# Patient Record
Sex: MALE | Race: WHITE | NOT HISPANIC OR LATINO | Employment: OTHER | URBAN - METROPOLITAN AREA
[De-identification: names, ages, dates, MRNs, and addresses within clinical notes are randomized per-mention and may not be internally consistent; named-entity substitution may affect disease eponyms.]

---

## 2017-01-10 ENCOUNTER — ANESTHESIA EVENT (OUTPATIENT)
Dept: ENDOSCOPY | Facility: HOSPITAL | Age: 82
End: 2017-01-10
Payer: MEDICARE

## 2017-01-10 ENCOUNTER — SURGERY (OUTPATIENT)
Age: 82
End: 2017-01-10

## 2017-01-10 ENCOUNTER — ANESTHESIA (OUTPATIENT)
Dept: ENDOSCOPY | Facility: HOSPITAL | Age: 82
End: 2017-01-10
Payer: MEDICARE

## 2017-01-10 VITALS — RESPIRATION RATE: 31 BRPM

## 2017-01-10 PROBLEM — K21.9 GERD (GASTROESOPHAGEAL REFLUX DISEASE): Status: ACTIVE | Noted: 2017-01-10

## 2017-01-10 PROCEDURE — D9220A PRA ANESTHESIA: Mod: CRNA,,, | Performed by: NURSE ANESTHETIST, CERTIFIED REGISTERED

## 2017-01-10 PROCEDURE — D9220A PRA ANESTHESIA: Mod: ANES,,, | Performed by: ANESTHESIOLOGY

## 2017-01-10 PROCEDURE — 63600175 PHARM REV CODE 636 W HCPCS: Mod: PO | Performed by: NURSE ANESTHETIST, CERTIFIED REGISTERED

## 2017-01-10 PROCEDURE — 25000003 PHARM REV CODE 250: Mod: PO | Performed by: NURSE ANESTHETIST, CERTIFIED REGISTERED

## 2017-01-10 RX ORDER — GLYCOPYRROLATE 0.2 MG/ML
INJECTION INTRAMUSCULAR; INTRAVENOUS
Status: DISCONTINUED | OUTPATIENT
Start: 2017-01-10 | End: 2017-01-10

## 2017-01-10 RX ORDER — LIDOCAINE HCL/PF 100 MG/5ML
SYRINGE (ML) INTRAVENOUS
Status: DISCONTINUED | OUTPATIENT
Start: 2017-01-10 | End: 2017-01-10

## 2017-01-10 RX ORDER — PROPOFOL 10 MG/ML
VIAL (ML) INTRAVENOUS
Status: DISCONTINUED | OUTPATIENT
Start: 2017-01-10 | End: 2017-01-10

## 2017-01-10 RX ADMIN — PROPOFOL 50 MG: 10 INJECTION, EMULSION INTRAVENOUS at 12:01

## 2017-01-10 RX ADMIN — LIDOCAINE HYDROCHLORIDE 100 SYRINGE: 20 INJECTION, SOLUTION INTRAVENOUS at 12:01

## 2017-01-10 RX ADMIN — GLYCOPYRROLATE 0.2 MG: 0.2 INJECTION, SOLUTION INTRAMUSCULAR; INTRAVENOUS at 12:01

## 2017-01-10 NOTE — ANESTHESIA PREPROCEDURE EVALUATION
01/10/2017  Amor Alcazar is a 81 y.o., male.    [facility] Anesthesia Evaluation      I have reviewed the Medications.     Review of Systems  Anesthesia Hx:  No problems with previous Anesthesia    Social:  Non-Smoker, No Alcohol Use    Cardiovascular:   hyperlipidemia    Pulmonary:  Pulmonary Normal    Renal/:  Renal/ Normal     Hepatic/GI:   GERD    Musculoskeletal:   Arthritis     Neurological:  Neurology Normal    Endocrine:  Endocrine Normal        Physical Exam  General:  Well nourished    Airway/Jaw/Neck:  Airway Findings: Mouth Opening: Normal General Airway Assessment: Adult  Mallampati: II  Jaw/Neck Findings:  Neck ROM: Extension Decreased, Mild      Dental:  Dental Findings: Edentulous   Chest/Lungs:  Chest/Lungs Findings: Clear to auscultation, Normal Respiratory Rate     Heart/Vascular:  Heart Findings: Rate: Normal  Rhythm: Regular Rhythm  Sounds: Normal  Heart murmur: negative Vascular Findings: Normal (No carotid bruits.)       Mental Status:  Mental Status Findings:  Cooperative, Alert and Oriented         Anesthesia Plan  Type of Anesthesia, risks & benefits discussed:  Anesthesia Type:  general  Patient's Preference:   Intra-op Monitoring Plan:   Intra-op Monitoring Plan Comments:   Post Op Pain Control Plan:   Post Op Pain Control Plan Comments:   Induction:   IV  Beta Blocker:  Patient is not currently on a Beta-Blocker (No further documentation required).       Informed Consent: Patient understands risks and agrees with Anesthesia plan.  Questions answered. Anesthesia consent signed with patient.  ASA Score: 2     Day of Surgery Review of History & Physical:        Anesthesia Plan Notes: Propofol general.        Ready For Surgery From Anesthesia Perspective.

## 2017-01-10 NOTE — TRANSFER OF CARE
"Anesthesia Transfer of Care Note    Patient: Amor Alcazar    Procedure(s) Performed: Procedure(s) (LRB):  ESOPHAGOGASTRODUODENOSCOPY (EGD) (N/A)    Patient location: PACU    Anesthesia Type: general    Transport from OR: Transported from OR on room air with adequate spontaneous ventilation    Post pain: adequate analgesia    Post assessment: no apparent anesthetic complications and tolerated procedure well    Post vital signs: stable    Level of consciousness: sedated    Nausea/Vomiting: no nausea/vomiting    Complications: none          Last vitals:   Visit Vitals    BP (!) 146/94 (BP Method: Automatic)    Pulse 69    Temp 36.6 °C (97.8 °F) (Oral)    Resp 18    Ht 5' 11" (1.803 m)    Wt 80.7 kg (178 lb)    SpO2 97%    BMI 24.83 kg/m2     "

## 2017-01-10 NOTE — ANESTHESIA POSTPROCEDURE EVALUATION
"Anesthesia Post Evaluation    Patient: Amor Alcazar    Procedure(s) Performed: Procedure(s) (LRB):  ESOPHAGOGASTRODUODENOSCOPY (EGD) (N/A)    Final Anesthesia Type: general  Patient location during evaluation: PACU  Patient participation: Yes- Able to Participate  Level of consciousness: awake and alert and oriented  Post-procedure vital signs: reviewed and stable  Pain management: adequate  Airway patency: patent  PONV status at discharge: No PONV  Anesthetic complications: no      Cardiovascular status: hemodynamically stable and blood pressure returned to baseline  Respiratory status: unassisted, spontaneous ventilation and room air  Hydration status: euvolemic  Follow-up not needed.        Visit Vitals    /74 (Patient Position: Lying, BP Method: Automatic)    Pulse 85    Temp 36.6 °C (97.9 °F) (Temporal)    Resp 16    Ht 5' 11" (1.803 m)    Wt 80.7 kg (178 lb)    SpO2 96%    BMI 24.83 kg/m2       Pain/Michelle Score: Pain Assessment Performed: Yes (1/10/2017 12:55 PM)  Presence of Pain: non-verbal indicators absent (1/10/2017 12:55 PM)  Michelle Score: 10 (1/10/2017 12:55 PM)      "

## 2017-01-16 ENCOUNTER — PATIENT MESSAGE (OUTPATIENT)
Dept: GASTROENTEROLOGY | Facility: CLINIC | Age: 82
End: 2017-01-16

## 2017-02-01 ENCOUNTER — PATIENT MESSAGE (OUTPATIENT)
Dept: GASTROENTEROLOGY | Facility: CLINIC | Age: 82
End: 2017-02-01

## 2017-04-04 ENCOUNTER — LAB VISIT (OUTPATIENT)
Dept: LAB | Facility: HOSPITAL | Age: 82
End: 2017-04-04
Attending: UROLOGY
Payer: MEDICARE

## 2017-04-04 DIAGNOSIS — C61 PROSTATE CANCER: ICD-10-CM

## 2017-04-04 LAB
PROSTATE SPECIFIC ANTIGEN, TOTAL: 0.51 NG/ML
PSA FREE MFR SERPL: 9.8 %
PSA FREE SERPL-MCNC: 0.05 NG/ML

## 2017-04-04 PROCEDURE — 84153 ASSAY OF PSA TOTAL: CPT

## 2017-04-04 PROCEDURE — 36415 COLL VENOUS BLD VENIPUNCTURE: CPT | Mod: PO

## 2017-04-05 ENCOUNTER — TELEPHONE (OUTPATIENT)
Dept: UROLOGY | Facility: CLINIC | Age: 82
End: 2017-04-05

## 2017-04-11 ENCOUNTER — OFFICE VISIT (OUTPATIENT)
Dept: UROLOGY | Facility: CLINIC | Age: 82
End: 2017-04-11
Payer: MEDICARE

## 2017-04-11 VITALS
SYSTOLIC BLOOD PRESSURE: 151 MMHG | DIASTOLIC BLOOD PRESSURE: 82 MMHG | WEIGHT: 173.31 LBS | BODY MASS INDEX: 24.26 KG/M2 | HEART RATE: 72 BPM | HEIGHT: 71 IN

## 2017-04-11 DIAGNOSIS — C61 PROSTATE CANCER: Primary | ICD-10-CM

## 2017-04-11 PROCEDURE — 96402 CHEMO HORMON ANTINEOPL SQ/IM: CPT | Mod: S$GLB,,, | Performed by: UROLOGY

## 2017-04-11 PROCEDURE — 99999 PR PBB SHADOW E&M-EST. PATIENT-LVL III: CPT | Mod: PBBFAC,,, | Performed by: UROLOGY

## 2017-04-11 PROCEDURE — 99499 UNLISTED E&M SERVICE: CPT | Mod: S$GLB,,, | Performed by: UROLOGY

## 2017-04-11 PROCEDURE — 1157F ADVNC CARE PLAN IN RCRD: CPT | Mod: S$GLB,,, | Performed by: UROLOGY

## 2017-04-11 PROCEDURE — 1160F RVW MEDS BY RX/DR IN RCRD: CPT | Mod: S$GLB,,, | Performed by: UROLOGY

## 2017-04-11 PROCEDURE — 1159F MED LIST DOCD IN RCRD: CPT | Mod: S$GLB,,, | Performed by: UROLOGY

## 2017-04-11 PROCEDURE — 99213 OFFICE O/P EST LOW 20 MIN: CPT | Mod: 25,S$GLB,, | Performed by: UROLOGY

## 2017-04-11 PROCEDURE — 1126F AMNT PAIN NOTED NONE PRSNT: CPT | Mod: S$GLB,,, | Performed by: UROLOGY

## 2017-04-11 NOTE — MR AVS SNAPSHOT
"    [facility]  [address]  [facility] [address]  Phone: [phone]                  Amor Alcazar   2017 8:00 AM   Office Visit    Description:  Male : 1935   Provider:  Tj Arellano MD   Department:  [facility]           Reason for Visit     Prostate Cancer                To Do List           Goals (5 Years of Data)     None      [facility] On Call     [facility] On Call Nurse Care Line -  Assistance  Unless otherwise directed by your provider, please contact [facility], our nurse care line that is available for  assistance.     Registered nurses in the [facility] provide: appointment scheduling, clinical advisement, health education, and other advisory services.  Call: [phone] (toll free)               Medications           Message regarding Medications     Verify the changes and/or additions to your medication regime listed below are the same as discussed with your clinician today.  If any of these changes or additions are incorrect, please notify your healthcare provider.        STOP taking these medications     ranitidine (ZANTAC) 300 MG tablet Take 1 tablet (300 mg total) by mouth every evening. , about 30-60 minutes before bedtime.           Verify that the below list of medications is an accurate representation of the medications you are currently taking.  If none reported, the list may be blank. If incorrect, please contact your healthcare provider. Carry this list with you in case of emergency.           Current Medications            Clinical Reference Information           Your Vitals Were     BP Pulse Height Weight BMI    151/82 (BP Location: Right arm) 72 5' 11" (1.803 m) 78.6 kg (173 lb 4.5 oz) 24.17 kg/m2      Blood Pressure          Most Recent Value    BP  (!)  151/82      Allergies as of 2017     No Known Allergies      Immunizations Administered on Date of Encounter - 2017     None      Language Assistance " Services     ATTENTION: Language assistance services are available, free of charge. Please call [phone].      ATENCIÓN: Si habla luis, tiene a mao disposición servicios gratuitos de asistencia lingüística. Llame al [phone].     CHÚ Ý: N?u b?n nói Ti?ng Vi?t, có các d?ch v? h? tr? ngôn ng? mi?n phí dành cho b?n. G?i s? [phone].         [facility] complies with applicable Federal civil rights laws and does not discriminate on the basis of race, color, national origin, age, disability, or sex.

## 2017-04-11 NOTE — PROGRESS NOTES
UROLOGY [place]  4 11 17    c-c follow up for prostate cancer high gabe    Age 82, accompanied by his wife.    Visits here every 6 months for LHRH agonist therapy. Receives trelstar 22.5 mg      His psa was 0.07 two years ago, 0.17 one year ago, 0.20 six months ago, and 0.51 last week.   On presentation it was 70.      His general health has been good, no special concerns. His stream is good, no pains or burning.  Nocturia x 2.      Has been on LHRH as his only treatment for his prostate malignancy.   Has not had side effects from his trelstar.   Also, his prostate medication is the only medication pt is on.      His first LHRH treatment was firmagon 240 mg in June 2014, the second was firmagon 80 mg one month later, and the third one another firmagon 80 mg a month after that. We had agreed that, after that, he would move to trelstar, as by then his tumor activity would be greatly suppressed and the six- month interval of the trelstar would be an advantage. He agreed to that and we have been keeping him on this treatment      His gabe was 9 (4+5) involving all sextants, done 6/2014      IMP prostate ca  Here for trelstar      rtc 6 mo

## 2017-05-09 ENCOUNTER — PATIENT MESSAGE (OUTPATIENT)
Dept: FAMILY MEDICINE | Facility: CLINIC | Age: 82
End: 2017-05-09

## 2017-06-01 ENCOUNTER — PATIENT MESSAGE (OUTPATIENT)
Dept: FAMILY MEDICINE | Facility: CLINIC | Age: 82
End: 2017-06-01

## 2017-06-28 ENCOUNTER — OFFICE VISIT (OUTPATIENT)
Dept: FAMILY MEDICINE | Facility: CLINIC | Age: 82
End: 2017-06-28
Payer: MEDICARE

## 2017-06-28 VITALS
HEIGHT: 71 IN | BODY MASS INDEX: 24.41 KG/M2 | SYSTOLIC BLOOD PRESSURE: 120 MMHG | HEART RATE: 65 BPM | RESPIRATION RATE: 16 BRPM | DIASTOLIC BLOOD PRESSURE: 74 MMHG | WEIGHT: 174.38 LBS

## 2017-06-28 DIAGNOSIS — M19.90 ARTHRITIS: ICD-10-CM

## 2017-06-28 DIAGNOSIS — Z85.828 HISTORY OF SKIN CANCER: ICD-10-CM

## 2017-06-28 DIAGNOSIS — E78.1 HYPERTRIGLYCERIDEMIA: ICD-10-CM

## 2017-06-28 DIAGNOSIS — E78.5 HYPERLIPIDEMIA, UNSPECIFIED HYPERLIPIDEMIA TYPE: Primary | ICD-10-CM

## 2017-06-28 DIAGNOSIS — C61 MALIGNANT NEOPLASM OF PROSTATE: ICD-10-CM

## 2017-06-28 PROBLEM — K21.9 GERD (GASTROESOPHAGEAL REFLUX DISEASE): Status: RESOLVED | Noted: 2017-01-10 | Resolved: 2017-06-28

## 2017-06-28 PROCEDURE — 1157F ADVNC CARE PLAN IN RCRD: CPT | Mod: S$GLB,,, | Performed by: FAMILY MEDICINE

## 2017-06-28 PROCEDURE — 1159F MED LIST DOCD IN RCRD: CPT | Mod: S$GLB,,, | Performed by: FAMILY MEDICINE

## 2017-06-28 PROCEDURE — 1126F AMNT PAIN NOTED NONE PRSNT: CPT | Mod: S$GLB,,, | Performed by: FAMILY MEDICINE

## 2017-06-28 PROCEDURE — 99214 OFFICE O/P EST MOD 30 MIN: CPT | Mod: S$GLB,,, | Performed by: FAMILY MEDICINE

## 2017-06-28 PROCEDURE — 99499 UNLISTED E&M SERVICE: CPT | Mod: S$GLB,,, | Performed by: FAMILY MEDICINE

## 2017-06-28 PROCEDURE — 99999 PR PBB SHADOW E&M-EST. PATIENT-LVL III: CPT | Mod: PBBFAC,,, | Performed by: FAMILY MEDICINE

## 2017-06-28 NOTE — PROGRESS NOTES
Subjective:     THIS DOCUMENT WAS MADE IN PART WITH [employer] DICTATION SOFTWARE.  OCCASIONALLY THIS SOFTWARE WILL MISINTERPRET WORDS OR PHRASES.     Patient ID: Amor Alcazar is a 82 y.o. male.    Chief Complaint: Hyperlipidemia (follow up;  lipid panel)    HPI    He returns with his wife for a routine checkup. He reports that he is doing well and really has no significant complaints.     Hyperlipidemia, chronic condition, trying to manage with diet really not trying that hard. Still has significant candy suites doughnuts etc.   Hypertriglyceridemia and low HDL is the primary concern.   History of prostate cancer, I did review his recent labs. He reports no acute urinary symptoms. He does follow with urology. There has been a mild increase in his PSA but it remains below 1.0.   History of skin cancer. He does not follow with Dr. Stovall on any regular basis any longer. He has a notice any significant concerns. He still spends quite a bit of time out in the sun and understands the need for protection.    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     No current facility-administered medications on file prior to visit.      Review of patient's allergies indicates:  No Known Allergies  Social History   Substance Use Topics    Smoking status: Never Smoker    Smokeless tobacco: Never Used    Alcohol use Yes      Comment: few beers daily         Review of Systems    Constitutional: Negative for chills, fever and unexpected weight change.   HENT: Negative for ear pain, rhinorrhea and sore throat.    Respiratory: Negative for cough, shortness of breath and wheezing.    Cardiovascular: Negative for chest pain.   Endocrine: Negative for polydipsia and polyuria.   Musculoskeletal: Positive for arthralgias and myalgias.   Skin: Negative for rash.   Allergic/Immunologic: Negative for environmental allergies.   Neurological: Negative for headaches.       Objective:      Physical Exam   Constitutional: He is oriented to person, place, and time. He appears well-developed and well-nourished. No distress.   HENT:   Head: Normocephalic and atraumatic.   Right Ear: External ear normal.   Left Ear: External ear normal.   Mouth/Throat: Oropharynx is clear and moist. No oropharyngeal exudate.   Eyes: Conjunctivae and EOM are normal. Pupils are equal, round, and reactive to light. Right eye exhibits no discharge. Left eye exhibits no discharge. No scleral icterus.   Neck: Normal range of motion. Neck supple. No JVD present.   Cardiovascular: Normal rate, regular rhythm and normal heart sounds.  Exam reveals no gallop and no friction rub.    No murmur heard.  Pulmonary/Chest: Effort normal and breath sounds normal. No respiratory distress.   Abdominal: Soft. Bowel sounds are normal. He exhibits no distension. There is no tenderness.   Lymphadenopathy:     He has no cervical adenopathy.   Neurological: He is alert and oriented to person, place, and time.   Skin: Skin is dry. No rash noted. He is not diaphoretic.   Psychiatric: He has a normal mood and affect. His behavior is normal.   Vitals reviewed.      Assessment:       1. Hyperlipidemia, unspecified hyperlipidemia type    2. Hypertriglyceridemia    3. Malignant neoplasm of prostate    4. Arthritis    5. History of skin cancer        Plan:       Amor was seen today for hyperlipidemia.    Diagnoses and all orders for this  visit:    Hyperlipidemia, unspecified hyperlipidemia type  -     Lipid panel; Future  -     Comprehensive metabolic panel; Future    Hypertriglyceridemia   Counseling on dietary improvements exercise and weight management    Malignant neoplasm of prostate   mild increase in PSA, he will follow back with urology  Arthritis   stable  History of skin cancer   stable, discussed UV protection

## 2017-07-06 ENCOUNTER — LAB VISIT (OUTPATIENT)
Dept: LAB | Facility: HOSPITAL | Age: 82
End: 2017-07-06
Attending: FAMILY MEDICINE
Payer: MEDICARE

## 2017-07-06 DIAGNOSIS — E78.5 HYPERLIPIDEMIA, UNSPECIFIED HYPERLIPIDEMIA TYPE: ICD-10-CM

## 2017-07-06 LAB
ALBUMIN SERPL BCP-MCNC: 3.6 G/DL
ALP SERPL-CCNC: 31 U/L
ALT SERPL W/O P-5'-P-CCNC: 5 U/L
ANION GAP SERPL CALC-SCNC: 6 MMOL/L
AST SERPL-CCNC: 18 U/L
BILIRUB SERPL-MCNC: 0.4 MG/DL
BUN SERPL-MCNC: 11 MG/DL
CALCIUM SERPL-MCNC: 9.2 MG/DL
CHLORIDE SERPL-SCNC: 107 MMOL/L
CHOLEST/HDLC SERPL: 4.4 {RATIO}
CO2 SERPL-SCNC: 27 MMOL/L
CREAT SERPL-MCNC: 1.2 MG/DL
EST. GFR  (AFRICAN AMERICAN): >60 ML/MIN/1.73 M^2
EST. GFR  (NON AFRICAN AMERICAN): 56.8 ML/MIN/1.73 M^2
GLUCOSE SERPL-MCNC: 99 MG/DL
HDL/CHOLESTEROL RATIO: 22.8 %
HDLC SERPL-MCNC: 184 MG/DL
HDLC SERPL-MCNC: 42 MG/DL
LDLC SERPL CALC-MCNC: 116.6 MG/DL
NONHDLC SERPL-MCNC: 142 MG/DL
POTASSIUM SERPL-SCNC: 4.6 MMOL/L
PROT SERPL-MCNC: 7.9 G/DL
SODIUM SERPL-SCNC: 140 MMOL/L
TRIGL SERPL-MCNC: 127 MG/DL

## 2017-07-06 PROCEDURE — 36415 COLL VENOUS BLD VENIPUNCTURE: CPT | Mod: PO

## 2017-07-06 PROCEDURE — 80053 COMPREHEN METABOLIC PANEL: CPT

## 2017-07-06 PROCEDURE — 80061 LIPID PANEL: CPT

## 2017-07-12 ENCOUNTER — OFFICE VISIT (OUTPATIENT)
Dept: FAMILY MEDICINE | Facility: CLINIC | Age: 82
End: 2017-07-12
Payer: MEDICARE

## 2017-07-12 VITALS
HEART RATE: 68 BPM | BODY MASS INDEX: 24.07 KG/M2 | DIASTOLIC BLOOD PRESSURE: 84 MMHG | SYSTOLIC BLOOD PRESSURE: 149 MMHG | WEIGHT: 171.94 LBS | HEIGHT: 71 IN

## 2017-07-12 DIAGNOSIS — C61 MALIGNANT NEOPLASM OF PROSTATE: ICD-10-CM

## 2017-07-12 DIAGNOSIS — Z00.00 ENCOUNTER FOR PREVENTIVE HEALTH EXAMINATION: Primary | ICD-10-CM

## 2017-07-12 DIAGNOSIS — Z85.828 HISTORY OF SKIN CANCER: ICD-10-CM

## 2017-07-12 DIAGNOSIS — E78.1 HYPERTRIGLYCERIDEMIA: ICD-10-CM

## 2017-07-12 DIAGNOSIS — M19.90 ARTHRITIS: ICD-10-CM

## 2017-07-12 DIAGNOSIS — Z96.1 PSEUDOPHAKIA OF BOTH EYES: ICD-10-CM

## 2017-07-12 PROCEDURE — 99397 PER PM REEVAL EST PAT 65+ YR: CPT | Mod: S$GLB,,, | Performed by: NURSE PRACTITIONER

## 2017-07-12 PROCEDURE — 99999 PR PBB SHADOW E&M-EST. PATIENT-LVL III: CPT | Mod: PBBFAC,,, | Performed by: NURSE PRACTITIONER

## 2017-07-12 NOTE — PATIENT INSTRUCTIONS
Counseling and Referral of Other Preventative  (Italic type indicates deductible and co-insurance are waived)    Patient Name: Amor Alcazar  Today's Date: 7/12/2017      SERVICE LIMITATIONS RECOMMENDATION    Vaccines    · Pneumococcal (once after 65)    · Influenza (annually)    · Hepatitis B (if medium/high risk)    · Prevnar 13      Hepatitis B medium/high risk factors:       - End-stage renal disease       - Hemophiliacs who received Factor VII or         IX concentrates       - Clients of institutions for the mentally             retarded       - Persons who live in the same house as          a HepB carrier       - Homosexual men       - Illicit injectable drug abusers     Pneumococcal: Done, no repeat necessary     Influenza: N/A     Hepatitis B: N/A     Prevnar 13: Done, no repeat necessary    Prostate cancer screening (annually to age 75)     Prostate specific antigen (PSA) Shared decision making with Provider. Sometimes a co-pay may be required if the patient decides to have this test. The USPSTF no longer recommends prostate cancer screening routinely in medicine: not to follow    Colorectal cancer screening (to age 75)    · Fecal occult blood test (annual)  · Flexible sigmoidoscopy (5y)  · Screening colonoscopy (10y)  · Barium enema   N/A    Diabetes self-management training (no USPSTF recommendations)  Requires referral by treating physician for patient with diabetes or renal disease. 10 hours of initial DSMT sessions of no less than 30 minutes each in a continuous 12-month period. 2 hours of follow-up DSMT in subsequent years.  N/A    Glaucoma screening (no USPSTF recommendation)  Diabetes mellitus, family history   , age 50 or over    American, age 65 or over  Last done 2016, recommend to repeat every 1  years    Medical nutrition therapy for diabetes or renal disease (no recommended schedule)  Requires referral by treating physician for patient with diabetes or renal disease  or kidney transplant within the past 3 years.  Can be provided in same year as diabetes self-management training (DSMT), and CMS recommends medical nutrition therapy take place after DSMT. Up to 3 hours for initial year and 2 hours in subsequent years.  N/A    Cardiovascular screening blood tests (every 5 years)  · Fasting lipid panel  Order as a panel if possible  Done this year, repeat every year    Diabetes screening tests (at least every 3 years, Medicare covers annually or at 6-month intervals for prediabetic patients)  · Fasting blood sugar (FBS) or glucose tolerance test (GTT)  Patient must be diagnosed with one of the following:       - Hypertension       - Dyslipidemia       - Obesity (BMI 30kg/m2)       - Previous elevated impaired FBS or GTT       ... or any two of the following:       - Overweight (BMI 25 but <30)       - Family history of diabetes       - Age 65 or older       - History of gestational diabetes or birth of baby weighing more than 9 pounds  Done this year, repeat every year    Abdominal aortic aneurysm screening (once)  · Sonogram   Limited to patients who meet one of the following criteria:       - Men who are 65-75 years old and have smoked more than 100 cigarette in their lifetime       - Anyone with a family history of abdominal aortic aneurysm       - Anyone recommended for screening by the USPSTF  N/A    HIV screening (annually for increased risk patients)  · HIV-1 and HIV-2 by EIA, or CARMEN, rapid antibody test or oral mucosa transudate  Patients must be at increased risk for HIV infection per USPSTF guidelines or pregnant. Tests covered annually for patient at increased risk or as requested by the patient. Pregnant patients may receive up to 3 tests during pregnancy.  Risks discussed, screening is not recommended    Smoking cessation counseling (up to 8 sessions per year)  Patients must be asymptomatic of tobacco-related conditions to receive as a preventative service.  Non-smoker     Subsequent annual wellness visit  At least 12 months since last AWV  Return in one year     The following information is provided to all patients.  This information is to help you find resources for any of the problems found today that may be affecting your health:                Living healthy guide: www.[facility].[place].[facility]      Understanding Diabetes: www.diabetes.org      Eating healthy: www.cdc.gov/healthyweight      CDC home safety checklist: www.cdc.gov/steadi/patient.html      Agency on Aging: www.goea.[place].[facility]      Alcoholics anonymous (AA): www.aa.org      Physical Activity: www.anastasia.nih.gov/do3lrvi      Tobacco use: www.quitwithusla.org

## 2017-07-19 NOTE — PROGRESS NOTES
"Amor Alcazar presented for a  Medicare AWV and comprehensive Health Risk Assessment today. The following components were reviewed and updated:    · Medical history  · Family History  · Social history  · Allergies and Current Medications  · Health Risk Assessment  · Health Maintenance  · Care Team     ** See Completed Assessments for Annual Wellness Visit within the encounter summary.**       The following assessments were completed:  · Living Situation  · CAGE  · Depression Screening  · Timed Get Up and Go  · Whisper Test  · Cognitive Function Screening  · Nutrition Screening  · ADL Screening  · PAQ Screening    Vitals:    07/12/17 1109   BP: (!) 149/84   BP Location: Left arm   Patient Position: Sitting   BP Method: Automatic   Pulse: 68   Weight: 78 kg (171 lb 15.3 oz)   Height: 5' 11" (1.803 m)     Body mass index is 23.98 kg/m².  Physical Exam   Constitutional: He appears well-developed. No distress.   HENT:   Head: Normocephalic.   Cardiovascular: Normal heart sounds.    Pulmonary/Chest: Breath sounds normal.   Neurological: He is alert.   Skin: Skin is warm and dry.   Psychiatric: He has a normal mood and affect. His behavior is normal. Judgment and thought content normal.         Diagnoses and health risks identified today and associated recommendations/orders:    1. Encounter for preventive health examination  Recommend yearly HRA visit    2. Malignant neoplasm of prostate  Follow urology recs  Followed by Eileen.       3. History of skin cancer  Recommend yearly skin checks  Followed by Norm Alaniz MD .       4. Pseudophakia of both eyes  Stable.     Followed by ophthalmology.       5. Hypertriglyceridemia  Stable.   Continue to monitor  Educate to limit sugar  Followed by Norm Alaniz MD .       6. Arthritis  Stable.     Followed by Norm Alaniz MD .         Provided Amor with a 5-10 year written screening schedule and personal prevention plan. Recommendations were developed " using the USPSTF age appropriate recommendations. Education, counseling, and referrals were provided as needed. After Visit Summary printed and given to patient which includes a list of additional screenings\tests needed.    Return in about 1 year (around 7/12/2018).    Kathie Morrison NP

## 2017-08-08 ENCOUNTER — PATIENT MESSAGE (OUTPATIENT)
Dept: FAMILY MEDICINE | Facility: CLINIC | Age: 82
End: 2017-08-08

## 2017-09-01 ENCOUNTER — PATIENT MESSAGE (OUTPATIENT)
Dept: FAMILY MEDICINE | Facility: CLINIC | Age: 82
End: 2017-09-01

## 2017-09-07 ENCOUNTER — TELEPHONE (OUTPATIENT)
Dept: UROLOGY | Facility: CLINIC | Age: 82
End: 2017-09-07

## 2017-09-07 DIAGNOSIS — C61 PROSTATE CANCER: Primary | ICD-10-CM

## 2017-09-08 DIAGNOSIS — C61 MALIGNANT NEOPLASM OF PROSTATE: Primary | ICD-10-CM

## 2017-10-03 ENCOUNTER — LAB VISIT (OUTPATIENT)
Dept: LAB | Facility: HOSPITAL | Age: 82
End: 2017-10-03
Attending: UROLOGY
Payer: MEDICARE

## 2017-10-03 DIAGNOSIS — C61 MALIGNANT NEOPLASM OF PROSTATE: ICD-10-CM

## 2017-10-03 LAB
PROSTATE SPECIFIC ANTIGEN, TOTAL: 1.3 NG/ML
PSA FREE MFR SERPL: 9.23 %
PSA FREE SERPL-MCNC: 0.12 NG/ML

## 2017-10-03 PROCEDURE — 84153 ASSAY OF PSA TOTAL: CPT

## 2017-10-03 PROCEDURE — 36415 COLL VENOUS BLD VENIPUNCTURE: CPT | Mod: PO

## 2017-10-10 ENCOUNTER — OFFICE VISIT (OUTPATIENT)
Dept: UROLOGY | Facility: CLINIC | Age: 82
End: 2017-10-10
Payer: MEDICARE

## 2017-10-10 VITALS
HEART RATE: 71 BPM | DIASTOLIC BLOOD PRESSURE: 60 MMHG | HEIGHT: 71 IN | BODY MASS INDEX: 24.07 KG/M2 | WEIGHT: 171.94 LBS | SYSTOLIC BLOOD PRESSURE: 140 MMHG

## 2017-10-10 DIAGNOSIS — C61 MALIGNANT NEOPLASM OF PROSTATE: Primary | ICD-10-CM

## 2017-10-10 PROCEDURE — 99999 PR PBB SHADOW E&M-EST. PATIENT-LVL II: CPT | Mod: PBBFAC,,, | Performed by: UROLOGY

## 2017-10-10 PROCEDURE — 96402 CHEMO HORMON ANTINEOPL SQ/IM: CPT | Mod: S$GLB,,, | Performed by: UROLOGY

## 2017-10-10 PROCEDURE — 99499 UNLISTED E&M SERVICE: CPT | Mod: S$GLB,,, | Performed by: UROLOGY

## 2017-10-10 PROCEDURE — 99213 OFFICE O/P EST LOW 20 MIN: CPT | Mod: 25,S$GLB,, | Performed by: UROLOGY

## 2017-10-10 NOTE — PROGRESS NOTES
UROLOGY [place]  10 10 17    c-c prostate ca    Age 82, accompanied by wife. Pt comes in for a trelstar injection.     Pt was diagnosed prostate ca in jun2014. The path report showed positive for adenocarcinoma in all sextants, ranging from 70% to 100% involvement. The gabe sum was 9 (4+5). Psa was 70.    A bone scan showed extensive degenerative change, but no evidence of metastatic disease.      Pt received systemic therapy with hormonal suppresion with firmagon 240 mg and later firmagon 80 im once a month times two months, and then shift to trelstar 22.5 mg im q 6 mo.  I also started him on casodex 50 mg po qd.      Visits here every 6 months for LHRH agonist therapy. Receives trelstar 22.5 mg      His psa was 1.3 last week, 0.51 on 4 4 17, 0.20 on 9 27 16, and 0.07 on 9 23 15      His general health has been good, no special concerns. His stream is good, no pains or burning.  Nocturia x 2.        been on LHRH as his only treatment for his prostate malignancy.   Has not had side effects from his trelstar.   Also, his prostate medication is the only medication pt is on.          IMP prostate ca  Here for trelstar, given by me, L glutteal region      rtc 6 mo

## 2017-12-22 ENCOUNTER — OFFICE VISIT (OUTPATIENT)
Dept: FAMILY MEDICINE | Facility: CLINIC | Age: 82
End: 2017-12-22
Payer: MEDICARE

## 2017-12-22 VITALS
SYSTOLIC BLOOD PRESSURE: 122 MMHG | DIASTOLIC BLOOD PRESSURE: 78 MMHG | HEIGHT: 71 IN | HEART RATE: 72 BPM | RESPIRATION RATE: 16 BRPM | BODY MASS INDEX: 24.26 KG/M2 | WEIGHT: 173.31 LBS

## 2017-12-22 DIAGNOSIS — C61 MALIGNANT NEOPLASM OF PROSTATE: ICD-10-CM

## 2017-12-22 DIAGNOSIS — E78.1 HYPERTRIGLYCERIDEMIA: Primary | ICD-10-CM

## 2017-12-22 PROCEDURE — 99999 PR PBB SHADOW E&M-EST. PATIENT-LVL III: CPT | Mod: PBBFAC,,, | Performed by: FAMILY MEDICINE

## 2017-12-22 PROCEDURE — 99499 UNLISTED E&M SERVICE: CPT | Mod: S$GLB,,, | Performed by: FAMILY MEDICINE

## 2017-12-22 PROCEDURE — 99213 OFFICE O/P EST LOW 20 MIN: CPT | Mod: S$GLB,,, | Performed by: FAMILY MEDICINE

## 2017-12-22 NOTE — PROGRESS NOTES
Subjective:       Patient ID: Amor Alcazar is a 82 y.o. male.    Chief Complaint: Establish Care (Patient here to establish care)    Here as new patient; previously saw Dr. Alaniz. States doing well overall and follows with urology here.        Review of Systems   Constitutional: Negative for chills, fatigue and fever.   Respiratory: Negative for cough, chest tightness and shortness of breath.    Cardiovascular: Negative for chest pain, palpitations and leg swelling.   Gastrointestinal: Negative for abdominal distention and abdominal pain.   Endocrine: Negative for cold intolerance and heat intolerance.   Skin: Negative for rash.   Psychiatric/Behavioral: Negative for dysphoric mood. The patient is not nervous/anxious.        Objective:      Physical Exam   Constitutional: He appears well-developed and well-nourished.   HENT:   Head: Normocephalic and atraumatic.   Cardiovascular: Normal rate, regular rhythm and normal heart sounds.    Pulmonary/Chest: Effort normal and breath sounds normal.   Psychiatric: He has a normal mood and affect.   Nursing note and vitals reviewed.      Assessment:       1. Hypertriglyceridemia    2. Malignant neoplasm of prostate        Plan:       Hypertriglyceridemia    Malignant neoplasm of prostate      Will monitor chronic medical issues and continue current plan of care.      Return in about 6 months (around 6/22/2018), or if symptoms worsen or fail to improve.

## 2018-02-12 ENCOUNTER — TELEPHONE (OUTPATIENT)
Dept: UROLOGY | Facility: CLINIC | Age: 83
End: 2018-02-12

## 2018-02-12 DIAGNOSIS — C61 MALIGNANT NEOPLASM OF PROSTATE: Primary | ICD-10-CM

## 2018-02-12 NOTE — TELEPHONE ENCOUNTER
Patient is scheduled for a 6 month Trelstar on 4/11/18. He needs a referral for the medication and orders for a PSA.

## 2018-04-05 ENCOUNTER — LAB VISIT (OUTPATIENT)
Dept: LAB | Facility: HOSPITAL | Age: 83
End: 2018-04-05
Attending: UROLOGY
Payer: MEDICARE

## 2018-04-05 DIAGNOSIS — C61 MALIGNANT NEOPLASM OF PROSTATE: ICD-10-CM

## 2018-04-05 LAB — COMPLEXED PSA SERPL-MCNC: 1.6 NG/ML

## 2018-04-05 PROCEDURE — 36415 COLL VENOUS BLD VENIPUNCTURE: CPT | Mod: PO

## 2018-04-05 PROCEDURE — 84153 ASSAY OF PSA TOTAL: CPT

## 2018-04-11 ENCOUNTER — OFFICE VISIT (OUTPATIENT)
Dept: UROLOGY | Facility: CLINIC | Age: 83
End: 2018-04-11
Payer: MEDICARE

## 2018-04-11 VITALS
DIASTOLIC BLOOD PRESSURE: 80 MMHG | SYSTOLIC BLOOD PRESSURE: 184 MMHG | HEART RATE: 58 BPM | HEIGHT: 70 IN | WEIGHT: 175.5 LBS | BODY MASS INDEX: 25.13 KG/M2 | RESPIRATION RATE: 18 BRPM

## 2018-04-11 DIAGNOSIS — C61 PROSTATE CA: Primary | ICD-10-CM

## 2018-04-11 PROCEDURE — 99999 PR PBB SHADOW E&M-EST. PATIENT-LVL III: CPT | Mod: PBBFAC,,, | Performed by: UROLOGY

## 2018-04-11 PROCEDURE — 99213 OFFICE O/P EST LOW 20 MIN: CPT | Mod: 25,S$GLB,, | Performed by: UROLOGY

## 2018-04-11 PROCEDURE — 96402 CHEMO HORMON ANTINEOPL SQ/IM: CPT | Mod: S$GLB,,, | Performed by: UROLOGY

## 2018-04-11 PROCEDURE — 99499 UNLISTED E&M SERVICE: CPT | Mod: S$PBB,,, | Performed by: UROLOGY

## 2018-04-11 RX ORDER — ERYTHROMYCIN 5 MG/G
OINTMENT OPHTHALMIC
COMMUNITY
Start: 2018-01-25 | End: 2018-04-11 | Stop reason: ALTCHOICE

## 2018-04-11 NOTE — PROGRESS NOTES
UROLOGY [place]  4 11 18     c-c prostate ca     Age 82, accompanied by wife. Pt comes in for a trelstar injection.      Pt was diagnosed prostate ca in jun2014. The path report showed positive for adenocarcinoma in all sextants, ranging from 70% to 100% involvement. The gabe sum was 9 (4+5). Psa was 70.    A bone scan showed extensive degenerative change, but no evidence of metastatic disease.      Pt received systemic therapy with hormonal suppresion with firmagon 240 mg and later firmagon 80 im once a month times two months, and then shift to trelstar 22.5 mg im q 6 mo.  I also started him on casodex 50 mg po qd.      Visits here every 6 months for LHRH agonist therapy. Receives trelstar 22.5 mg      His psa was 1.6 last week, it was 1.3 six months ago. It was 28 in sept 2015      His general health has been good, no special concerns. His stream is good, no pains or burning.  Nocturia x 2.          been on LHRH as his only treatment for his prostate malignancy.   Has not had side effects from his trelstar.   Also, his prostate medication is the only medication pt is on.          IMP prostate ca  Trelstar, 22.5 mg given by me, Right  glutteal region      rtc 6 mo

## 2018-06-11 ENCOUNTER — OFFICE VISIT (OUTPATIENT)
Dept: FAMILY MEDICINE | Facility: CLINIC | Age: 83
End: 2018-06-11
Payer: MEDICARE

## 2018-06-11 VITALS
RESPIRATION RATE: 16 BRPM | DIASTOLIC BLOOD PRESSURE: 78 MMHG | BODY MASS INDEX: 24.59 KG/M2 | HEIGHT: 70 IN | WEIGHT: 171.75 LBS | HEART RATE: 72 BPM | SYSTOLIC BLOOD PRESSURE: 132 MMHG

## 2018-06-11 DIAGNOSIS — Z00.00 WELLNESS EXAMINATION: Primary | ICD-10-CM

## 2018-06-11 DIAGNOSIS — E78.1 HYPERTRIGLYCERIDEMIA: ICD-10-CM

## 2018-06-11 PROCEDURE — 99397 PER PM REEVAL EST PAT 65+ YR: CPT | Mod: S$GLB,,, | Performed by: FAMILY MEDICINE

## 2018-06-11 PROCEDURE — 99999 PR PBB SHADOW E&M-EST. PATIENT-LVL III: CPT | Mod: PBBFAC,,, | Performed by: FAMILY MEDICINE

## 2018-06-11 PROCEDURE — 99499 UNLISTED E&M SERVICE: CPT | Mod: S$PBB,,, | Performed by: FAMILY MEDICINE

## 2018-06-11 NOTE — PROGRESS NOTES
Subjective:       Patient ID: Amor Alcazar is a 83 y.o. male.    Chief Complaint: Hyperlipidemia (Patient here for 6 month check up)    Here for wellness. Due for labs. Doing well overall.       Hyperlipidemia   This is a chronic problem. The current episode started more than 1 year ago. The problem is controlled. Pertinent negatives include no chest pain or shortness of breath.     Review of Systems   Constitutional: Negative for chills, fatigue and fever.   Respiratory: Negative for cough, chest tightness and shortness of breath.    Cardiovascular: Negative for chest pain, palpitations and leg swelling.   Gastrointestinal: Negative for abdominal distention and abdominal pain.   Endocrine: Negative for cold intolerance and heat intolerance.   Skin: Negative for rash.       Objective:      Physical Exam   Constitutional: He appears well-developed and well-nourished.   HENT:   Head: Normocephalic and atraumatic.   Cardiovascular: Normal rate, regular rhythm and normal heart sounds.    Pulmonary/Chest: Effort normal and breath sounds normal.   Psychiatric: He has a normal mood and affect.   Nursing note and vitals reviewed.      Assessment:       1. Wellness examination    2. Hypertriglyceridemia        Plan:       Wellness examination  -     CBC auto differential; Future; Expected date: 06/11/2018  -     Comprehensive metabolic panel; Future; Expected date: 06/11/2018  -     Lipid panel; Future; Expected date: 06/11/2018    Hypertriglyceridemia  -     CBC auto differential; Future; Expected date: 06/11/2018  -     Comprehensive metabolic panel; Future; Expected date: 06/11/2018  -     Lipid panel; Future; Expected date: 06/11/2018      Update labs and health maintenance.  Will monitor chronic medical issues and continue current plan of care.      Follow-up in about 6 months (around 12/11/2018), or if symptoms worsen or fail to improve.

## 2018-06-14 ENCOUNTER — LAB VISIT (OUTPATIENT)
Dept: LAB | Facility: HOSPITAL | Age: 83
End: 2018-06-14
Attending: FAMILY MEDICINE
Payer: MEDICARE

## 2018-06-14 DIAGNOSIS — Z00.00 WELLNESS EXAMINATION: ICD-10-CM

## 2018-06-14 DIAGNOSIS — E78.1 HYPERTRIGLYCERIDEMIA: ICD-10-CM

## 2018-06-14 LAB
ALBUMIN SERPL BCP-MCNC: 3.9 G/DL
ALP SERPL-CCNC: 27 U/L
ALT SERPL W/O P-5'-P-CCNC: <5 U/L
ANION GAP SERPL CALC-SCNC: 9 MMOL/L
AST SERPL-CCNC: 19 U/L
BASOPHILS # BLD AUTO: 0.06 K/UL
BASOPHILS NFR BLD: 0.9 %
BILIRUB SERPL-MCNC: 0.6 MG/DL
BUN SERPL-MCNC: 10 MG/DL
CALCIUM SERPL-MCNC: 9.6 MG/DL
CHLORIDE SERPL-SCNC: 108 MMOL/L
CHOLEST SERPL-MCNC: 191 MG/DL
CHOLEST/HDLC SERPL: 3.8 {RATIO}
CO2 SERPL-SCNC: 25 MMOL/L
CREAT SERPL-MCNC: 1.1 MG/DL
DIFFERENTIAL METHOD: ABNORMAL
EOSINOPHIL # BLD AUTO: 0.3 K/UL
EOSINOPHIL NFR BLD: 4.1 %
ERYTHROCYTE [DISTWIDTH] IN BLOOD BY AUTOMATED COUNT: 14.9 %
EST. GFR  (AFRICAN AMERICAN): >60 ML/MIN/1.73 M^2
EST. GFR  (NON AFRICAN AMERICAN): >60 ML/MIN/1.73 M^2
GLUCOSE SERPL-MCNC: 95 MG/DL
HCT VFR BLD AUTO: 45.1 %
HDLC SERPL-MCNC: 50 MG/DL
HDLC SERPL: 26.2 %
HGB BLD-MCNC: 14.9 G/DL
IMM GRANULOCYTES # BLD AUTO: 0.02 K/UL
IMM GRANULOCYTES NFR BLD AUTO: 0.3 %
LDLC SERPL CALC-MCNC: 120.6 MG/DL
LYMPHOCYTES # BLD AUTO: 2.8 K/UL
LYMPHOCYTES NFR BLD: 41.8 %
MCH RBC QN AUTO: 32.9 PG
MCHC RBC AUTO-ENTMCNC: 33 G/DL
MCV RBC AUTO: 100 FL
MONOCYTES # BLD AUTO: 0.7 K/UL
MONOCYTES NFR BLD: 10.1 %
NEUTROPHILS # BLD AUTO: 2.8 K/UL
NEUTROPHILS NFR BLD: 42.8 %
NONHDLC SERPL-MCNC: 141 MG/DL
NRBC BLD-RTO: 0 /100 WBC
PLATELET # BLD AUTO: 150 K/UL
PMV BLD AUTO: 10.3 FL
POTASSIUM SERPL-SCNC: 4.7 MMOL/L
PROT SERPL-MCNC: 8.2 G/DL
RBC # BLD AUTO: 4.53 M/UL
SODIUM SERPL-SCNC: 142 MMOL/L
TRIGL SERPL-MCNC: 102 MG/DL
WBC # BLD AUTO: 6.61 K/UL

## 2018-06-14 PROCEDURE — 80053 COMPREHEN METABOLIC PANEL: CPT

## 2018-06-14 PROCEDURE — 85025 COMPLETE CBC W/AUTO DIFF WBC: CPT

## 2018-06-14 PROCEDURE — 80061 LIPID PANEL: CPT

## 2018-06-14 PROCEDURE — 36415 COLL VENOUS BLD VENIPUNCTURE: CPT | Mod: PO

## 2018-07-13 ENCOUNTER — OFFICE VISIT (OUTPATIENT)
Dept: FAMILY MEDICINE | Facility: CLINIC | Age: 83
End: 2018-07-13
Payer: MEDICARE

## 2018-07-13 DIAGNOSIS — C61 MALIGNANT NEOPLASM OF PROSTATE: ICD-10-CM

## 2018-07-13 DIAGNOSIS — E78.1 HYPERTRIGLYCERIDEMIA: ICD-10-CM

## 2018-07-13 DIAGNOSIS — Z00.00 ENCOUNTER FOR PREVENTIVE HEALTH EXAMINATION: Primary | ICD-10-CM

## 2018-07-13 DIAGNOSIS — M19.90 ARTHRITIS: ICD-10-CM

## 2018-07-13 PROCEDURE — 99999 PR PBB SHADOW E&M-EST. PATIENT-LVL III: CPT | Mod: PBBFAC,,, | Performed by: NURSE PRACTITIONER

## 2018-07-13 PROCEDURE — G0439 PPPS, SUBSEQ VISIT: HCPCS | Mod: S$GLB,,, | Performed by: NURSE PRACTITIONER

## 2018-07-13 NOTE — PATIENT INSTRUCTIONS
Counseling and Referral of Other Preventative  (Italic type indicates deductible and co-insurance are waived)    Patient Name: Amor Alcazar  Today's Date: 7/13/2018    Health Maintenance       Date Due Completion Date    Influenza Vaccine 08/01/2018 9/13/2017 (Done)    Override on 9/13/2017: Done    Override on 10/21/2013: Done    Lipid Panel 06/14/2019 6/14/2018    Override on 3/21/2013: Done    TETANUS VACCINE 06/20/2026 6/20/2016    Override on 6/20/2016: Done        No orders of the defined types were placed in this encounter.    The following information is provided to all patients.  This information is to help you find resources for any of the problems found today that may be affecting your health:                Living healthy guide: www.Count includes the [facility].[place].gov      Understanding Diabetes: www.diabetes.org      Eating healthy: www.cdc.gov/healthyweight      [facility] home safety checklist: www.cdc.gov/steadi/patient.html      Agency on Aging: www.goea.[place].gov      Alcoholics anonymous (AA): www.aa.org      Physical Activity: www.anastasia.nih.gov/me8ulkc      Tobacco use: www.quitwithusla.org

## 2018-07-17 VITALS
SYSTOLIC BLOOD PRESSURE: 138 MMHG | HEIGHT: 70 IN | OXYGEN SATURATION: 98 % | HEART RATE: 66 BPM | BODY MASS INDEX: 24.27 KG/M2 | WEIGHT: 169.56 LBS | DIASTOLIC BLOOD PRESSURE: 82 MMHG

## 2018-07-17 NOTE — PROGRESS NOTES
"Amor Alcazar presented for a  Medicare AWV and comprehensive Health Risk Assessment today. The following components were reviewed and updated:    · Medical history  · Family History  · Social history  · Allergies and Current Medications  · Health Risk Assessment  · Health Maintenance  · Care Team     Review of Systems   Constitutional: Negative for chills, fever, malaise/fatigue and weight loss.   Respiratory: Negative for cough, shortness of breath and wheezing.    Cardiovascular: Negative for chest pain.   Gastrointestinal: Negative for abdominal pain, blood in stool, constipation, diarrhea, nausea and vomiting.   Skin: Negative for rash.   Neurological: Negative for dizziness, weakness and headaches.     ** See Completed Assessments for Annual Wellness Visit within the encounter summary.**     The following assessments were completed:  · Living Situation  · CAGE  · Depression Screening  · Timed Get Up and Go  · Whisper Test  · Cognitive Function Screening      · Nutrition Screening  · ADL Screening  · PAQ Screening    Vitals:    07/13/18 0937   BP: 138/82   BP Location: Left arm   Patient Position: Sitting   BP Method: Medium (Manual)   Pulse: 66   SpO2: 98%   Weight: 76.9 kg (169 lb 8.5 oz)   Height: 5' 10" (1.778 m)     Body mass index is 24.33 kg/m².  Physical Exam   Constitutional: He is oriented to person, place, and time. He appears well-nourished.   Cardiovascular: Normal rate, regular rhythm, normal heart sounds and intact distal pulses.    Pulmonary/Chest: Effort normal and breath sounds normal.   Neurological: He is alert and oriented to person, place, and time.   Skin: Skin is warm and dry.   Vitals reviewed.        Diagnoses and health risks identified today and associated recommendations/orders:    1. Encounter for preventive health examination  Reviewed and discussed health maintenance.      2. Hypertriglyceridemia  Stable- continue current treatment and follow up routinely with PCP     3. " Malignant neoplasm of prostate  Stable- continue current treatment and follow up routinely with PCP and urology ()    4. Arthritis  Stable- continue current treatment and follow up routinely with PCP       Provided Amor with a 5-10 year written screening schedule and personal prevention plan. Recommendations were developed using the USPSTF age appropriate recommendations. Education, counseling, and referrals were provided as needed. After Visit Summary printed and given to patient which includes a list of additional screenings\tests needed.    Anusha Yates, NP

## 2018-08-15 ENCOUNTER — PATIENT MESSAGE (OUTPATIENT)
Dept: UROLOGY | Facility: CLINIC | Age: 83
End: 2018-08-15

## 2018-08-15 DIAGNOSIS — C61 MALIGNANT NEOPLASM OF PROSTATE: Primary | ICD-10-CM

## 2018-10-03 ENCOUNTER — PATIENT MESSAGE (OUTPATIENT)
Dept: UROLOGY | Facility: CLINIC | Age: 83
End: 2018-10-03

## 2018-10-05 ENCOUNTER — LAB VISIT (OUTPATIENT)
Dept: LAB | Facility: HOSPITAL | Age: 83
End: 2018-10-05
Attending: UROLOGY
Payer: MEDICARE

## 2018-10-05 DIAGNOSIS — C61 PROSTATE CA: ICD-10-CM

## 2018-10-05 LAB — COMPLEXED PSA SERPL-MCNC: 2.7 NG/ML

## 2018-10-05 PROCEDURE — 36415 COLL VENOUS BLD VENIPUNCTURE: CPT | Mod: PO

## 2018-10-05 PROCEDURE — 84153 ASSAY OF PSA TOTAL: CPT

## 2018-10-11 ENCOUNTER — OFFICE VISIT (OUTPATIENT)
Dept: UROLOGY | Facility: CLINIC | Age: 83
End: 2018-10-11
Payer: MEDICARE

## 2018-10-11 VITALS
HEIGHT: 70 IN | HEART RATE: 75 BPM | DIASTOLIC BLOOD PRESSURE: 81 MMHG | SYSTOLIC BLOOD PRESSURE: 129 MMHG | BODY MASS INDEX: 24.57 KG/M2 | WEIGHT: 171.63 LBS

## 2018-10-11 DIAGNOSIS — C61 MALIGNANT NEOPLASM OF PROSTATE: Primary | ICD-10-CM

## 2018-10-11 PROCEDURE — 99999 PR PBB SHADOW E&M-EST. PATIENT-LVL III: CPT | Mod: PBBFAC,,, | Performed by: UROLOGY

## 2018-10-11 PROCEDURE — 99213 OFFICE O/P EST LOW 20 MIN: CPT | Mod: PBBFAC,PO | Performed by: UROLOGY

## 2018-10-11 PROCEDURE — 96402 CHEMO HORMON ANTINEOPL SQ/IM: CPT | Mod: S$PBB,,, | Performed by: UROLOGY

## 2018-10-11 PROCEDURE — 1101F PT FALLS ASSESS-DOCD LE1/YR: CPT | Mod: CPTII,,, | Performed by: UROLOGY

## 2018-10-11 PROCEDURE — 99212 OFFICE O/P EST SF 10 MIN: CPT | Mod: S$PBB,25,, | Performed by: UROLOGY

## 2018-10-11 RX ADMIN — TRIPTORELIN PAMOATE 22.5 MG: KIT at 01:10

## 2018-10-11 NOTE — PROGRESS NOTES
UROLOGY [place]  10 11 18    4 11 18     c-c prostate ca     Age 82, accompanied by wife. Pt comes in for a trelstar injection every six months.      Pt was diagnosed prostate ca in jun2014. The path report showed positive for adenocarcinoma in all sextants, ranging from 70% to 100% involvement. The gabe sum was 9 (4+5). Psa was 70.    A bone scan showed extensive degenerative change, but no evidence of metastatic disease.      Pt received systemic therapy with hormonal suppresion with firmagon 240 mg and later firmagon 80 im once a month times two months, and then shifted to trelstar 22.5 mg im q 6 mo.  I also started him on casodex 50 mg po qd.      Visits here every 6 months for LHRH agonist therapy. Receives trelstar 22.5 mg and has had no side effects.      His psa was 1.6 six months ago, and 2.7 last week.      His general health has been good, no special concerns. His stream is good, no pains or burning.  Nocturia x 2.        LHRH has been his only treatment for his prostate malignancy.   Also, his prostate medication is the only medication pt is on.    PAST MEDICAL HISTORY:    SURGICAL:  Cataract surgery.     MEDICAL:  Osteoarthritis. Prostate ca, high gabe, on hormonal therapy     FAMILIAL:  No family history of prostate cancer.     SOCIAL:  The patient is , lives in [place].     ALLERGIES:  NKDA.     PHYSICAL EXAMINATION:  ABDOMEN:  Flat.  No masses.  CVA is negative.  No organomegaly or tenderness.  GENITOURINARY:  Penis noncircumcised.  Meatus normal.  Testes normal.  ANA:  requests we do it on next visit in 4/19         IMP prostate ca  Trelstar, 22.5 mg given by me, Right  glutteal region, no problems      rtc 6 mo

## 2019-01-08 ENCOUNTER — TELEPHONE (OUTPATIENT)
Dept: UROLOGY | Facility: CLINIC | Age: 84
End: 2019-01-08

## 2019-01-08 DIAGNOSIS — C61 PROSTATE CA: Primary | ICD-10-CM

## 2019-01-08 NOTE — TELEPHONE ENCOUNTER
----- Message from Summer Hoffmann sent at 1/8/2019 11:35 AM CST -----  Contact: wife Polina Alcazar  Patient wife need to speak to nurse regarding approval from insurance for pt to get shot for cancer per wife       Please call to advice [phone] (home)

## 2019-03-06 ENCOUNTER — PATIENT MESSAGE (OUTPATIENT)
Dept: FAMILY MEDICINE | Facility: CLINIC | Age: 84
End: 2019-03-06

## 2019-03-06 DIAGNOSIS — M19.90 ARTHRITIS: ICD-10-CM

## 2019-03-06 DIAGNOSIS — E78.1 HYPERTRIGLYCERIDEMIA: Primary | ICD-10-CM

## 2019-03-07 ENCOUNTER — PATIENT MESSAGE (OUTPATIENT)
Dept: FAMILY MEDICINE | Facility: CLINIC | Age: 84
End: 2019-03-07

## 2019-03-08 ENCOUNTER — PATIENT MESSAGE (OUTPATIENT)
Dept: FAMILY MEDICINE | Facility: CLINIC | Age: 84
End: 2019-03-08

## 2019-03-11 ENCOUNTER — TELEPHONE (OUTPATIENT)
Dept: FAMILY MEDICINE | Facility: CLINIC | Age: 84
End: 2019-03-11

## 2019-03-12 ENCOUNTER — LAB VISIT (OUTPATIENT)
Dept: LAB | Facility: HOSPITAL | Age: 84
End: 2019-03-12
Attending: FAMILY MEDICINE
Payer: MEDICARE

## 2019-03-12 DIAGNOSIS — M19.90 ARTHRITIS: ICD-10-CM

## 2019-03-12 DIAGNOSIS — E78.1 HYPERTRIGLYCERIDEMIA: ICD-10-CM

## 2019-03-12 LAB
ALBUMIN SERPL BCP-MCNC: 3.8 G/DL
ALP SERPL-CCNC: 27 U/L
ALT SERPL W/O P-5'-P-CCNC: 5 U/L
ANION GAP SERPL CALC-SCNC: 8 MMOL/L
AST SERPL-CCNC: 24 U/L
BASOPHILS # BLD AUTO: 0.06 K/UL
BASOPHILS NFR BLD: 0.8 %
BILIRUB SERPL-MCNC: 0.5 MG/DL
BUN SERPL-MCNC: 15 MG/DL
CALCIUM SERPL-MCNC: 9.8 MG/DL
CHLORIDE SERPL-SCNC: 107 MMOL/L
CHOLEST SERPL-MCNC: 202 MG/DL
CHOLEST/HDLC SERPL: 4 {RATIO}
CO2 SERPL-SCNC: 25 MMOL/L
CREAT SERPL-MCNC: 1.1 MG/DL
DIFFERENTIAL METHOD: ABNORMAL
EOSINOPHIL # BLD AUTO: 0.3 K/UL
EOSINOPHIL NFR BLD: 4 %
ERYTHROCYTE [DISTWIDTH] IN BLOOD BY AUTOMATED COUNT: 14.3 %
EST. GFR  (AFRICAN AMERICAN): >60 ML/MIN/1.73 M^2
EST. GFR  (NON AFRICAN AMERICAN): >60 ML/MIN/1.73 M^2
GLUCOSE SERPL-MCNC: 94 MG/DL
HCT VFR BLD AUTO: 40.8 %
HDLC SERPL-MCNC: 51 MG/DL
HDLC SERPL: 25.2 %
HGB BLD-MCNC: 13.3 G/DL
IMM GRANULOCYTES # BLD AUTO: 0.04 K/UL
IMM GRANULOCYTES NFR BLD AUTO: 0.6 %
LDLC SERPL CALC-MCNC: 124.6 MG/DL
LYMPHOCYTES # BLD AUTO: 2.3 K/UL
LYMPHOCYTES NFR BLD: 32.2 %
MCH RBC QN AUTO: 31.7 PG
MCHC RBC AUTO-ENTMCNC: 32.6 G/DL
MCV RBC AUTO: 97 FL
MONOCYTES # BLD AUTO: 0.8 K/UL
MONOCYTES NFR BLD: 11.5 %
NEUTROPHILS # BLD AUTO: 3.6 K/UL
NEUTROPHILS NFR BLD: 50.9 %
NONHDLC SERPL-MCNC: 151 MG/DL
NRBC BLD-RTO: 0 /100 WBC
PLATELET # BLD AUTO: 189 K/UL
PMV BLD AUTO: 10.1 FL
POTASSIUM SERPL-SCNC: 4.6 MMOL/L
PROT SERPL-MCNC: 7.9 G/DL
RBC # BLD AUTO: 4.19 M/UL
SODIUM SERPL-SCNC: 140 MMOL/L
TRIGL SERPL-MCNC: 132 MG/DL
TSH SERPL DL<=0.005 MIU/L-ACNC: 1.49 UIU/ML
WBC # BLD AUTO: 7.07 K/UL

## 2019-03-12 PROCEDURE — 80061 LIPID PANEL: CPT | Mod: HCNC

## 2019-03-12 PROCEDURE — 80053 COMPREHEN METABOLIC PANEL: CPT | Mod: HCNC

## 2019-03-12 PROCEDURE — 85025 COMPLETE CBC W/AUTO DIFF WBC: CPT | Mod: HCNC

## 2019-03-12 PROCEDURE — 84443 ASSAY THYROID STIM HORMONE: CPT | Mod: HCNC

## 2019-03-12 PROCEDURE — 36415 COLL VENOUS BLD VENIPUNCTURE: CPT | Mod: HCNC,PO

## 2019-03-15 ENCOUNTER — OFFICE VISIT (OUTPATIENT)
Dept: FAMILY MEDICINE | Facility: CLINIC | Age: 84
End: 2019-03-15
Payer: MEDICARE

## 2019-03-15 VITALS
OXYGEN SATURATION: 98 % | SYSTOLIC BLOOD PRESSURE: 144 MMHG | DIASTOLIC BLOOD PRESSURE: 88 MMHG | BODY MASS INDEX: 24.37 KG/M2 | HEART RATE: 75 BPM | WEIGHT: 170.19 LBS | HEIGHT: 70 IN

## 2019-03-15 DIAGNOSIS — C61 MALIGNANT NEOPLASM OF PROSTATE: ICD-10-CM

## 2019-03-15 DIAGNOSIS — Z00.00 WELLNESS EXAMINATION: Primary | ICD-10-CM

## 2019-03-15 DIAGNOSIS — E78.1 HYPERTRIGLYCERIDEMIA: ICD-10-CM

## 2019-03-15 PROCEDURE — 99999 PR PBB SHADOW E&M-EST. PATIENT-LVL III: ICD-10-PCS | Mod: PBBFAC,HCNC,, | Performed by: FAMILY MEDICINE

## 2019-03-15 PROCEDURE — 99397 PR PREVENTIVE VISIT,EST,65 & OVER: ICD-10-PCS | Mod: HCNC,S$GLB,, | Performed by: FAMILY MEDICINE

## 2019-03-15 PROCEDURE — 99999 PR PBB SHADOW E&M-EST. PATIENT-LVL III: CPT | Mod: PBBFAC,HCNC,, | Performed by: FAMILY MEDICINE

## 2019-03-15 PROCEDURE — 99397 PER PM REEVAL EST PAT 65+ YR: CPT | Mod: HCNC,S$GLB,, | Performed by: FAMILY MEDICINE

## 2019-03-15 NOTE — PROGRESS NOTES
Subjective:       Patient ID: Amor Alcazar is a 83 y.o. male.    Chief Complaint: Annual Exam    Here for wellness and f/u lipids. Doing well overall. Following with urology for prostate cancer and stable.      Hyperlipidemia   This is a chronic problem. The current episode started more than 1 year ago. The problem is controlled. Pertinent negatives include no chest pain or shortness of breath.     Review of Systems   Constitutional: Negative for chills, fatigue and fever.   Respiratory: Negative for cough, chest tightness and shortness of breath.    Cardiovascular: Negative for chest pain, palpitations and leg swelling.   Endocrine: Negative for cold intolerance and heat intolerance.   Skin: Negative for rash.   Psychiatric/Behavioral: Negative for dysphoric mood. The patient is not nervous/anxious.        Objective:      Physical Exam   Constitutional: He appears well-developed and well-nourished.   HENT:   Head: Normocephalic and atraumatic.   Cardiovascular: Normal rate, regular rhythm and normal heart sounds.   Pulmonary/Chest: Effort normal and breath sounds normal.   Psychiatric: He has a normal mood and affect.   Nursing note and vitals reviewed.      Assessment:       1. Wellness examination    2. Malignant neoplasm of prostate    3. Hypertriglyceridemia        Plan:       Wellness examination  -     CBC auto differential; Future; Expected date: 03/15/2019  -     Comprehensive metabolic panel; Future; Expected date: 03/15/2019  -     Lipid panel; Future; Expected date: 03/15/2019  -     TSH; Future; Expected date: 03/15/2019    Malignant neoplasm of prostate    Hypertriglyceridemia  -     CBC auto differential; Future; Expected date: 03/15/2019  -     Comprehensive metabolic panel; Future; Expected date: 03/15/2019  -     Lipid panel; Future; Expected date: 03/15/2019  -     TSH; Future; Expected date: 03/15/2019      Reviewed recent labs and at goal overall.  Will monitor chronic medical issues and  continue current plan of care.  F/u with urology as planned.    Follow-up in about 6 months (around 9/15/2019), or if symptoms worsen or fail to improve.

## 2019-04-17 ENCOUNTER — LAB VISIT (OUTPATIENT)
Dept: LAB | Facility: HOSPITAL | Age: 84
End: 2019-04-17
Attending: UROLOGY
Payer: MEDICARE

## 2019-04-17 DIAGNOSIS — C61 MALIGNANT NEOPLASM OF PROSTATE: ICD-10-CM

## 2019-04-17 LAB — COMPLEXED PSA SERPL-MCNC: 3.9 NG/ML (ref 0–4)

## 2019-04-17 PROCEDURE — 36415 COLL VENOUS BLD VENIPUNCTURE: CPT | Mod: HCNC,PO

## 2019-04-17 PROCEDURE — 84153 ASSAY OF PSA TOTAL: CPT | Mod: HCNC

## 2019-04-24 ENCOUNTER — OFFICE VISIT (OUTPATIENT)
Dept: UROLOGY | Facility: CLINIC | Age: 84
End: 2019-04-24
Payer: MEDICARE

## 2019-04-24 VITALS
DIASTOLIC BLOOD PRESSURE: 74 MMHG | BODY MASS INDEX: 24.65 KG/M2 | HEIGHT: 70 IN | SYSTOLIC BLOOD PRESSURE: 132 MMHG | WEIGHT: 172.19 LBS | HEART RATE: 76 BPM

## 2019-04-24 DIAGNOSIS — C61 PROSTATE CA: Primary | ICD-10-CM

## 2019-04-24 PROCEDURE — 99999 PR PBB SHADOW E&M-EST. PATIENT-LVL III: CPT | Mod: PBBFAC,HCNC,, | Performed by: UROLOGY

## 2019-04-24 PROCEDURE — 96402 PR CHEMOTHER HORMON ANTINEOPL SUB-Q/IM: ICD-10-PCS | Mod: HCNC,S$GLB,, | Performed by: UROLOGY

## 2019-04-24 PROCEDURE — 96402 CHEMO HORMON ANTINEOPL SQ/IM: CPT | Mod: HCNC,S$GLB,, | Performed by: UROLOGY

## 2019-04-24 PROCEDURE — 99212 OFFICE O/P EST SF 10 MIN: CPT | Mod: HCNC,25,S$GLB, | Performed by: UROLOGY

## 2019-04-24 PROCEDURE — 99499 UNLISTED E&M SERVICE: CPT | Mod: HCNC,S$GLB,, | Performed by: UROLOGY

## 2019-04-24 PROCEDURE — 99999 PR PBB SHADOW E&M-EST. PATIENT-LVL III: ICD-10-PCS | Mod: PBBFAC,HCNC,, | Performed by: UROLOGY

## 2019-04-24 PROCEDURE — 1101F PR PT FALLS ASSESS DOC 0-1 FALLS W/OUT INJ PAST YR: ICD-10-PCS | Mod: HCNC,CPTII,S$GLB, | Performed by: UROLOGY

## 2019-04-24 PROCEDURE — 1101F PT FALLS ASSESS-DOCD LE1/YR: CPT | Mod: HCNC,CPTII,S$GLB, | Performed by: UROLOGY

## 2019-04-24 PROCEDURE — 99499 RISK ADDL DX/OHS AUDIT: ICD-10-PCS | Mod: HCNC,S$GLB,, | Performed by: UROLOGY

## 2019-04-24 PROCEDURE — 99212 PR OFFICE/OUTPT VISIT, EST, LEVL II, 10-19 MIN: ICD-10-PCS | Mod: HCNC,25,S$GLB, | Performed by: UROLOGY

## 2019-04-24 NOTE — PROGRESS NOTES
UROLOGY [place]  4 24 19     c-c prostate ca     Age 84, here with wife. Pt comes in for a trelstar injection every six months.      Pt was diagnosed prostate ca in jun2014. The path report showed positive for adenocarcinoma in all sextants, ranging from 70% to 100% involvement. The gabe sum was 9 (4+5). Psa was 70.    A bone scan showed extensive degenerative change, but no evidence of metastatic disease.      Pt received systemic therapy with hormonal suppresion with firmagon 240 mg and later firmagon 80 im once a month times two months, and then shifted to trelstar 22.5 mg im q 6 mo.  I also started him on casodex 50 mg po qd.      Visits here every 6 months for LHRH agonist therapy. Receives trelstar 22.5 mg and has had no side effects.      His psa was 3.9 last week, 2.7 six months ago, and 1.6 one year ago. It was 50 five years ago.       His general health has been good, no special concerns. His stream is good, no pains or burning.  Nocturia x 2.     LHRH has been his only treatment for his prostate malignancy.   Also, his prostate medication is the only medication pt is on.     PAST MEDICAL HISTORY:     SURGICAL:  Cataract surgery.     MEDICAL:  Osteoarthritis. Prostate ca, high gabe, on hormonal therapy     FAMILIAL:  No family history of prostate cancer.lot [id number]     SOCIAL:  The patient is , lives in [place].     ALLERGIES:  NKDA.     PHYSICAL EXAMINATION:  ABDOMEN:  Flat.  No masses.  CVA is negative.  No organomegaly or tenderness.  GENITOURINARY:  Penis noncircumcised.  Meatus normal.  Testes normal.     IMP prostate ca    Lupron depot 45 mg (6 months), given in L gluteal area personally by me.  [id number] exp 39 may 2021    May benefit from Ermaricarmenada, will discuss.      rtc 6 mo

## 2019-04-26 ENCOUNTER — TELEPHONE (OUTPATIENT)
Dept: PHARMACY | Facility: CLINIC | Age: 84
End: 2019-04-26

## 2019-04-26 ENCOUNTER — PATIENT MESSAGE (OUTPATIENT)
Dept: FAMILY MEDICINE | Facility: CLINIC | Age: 84
End: 2019-04-26

## 2019-04-26 DIAGNOSIS — C61 MALIGNANT NEOPLASM OF PROSTATE: Primary | ICD-10-CM

## 2019-04-26 NOTE — TELEPHONE ENCOUNTER
LVM for callback to inform patient that [facility] received prescription for Erleada and prior authorization is required.  [facility] will be back in touch once insurance determination is received.

## 2019-04-29 NOTE — TELEPHONE ENCOUNTER
DOCUMENTATION ONLY:  Prior authorization for Erleada 60mg Tablet approved from 04/27/2019 to 10/27/2019    Co-pay: $2,486.33    Patient Assistance IS required. Sending to the financial assistance team to investigate assistance options. Laura ARTEAGA

## 2019-04-30 NOTE — TELEPHONE ENCOUNTER
Marcos is approved by the patient's insurance with a high copay. We will be assisting the patient in applying to the  patient assistance program. Sending a staff message to Dr Tj Arellano  regarding assistance application and faxing application prescription for his review and signature.

## 2019-05-06 ENCOUNTER — PATIENT MESSAGE (OUTPATIENT)
Dept: UROLOGY | Facility: CLINIC | Age: 84
End: 2019-05-06

## 2019-05-20 ENCOUNTER — PATIENT MESSAGE (OUTPATIENT)
Dept: UROLOGY | Facility: CLINIC | Age: 84
End: 2019-05-20

## 2019-05-20 ENCOUNTER — TELEPHONE (OUTPATIENT)
Dept: UROLOGY | Facility: CLINIC | Age: 84
End: 2019-05-20

## 2019-05-20 NOTE — TELEPHONE ENCOUNTER
Advised did not get any paperwork from [employer] about the erleada. Gave correct fax numbers to send to

## 2019-05-20 NOTE — TELEPHONE ENCOUNTER
FOR DOCUMENTATION ONLY:  Financial Assistance for Erleada is approved from 11/21/18 to 5/20/20  Source: NATALYA  BIN: [id number]  PCN: [id number]  ID: [id number]  GRP: [id number]  Assistance Amount: $3500

## 2019-05-20 NOTE — TELEPHONE ENCOUNTER
Funding became available and we were able to secure a lottie for patient's Erleada prescription. Patient is approved with $0.00 copay. We will reach out to the patient to notify of the approval, offer consultation, and schedule a delivery of the medication. Sending a staff message to Dr Tj Arellano regarding approval.

## 2019-05-20 NOTE — TELEPHONE ENCOUNTER
----- Message from Tyree Bran sent at 5/20/2019  9:35 AM CDT -----  Contact: Polina Alcazar - Spouse  Type: Needs Medical Advice    Who Called:  Polina  Smooth Call Back Number: [phone]  Additional Information: Caller would like to discuss paperwork for medication. Please call to advise. Thanks!

## 2019-05-21 ENCOUNTER — TELEPHONE (OUTPATIENT)
Dept: PHARMACY | Facility: CLINIC | Age: 84
End: 2019-05-21

## 2019-05-21 ENCOUNTER — PATIENT MESSAGE (OUTPATIENT)
Dept: UROLOGY | Facility: CLINIC | Age: 84
End: 2019-05-21

## 2019-05-21 NOTE — TELEPHONE ENCOUNTER
Initial Erleada consult completed on  . Erleada (apalutamide) will be shipped on  to arrive at patient's home on  via [employer]. $ 0.00 copay. Patient will start Erleada on . Address confirmed, CC on file. Confirmed 2 patient identifiers - name and . Therapy Appropriate.     Patient was counseled on the administration directions for Erleada:  -Take 4 capsules (240 mg) by mouth once daily, with or without food and a full glass of water.  -Do not chew, crush, or break the capsules.  If possible, patient was instructed tip the tablets from the RX bottle to the cap, and take directly from the cap to the mouth.  Patient may handle the medication with their hands if they wear with a latex or nitrile glove and wash their hands before and after handling the tablets.  -If dose is missed, administer as soon as possible and then resume normal dosing schedule.  Do not administer extra tablets to make up for a missed dose.    Patient was counseled on the following possible side effects, which include, but are not limited to:  Peripheral edema, hypertension - patient will use blood pressure to monitor for trends, fatigue, fall-risks, rash, increased cholesterol levels, increased blood glucose levels, increased potassium levels, hypothyroidism, hot flashes, diarrhea, nausea, decreased appetite, arthralgia. Contact provider if signs of allergic reaction, SOB, wheezing, chest tightness, fever, bad cough, swelling of face, lips, tongue or throat.    DDIs:  Medication list reviewed.     Patient was given 2 patient education handouts on how to handle oral chemotherapy and specific recommendations- do's and don'ts. Instructed the patient that if they have any remaining oral chemotherapy, not to flush down the toilet or throw away in the trash; The patient or caregiver should return the unused oral chemotherapy to either the clinic or to myself in the Pharmacy where the oral chemotherapy can be disposed of properly.      Patient confirmed understanding. Patient did not have additional questions. Consultation included: indication; goals of treatment; administration; storage and handling; side effects; how to handle side effects; the importance of compliance; how to handle missed doses; the importance of laboratory monitoring; the importance of keeping all follow up appointments.  Patient understands to report any medication changes to [facility] and provider. All questions answered and addressed to patients satisfaction. I will f/u with her in 1 week from start, [facility] to contact patient in 3 weeks for refills.

## 2019-05-27 ENCOUNTER — PATIENT MESSAGE (OUTPATIENT)
Dept: UROLOGY | Facility: CLINIC | Age: 84
End: 2019-05-27

## 2019-05-27 NOTE — TELEPHONE ENCOUNTER
Spoke to pt's wife and clarified some questions she had on her message. Se verbalized understanding.

## 2019-06-05 NOTE — TELEPHONE ENCOUNTER
"The patient's wife returned our call in regards to first clinical follow up for Erleada. She confirmed the patient initiated upon receiving on 5/24.    Administration: She confirms the patient is taking medication as prescribed: 4 capsules once daily at 8am every morning. Medication is stored at room temp.  Adherence: Patient denies missed doses of his Erleada since starting.  Side effects/disease state management: She stated the patient is overall doing great and has not noticed any side effects - no fatigue, rash, diarrhea or nausea noted. He continues to "graze" throughout the day and has a decent appetite. She believes his energy levels have improved since initiating and went for a walk together this morning.    She expressed gratitude for [facility]'s assistance with the patient's care. We reviewed that a technician should be calling in about one week for a refill and a pharmacist will continue to f/u every couple of months. Informed her that they can call with questions at any time or ask to speak with a pharmacist - she verbalized understanding.  "

## 2019-06-17 NOTE — TELEPHONE ENCOUNTER
Erleada is not covered by patient's insurance at this time. Patient's enrollment in [employer] Patient Assistance Program has been reactivated. Patient is approved to receive his Erleada from [employer] Patient Assistance Program through 12/31/19 with $0.00 copay. Patient has been informed.

## 2019-06-17 NOTE — TELEPHONE ENCOUNTER
FOR DOCUMENTATION ONLY:  Financial Assistance for Gleevec is approved from 6/17/19 to 12/31/19 with $0.00 copay  Source: [employer] Patient Assistance Program  Assistance Amount: $[id number]

## 2019-07-02 ENCOUNTER — PATIENT MESSAGE (OUTPATIENT)
Dept: UROLOGY | Facility: CLINIC | Age: 84
End: 2019-07-02

## 2019-08-14 ENCOUNTER — PATIENT MESSAGE (OUTPATIENT)
Dept: UROLOGY | Facility: CLINIC | Age: 84
End: 2019-08-14

## 2019-08-14 DIAGNOSIS — C61 MALIGNANT NEOPLASM OF PROSTATE: Primary | ICD-10-CM

## 2019-08-20 ENCOUNTER — PATIENT OUTREACH (OUTPATIENT)
Dept: ADMINISTRATIVE | Facility: HOSPITAL | Age: 84
End: 2019-08-20

## 2019-08-21 ENCOUNTER — LAB VISIT (OUTPATIENT)
Dept: LAB | Facility: HOSPITAL | Age: 84
End: 2019-08-21
Attending: UROLOGY
Payer: MEDICARE

## 2019-08-21 DIAGNOSIS — C61 MALIGNANT NEOPLASM OF PROSTATE: ICD-10-CM

## 2019-08-21 LAB — COMPLEXED PSA SERPL-MCNC: 0.28 NG/ML (ref 0–4)

## 2019-08-21 PROCEDURE — 36415 COLL VENOUS BLD VENIPUNCTURE: CPT | Mod: HCNC,PO

## 2019-08-21 PROCEDURE — 84153 ASSAY OF PSA TOTAL: CPT | Mod: HCNC

## 2019-08-23 ENCOUNTER — PATIENT MESSAGE (OUTPATIENT)
Dept: UROLOGY | Facility: CLINIC | Age: 84
End: 2019-08-23

## 2019-08-28 ENCOUNTER — PATIENT MESSAGE (OUTPATIENT)
Dept: UROLOGY | Facility: CLINIC | Age: 84
End: 2019-08-28

## 2019-09-04 ENCOUNTER — OFFICE VISIT (OUTPATIENT)
Dept: FAMILY MEDICINE | Facility: CLINIC | Age: 84
End: 2019-09-04
Payer: MEDICARE

## 2019-09-04 VITALS
BODY MASS INDEX: 22.9 KG/M2 | SYSTOLIC BLOOD PRESSURE: 128 MMHG | TEMPERATURE: 98 F | RESPIRATION RATE: 18 BRPM | OXYGEN SATURATION: 98 % | DIASTOLIC BLOOD PRESSURE: 70 MMHG | HEART RATE: 76 BPM | WEIGHT: 160 LBS | HEIGHT: 70 IN

## 2019-09-04 DIAGNOSIS — C61 PROSTATE CANCER: Primary | ICD-10-CM

## 2019-09-04 DIAGNOSIS — R01.1 HEART MURMUR: ICD-10-CM

## 2019-09-04 PROCEDURE — G0008 FLU VACCINE - HIGH DOSE (65+) PRESERVATIVE FREE IM: ICD-10-PCS | Mod: S$GLB,,, | Performed by: FAMILY MEDICINE

## 2019-09-04 PROCEDURE — G0008 ADMIN INFLUENZA VIRUS VAC: HCPCS | Mod: S$GLB,,, | Performed by: FAMILY MEDICINE

## 2019-09-04 PROCEDURE — 1101F PR PT FALLS ASSESS DOC 0-1 FALLS W/OUT INJ PAST YR: ICD-10-PCS | Mod: CPTII,S$GLB,, | Performed by: FAMILY MEDICINE

## 2019-09-04 PROCEDURE — 99214 PR OFFICE/OUTPT VISIT, EST, LEVL IV, 30-39 MIN: ICD-10-PCS | Mod: 25,S$GLB,, | Performed by: FAMILY MEDICINE

## 2019-09-04 PROCEDURE — 90662 FLU VACCINE - HIGH DOSE (65+) PRESERVATIVE FREE IM: ICD-10-PCS | Mod: S$GLB,,, | Performed by: FAMILY MEDICINE

## 2019-09-04 PROCEDURE — 99499 RISK ADDL DX/OHS AUDIT: ICD-10-PCS | Mod: S$GLB,,, | Performed by: FAMILY MEDICINE

## 2019-09-04 PROCEDURE — 99214 OFFICE O/P EST MOD 30 MIN: CPT | Mod: 25,S$GLB,, | Performed by: FAMILY MEDICINE

## 2019-09-04 PROCEDURE — 90662 IIV NO PRSV INCREASED AG IM: CPT | Mod: S$GLB,,, | Performed by: FAMILY MEDICINE

## 2019-09-04 PROCEDURE — 1101F PT FALLS ASSESS-DOCD LE1/YR: CPT | Mod: CPTII,S$GLB,, | Performed by: FAMILY MEDICINE

## 2019-09-04 PROCEDURE — 99499 UNLISTED E&M SERVICE: CPT | Mod: S$GLB,,, | Performed by: FAMILY MEDICINE

## 2019-09-05 NOTE — PROGRESS NOTES
Subjective:       Patient ID: Amor Alcazar is a 84 y.o. male.    Chief Complaint: Establish Care    HPI   The patient is an 84-year-old is here today to establish care with me.  He is healthy aside from his prostate cancer.  He had prostate cancer for years.  He has gone through several medication treatments with his prostate cancer and he is currently on Lupron and erleada.  He has had some side effects with the erleada including headaches, dizziness, decreased appetite and weight loss but the side effects seem to be improving and were more significant when he 1st started with this medicine.  He does have an upcoming appointment with his urologist on October 25th and does follow with his urologist regularly    His history is also remarkable for history of skin cancer.  He usually sees his dermatologist yearly    He is otherwise doing well and has no acute questions or concerns.  We did discuss his labs from earlier this year    Review of Systems   Constitutional: Positive for activity change, appetite change and unexpected weight change. Negative for chills, diaphoresis, fatigue and fever.   HENT: Negative for congestion, ear pain, hearing loss, postnasal drip, rhinorrhea, sinus pressure, sneezing, sore throat and trouble swallowing.    Eyes: Negative for pain, discharge and visual disturbance.   Respiratory: Negative for cough, chest tightness, shortness of breath and wheezing.    Cardiovascular: Negative for chest pain, palpitations and leg swelling.   Gastrointestinal: Positive for constipation. Negative for abdominal distention, abdominal pain, blood in stool, diarrhea, nausea and vomiting.   Endocrine: Negative for polydipsia and polyuria.   Genitourinary: Negative for difficulty urinating, hematuria and urgency.   Musculoskeletal: Negative for arthralgias, joint swelling and neck pain.   Skin: Negative for rash.   Neurological: Positive for dizziness and headaches. Negative for weakness.    Psychiatric/Behavioral: Negative for confusion and dysphoric mood.       Objective:      Physical Exam   Constitutional: He is oriented to person, place, and time. He appears well-developed and well-nourished. No distress.   HENT:   Head: Normocephalic and atraumatic.   Right Ear: Hearing, tympanic membrane, external ear and ear canal normal.   Left Ear: Hearing, tympanic membrane, external ear and ear canal normal.   Nose: Nose normal.   Mouth/Throat: Oropharynx is clear and moist and mucous membranes are normal. No oral lesions. No oropharyngeal exudate, posterior oropharyngeal edema or posterior oropharyngeal erythema.   Eyes: Pupils are equal, round, and reactive to light. Conjunctivae, EOM and lids are normal. No scleral icterus.   Neck: Normal range of motion. Neck supple. Carotid bruit is not present. No thyroid mass and no thyromegaly present.   Cardiovascular: Normal rate and regular rhythm.  No extrasystoles are present. PMI is not displaced. Exam reveals no gallop.   Murmur heard.   Systolic murmur is present with a grade of 2/6.  Pulmonary/Chest: Effort normal and breath sounds normal. No accessory muscle usage. No respiratory distress.   Clear to auscultation bilaterally.   Abdominal: Soft. Normal appearance and bowel sounds are normal. He exhibits no abdominal bruit. There is no hepatosplenomegaly. There is no tenderness. There is no rebound.   Lymphadenopathy:        Head (right side): No submental and no submandibular adenopathy present.        Head (left side): No submental and no submandibular adenopathy present.        Right cervical: No superficial cervical, no deep cervical and no posterior cervical adenopathy present.       Left cervical: No superficial cervical, no deep cervical and no posterior cervical adenopathy present.        Right: No supraclavicular adenopathy present.        Left: No supraclavicular adenopathy present.   Neurological: He is alert and oriented to person, place, and  "time.   Skin: Skin is warm, dry and intact.   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     Blood pressure 128/70, pulse 76, temperature 98 °F (36.7 °C), temperature source Oral, resp. rate 18, height 5' 10" (1.778 m), weight 72.6 kg (160 lb), SpO2 98 %.Body mass index is 22.96 kg/m².        A/P:  1) prostate cancer.  New to me.  He will follow up with his urologist and continue with medication under his direction.  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  New to me.  They will schedule an appointment with his dermatologist Dr. Stovall within the next month.    3)   heart murmur.  New.  We will check an echo  4)  Health maintenance issues.  We will monitor the flu shot today.  He will get his shingles shot at the pharmacy.  His labs from earlier this year look good we will check these yearly    "

## 2019-09-06 ENCOUNTER — CLINICAL SUPPORT (OUTPATIENT)
Dept: CARDIOLOGY | Facility: CLINIC | Age: 84
End: 2019-09-06
Attending: FAMILY MEDICINE
Payer: MEDICARE

## 2019-09-06 VITALS
SYSTOLIC BLOOD PRESSURE: 158 MMHG | BODY MASS INDEX: 22.9 KG/M2 | HEART RATE: 65 BPM | WEIGHT: 160 LBS | DIASTOLIC BLOOD PRESSURE: 76 MMHG | HEIGHT: 70 IN

## 2019-09-06 DIAGNOSIS — R01.1 HEART MURMUR: ICD-10-CM

## 2019-09-06 LAB
ASCENDING AORTA: 3.35 CM
AV INDEX (PROSTH): 0.52
AV MEAN GRADIENT: 8 MMHG
AV PEAK GRADIENT: 13 MMHG
AV VALVE AREA: 1.81 CM2
AV VELOCITY RATIO: 0.5
BSA FOR ECHO PROCEDURE: 1.89 M2
CV ECHO LV RWT: 0.72 CM
DOP CALC AO PEAK VEL: 1.82 M/S
DOP CALC AO VTI: 41.52 CM
DOP CALC LVOT AREA: 3.5 CM2
DOP CALC LVOT DIAMETER: 2.1 CM
DOP CALC LVOT PEAK VEL: 0.91 M/S
DOP CALC LVOT STROKE VOLUME: 75.26 CM3
DOP CALCLVOT PEAK VEL VTI: 21.74 CM
E WAVE DECELERATION TIME: 291.7 MSEC
E/A RATIO: 1.15
E/E' RATIO: 11.5 M/S
ECHO LV POSTERIOR WALL: 1.11 CM (ref 0.6–1.1)
FRACTIONAL SHORTENING: 47 % (ref 28–44)
INTERVENTRICULAR SEPTUM: 1.05 CM (ref 0.6–1.1)
IVRT: 0.11 MSEC
LA MAJOR: 5.26 CM
LA MINOR: 5.4 CM
LA WIDTH: 4.05 CM
LEFT ATRIUM SIZE: 2.89 CM
LEFT ATRIUM VOLUME INDEX: 27.9 ML/M2
LEFT ATRIUM VOLUME: 53.02 CM3
LEFT INTERNAL DIMENSION IN SYSTOLE: 1.63 CM (ref 2.1–4)
LEFT VENTRICLE DIASTOLIC VOLUME INDEX: 19.65 ML/M2
LEFT VENTRICLE DIASTOLIC VOLUME: 37.31 ML
LEFT VENTRICLE MASS INDEX: 51 G/M2
LEFT VENTRICLE SYSTOLIC VOLUME INDEX: 4 ML/M2
LEFT VENTRICLE SYSTOLIC VOLUME: 7.57 ML
LEFT VENTRICULAR INTERNAL DIMENSION IN DIASTOLE: 3.08 CM (ref 3.5–6)
LEFT VENTRICULAR MASS: 96 G
LV LATERAL E/E' RATIO: 11.5 M/S
LV SEPTAL E/E' RATIO: 11.5 M/S
MV PEAK A VEL: 0.8 M/S
MV PEAK E VEL: 0.92 M/S
PISA TR MAX VEL: 2.57 M/S
PULM VEIN S/D RATIO: 1.6
PV PEAK D VEL: 0.47 M/S
PV PEAK S VEL: 0.75 M/S
RA MAJOR: 4.44 CM
RA PRESSURE: 3 MMHG
RA WIDTH: 2.81 CM
RIGHT VENTRICULAR END-DIASTOLIC DIMENSION: 3.63 CM
RV TISSUE DOPPLER FREE WALL SYSTOLIC VELOCITY 1 (APICAL 4 CHAMBER VIEW): 10.23 CM/S
SINUS: 3.89 CM
STJ: 2.48 CM
TDI LATERAL: 0.08 M/S
TDI SEPTAL: 0.08 M/S
TDI: 0.08 M/S
TR MAX PG: 26 MMHG
TRICUSPID ANNULAR PLANE SYSTOLIC EXCURSION: 2.29 CM
TV REST PULMONARY ARTERY PRESSURE: 29 MMHG

## 2019-09-06 PROCEDURE — 93306 TTE W/DOPPLER COMPLETE: CPT | Mod: HCNC,S$GLB,, | Performed by: INTERNAL MEDICINE

## 2019-09-06 PROCEDURE — 99999 PR PBB SHADOW E&M-EST. PATIENT-LVL II: ICD-10-PCS | Mod: PBBFAC,HCNC,,

## 2019-09-06 PROCEDURE — 99999 PR PBB SHADOW E&M-EST. PATIENT-LVL II: CPT | Mod: PBBFAC,HCNC,,

## 2019-09-06 PROCEDURE — 93306 ECHO (CUPID ONLY): ICD-10-PCS | Mod: HCNC,S$GLB,, | Performed by: INTERNAL MEDICINE

## 2019-09-07 ENCOUNTER — PATIENT MESSAGE (OUTPATIENT)
Dept: FAMILY MEDICINE | Facility: CLINIC | Age: 84
End: 2019-09-07

## 2019-09-11 ENCOUNTER — PATIENT MESSAGE (OUTPATIENT)
Dept: FAMILY MEDICINE | Facility: CLINIC | Age: 84
End: 2019-09-11

## 2019-09-11 DIAGNOSIS — Z79.899 HIGH RISK MEDICATION USE: Primary | ICD-10-CM

## 2019-09-11 DIAGNOSIS — M89.9 DISORDER OF BONE: ICD-10-CM

## 2019-09-17 ENCOUNTER — HOSPITAL ENCOUNTER (OUTPATIENT)
Dept: RADIOLOGY | Facility: HOSPITAL | Age: 84
Discharge: HOME | End: 2019-09-17
Attending: FAMILY MEDICINE
Payer: MEDICARE

## 2019-09-17 DIAGNOSIS — M89.9 DISORDER OF BONE: ICD-10-CM

## 2019-09-17 DIAGNOSIS — Z79.899 HIGH RISK MEDICATION USE: ICD-10-CM

## 2019-09-17 PROCEDURE — 77080 DXA BONE DENSITY AXIAL: CPT | Mod: 26,HCNC,, | Performed by: RADIOLOGY

## 2019-09-17 PROCEDURE — 77080 DEXA BONE DENSITY SPINE HIP: ICD-10-PCS | Mod: 26,HCNC,, | Performed by: RADIOLOGY

## 2019-09-17 PROCEDURE — 77080 DXA BONE DENSITY AXIAL: CPT | Mod: TC,HCNC,PO

## 2019-09-18 ENCOUNTER — PATIENT MESSAGE (OUTPATIENT)
Dept: FAMILY MEDICINE | Facility: CLINIC | Age: 84
End: 2019-09-18

## 2019-09-23 ENCOUNTER — TELEPHONE (OUTPATIENT)
Dept: UROLOGY | Facility: CLINIC | Age: 84
End: 2019-09-23

## 2019-09-23 NOTE — TELEPHONE ENCOUNTER
----- Message from Maty Gatica sent at 9/23/2019  9:38 AM CDT -----  Contact: PT  Type:  Patient Returning Call    Who Called:  PT  Who Left Message for Patient:  Sue  Does the patient know what this is regarding?:  Appointment  Best Call Back Number:  [phone]  Additional Information:  Please Advise ---Thank you

## 2019-09-24 NOTE — TELEPHONE ENCOUNTER
----- Message from Qasim Robertson sent at 9/24/2019  8:20 AM CDT -----  Contact: Polina  Type:  Patient Returning Call    Who Called:  Patient's wife   Who Left Message for Patient:  Kendy  Does the patient know what this is regarding?:  Not sure  Best Call Back Number:  [phone]  Additional Information:  Requesting a call back

## 2019-09-25 ENCOUNTER — PATIENT MESSAGE (OUTPATIENT)
Dept: UROLOGY | Facility: CLINIC | Age: 84
End: 2019-09-25

## 2019-09-25 DIAGNOSIS — C61 MALIGNANT NEOPLASM OF PROSTATE: Primary | ICD-10-CM

## 2019-10-01 ENCOUNTER — LAB VISIT (OUTPATIENT)
Dept: LAB | Facility: HOSPITAL | Age: 84
End: 2019-10-01
Attending: UROLOGY
Payer: MEDICARE

## 2019-10-01 DIAGNOSIS — C61 MALIGNANT NEOPLASM OF PROSTATE: ICD-10-CM

## 2019-10-01 LAB — COMPLEXED PSA SERPL-MCNC: 0.28 NG/ML (ref 0–4)

## 2019-10-01 PROCEDURE — 84153 ASSAY OF PSA TOTAL: CPT | Mod: HCNC

## 2019-10-01 PROCEDURE — 36415 COLL VENOUS BLD VENIPUNCTURE: CPT | Mod: HCNC,PO

## 2019-10-04 ENCOUNTER — PES CALL (OUTPATIENT)
Dept: ADMINISTRATIVE | Facility: CLINIC | Age: 84
End: 2019-10-04

## 2019-10-08 ENCOUNTER — OFFICE VISIT (OUTPATIENT)
Dept: UROLOGY | Facility: CLINIC | Age: 84
End: 2019-10-08
Payer: MEDICARE

## 2019-10-08 VITALS
WEIGHT: 165.56 LBS | DIASTOLIC BLOOD PRESSURE: 74 MMHG | HEIGHT: 70 IN | BODY MASS INDEX: 23.7 KG/M2 | HEART RATE: 71 BPM | SYSTOLIC BLOOD PRESSURE: 129 MMHG

## 2019-10-08 DIAGNOSIS — C61 MALIGNANT NEOPLASM OF PROSTATE: Primary | ICD-10-CM

## 2019-10-08 PROCEDURE — 99999 PR PBB SHADOW E&M-EST. PATIENT-LVL III: ICD-10-PCS | Mod: PBBFAC,HCNC,, | Performed by: UROLOGY

## 2019-10-08 PROCEDURE — 99499 UNLISTED E&M SERVICE: CPT | Mod: HCNC,S$GLB,, | Performed by: UROLOGY

## 2019-10-08 PROCEDURE — 99499 RISK ADDL DX/OHS AUDIT: ICD-10-PCS | Mod: HCNC,S$GLB,, | Performed by: UROLOGY

## 2019-10-08 PROCEDURE — 99214 PR OFFICE/OUTPT VISIT, EST, LEVL IV, 30-39 MIN: ICD-10-PCS | Mod: HCNC,S$GLB,, | Performed by: UROLOGY

## 2019-10-08 PROCEDURE — 1101F PR PT FALLS ASSESS DOC 0-1 FALLS W/OUT INJ PAST YR: ICD-10-PCS | Mod: HCNC,CPTII,S$GLB, | Performed by: UROLOGY

## 2019-10-08 PROCEDURE — 99214 OFFICE O/P EST MOD 30 MIN: CPT | Mod: HCNC,S$GLB,, | Performed by: UROLOGY

## 2019-10-08 PROCEDURE — 99999 PR PBB SHADOW E&M-EST. PATIENT-LVL III: CPT | Mod: PBBFAC,HCNC,, | Performed by: UROLOGY

## 2019-10-08 PROCEDURE — 1101F PT FALLS ASSESS-DOCD LE1/YR: CPT | Mod: HCNC,CPTII,S$GLB, | Performed by: UROLOGY

## 2019-10-08 NOTE — PROGRESS NOTES
UROLOGY [place]  4 24 19     c-c prostate ca     Age 84, here with wife. Pt comes in for a trelstar injection every six months.      Pt was diagnosed prostate ca in jun2014. The path report showed positive for adenocarcinoma in all sextants, ranging from 70% to 100% involvement. The gabe sum was 9 (4+5). Psa was 70.    A bone scan showed extensive degenerative change, but no evidence of metastatic disease.      He received systemic therapy with hormonal suppresion with firmagon 240 mg and later firmagon 80 im once a month times two months, and then shifted to trelstar 22.5 mg im q 6 mo.  I also started him on casodex 50 mg po qd.      Six months ago he received LHRH agonist therapy, lupron 45 mg (6 mo), when his psa was 3.9.     Since then the psa went down to 0.28 (this month).   Pt was also started on erleada, and receives it as 240 mg orally in four tablets that he takes together.   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     Pt says he likes the erleada, as he is tolerating it well and is 'doing much better than with the injections'.     IMP  Prostate ca, advanced, diagnosed jun2014. path report showed adenocarcinoma in all sextants, ranging from 70% to 100% involvement. gabe sum was 9 (4+5). Psa was 70.  Pt declines receiving the lupron injection today. Prefers to stay on the erleada only. Might have to check with dr onofre to see if this regimen can be maintained.       Counseled 35 min  Over 50% of time in counseling

## 2019-10-28 ENCOUNTER — TELEPHONE (OUTPATIENT)
Dept: UROLOGY | Facility: CLINIC | Age: 84
End: 2019-10-28

## 2019-10-28 NOTE — TELEPHONE ENCOUNTER
Patient's wife states there was some confusion on whether or not there was to be an injection received and she states she was billed for an injection for the 10/8 visits that her  did not receive. I explained that the patient used to get trelstar injections until [facility] switched to lupron, not it's the lupron injections every 6 months. Explained to patient that the erleada medication is usually given in conjunction with either the trelstar or the lupron. Patient wife expressed understanding. I explain I would talk to Dr. Arellano and figure out if the patient should come in for an injection, or continue on without.

## 2019-10-28 NOTE — TELEPHONE ENCOUNTER
----- Message from Kath Patel sent at 10/28/2019 12:18 PM CDT -----  Type: Needs Medical Advice    Who Called:  Pt  Wife colton  Smooth Call Back Number: [phone]     Please call  For  Details   Additional Information:   Calling about an  Raquel  On 01/08 // pt  Wife  Needs  To  Discuss   With  Nurse   Some  issues

## 2019-10-31 ENCOUNTER — TELEPHONE (OUTPATIENT)
Dept: UROLOGY | Facility: CLINIC | Age: 84
End: 2019-10-31

## 2019-10-31 ENCOUNTER — PATIENT MESSAGE (OUTPATIENT)
Dept: UROLOGY | Facility: CLINIC | Age: 84
End: 2019-10-31

## 2019-10-31 DIAGNOSIS — C61 PROSTATE CA: Primary | ICD-10-CM

## 2019-10-31 NOTE — TELEPHONE ENCOUNTER
I called and had a conversation with pt and wife to discuss treatment. I spoke to dr kendal onofre, as I had planned at the time of my last note on the pt. Dr onofre said that if pt refuses to take the lupron there is not much we can do, and he can be on the erleada only. However, that the action of the lupron (reducing the production of testosterone) added to the action of erleada (androgen receptor inhibitor), is superior to the use of any of them separately.     In my conversation with pt and wife they were open minded and said that in view of that information they would have no problems resuming the shots of lupron.     //IMP prostate ca, advanced  Plan: will continue with both the lupron q 6 months and the erleada, can come to office in week or two to get the injection.

## 2019-11-01 ENCOUNTER — TELEPHONE (OUTPATIENT)
Dept: FAMILY MEDICINE | Facility: CLINIC | Age: 84
End: 2019-11-01

## 2019-11-02 ENCOUNTER — TELEPHONE (OUTPATIENT)
Dept: UROLOGY | Facility: CLINIC | Age: 84
End: 2019-11-02

## 2019-11-02 NOTE — TELEPHONE ENCOUNTER
CORRECTION:  The previous telephone note, dated 10 31 19, signed by me, was typed there in error, because it belongs to another patient.   (I could not find a command in Epic to discard it).

## 2019-11-15 ENCOUNTER — OFFICE VISIT (OUTPATIENT)
Dept: UROLOGY | Facility: CLINIC | Age: 84
End: 2019-11-15
Payer: MEDICARE

## 2019-11-15 VITALS
BODY MASS INDEX: 24.3 KG/M2 | HEIGHT: 70 IN | DIASTOLIC BLOOD PRESSURE: 82 MMHG | WEIGHT: 169.75 LBS | HEART RATE: 115 BPM | SYSTOLIC BLOOD PRESSURE: 131 MMHG

## 2019-11-15 DIAGNOSIS — C61 MALIGNANT NEOPLASM OF PROSTATE: Primary | ICD-10-CM

## 2019-11-15 PROCEDURE — 1101F PR PT FALLS ASSESS DOC 0-1 FALLS W/OUT INJ PAST YR: ICD-10-PCS | Mod: CPTII,S$GLB,, | Performed by: UROLOGY

## 2019-11-15 PROCEDURE — 96402 CHEMO HORMON ANTINEOPL SQ/IM: CPT | Mod: S$GLB,,, | Performed by: UROLOGY

## 2019-11-15 PROCEDURE — 1101F PT FALLS ASSESS-DOCD LE1/YR: CPT | Mod: CPTII,S$GLB,, | Performed by: UROLOGY

## 2019-11-15 PROCEDURE — 99213 PR OFFICE/OUTPT VISIT, EST, LEVL III, 20-29 MIN: ICD-10-PCS | Mod: S$GLB,,, | Performed by: UROLOGY

## 2019-11-15 PROCEDURE — 99999 PR PBB SHADOW E&M-EST. PATIENT-LVL III: ICD-10-PCS | Mod: PBBFAC,,, | Performed by: UROLOGY

## 2019-11-15 PROCEDURE — 99499 RISK ADDL DX/OHS AUDIT: ICD-10-PCS | Mod: HCNC,S$GLB,, | Performed by: UROLOGY

## 2019-11-15 PROCEDURE — 99999 PR PBB SHADOW E&M-EST. PATIENT-LVL III: CPT | Mod: PBBFAC,,, | Performed by: UROLOGY

## 2019-11-15 PROCEDURE — 96402 PR CHEMOTHER HORMON ANTINEOPL SUB-Q/IM: ICD-10-PCS | Mod: S$GLB,,, | Performed by: UROLOGY

## 2019-11-15 PROCEDURE — 99213 OFFICE O/P EST LOW 20 MIN: CPT | Mod: S$GLB,,, | Performed by: UROLOGY

## 2019-11-15 PROCEDURE — 99499 UNLISTED E&M SERVICE: CPT | Mod: HCNC,S$GLB,, | Performed by: UROLOGY

## 2019-11-17 NOTE — PROGRESS NOTES
UROLOGY [place]  11 15 19    Cc prostate ca    Age 84, comes in accompanied by wife; is here for an injection of LHRH. Pt has been on the androgen deprivation treatment with im injections q 6 mo; the last time he was here, however, pt pointed out that his psa was very low and he had been started on erleada, so 'he was not taking the lupron injections any more'. In view of this, I did not give him the lupron injection then, but later contacted dr kendal onofre to run this concept by him and see if he thought that it was in the best interest of the pt to stop the lupron. His answer was that in pt;'s setting it would be better to keep him on the lupron shots and also on the erleada. We then called pt and let him know that he needed to come back to get the shot. He is here for such.     IMP  Prostate adenocarcinoma , gave him lupron 45 mg (6-month) intramuscular injection, L gluteal area, tolerated well.     Summary of case   Pt was diagnosed prostate ca in jun2014. The path report showed positive for adenocarcinoma in all sextants, ranging from 70% to 100% involvement. The gabe sum was 9 (4+5). Psa was 70.    A bone scan showed extensive degenerative change, but no evidence of metastatic disease.    He received systemic therapy with hormonal suppresion with firmagon 240 mg and later firmagon 80 im once a month times two months, and then shifted to trelstar 22.5 mg im q 6 mo. I also started him on casodex 50 mg po qd. Six months ago 4 24 19,  he received lupron 45 mg (6 mo), when his psa was 3.9.    Since then the psa went down to 0.28 on oct 1, 2019 Pt was also started on erleada, and receives it as 240 mg orally in four tablets daily that he takes together.   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      RTC 6 mo for another injection of lupron 45 mg

## 2019-11-22 ENCOUNTER — TELEPHONE (OUTPATIENT)
Dept: PHARMACY | Facility: CLINIC | Age: 84
End: 2019-11-22

## 2019-11-22 NOTE — TELEPHONE ENCOUNTER
FOR DOCUMENTATION ONLY:  Financial Assistance for Erleada is approved from 1-1-2020 to 12-  Source [employer] Patient Assistance

## 2019-12-31 ENCOUNTER — PES CALL (OUTPATIENT)
Dept: ADMINISTRATIVE | Facility: CLINIC | Age: 84
End: 2019-12-31

## 2020-01-08 ENCOUNTER — PES CALL (OUTPATIENT)
Dept: ADMINISTRATIVE | Facility: CLINIC | Age: 85
End: 2020-01-08

## 2020-02-03 ENCOUNTER — PATIENT MESSAGE (OUTPATIENT)
Dept: FAMILY MEDICINE | Facility: CLINIC | Age: 85
End: 2020-02-03

## 2020-02-03 DIAGNOSIS — C61 PROSTATE CANCER: Primary | ICD-10-CM

## 2020-02-03 DIAGNOSIS — R79.9 ABNORMAL FINDING OF BLOOD CHEMISTRY, UNSPECIFIED: ICD-10-CM

## 2020-02-05 NOTE — TELEPHONE ENCOUNTER
They can both have their labs in April and they're yearly visits in Jan 2021 if that works for them

## 2020-02-21 ENCOUNTER — PES CALL (OUTPATIENT)
Dept: ADMINISTRATIVE | Facility: CLINIC | Age: 85
End: 2020-02-21

## 2020-03-07 ENCOUNTER — PATIENT MESSAGE (OUTPATIENT)
Dept: UROLOGY | Facility: CLINIC | Age: 85
End: 2020-03-07

## 2020-03-09 ENCOUNTER — PATIENT MESSAGE (OUTPATIENT)
Dept: UROLOGY | Facility: CLINIC | Age: 85
End: 2020-03-09

## 2020-03-10 ENCOUNTER — PATIENT MESSAGE (OUTPATIENT)
Dept: UROLOGY | Facility: CLINIC | Age: 85
End: 2020-03-10

## 2020-03-10 DIAGNOSIS — C61 MALIGNANT NEOPLASM OF PROSTATE: Primary | ICD-10-CM

## 2020-03-12 ENCOUNTER — PATIENT MESSAGE (OUTPATIENT)
Dept: UROLOGY | Facility: CLINIC | Age: 85
End: 2020-03-12

## 2020-04-17 ENCOUNTER — PATIENT MESSAGE (OUTPATIENT)
Dept: UROLOGY | Facility: CLINIC | Age: 85
End: 2020-04-17

## 2020-04-18 ENCOUNTER — PATIENT MESSAGE (OUTPATIENT)
Dept: UROLOGY | Facility: CLINIC | Age: 85
End: 2020-04-18

## 2020-05-05 ENCOUNTER — TELEPHONE (OUTPATIENT)
Dept: PHARMACY | Facility: CLINIC | Age: 85
End: 2020-05-05

## 2020-05-05 DIAGNOSIS — C61 MALIGNANT NEOPLASM OF PROSTATE: ICD-10-CM

## 2020-05-05 NOTE — TELEPHONE ENCOUNTER
----- Message from Maty Gatica sent at 5/5/2020 11:56 AM CDT -----  Contact: Jocelyn LIU [facility]  Type:  RX Refill Request    Who Called:  Jocelyn    Refill or New Rx:  refill  RX Name and Strength:  apalutamide (ERLEADA) 60 mg Tab  How is the patient currently taking it? (ex. 1XDay):  As Directed  Is this a 30 day or 90 day RX:  as Directed  [facility]erred Pharm[facility] with phone number[facility]  [facility] [phone] ext [phone] fax [phone]  Best Call Back Number:  [phone]  Additional Information:  Please Advise ---Thank you

## 2020-05-05 NOTE — TELEPHONE ENCOUNTER
No answer/VM to inform patient that [facility] received prescription for Erleada and prior authorization is required.  [facility] will be back in touch once insurance determination is received.

## 2020-05-06 NOTE — TELEPHONE ENCOUNTER
DOCUMENTATION ONLY:  Prior authorization for Erleada 60 mg Tablet #120/30 approved from 05/05/2020 to 11/01/2020  Case ID: [id number]    Co-pay: $1587.22    Patient Assistance IS required. Sending to the financial assistance team to investigate assistance options. Laura ARTEAGA

## 2020-05-06 NOTE — TELEPHONE ENCOUNTER
Patient is enrolled in [employer] Patient Assistance Program for Erleada. Please fax Erleada prescription to [employer] Patient [employer] Program at [phone] so patient can continue to order his refills. Sending a staff message to Dr. Arellano.

## 2020-05-07 ENCOUNTER — PATIENT MESSAGE (OUTPATIENT)
Dept: UROLOGY | Facility: CLINIC | Age: 85
End: 2020-05-07

## 2020-05-11 ENCOUNTER — LAB VISIT (OUTPATIENT)
Dept: LAB | Facility: HOSPITAL | Age: 85
End: 2020-05-11
Attending: UROLOGY
Payer: MEDICARE

## 2020-05-11 DIAGNOSIS — C61 MALIGNANT NEOPLASM OF PROSTATE: ICD-10-CM

## 2020-05-11 PROCEDURE — 84153 ASSAY OF PSA TOTAL: CPT | Mod: HCNC

## 2020-05-11 PROCEDURE — 36415 COLL VENOUS BLD VENIPUNCTURE: CPT | Mod: HCNC,PO

## 2020-05-12 LAB — COMPLEXED PSA SERPL-MCNC: 0.23 NG/ML (ref 0–4)

## 2020-05-19 ENCOUNTER — OFFICE VISIT (OUTPATIENT)
Dept: UROLOGY | Facility: CLINIC | Age: 85
End: 2020-05-19
Payer: MEDICARE

## 2020-05-19 VITALS
BODY MASS INDEX: 24.3 KG/M2 | HEART RATE: 77 BPM | DIASTOLIC BLOOD PRESSURE: 74 MMHG | SYSTOLIC BLOOD PRESSURE: 118 MMHG | HEIGHT: 70 IN | WEIGHT: 169.75 LBS

## 2020-05-19 DIAGNOSIS — C61 MALIGNANT NEOPLASM OF PROSTATE: Primary | ICD-10-CM

## 2020-05-19 PROCEDURE — 99499 UNLISTED E&M SERVICE: CPT | Mod: HCNC,S$GLB,, | Performed by: UROLOGY

## 2020-05-19 PROCEDURE — 1126F AMNT PAIN NOTED NONE PRSNT: CPT | Mod: HCNC,S$GLB,, | Performed by: UROLOGY

## 2020-05-19 PROCEDURE — 1159F PR MEDICATION LIST DOCUMENTED IN MEDICAL RECORD: ICD-10-PCS | Mod: HCNC,S$GLB,, | Performed by: UROLOGY

## 2020-05-19 PROCEDURE — 99213 OFFICE O/P EST LOW 20 MIN: CPT | Mod: 25,HCNC,S$GLB, | Performed by: UROLOGY

## 2020-05-19 PROCEDURE — 1126F PR PAIN SEVERITY QUANTIFIED, NO PAIN PRESENT: ICD-10-PCS | Mod: HCNC,S$GLB,, | Performed by: UROLOGY

## 2020-05-19 PROCEDURE — 1101F PR PT FALLS ASSESS DOC 0-1 FALLS W/OUT INJ PAST YR: ICD-10-PCS | Mod: HCNC,CPTII,S$GLB, | Performed by: UROLOGY

## 2020-05-19 PROCEDURE — 99999 PR PBB SHADOW E&M-EST. PATIENT-LVL III: CPT | Mod: PBBFAC,HCNC,, | Performed by: UROLOGY

## 2020-05-19 PROCEDURE — 99499 RISK ADDL DX/OHS AUDIT: ICD-10-PCS | Mod: HCNC,S$GLB,, | Performed by: UROLOGY

## 2020-05-19 PROCEDURE — 1159F MED LIST DOCD IN RCRD: CPT | Mod: HCNC,S$GLB,, | Performed by: UROLOGY

## 2020-05-19 PROCEDURE — 99213 PR OFFICE/OUTPT VISIT, EST, LEVL III, 20-29 MIN: ICD-10-PCS | Mod: 25,HCNC,S$GLB, | Performed by: UROLOGY

## 2020-05-19 PROCEDURE — 96402 PR CHEMOTHER HORMON ANTINEOPL SUB-Q/IM: ICD-10-PCS | Mod: HCNC,S$GLB,, | Performed by: UROLOGY

## 2020-05-19 PROCEDURE — 96402 CHEMO HORMON ANTINEOPL SQ/IM: CPT | Mod: HCNC,S$GLB,, | Performed by: UROLOGY

## 2020-05-19 PROCEDURE — 1101F PT FALLS ASSESS-DOCD LE1/YR: CPT | Mod: HCNC,CPTII,S$GLB, | Performed by: UROLOGY

## 2020-05-19 PROCEDURE — 99999 PR PBB SHADOW E&M-EST. PATIENT-LVL III: ICD-10-PCS | Mod: PBBFAC,HCNC,, | Performed by: UROLOGY

## 2020-05-19 NOTE — PROGRESS NOTES
UROLOGY [place]  5 19 20     c-c prostate ca     Age 85, here with wife. Pt comes in for a trelstar injection every six months.     psa was 0.23 last week. 5 11 20     History: Pt was diagnosed prostate ca in jun2014. The path report showed positive for adenocarcinoma in all sextants, ranging from 70% to 100% involvement. The gabe sum was 9 (4+5). Psa was 70.    A bone scan showed extensive degenerative change, but no evidence of metastatic disease.      He received systemic therapy with hormonal suppresion with firmagon 240 mg and later firmagon 80 im once a month times two months, and then shifted to trelstar 22.5 mg im q 6 mo.  I also started him on casodex 50 mg po qd. On 4 24 19 received LHRH agonist therapy, lupron 45 mg (6 mo), when his psa was 3.9. Pt was also started on erleada, and receives it as 240 mg orally in four tablets that he takes together. 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 He must stay on the lupron even when on erleada.        IMP  Prostate ca, advanced, diagnosed jun2014. path report showed adenocarcinoma in all sextants, ranging from 70% to 100% involvement. gabe sum was 9 (4+5). Psa was 70.  I personally gave him his lupron 45 mg in R gluteal region. Lot [id number], exp 2 jun 22    RTc 6 mo for another lupron injection

## 2020-06-12 ENCOUNTER — LAB VISIT (OUTPATIENT)
Dept: LAB | Facility: HOSPITAL | Age: 85
End: 2020-06-12
Attending: FAMILY MEDICINE
Payer: MEDICARE

## 2020-06-12 DIAGNOSIS — C61 PROSTATE CANCER: ICD-10-CM

## 2020-06-12 DIAGNOSIS — R79.9 ABNORMAL FINDING OF BLOOD CHEMISTRY, UNSPECIFIED: ICD-10-CM

## 2020-06-12 LAB
ALBUMIN SERPL BCP-MCNC: 4 G/DL (ref 3.5–5.2)
ALP SERPL-CCNC: 31 U/L (ref 55–135)
ALT SERPL W/O P-5'-P-CCNC: 5 U/L (ref 10–44)
ANION GAP SERPL CALC-SCNC: 9 MMOL/L (ref 8–16)
AST SERPL-CCNC: 18 U/L (ref 10–40)
BASOPHILS # BLD AUTO: 0.05 K/UL (ref 0–0.2)
BASOPHILS NFR BLD: 0.7 % (ref 0–1.9)
BILIRUB SERPL-MCNC: 0.4 MG/DL (ref 0.1–1)
BUN SERPL-MCNC: 14 MG/DL (ref 8–23)
CALCIUM SERPL-MCNC: 9.9 MG/DL (ref 8.7–10.5)
CHLORIDE SERPL-SCNC: 106 MMOL/L (ref 95–110)
CHOLEST SERPL-MCNC: 277 MG/DL (ref 120–199)
CHOLEST/HDLC SERPL: 5.3 {RATIO} (ref 2–5)
CO2 SERPL-SCNC: 24 MMOL/L (ref 23–29)
CREAT SERPL-MCNC: 1.2 MG/DL (ref 0.5–1.4)
DIFFERENTIAL METHOD: ABNORMAL
EOSINOPHIL # BLD AUTO: 0.3 K/UL (ref 0–0.5)
EOSINOPHIL NFR BLD: 3.5 % (ref 0–8)
ERYTHROCYTE [DISTWIDTH] IN BLOOD BY AUTOMATED COUNT: 13.9 % (ref 11.5–14.5)
EST. GFR  (AFRICAN AMERICAN): >60 ML/MIN/1.73 M^2
EST. GFR  (NON AFRICAN AMERICAN): 55.6 ML/MIN/1.73 M^2
GLUCOSE SERPL-MCNC: 105 MG/DL (ref 70–110)
HCT VFR BLD AUTO: 39.8 % (ref 40–54)
HDLC SERPL-MCNC: 52 MG/DL (ref 40–75)
HDLC SERPL: 18.8 % (ref 20–50)
HGB BLD-MCNC: 12.6 G/DL (ref 14–18)
IMM GRANULOCYTES # BLD AUTO: 0.05 K/UL (ref 0–0.04)
IMM GRANULOCYTES NFR BLD AUTO: 0.7 % (ref 0–0.5)
LDLC SERPL CALC-MCNC: 188.2 MG/DL (ref 63–159)
LYMPHOCYTES # BLD AUTO: 2.5 K/UL (ref 1–4.8)
LYMPHOCYTES NFR BLD: 32.7 % (ref 18–48)
MCH RBC QN AUTO: 31.7 PG (ref 27–31)
MCHC RBC AUTO-ENTMCNC: 31.7 G/DL (ref 32–36)
MCV RBC AUTO: 100 FL (ref 82–98)
MONOCYTES # BLD AUTO: 0.7 K/UL (ref 0.3–1)
MONOCYTES NFR BLD: 9.5 % (ref 4–15)
NEUTROPHILS # BLD AUTO: 4.1 K/UL (ref 1.8–7.7)
NEUTROPHILS NFR BLD: 52.9 % (ref 38–73)
NONHDLC SERPL-MCNC: 225 MG/DL
NRBC BLD-RTO: 0 /100 WBC
PLATELET # BLD AUTO: 209 K/UL (ref 150–350)
PMV BLD AUTO: 10.1 FL (ref 9.2–12.9)
POTASSIUM SERPL-SCNC: 4.6 MMOL/L (ref 3.5–5.1)
PROT SERPL-MCNC: 8.1 G/DL (ref 6–8.4)
RBC # BLD AUTO: 3.98 M/UL (ref 4.6–6.2)
SODIUM SERPL-SCNC: 139 MMOL/L (ref 136–145)
TRIGL SERPL-MCNC: 184 MG/DL (ref 30–150)
WBC # BLD AUTO: 7.65 K/UL (ref 3.9–12.7)

## 2020-06-12 PROCEDURE — 80061 LIPID PANEL: CPT | Mod: HCNC

## 2020-06-12 PROCEDURE — 36415 COLL VENOUS BLD VENIPUNCTURE: CPT | Mod: HCNC,PO

## 2020-06-12 PROCEDURE — 80053 COMPREHEN METABOLIC PANEL: CPT | Mod: HCNC

## 2020-06-12 PROCEDURE — 85025 COMPLETE CBC W/AUTO DIFF WBC: CPT | Mod: HCNC

## 2020-06-15 ENCOUNTER — PATIENT MESSAGE (OUTPATIENT)
Dept: FAMILY MEDICINE | Facility: CLINIC | Age: 85
End: 2020-06-15
Payer: MEDICARE

## 2020-06-15 DIAGNOSIS — D64.9 ANEMIA, UNSPECIFIED TYPE: Primary | ICD-10-CM

## 2020-06-15 DIAGNOSIS — D53.9 NUTRITIONAL ANEMIA, UNSPECIFIED: ICD-10-CM

## 2020-06-15 PROCEDURE — 82270 POCT HEMOCULT STOOL X3: ICD-10-PCS | Mod: ,,, | Performed by: FAMILY MEDICINE

## 2020-06-15 PROCEDURE — 82270 OCCULT BLOOD FECES: CPT | Mod: ,,, | Performed by: FAMILY MEDICINE

## 2020-06-19 ENCOUNTER — PATIENT MESSAGE (OUTPATIENT)
Dept: FAMILY MEDICINE | Facility: CLINIC | Age: 85
End: 2020-06-19

## 2020-06-22 NOTE — TELEPHONE ENCOUNTER
Please review previous messages from Jeannie Richardson regarding someone from your clinic bringing out stool cards.  Patient is 85.  I dont think this is the [facility] de[facility]. For a FITKIT , colon cancer screening.

## 2020-06-23 ENCOUNTER — PATIENT MESSAGE (OUTPATIENT)
Dept: FAMILY MEDICINE | Facility: CLINIC | Age: 85
End: 2020-06-23

## 2020-06-24 ENCOUNTER — PATIENT MESSAGE (OUTPATIENT)
Dept: FAMILY MEDICINE | Facility: CLINIC | Age: 85
End: 2020-06-24

## 2020-06-24 NOTE — TELEPHONE ENCOUNTER
Spoke to patient's wife.  Instructions were discussed at length regarding the stool collection cards.  Verbalized understanding.  Patient to call when he arrives and requested that we bring it out to their vehicle.

## 2020-06-30 ENCOUNTER — LAB VISIT (OUTPATIENT)
Dept: LAB | Facility: HOSPITAL | Age: 85
End: 2020-06-30
Attending: FAMILY MEDICINE
Payer: MEDICARE

## 2020-06-30 DIAGNOSIS — D64.9 ANEMIA, UNSPECIFIED TYPE: ICD-10-CM

## 2020-06-30 DIAGNOSIS — D53.9 NUTRITIONAL ANEMIA, UNSPECIFIED: ICD-10-CM

## 2020-06-30 LAB
BASOPHILS # BLD AUTO: 0.05 K/UL (ref 0–0.2)
BASOPHILS NFR BLD: 0.8 % (ref 0–1.9)
DIFFERENTIAL METHOD: ABNORMAL
EOSINOPHIL # BLD AUTO: 0.3 K/UL (ref 0–0.5)
EOSINOPHIL NFR BLD: 4.7 % (ref 0–8)
ERYTHROCYTE [DISTWIDTH] IN BLOOD BY AUTOMATED COUNT: 14.3 % (ref 11.5–14.5)
FERRITIN SERPL-MCNC: 442 NG/ML (ref 20–300)
FOLATE SERPL-MCNC: 7.5 NG/ML (ref 4–24)
HCT VFR BLD AUTO: 37.4 % (ref 40–54)
HGB BLD-MCNC: 11.6 G/DL (ref 14–18)
IMM GRANULOCYTES # BLD AUTO: 0.04 K/UL (ref 0–0.04)
IMM GRANULOCYTES NFR BLD AUTO: 0.6 % (ref 0–0.5)
IRON SERPL-MCNC: 79 UG/DL (ref 45–160)
LYMPHOCYTES # BLD AUTO: 2 K/UL (ref 1–4.8)
LYMPHOCYTES NFR BLD: 30.9 % (ref 18–48)
MCH RBC QN AUTO: 31.7 PG (ref 27–31)
MCHC RBC AUTO-ENTMCNC: 31 G/DL (ref 32–36)
MCV RBC AUTO: 102 FL (ref 82–98)
MONOCYTES # BLD AUTO: 0.7 K/UL (ref 0.3–1)
MONOCYTES NFR BLD: 11.1 % (ref 4–15)
NEUTROPHILS # BLD AUTO: 3.3 K/UL (ref 1.8–7.7)
NEUTROPHILS NFR BLD: 51.9 % (ref 38–73)
NRBC BLD-RTO: 0 /100 WBC
PLATELET # BLD AUTO: 211 K/UL (ref 150–350)
PMV BLD AUTO: 10.8 FL (ref 9.2–12.9)
RBC # BLD AUTO: 3.66 M/UL (ref 4.6–6.2)
SATURATED IRON: 36 % (ref 20–50)
TOTAL IRON BINDING CAPACITY: 221 UG/DL (ref 250–450)
TRANSFERRIN SERPL-MCNC: 149 MG/DL (ref 200–375)
VIT B12 SERPL-MCNC: 239 PG/ML (ref 210–950)
WBC # BLD AUTO: 6.41 K/UL (ref 3.9–12.7)

## 2020-06-30 PROCEDURE — 82746 ASSAY OF FOLIC ACID SERUM: CPT | Mod: HCNC

## 2020-06-30 PROCEDURE — 82728 ASSAY OF FERRITIN: CPT | Mod: HCNC

## 2020-06-30 PROCEDURE — 36415 COLL VENOUS BLD VENIPUNCTURE: CPT | Mod: HCNC,PO

## 2020-06-30 PROCEDURE — 83540 ASSAY OF IRON: CPT | Mod: HCNC

## 2020-06-30 PROCEDURE — 85025 COMPLETE CBC W/AUTO DIFF WBC: CPT | Mod: HCNC

## 2020-06-30 PROCEDURE — 82607 VITAMIN B-12: CPT | Mod: HCNC

## 2020-07-02 LAB
CTP QC/QA: YES
FECAL OCCULT BLOOD, POC: NEGATIVE

## 2020-07-05 ENCOUNTER — PATIENT MESSAGE (OUTPATIENT)
Dept: FAMILY MEDICINE | Facility: CLINIC | Age: 85
End: 2020-07-05

## 2020-07-05 DIAGNOSIS — D64.9 ANEMIA, UNSPECIFIED TYPE: Primary | ICD-10-CM

## 2020-07-09 ENCOUNTER — PATIENT MESSAGE (OUTPATIENT)
Dept: FAMILY MEDICINE | Facility: CLINIC | Age: 85
End: 2020-07-09

## 2020-07-10 ENCOUNTER — TELEPHONE (OUTPATIENT)
Dept: HEMATOLOGY/ONCOLOGY | Facility: CLINIC | Age: 85
End: 2020-07-10

## 2020-07-10 NOTE — TELEPHONE ENCOUNTER
Spoke with wife regarding appointment.  She stated that it is very distressing for her  to come to the cancer center.  Offered her the option of a VV, she agreed.  Explained that she would need to use her IPad for the appointment, she verbalized understanding.  Request that staff call her the morning of appt to confirm her connection.

## 2020-07-15 ENCOUNTER — OFFICE VISIT (OUTPATIENT)
Dept: HEMATOLOGY/ONCOLOGY | Facility: CLINIC | Age: 85
End: 2020-07-15
Payer: MEDICARE

## 2020-07-15 ENCOUNTER — TELEPHONE (OUTPATIENT)
Dept: FAMILY MEDICINE | Facility: CLINIC | Age: 85
End: 2020-07-15

## 2020-07-15 DIAGNOSIS — D64.9 ANEMIA, UNSPECIFIED TYPE: ICD-10-CM

## 2020-07-15 PROCEDURE — 1159F PR MEDICATION LIST DOCUMENTED IN MEDICAL RECORD: ICD-10-PCS | Mod: HCNC,95,, | Performed by: INTERNAL MEDICINE

## 2020-07-15 PROCEDURE — 99203 OFFICE O/P NEW LOW 30 MIN: CPT | Mod: HCNC,95,, | Performed by: INTERNAL MEDICINE

## 2020-07-15 PROCEDURE — 1101F PT FALLS ASSESS-DOCD LE1/YR: CPT | Mod: HCNC,CPTII,95, | Performed by: INTERNAL MEDICINE

## 2020-07-15 PROCEDURE — 99203 PR OFFICE/OUTPT VISIT, NEW, LEVL III, 30-44 MIN: ICD-10-PCS | Mod: HCNC,95,, | Performed by: INTERNAL MEDICINE

## 2020-07-15 PROCEDURE — 1101F PR PT FALLS ASSESS DOC 0-1 FALLS W/OUT INJ PAST YR: ICD-10-PCS | Mod: HCNC,CPTII,95, | Performed by: INTERNAL MEDICINE

## 2020-07-15 PROCEDURE — 1159F MED LIST DOCD IN RCRD: CPT | Mod: HCNC,95,, | Performed by: INTERNAL MEDICINE

## 2020-07-15 NOTE — TELEPHONE ENCOUNTER
Dear Dr. Dubose,  before I subject my  to the Trauma (and it would be Traumatic for him and myself) of seeing another Doctor, I would like to know what, if anything, he can/will do for him.  He/We have been struggling with his condition for 6 years now.  It is hard and getting harder with all of the additional Trauma these days.  We just need more info from you.  Thank you.    The above message was sent 5 days ago and he has had his VV with Dr Hogan. Wife would like to discuss with you.

## 2020-07-15 NOTE — PROGRESS NOTES
HPI    85 years old male was referred to Hematology Clinic for anemia.  Patient is currently being followed by urology treating for prostate cancer.    PSA is well controlled.    On recent blood work patient noticed the hemoglobin trending down.        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/19 ECHO     Social History     Socioeconomic History    Marital status:      Spouse name: Not on file    Number of children: Not on file    Years of education: Not on file    Highest education level: Not on file   Occupational History    Occupation: retired     Comment: [employer], [employer]   Social Needs    Financial resource strain: Not hard at all    Food insecurity     Worry: Never true     Inability: Never true    Transportation needs     Medical: No     Non-medical: No   Tobacco Use    Smoking status: Never Smoker    Smokeless tobacco: Never Used   Substance and Sexual Activity    Alcohol use: Yes     Frequency: 2-3 times a week     Drinks per session: 1 or 2     Binge frequency: Never     Comment: few beers daily    Drug use: No    Sexual activity: Not Currently   Lifestyle    Physical activity     Days per week: Not on file     Minutes per session: Not on file    Stress: To some extent   Relationships    Social connections     Talks on phone: Once a week     Gets together: Once a week     Attends Judaism service: Not on file     Active member of club or organization: No     Attends meetings of clubs or organizations: Never     Relationship status:    Other Topics Concern    Not on file   Social History Narrative    Not on file         Subjective      Review of Systems   Constitutional: Negative for appetite change, fatigue and unexpected weight change.   HENT: Negative for mouth sores.   Eyes: Negative for visual disturbance.   Respiratory:  Negative for cough and shortness of breath.   Cardiovascular: Negative for chest pain.   Gastrointestinal: Negative for diarrhea.   Genitourinary: Negative for frequency.   Musculoskeletal: Negative for back pain.   Skin: Negative for rash.   Neurological: Negative for headaches.   Hematological: Negative for adenopathy.   Psychiatric/Behavioral: The patient is not nervous/anxious.   All other systems reviewed and are negative.     Objective    Physical Exam     Virtual visit    CMP  Sodium   Date Value Ref Range Status   06/12/2020 139 136 - 145 mmol/L Final     Potassium   Date Value Ref Range Status   06/12/2020 4.6 3.5 - 5.1 mmol/L Final     Chloride   Date Value Ref Range Status   06/12/2020 106 95 - 110 mmol/L Final     CO2   Date Value Ref Range Status   06/12/2020 24 23 - 29 mmol/L Final     Glucose   Date Value Ref Range Status   06/12/2020 105 70 - 110 mg/dL Final     BUN, Bld   Date Value Ref Range Status   06/12/2020 14 8 - 23 mg/dL Final     Creatinine   Date Value Ref Range Status   06/12/2020 1.2 0.5 - 1.4 mg/dL Final     Calcium   Date Value Ref Range Status   06/12/2020 9.9 8.7 - 10.5 mg/dL Final     Total Protein   Date Value Ref Range Status   06/12/2020 8.1 6.0 - 8.4 g/dL Final     Albumin   Date Value Ref Range Status   06/12/2020 4.0 3.5 - 5.2 g/dL Final     Total Bilirubin   Date Value Ref Range Status   06/12/2020 0.4 0.1 - 1.0 mg/dL Final     Comment:     For infants and newborns, interpretation of results should be based  on gestational age, weight and in agreement with clinical  observations.  Premature Infant recommended reference ranges:  Up to 24 hours.............<8.0 mg/dL  Up to 48 hours............<12.0 mg/dL  3-5 days..................<15.0 mg/dL  6-29 days.................<15.0 mg/dL       Alkaline Phosphatase   Date Value Ref Range Status   06/12/2020 31 (L) 55 - 135 U/L Final     AST   Date Value Ref Range Status   06/12/2020 18 10 - 40 U/L Final     ALT   Date Value Ref Range  Status   06/12/2020 5 (L) 10 - 44 U/L Final     Anion Gap   Date Value Ref Range Status   06/12/2020 9 8 - 16 mmol/L Final     eGFR if    Date Value Ref Range Status   06/12/2020 >60.0 >60 mL/min/1.73 m^2 Final     eGFR if non    Date Value Ref Range Status   06/12/2020 55.6 (A) >60 mL/min/1.73 m^2 Final     Comment:     Calculation used to obtain the estimated glomerular filtration  rate (eGFR) is the CKD-EPI equation.        Lab Results   Component Value Date    WBC 6.41 06/30/2020    HGB 11.6 (L) 06/30/2020    HCT 37.4 (L) 06/30/2020     (H) 06/30/2020     06/30/2020         Assessment Plan    Anemia Macrocytosis  > recheck CBC   > peripheral blood smear  > LDH            Anemia, unspecified type  -     Ambulatory referral/consult to Hematology / Oncology

## 2020-07-15 NOTE — LETTER
July 15, 2020      Dipti Dubose MD  [id number] [address] [address]           [facility] [address] [address]  Phone: [phone]  Fax: [phone]          Patient: Amor Alcazar   MR Number: [id number]   YOB: 1935   Date of Visit: 7/15/2020       Dear Dr. Dipti Dubose:    Thank you for referring Amor Alcazar to me for evaluation. Attached you will find relevant portions of my assessment and plan of care.    If you have questions, please do not hesitate to call me. I look forward to following Amor Alcazar along with you.    Sincerely,    Juan Daniel Hogan MD    Enclosure  CC:  No Recipients    If you would like to receive this communication electronically, please contact [email or web address] or [phone] to request more information on [employer] Link access.    For providers and/or their staff who would like to refer a patient to [facility], please contact us through our one-stop-shop provider referral line, [facility], at [phone].    If you feel you have received this communication in error or would no longer like to receive these types of communications, please e-mail [email or web address]

## 2020-07-16 ENCOUNTER — PATIENT MESSAGE (OUTPATIENT)
Dept: FAMILY MEDICINE | Facility: CLINIC | Age: 85
End: 2020-07-16

## 2020-07-17 ENCOUNTER — PES CALL (OUTPATIENT)
Dept: ADMINISTRATIVE | Facility: CLINIC | Age: 85
End: 2020-07-17

## 2020-08-21 ENCOUNTER — PATIENT MESSAGE (OUTPATIENT)
Dept: UROLOGY | Facility: CLINIC | Age: 85
End: 2020-08-21

## 2020-08-21 ENCOUNTER — PATIENT MESSAGE (OUTPATIENT)
Dept: HEMATOLOGY/ONCOLOGY | Facility: CLINIC | Age: 85
End: 2020-08-21

## 2020-08-21 DIAGNOSIS — C61 PROSTATE CA: Primary | ICD-10-CM

## 2020-08-21 DIAGNOSIS — C61 MALIGNANT NEOPLASM OF PROSTATE: Primary | ICD-10-CM

## 2020-09-08 NOTE — PROGRESS NOTES
HPI    85 years old male was referred to Hematology Clinic for anemia.  Patient is currently being followed by urology treating for prostate cancer.    PSA is well controlled.  Last check in May 2020.  Patient follows Dr. Arellano    Follow-up visit no acute events.      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/19 ECHO     Social History     Socioeconomic History    Marital status:      Spouse name: Not on file    Number of children: Not on file    Years of education: Not on file    Highest education level: Not on file   Occupational History    Occupation: retired     Comment: [employer], [employer]   Social Needs    Financial resource strain: Not hard at all    Food insecurity     Worry: Never true     Inability: Never true    Transportation needs     Medical: No     Non-medical: No   Tobacco Use    Smoking status: Never Smoker    Smokeless tobacco: Never Used   Substance and Sexual Activity    Alcohol use: Yes     Frequency: 2-3 times a week     Drinks per session: 1 or 2     Binge frequency: Never     Comment: few beers daily    Drug use: No    Sexual activity: Not Currently   Lifestyle    Physical activity     Days per week: Not on file     Minutes per session: Not on file    Stress: To some extent   Relationships    Social connections     Talks on phone: Once a week     Gets together: Once a week     Attends Nondenominational service: Not on file     Active member of club or organization: No     Attends meetings of clubs or organizations: Never     Relationship status:    Other Topics Concern    Not on file   Social History Narrative    Not on file         Subjective      Review of Systems   Constitutional: Negative for appetite change, fatigue and unexpected weight change.   HENT: Negative for mouth sores.   Eyes: Negative for visual  disturbance.   Respiratory: Negative for cough and shortness of breath.   Cardiovascular: Negative for chest pain.   Gastrointestinal: Negative for diarrhea.   Genitourinary: Negative for frequency.   Musculoskeletal: Negative for back pain.   Skin: Negative for rash.   Neurological: Negative for headaches.   Hematological: Negative for adenopathy.   Psychiatric/Behavioral: The patient is not nervous/anxious.   All other systems reviewed and are negative.     Objective    Physical Exam     Virtual visit    CMP  Sodium   Date Value Ref Range Status   06/12/2020 139 136 - 145 mmol/L Final     Potassium   Date Value Ref Range Status   06/12/2020 4.6 3.5 - 5.1 mmol/L Final     Chloride   Date Value Ref Range Status   06/12/2020 106 95 - 110 mmol/L Final     CO2   Date Value Ref Range Status   06/12/2020 24 23 - 29 mmol/L Final     Glucose   Date Value Ref Range Status   06/12/2020 105 70 - 110 mg/dL Final     BUN, Bld   Date Value Ref Range Status   06/12/2020 14 8 - 23 mg/dL Final     Creatinine   Date Value Ref Range Status   06/12/2020 1.2 0.5 - 1.4 mg/dL Final     Calcium   Date Value Ref Range Status   06/12/2020 9.9 8.7 - 10.5 mg/dL Final     Total Protein   Date Value Ref Range Status   06/12/2020 8.1 6.0 - 8.4 g/dL Final     Albumin   Date Value Ref Range Status   06/12/2020 4.0 3.5 - 5.2 g/dL Final     Total Bilirubin   Date Value Ref Range Status   06/12/2020 0.4 0.1 - 1.0 mg/dL Final     Comment:     For infants and newborns, interpretation of results should be based  on gestational age, weight and in agreement with clinical  observations.  Premature Infant recommended reference ranges:  Up to 24 hours.............<8.0 mg/dL  Up to 48 hours............<12.0 mg/dL  3-5 days..................<15.0 mg/dL  6-29 days.................<15.0 mg/dL       Alkaline Phosphatase   Date Value Ref Range Status   06/12/2020 31 (L) 55 - 135 U/L Final     AST   Date Value Ref Range Status   06/12/2020 18 10 - 40 U/L Final      ALT   Date Value Ref Range Status   06/12/2020 5 (L) 10 - 44 U/L Final     Anion Gap   Date Value Ref Range Status   06/12/2020 9 8 - 16 mmol/L Final     eGFR if    Date Value Ref Range Status   06/12/2020 >60.0 >60 mL/min/1.73 m^2 Final     eGFR if non    Date Value Ref Range Status   06/12/2020 55.6 (A) >60 mL/min/1.73 m^2 Final     Comment:     Calculation used to obtain the estimated glomerular filtration  rate (eGFR) is the CKD-EPI equation.        Lab Results   Component Value Date    WBC 8.02 09/09/2020    HGB 11.9 (L) 09/09/2020    HCT 36.0 (L) 09/09/2020    MCV 95 09/09/2020     09/09/2020       Ferritin 442  Iron 79, transferrin 149, TIBC 221, iron saturation 36%  Folate 7.5      Assessment Plan    Anemia Macrocytosis resolved  > hemoglobin stable  > RTC 6 months with repeat lab CBC LDH.    Prostate cancer  > follow-up with Dr. Arellano             There are no diagnoses linked to this encounter.

## 2020-09-09 ENCOUNTER — LAB VISIT (OUTPATIENT)
Dept: LAB | Facility: HOSPITAL | Age: 85
End: 2020-09-09
Attending: INTERNAL MEDICINE
Payer: MEDICARE

## 2020-09-09 ENCOUNTER — OFFICE VISIT (OUTPATIENT)
Dept: HEMATOLOGY/ONCOLOGY | Facility: CLINIC | Age: 85
End: 2020-09-09
Payer: MEDICARE

## 2020-09-09 VITALS
HEIGHT: 70 IN | SYSTOLIC BLOOD PRESSURE: 129 MMHG | BODY MASS INDEX: 23.99 KG/M2 | DIASTOLIC BLOOD PRESSURE: 73 MMHG | OXYGEN SATURATION: 98 % | RESPIRATION RATE: 20 BRPM | TEMPERATURE: 98 F | WEIGHT: 167.56 LBS | HEART RATE: 72 BPM

## 2020-09-09 DIAGNOSIS — D64.9 ANEMIA, UNSPECIFIED TYPE: Primary | ICD-10-CM

## 2020-09-09 DIAGNOSIS — D64.9 ANEMIA, UNSPECIFIED TYPE: ICD-10-CM

## 2020-09-09 DIAGNOSIS — C61 MALIGNANT NEOPLASM OF PROSTATE: ICD-10-CM

## 2020-09-09 LAB
BASOPHILS # BLD AUTO: 0.07 K/UL (ref 0–0.2)
BASOPHILS NFR BLD: 0.9 % (ref 0–1.9)
DIFFERENTIAL METHOD: ABNORMAL
EOSINOPHIL # BLD AUTO: 0.4 K/UL (ref 0–0.5)
EOSINOPHIL NFR BLD: 4.7 % (ref 0–8)
ERYTHROCYTE [DISTWIDTH] IN BLOOD BY AUTOMATED COUNT: 12.8 % (ref 11.5–14.5)
HCT VFR BLD AUTO: 36 % (ref 40–54)
HGB BLD-MCNC: 11.9 G/DL (ref 14–18)
IMM GRANULOCYTES # BLD AUTO: 0.05 K/UL (ref 0–0.04)
IMM GRANULOCYTES NFR BLD AUTO: 0.6 % (ref 0–0.5)
LDH SERPL L TO P-CCNC: 524 U/L (ref 313–618)
LYMPHOCYTES # BLD AUTO: 2.8 K/UL (ref 1–4.8)
LYMPHOCYTES NFR BLD: 34.9 % (ref 18–48)
MCH RBC QN AUTO: 31.4 PG (ref 27–31)
MCHC RBC AUTO-ENTMCNC: 33.1 G/DL (ref 32–36)
MCV RBC AUTO: 95 FL (ref 82–98)
MONOCYTES # BLD AUTO: 0.8 K/UL (ref 0.3–1)
MONOCYTES NFR BLD: 10 % (ref 4–15)
NEUTROPHILS # BLD AUTO: 3.9 K/UL (ref 1.8–7.7)
NEUTROPHILS NFR BLD: 48.9 % (ref 38–73)
NRBC BLD-RTO: 0 /100 WBC
PATH REV BLD -IMP: NORMAL
PLATELET # BLD AUTO: 213 K/UL (ref 150–350)
PMV BLD AUTO: 9.8 FL (ref 9.2–12.9)
RBC # BLD AUTO: 3.79 M/UL (ref 4.6–6.2)
WBC # BLD AUTO: 8.02 K/UL (ref 3.9–12.7)

## 2020-09-09 PROCEDURE — 85025 COMPLETE CBC W/AUTO DIFF WBC: CPT | Mod: PN

## 2020-09-09 PROCEDURE — 1101F PT FALLS ASSESS-DOCD LE1/YR: CPT | Mod: HCNC,CPTII,S$GLB, | Performed by: INTERNAL MEDICINE

## 2020-09-09 PROCEDURE — 1101F PR PT FALLS ASSESS DOC 0-1 FALLS W/OUT INJ PAST YR: ICD-10-PCS | Mod: HCNC,CPTII,S$GLB, | Performed by: INTERNAL MEDICINE

## 2020-09-09 PROCEDURE — 85025 COMPLETE CBC W/AUTO DIFF WBC: CPT

## 2020-09-09 PROCEDURE — 83615 LACTATE (LD) (LDH) ENZYME: CPT | Mod: PN

## 2020-09-09 PROCEDURE — 99999 PR PBB SHADOW E&M-EST. PATIENT-LVL III: ICD-10-PCS | Mod: PBBFAC,HCNC,, | Performed by: INTERNAL MEDICINE

## 2020-09-09 PROCEDURE — 99214 OFFICE O/P EST MOD 30 MIN: CPT | Mod: HCNC,S$GLB,, | Performed by: INTERNAL MEDICINE

## 2020-09-09 PROCEDURE — 83615 LACTATE (LD) (LDH) ENZYME: CPT

## 2020-09-09 PROCEDURE — 36415 COLL VENOUS BLD VENIPUNCTURE: CPT | Mod: HCNC,PN

## 2020-09-09 PROCEDURE — 1159F MED LIST DOCD IN RCRD: CPT | Mod: HCNC,S$GLB,, | Performed by: INTERNAL MEDICINE

## 2020-09-09 PROCEDURE — 99214 PR OFFICE/OUTPT VISIT, EST, LEVL IV, 30-39 MIN: ICD-10-PCS | Mod: HCNC,S$GLB,, | Performed by: INTERNAL MEDICINE

## 2020-09-09 PROCEDURE — 1126F PR PAIN SEVERITY QUANTIFIED, NO PAIN PRESENT: ICD-10-PCS | Mod: HCNC,S$GLB,, | Performed by: INTERNAL MEDICINE

## 2020-09-09 PROCEDURE — 1126F AMNT PAIN NOTED NONE PRSNT: CPT | Mod: HCNC,S$GLB,, | Performed by: INTERNAL MEDICINE

## 2020-09-09 PROCEDURE — 1159F PR MEDICATION LIST DOCUMENTED IN MEDICAL RECORD: ICD-10-PCS | Mod: HCNC,S$GLB,, | Performed by: INTERNAL MEDICINE

## 2020-09-09 PROCEDURE — 99999 PR PBB SHADOW E&M-EST. PATIENT-LVL III: CPT | Mod: PBBFAC,HCNC,, | Performed by: INTERNAL MEDICINE

## 2020-09-09 RX ORDER — FOLIC ACID 0.4 MG
400 TABLET ORAL DAILY
COMMUNITY

## 2020-09-12 LAB — PATH REV BLD -IMP: NORMAL

## 2020-10-26 ENCOUNTER — LAB VISIT (OUTPATIENT)
Dept: LAB | Facility: HOSPITAL | Age: 85
End: 2020-10-26
Attending: UROLOGY
Payer: MEDICARE

## 2020-10-26 DIAGNOSIS — C61 PROSTATE CA: ICD-10-CM

## 2020-10-26 PROCEDURE — 84153 ASSAY OF PSA TOTAL: CPT | Mod: HCNC

## 2020-10-26 PROCEDURE — 36415 COLL VENOUS BLD VENIPUNCTURE: CPT | Mod: HCNC,PO

## 2020-10-27 LAB — COMPLEXED PSA SERPL-MCNC: 0.39 NG/ML (ref 0–4)

## 2020-10-31 ENCOUNTER — PATIENT OUTREACH (OUTPATIENT)
Dept: ADMINISTRATIVE | Facility: OTHER | Age: 85
End: 2020-10-31

## 2020-10-31 NOTE — PROGRESS NOTES
Health Maintenance Due   Topic Date Due    Influenza Vaccine (1) 08/01/2020     Updates were requested from care everywhere.  Chart was reviewed for overdue Proactive [facility] Encounters (SHANT) topics (CRS, Breast Cancer Screening, Eye exam)  Health Maintenance has been updated.  LINKS immunization registry triggered.  Immunizations were reconciled.

## 2020-11-03 ENCOUNTER — CLINICAL SUPPORT (OUTPATIENT)
Dept: UROLOGY | Facility: CLINIC | Age: 85
End: 2020-11-03
Payer: MEDICARE

## 2020-11-03 VITALS — BODY MASS INDEX: 23.68 KG/M2 | HEIGHT: 70 IN | WEIGHT: 165.38 LBS

## 2020-11-03 DIAGNOSIS — C61 PROSTATE CA: Primary | ICD-10-CM

## 2020-11-03 PROCEDURE — 99999 PR PBB SHADOW E&M-EST. PATIENT-LVL III: CPT | Mod: PBBFAC,HCNC,, | Performed by: UROLOGY

## 2020-11-03 PROCEDURE — 99999 PR PBB SHADOW E&M-EST. PATIENT-LVL III: ICD-10-PCS | Mod: PBBFAC,HCNC,, | Performed by: UROLOGY

## 2020-11-03 PROCEDURE — 99499 NO LOS: ICD-10-PCS | Mod: HCNC,S$GLB,, | Performed by: UROLOGY

## 2020-11-03 PROCEDURE — 96402 CHEMO HORMON ANTINEOPL SQ/IM: CPT | Mod: HCNC,S$GLB,, | Performed by: UROLOGY

## 2020-11-03 PROCEDURE — 96402 PR CHEMOTHER HORMON ANTINEOPL SUB-Q/IM: ICD-10-PCS | Mod: HCNC,S$GLB,, | Performed by: UROLOGY

## 2020-11-03 PROCEDURE — 99499 UNLISTED E&M SERVICE: CPT | Mod: HCNC,S$GLB,, | Performed by: UROLOGY

## 2020-11-03 RX ORDER — INFLUENZA A VIRUS A/MICHIGAN/45/2015 X-275 (H1N1) ANTIGEN (FORMALDEHYDE INACTIVATED), INFLUENZA A VIRUS A/SINGAPORE/INFIMH-16-0019/2016 IVR-186 (H3N2) ANTIGEN (FORMALDEHYDE INACTIVATED), INFLUENZA B VIRUS B/PHUKET/3073/2013 ANTIGEN (FORMALDEHYDE INACTIVATED), AND INFLUENZA B VIRUS B/MARYLAND/15/2016 BX-69A ANTIGEN (FORMALDEHYDE INACTIVATED) 60; 60; 60; 60 UG/.7ML; UG/.7ML; UG/.7ML; UG/.7ML
INJECTION, SUSPENSION INTRAMUSCULAR
COMMUNITY
Start: 2020-10-13 | End: 2021-01-11

## 2020-11-03 NOTE — PROGRESS NOTES
UROLOGY [place]  11 3 20     c-c prostate ca     Age 85, here with wife. Pt comes in for a trelstar injection every six months. pt says he is having no side effects from it.     psa was 0.39 last week     History: Pt was diagnosed prostate ca in jun2014. The path report showed positive for adenocarcinoma in all sextants, ranging from 70% to 100% involvement. The gabe sum was 9 (4+5). Psa was 70.    A bone scan showed extensive degenerative change, but no evidence of metastatic disease.      He received systemic therapy with hormonal suppresion with firmagon 240 mg and later firmagon 80 im once a month times two months, and then shifted to trelstar 22.5 mg im q 6 mo.  I also started him on casodex 50 mg po qd. On 4 24 19 received LHRH agonist therapy, lupron 45 mg (6 mo), when his psa was 3.9. Pt was also started on erleada, and receives it as 240 mg orally in four tablets that he takes together. 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 He must stay on the lupron even when on erleada.         IMP  Prostate ca, advanced, diagnosed jun2014. path report showed adenocarcinoma in all sextants, ranging from 70% to 100% involvement. gabe sum was 9 (4+5). Psa was 70.  I personally gave him his Eligard 45 mg (6-month) today (11 3 20) in the L anterior abdominal area, Lot [id number], exp 07/2022     RTc 6 mo for another Eligard injection

## 2020-12-10 ENCOUNTER — TELEPHONE (OUTPATIENT)
Dept: FAMILY MEDICINE | Facility: CLINIC | Age: 85
End: 2020-12-10

## 2021-01-11 ENCOUNTER — OFFICE VISIT (OUTPATIENT)
Dept: FAMILY MEDICINE | Facility: CLINIC | Age: 86
End: 2021-01-11
Payer: MEDICARE

## 2021-01-11 VITALS
DIASTOLIC BLOOD PRESSURE: 70 MMHG | BODY MASS INDEX: 24.14 KG/M2 | RESPIRATION RATE: 18 BRPM | HEIGHT: 70 IN | SYSTOLIC BLOOD PRESSURE: 140 MMHG | HEART RATE: 114 BPM | WEIGHT: 168.63 LBS | TEMPERATURE: 98 F

## 2021-01-11 DIAGNOSIS — E78.5 HYPERLIPIDEMIA, UNSPECIFIED HYPERLIPIDEMIA TYPE: ICD-10-CM

## 2021-01-11 DIAGNOSIS — Z00.00 ANNUAL PHYSICAL EXAM: Primary | ICD-10-CM

## 2021-01-11 DIAGNOSIS — C61 MALIGNANT NEOPLASM OF PROSTATE: ICD-10-CM

## 2021-01-11 PROCEDURE — 1157F ADVNC CARE PLAN IN RCRD: CPT | Mod: S$GLB,,, | Performed by: FAMILY MEDICINE

## 2021-01-11 PROCEDURE — 1126F AMNT PAIN NOTED NONE PRSNT: CPT | Mod: S$GLB,,, | Performed by: FAMILY MEDICINE

## 2021-01-11 PROCEDURE — 1126F PR PAIN SEVERITY QUANTIFIED, NO PAIN PRESENT: ICD-10-PCS | Mod: S$GLB,,, | Performed by: FAMILY MEDICINE

## 2021-01-11 PROCEDURE — 99499 RISK ADDL DX/OHS AUDIT: ICD-10-PCS | Mod: S$GLB,,, | Performed by: FAMILY MEDICINE

## 2021-01-11 PROCEDURE — 1101F PR PT FALLS ASSESS DOC 0-1 FALLS W/OUT INJ PAST YR: ICD-10-PCS | Mod: CPTII,S$GLB,, | Performed by: FAMILY MEDICINE

## 2021-01-11 PROCEDURE — 3288F PR FALLS RISK ASSESSMENT DOCUMENTED: ICD-10-PCS | Mod: CPTII,S$GLB,, | Performed by: FAMILY MEDICINE

## 2021-01-11 PROCEDURE — 99397 PR PREVENTIVE VISIT,EST,65 & OVER: ICD-10-PCS | Mod: S$GLB,,, | Performed by: FAMILY MEDICINE

## 2021-01-11 PROCEDURE — 1157F PR ADVANCE CARE PLAN OR EQUIV PRESENT IN MEDICAL RECORD: ICD-10-PCS | Mod: S$GLB,,, | Performed by: FAMILY MEDICINE

## 2021-01-11 PROCEDURE — 99397 PER PM REEVAL EST PAT 65+ YR: CPT | Mod: S$GLB,,, | Performed by: FAMILY MEDICINE

## 2021-01-11 PROCEDURE — 1101F PT FALLS ASSESS-DOCD LE1/YR: CPT | Mod: CPTII,S$GLB,, | Performed by: FAMILY MEDICINE

## 2021-01-11 PROCEDURE — 3288F FALL RISK ASSESSMENT DOCD: CPT | Mod: CPTII,S$GLB,, | Performed by: FAMILY MEDICINE

## 2021-01-11 PROCEDURE — 99499 UNLISTED E&M SERVICE: CPT | Mod: S$GLB,,, | Performed by: FAMILY MEDICINE

## 2021-02-05 ENCOUNTER — PATIENT MESSAGE (OUTPATIENT)
Dept: FAMILY MEDICINE | Facility: CLINIC | Age: 86
End: 2021-02-05

## 2021-02-15 ENCOUNTER — PATIENT MESSAGE (OUTPATIENT)
Dept: FAMILY MEDICINE | Facility: CLINIC | Age: 86
End: 2021-02-15

## 2021-02-22 ENCOUNTER — TELEPHONE (OUTPATIENT)
Dept: FAMILY MEDICINE | Facility: CLINIC | Age: 86
End: 2021-02-22

## 2021-02-22 ENCOUNTER — PATIENT MESSAGE (OUTPATIENT)
Dept: FAMILY MEDICINE | Facility: CLINIC | Age: 86
End: 2021-02-22

## 2021-02-24 ENCOUNTER — IMMUNIZATION (OUTPATIENT)
Dept: FAMILY MEDICINE | Facility: CLINIC | Age: 86
End: 2021-02-24
Payer: MEDICARE

## 2021-02-24 DIAGNOSIS — Z23 NEED FOR VACCINATION: Primary | ICD-10-CM

## 2021-02-24 PROCEDURE — 91300 COVID-19, MRNA, LNP-S, PF, 30 MCG/0.3 ML DOSE VACCINE: CPT | Mod: PBBFAC | Performed by: INTERNAL MEDICINE

## 2021-03-09 ENCOUNTER — TELEPHONE (OUTPATIENT)
Dept: FAMILY MEDICINE | Facility: CLINIC | Age: 86
End: 2021-03-09

## 2021-03-10 ENCOUNTER — LAB VISIT (OUTPATIENT)
Dept: LAB | Facility: HOSPITAL | Age: 86
End: 2021-03-10
Attending: FAMILY MEDICINE
Payer: MEDICARE

## 2021-03-10 ENCOUNTER — PATIENT MESSAGE (OUTPATIENT)
Dept: FAMILY MEDICINE | Facility: CLINIC | Age: 86
End: 2021-03-10

## 2021-03-10 DIAGNOSIS — E78.5 HYPERLIPIDEMIA, UNSPECIFIED HYPERLIPIDEMIA TYPE: ICD-10-CM

## 2021-03-10 LAB
ALBUMIN SERPL BCP-MCNC: 3.7 G/DL (ref 3.5–5.2)
ALP SERPL-CCNC: 35 U/L (ref 55–135)
ALT SERPL W/O P-5'-P-CCNC: 12 U/L (ref 10–44)
ANION GAP SERPL CALC-SCNC: 10 MMOL/L (ref 8–16)
AST SERPL-CCNC: 23 U/L (ref 10–40)
BILIRUB SERPL-MCNC: 0.4 MG/DL (ref 0.1–1)
BUN SERPL-MCNC: 15 MG/DL (ref 8–23)
CALCIUM SERPL-MCNC: 9.7 MG/DL (ref 8.7–10.5)
CHLORIDE SERPL-SCNC: 108 MMOL/L (ref 95–110)
CHOLEST SERPL-MCNC: 264 MG/DL (ref 120–199)
CHOLEST/HDLC SERPL: 6.8 {RATIO} (ref 2–5)
CO2 SERPL-SCNC: 23 MMOL/L (ref 23–29)
CREAT SERPL-MCNC: 1.2 MG/DL (ref 0.5–1.4)
EST. GFR  (AFRICAN AMERICAN): >60 ML/MIN/1.73 M^2
EST. GFR  (NON AFRICAN AMERICAN): 55.6 ML/MIN/1.73 M^2
GLUCOSE SERPL-MCNC: 112 MG/DL (ref 70–110)
HDLC SERPL-MCNC: 39 MG/DL (ref 40–75)
HDLC SERPL: 14.8 % (ref 20–50)
LDLC SERPL CALC-MCNC: 182.4 MG/DL (ref 63–159)
NONHDLC SERPL-MCNC: 225 MG/DL
POTASSIUM SERPL-SCNC: 4.5 MMOL/L (ref 3.5–5.1)
PROT SERPL-MCNC: 7.8 G/DL (ref 6–8.4)
SODIUM SERPL-SCNC: 141 MMOL/L (ref 136–145)
TRIGL SERPL-MCNC: 213 MG/DL (ref 30–150)

## 2021-03-10 PROCEDURE — 80053 COMPREHEN METABOLIC PANEL: CPT | Performed by: FAMILY MEDICINE

## 2021-03-10 PROCEDURE — 80061 LIPID PANEL: CPT | Performed by: FAMILY MEDICINE

## 2021-03-10 PROCEDURE — 36415 COLL VENOUS BLD VENIPUNCTURE: CPT | Mod: PO | Performed by: FAMILY MEDICINE

## 2021-03-17 ENCOUNTER — IMMUNIZATION (OUTPATIENT)
Dept: FAMILY MEDICINE | Facility: CLINIC | Age: 86
End: 2021-03-17
Payer: MEDICARE

## 2021-03-17 DIAGNOSIS — Z23 NEED FOR VACCINATION: Primary | ICD-10-CM

## 2021-03-17 PROCEDURE — 91300 COVID-19, MRNA, LNP-S, PF, 30 MCG/0.3 ML DOSE VACCINE: CPT | Mod: ,,, | Performed by: INTERNAL MEDICINE

## 2021-03-17 PROCEDURE — 0002A COVID-19, MRNA, LNP-S, PF, 30 MCG/0.3 ML DOSE VACCINE: ICD-10-PCS | Mod: CV19,,, | Performed by: INTERNAL MEDICINE

## 2021-03-17 PROCEDURE — 0002A COVID-19, MRNA, LNP-S, PF, 30 MCG/0.3 ML DOSE VACCINE: CPT | Mod: CV19,,, | Performed by: INTERNAL MEDICINE

## 2021-03-17 PROCEDURE — 91300 COVID-19, MRNA, LNP-S, PF, 30 MCG/0.3 ML DOSE VACCINE: ICD-10-PCS | Mod: ,,, | Performed by: INTERNAL MEDICINE

## 2021-04-15 DIAGNOSIS — C61 MALIGNANT NEOPLASM OF PROSTATE: ICD-10-CM

## 2021-04-16 ENCOUNTER — SPECIALTY PHARMACY (OUTPATIENT)
Dept: PHARMACY | Facility: CLINIC | Age: 86
End: 2021-04-16

## 2021-04-16 DIAGNOSIS — C61 MALIGNANT NEOPLASM OF PROSTATE: Primary | ICD-10-CM

## 2021-05-02 ENCOUNTER — PATIENT MESSAGE (OUTPATIENT)
Dept: UROLOGY | Facility: CLINIC | Age: 86
End: 2021-05-02

## 2021-05-02 DIAGNOSIS — C61 PROSTATE CA: Primary | ICD-10-CM

## 2021-05-05 ENCOUNTER — LAB VISIT (OUTPATIENT)
Dept: LAB | Facility: HOSPITAL | Age: 86
End: 2021-05-05
Attending: UROLOGY
Payer: MEDICARE

## 2021-05-05 ENCOUNTER — OFFICE VISIT (OUTPATIENT)
Dept: UROLOGY | Facility: CLINIC | Age: 86
End: 2021-05-05
Payer: MEDICARE

## 2021-05-05 VITALS
DIASTOLIC BLOOD PRESSURE: 80 MMHG | SYSTOLIC BLOOD PRESSURE: 119 MMHG | HEIGHT: 70 IN | WEIGHT: 153.25 LBS | HEART RATE: 109 BPM | BODY MASS INDEX: 21.94 KG/M2

## 2021-05-05 DIAGNOSIS — C61 PROSTATE CA: ICD-10-CM

## 2021-05-05 DIAGNOSIS — C61 MALIGNANT NEOPLASM OF PROSTATE: Primary | ICD-10-CM

## 2021-05-05 LAB — COMPLEXED PSA SERPL-MCNC: 0.89 NG/ML (ref 0–4)

## 2021-05-05 PROCEDURE — 99499 NO LOS: ICD-10-PCS | Mod: S$GLB,,, | Performed by: UROLOGY

## 2021-05-05 PROCEDURE — 3288F PR FALLS RISK ASSESSMENT DOCUMENTED: ICD-10-PCS | Mod: CPTII,S$GLB,, | Performed by: UROLOGY

## 2021-05-05 PROCEDURE — 96402 PR CHEMOTHER HORMON ANTINEOPL SUB-Q/IM: ICD-10-PCS | Mod: S$GLB,,, | Performed by: UROLOGY

## 2021-05-05 PROCEDURE — 1157F ADVNC CARE PLAN IN RCRD: CPT | Mod: S$GLB,,, | Performed by: UROLOGY

## 2021-05-05 PROCEDURE — 99499 UNLISTED E&M SERVICE: CPT | Mod: S$GLB,,, | Performed by: UROLOGY

## 2021-05-05 PROCEDURE — 1159F MED LIST DOCD IN RCRD: CPT | Mod: S$GLB,,, | Performed by: UROLOGY

## 2021-05-05 PROCEDURE — 84153 ASSAY OF PSA TOTAL: CPT | Performed by: UROLOGY

## 2021-05-05 PROCEDURE — 36415 COLL VENOUS BLD VENIPUNCTURE: CPT | Mod: PO | Performed by: UROLOGY

## 2021-05-05 PROCEDURE — 96402 CHEMO HORMON ANTINEOPL SQ/IM: CPT | Mod: S$GLB,,, | Performed by: UROLOGY

## 2021-05-05 PROCEDURE — 99999 PR PBB SHADOW E&M-EST. PATIENT-LVL III: ICD-10-PCS | Mod: PBBFAC,,, | Performed by: UROLOGY

## 2021-05-05 PROCEDURE — 1157F PR ADVANCE CARE PLAN OR EQUIV PRESENT IN MEDICAL RECORD: ICD-10-PCS | Mod: S$GLB,,, | Performed by: UROLOGY

## 2021-05-05 PROCEDURE — 1101F PR PT FALLS ASSESS DOC 0-1 FALLS W/OUT INJ PAST YR: ICD-10-PCS | Mod: CPTII,S$GLB,, | Performed by: UROLOGY

## 2021-05-05 PROCEDURE — 1101F PT FALLS ASSESS-DOCD LE1/YR: CPT | Mod: CPTII,S$GLB,, | Performed by: UROLOGY

## 2021-05-05 PROCEDURE — 99999 PR PBB SHADOW E&M-EST. PATIENT-LVL III: CPT | Mod: PBBFAC,,, | Performed by: UROLOGY

## 2021-05-05 PROCEDURE — 3288F FALL RISK ASSESSMENT DOCD: CPT | Mod: CPTII,S$GLB,, | Performed by: UROLOGY

## 2021-05-05 PROCEDURE — 1159F PR MEDICATION LIST DOCUMENTED IN MEDICAL RECORD: ICD-10-PCS | Mod: S$GLB,,, | Performed by: UROLOGY

## 2021-05-15 ENCOUNTER — PATIENT MESSAGE (OUTPATIENT)
Dept: UROLOGY | Facility: CLINIC | Age: 86
End: 2021-05-15

## 2021-05-17 ENCOUNTER — PATIENT MESSAGE (OUTPATIENT)
Dept: UROLOGY | Facility: CLINIC | Age: 86
End: 2021-05-17

## 2021-05-24 ENCOUNTER — TELEPHONE (OUTPATIENT)
Dept: UROLOGY | Facility: CLINIC | Age: 86
End: 2021-05-24

## 2021-07-05 ENCOUNTER — PATIENT MESSAGE (OUTPATIENT)
Dept: UROLOGY | Facility: CLINIC | Age: 86
End: 2021-07-05

## 2021-08-19 ENCOUNTER — OFFICE VISIT (OUTPATIENT)
Dept: FAMILY MEDICINE | Facility: CLINIC | Age: 86
End: 2021-08-19
Payer: MEDICARE

## 2021-08-19 VITALS
SYSTOLIC BLOOD PRESSURE: 148 MMHG | RESPIRATION RATE: 16 BRPM | WEIGHT: 149.25 LBS | HEIGHT: 70 IN | OXYGEN SATURATION: 98 % | HEART RATE: 103 BPM | DIASTOLIC BLOOD PRESSURE: 88 MMHG | BODY MASS INDEX: 21.37 KG/M2 | TEMPERATURE: 98 F

## 2021-08-19 DIAGNOSIS — R63.0 ANOREXIA: ICD-10-CM

## 2021-08-19 DIAGNOSIS — R79.9 ABNORMAL FINDING OF BLOOD CHEMISTRY, UNSPECIFIED: ICD-10-CM

## 2021-08-19 DIAGNOSIS — R63.4 WEIGHT LOSS: Primary | ICD-10-CM

## 2021-08-19 DIAGNOSIS — C61 PROSTATE CANCER: ICD-10-CM

## 2021-08-19 PROCEDURE — 99214 PR OFFICE/OUTPT VISIT, EST, LEVL IV, 30-39 MIN: ICD-10-PCS | Mod: S$GLB,,, | Performed by: FAMILY MEDICINE

## 2021-08-19 PROCEDURE — 93010 EKG 12-LEAD: ICD-10-PCS | Mod: S$GLB,,, | Performed by: INTERNAL MEDICINE

## 2021-08-19 PROCEDURE — 1159F PR MEDICATION LIST DOCUMENTED IN MEDICAL RECORD: ICD-10-PCS | Mod: CPTII,S$GLB,, | Performed by: FAMILY MEDICINE

## 2021-08-19 PROCEDURE — 3077F SYST BP >= 140 MM HG: CPT | Mod: CPTII,S$GLB,, | Performed by: FAMILY MEDICINE

## 2021-08-19 PROCEDURE — 99499 RISK ADDL DX/OHS AUDIT: ICD-10-PCS | Mod: S$GLB,,, | Performed by: FAMILY MEDICINE

## 2021-08-19 PROCEDURE — 3077F PR MOST RECENT SYSTOLIC BLOOD PRESSURE >= 140 MM HG: ICD-10-PCS | Mod: CPTII,S$GLB,, | Performed by: FAMILY MEDICINE

## 2021-08-19 PROCEDURE — 1160F RVW MEDS BY RX/DR IN RCRD: CPT | Mod: CPTII,S$GLB,, | Performed by: FAMILY MEDICINE

## 2021-08-19 PROCEDURE — 3079F DIAST BP 80-89 MM HG: CPT | Mod: CPTII,S$GLB,, | Performed by: FAMILY MEDICINE

## 2021-08-19 PROCEDURE — 1160F PR REVIEW ALL MEDS BY PRESCRIBER/CLIN PHARMACIST DOCUMENTED: ICD-10-PCS | Mod: CPTII,S$GLB,, | Performed by: FAMILY MEDICINE

## 2021-08-19 PROCEDURE — 99214 OFFICE O/P EST MOD 30 MIN: CPT | Mod: S$GLB,,, | Performed by: FAMILY MEDICINE

## 2021-08-19 PROCEDURE — 1159F MED LIST DOCD IN RCRD: CPT | Mod: CPTII,S$GLB,, | Performed by: FAMILY MEDICINE

## 2021-08-19 PROCEDURE — 1126F AMNT PAIN NOTED NONE PRSNT: CPT | Mod: CPTII,S$GLB,, | Performed by: FAMILY MEDICINE

## 2021-08-19 PROCEDURE — 1126F PR PAIN SEVERITY QUANTIFIED, NO PAIN PRESENT: ICD-10-PCS | Mod: CPTII,S$GLB,, | Performed by: FAMILY MEDICINE

## 2021-08-19 PROCEDURE — 99499 UNLISTED E&M SERVICE: CPT | Mod: S$GLB,,, | Performed by: FAMILY MEDICINE

## 2021-08-19 PROCEDURE — 1157F ADVNC CARE PLAN IN RCRD: CPT | Mod: CPTII,S$GLB,, | Performed by: FAMILY MEDICINE

## 2021-08-19 PROCEDURE — 93010 ELECTROCARDIOGRAM REPORT: CPT | Mod: S$GLB,,, | Performed by: INTERNAL MEDICINE

## 2021-08-19 PROCEDURE — 3079F PR MOST RECENT DIASTOLIC BLOOD PRESSURE 80-89 MM HG: ICD-10-PCS | Mod: CPTII,S$GLB,, | Performed by: FAMILY MEDICINE

## 2021-08-19 PROCEDURE — 93005 ELECTROCARDIOGRAM TRACING: CPT | Mod: S$GLB,,, | Performed by: FAMILY MEDICINE

## 2021-08-19 PROCEDURE — 1157F PR ADVANCE CARE PLAN OR EQUIV PRESENT IN MEDICAL RECORD: ICD-10-PCS | Mod: CPTII,S$GLB,, | Performed by: FAMILY MEDICINE

## 2021-08-19 PROCEDURE — 93005 EKG 12-LEAD: ICD-10-PCS | Mod: S$GLB,,, | Performed by: FAMILY MEDICINE

## 2021-08-19 RX ORDER — LEUPROLIDE ACETATE 45 MG
KIT SUBCUTANEOUS
COMMUNITY
Start: 2021-05-05 | End: 2022-04-20 | Stop reason: ALTCHOICE

## 2021-08-20 ENCOUNTER — TELEPHONE (OUTPATIENT)
Dept: FAMILY MEDICINE | Facility: CLINIC | Age: 86
End: 2021-08-20

## 2021-08-23 ENCOUNTER — PATIENT MESSAGE (OUTPATIENT)
Dept: UROLOGY | Facility: CLINIC | Age: 86
End: 2021-08-23

## 2021-08-26 ENCOUNTER — LAB VISIT (OUTPATIENT)
Dept: LAB | Facility: HOSPITAL | Age: 86
End: 2021-08-26
Attending: FAMILY MEDICINE
Payer: MEDICARE

## 2021-08-26 DIAGNOSIS — R79.9 ABNORMAL FINDING OF BLOOD CHEMISTRY, UNSPECIFIED: ICD-10-CM

## 2021-08-26 DIAGNOSIS — R63.4 WEIGHT LOSS: ICD-10-CM

## 2021-08-26 LAB
ALBUMIN SERPL BCP-MCNC: 3.6 G/DL (ref 3.5–5.2)
ALP SERPL-CCNC: 30 U/L (ref 55–135)
ALT SERPL W/O P-5'-P-CCNC: 9 U/L (ref 10–44)
ANION GAP SERPL CALC-SCNC: 9 MMOL/L (ref 8–16)
AST SERPL-CCNC: 20 U/L (ref 10–40)
BASOPHILS # BLD AUTO: 0.06 K/UL (ref 0–0.2)
BASOPHILS NFR BLD: 0.9 % (ref 0–1.9)
BILIRUB SERPL-MCNC: 0.3 MG/DL (ref 0.1–1)
BUN SERPL-MCNC: 15 MG/DL (ref 8–23)
CALCIUM SERPL-MCNC: 9.5 MG/DL (ref 8.7–10.5)
CHLORIDE SERPL-SCNC: 107 MMOL/L (ref 95–110)
CHOLEST SERPL-MCNC: 242 MG/DL (ref 120–199)
CHOLEST/HDLC SERPL: 6.5 {RATIO} (ref 2–5)
CO2 SERPL-SCNC: 24 MMOL/L (ref 23–29)
CREAT SERPL-MCNC: 1.2 MG/DL (ref 0.5–1.4)
DIFFERENTIAL METHOD: ABNORMAL
EOSINOPHIL # BLD AUTO: 0.3 K/UL (ref 0–0.5)
EOSINOPHIL NFR BLD: 3.8 % (ref 0–8)
ERYTHROCYTE [DISTWIDTH] IN BLOOD BY AUTOMATED COUNT: 13.9 % (ref 11.5–14.5)
EST. GFR  (AFRICAN AMERICAN): >60 ML/MIN/1.73 M^2
EST. GFR  (NON AFRICAN AMERICAN): 55.2 ML/MIN/1.73 M^2
ESTIMATED AVG GLUCOSE: 108 MG/DL (ref 68–131)
GLUCOSE SERPL-MCNC: 111 MG/DL (ref 70–110)
HBA1C MFR BLD: 5.4 % (ref 4–5.6)
HCT VFR BLD AUTO: 35 % (ref 40–54)
HDLC SERPL-MCNC: 37 MG/DL (ref 40–75)
HDLC SERPL: 15.3 % (ref 20–50)
HGB BLD-MCNC: 11.3 G/DL (ref 14–18)
IMM GRANULOCYTES # BLD AUTO: 0.04 K/UL (ref 0–0.04)
IMM GRANULOCYTES NFR BLD AUTO: 0.6 % (ref 0–0.5)
LDLC SERPL CALC-MCNC: 166.4 MG/DL (ref 63–159)
LYMPHOCYTES # BLD AUTO: 1.9 K/UL (ref 1–4.8)
LYMPHOCYTES NFR BLD: 27.5 % (ref 18–48)
MCH RBC QN AUTO: 30.8 PG (ref 27–31)
MCHC RBC AUTO-ENTMCNC: 32.3 G/DL (ref 32–36)
MCV RBC AUTO: 95 FL (ref 82–98)
MONOCYTES # BLD AUTO: 0.6 K/UL (ref 0.3–1)
MONOCYTES NFR BLD: 9.3 % (ref 4–15)
NEUTROPHILS # BLD AUTO: 3.9 K/UL (ref 1.8–7.7)
NEUTROPHILS NFR BLD: 57.9 % (ref 38–73)
NONHDLC SERPL-MCNC: 205 MG/DL
NRBC BLD-RTO: 0 /100 WBC
PLATELET # BLD AUTO: 239 K/UL (ref 150–450)
PMV BLD AUTO: 10.6 FL (ref 9.2–12.9)
POTASSIUM SERPL-SCNC: 4.2 MMOL/L (ref 3.5–5.1)
PREALB SERPL-MCNC: 20 MG/DL (ref 20–43)
PROT SERPL-MCNC: 7.4 G/DL (ref 6–8.4)
RBC # BLD AUTO: 3.67 M/UL (ref 4.6–6.2)
SODIUM SERPL-SCNC: 140 MMOL/L (ref 136–145)
T4 FREE SERPL-MCNC: 0.66 NG/DL (ref 0.71–1.51)
TRIGL SERPL-MCNC: 193 MG/DL (ref 30–150)
TSH SERPL DL<=0.005 MIU/L-ACNC: 5.38 UIU/ML (ref 0.4–4)
WBC # BLD AUTO: 6.79 K/UL (ref 3.9–12.7)

## 2021-08-26 PROCEDURE — 83036 HEMOGLOBIN GLYCOSYLATED A1C: CPT | Performed by: FAMILY MEDICINE

## 2021-08-26 PROCEDURE — 84443 ASSAY THYROID STIM HORMONE: CPT | Performed by: FAMILY MEDICINE

## 2021-08-26 PROCEDURE — 85025 COMPLETE CBC W/AUTO DIFF WBC: CPT | Performed by: FAMILY MEDICINE

## 2021-08-26 PROCEDURE — 84134 ASSAY OF PREALBUMIN: CPT | Performed by: FAMILY MEDICINE

## 2021-08-26 PROCEDURE — 84439 ASSAY OF FREE THYROXINE: CPT | Performed by: FAMILY MEDICINE

## 2021-08-26 PROCEDURE — 80053 COMPREHEN METABOLIC PANEL: CPT | Performed by: FAMILY MEDICINE

## 2021-08-26 PROCEDURE — 36415 COLL VENOUS BLD VENIPUNCTURE: CPT | Mod: PO | Performed by: FAMILY MEDICINE

## 2021-08-26 PROCEDURE — 80061 LIPID PANEL: CPT | Performed by: FAMILY MEDICINE

## 2021-09-11 ENCOUNTER — PATIENT MESSAGE (OUTPATIENT)
Dept: FAMILY MEDICINE | Facility: CLINIC | Age: 86
End: 2021-09-11

## 2021-09-14 ENCOUNTER — TELEPHONE (OUTPATIENT)
Dept: UROLOGY | Facility: CLINIC | Age: 86
End: 2021-09-14

## 2021-10-07 ENCOUNTER — TELEPHONE (OUTPATIENT)
Dept: UROLOGY | Facility: CLINIC | Age: 86
End: 2021-10-07

## 2021-10-13 ENCOUNTER — TELEPHONE (OUTPATIENT)
Dept: UROLOGY | Facility: CLINIC | Age: 86
End: 2021-10-13

## 2021-10-14 ENCOUNTER — TELEPHONE (OUTPATIENT)
Dept: UROLOGY | Facility: CLINIC | Age: 86
End: 2021-10-14

## 2021-10-19 ENCOUNTER — LAB VISIT (OUTPATIENT)
Dept: LAB | Facility: HOSPITAL | Age: 86
End: 2021-10-19
Attending: UROLOGY
Payer: MEDICARE

## 2021-10-19 DIAGNOSIS — C61 MALIGNANT NEOPLASM OF PROSTATE: ICD-10-CM

## 2021-10-19 LAB — COMPLEXED PSA SERPL-MCNC: 1.8 NG/ML (ref 0–4)

## 2021-10-19 PROCEDURE — 36415 COLL VENOUS BLD VENIPUNCTURE: CPT | Mod: HCNC,PO | Performed by: UROLOGY

## 2021-10-19 PROCEDURE — 84153 ASSAY OF PSA TOTAL: CPT | Mod: HCNC | Performed by: UROLOGY

## 2021-10-26 ENCOUNTER — OFFICE VISIT (OUTPATIENT)
Dept: UROLOGY | Facility: CLINIC | Age: 86
End: 2021-10-26
Payer: MEDICARE

## 2021-10-26 VITALS — BODY MASS INDEX: 21.14 KG/M2 | WEIGHT: 147.69 LBS | HEIGHT: 70 IN

## 2021-10-26 DIAGNOSIS — C61 MALIGNANT NEOPLASM OF PROSTATE: Primary | ICD-10-CM

## 2021-10-26 PROCEDURE — 1126F PR PAIN SEVERITY QUANTIFIED, NO PAIN PRESENT: ICD-10-PCS | Mod: HCNC,CPTII,S$GLB, | Performed by: UROLOGY

## 2021-10-26 PROCEDURE — 99499 UNLISTED E&M SERVICE: CPT | Mod: HCNC,S$GLB,, | Performed by: UROLOGY

## 2021-10-26 PROCEDURE — 99213 OFFICE O/P EST LOW 20 MIN: CPT | Mod: HCNC,25,S$GLB, | Performed by: UROLOGY

## 2021-10-26 PROCEDURE — 1157F ADVNC CARE PLAN IN RCRD: CPT | Mod: HCNC,CPTII,S$GLB, | Performed by: UROLOGY

## 2021-10-26 PROCEDURE — 99499 RISK ADDL DX/OHS AUDIT: ICD-10-PCS | Mod: HCNC,S$GLB,, | Performed by: UROLOGY

## 2021-10-26 PROCEDURE — 96402 CHEMO HORMON ANTINEOPL SQ/IM: CPT | Mod: HCNC,S$GLB,, | Performed by: UROLOGY

## 2021-10-26 PROCEDURE — 96402 PR CHEMOTHER HORMON ANTINEOPL SUB-Q/IM: ICD-10-PCS | Mod: HCNC,S$GLB,, | Performed by: UROLOGY

## 2021-10-26 PROCEDURE — 1159F MED LIST DOCD IN RCRD: CPT | Mod: HCNC,CPTII,S$GLB, | Performed by: UROLOGY

## 2021-10-26 PROCEDURE — 1101F PT FALLS ASSESS-DOCD LE1/YR: CPT | Mod: HCNC,CPTII,S$GLB, | Performed by: UROLOGY

## 2021-10-26 PROCEDURE — 1159F PR MEDICATION LIST DOCUMENTED IN MEDICAL RECORD: ICD-10-PCS | Mod: HCNC,CPTII,S$GLB, | Performed by: UROLOGY

## 2021-10-26 PROCEDURE — 1160F PR REVIEW ALL MEDS BY PRESCRIBER/CLIN PHARMACIST DOCUMENTED: ICD-10-PCS | Mod: HCNC,CPTII,S$GLB, | Performed by: UROLOGY

## 2021-10-26 PROCEDURE — 99999 PR PBB SHADOW E&M-EST. PATIENT-LVL II: ICD-10-PCS | Mod: PBBFAC,HCNC,, | Performed by: UROLOGY

## 2021-10-26 PROCEDURE — 99213 PR OFFICE/OUTPT VISIT, EST, LEVL III, 20-29 MIN: ICD-10-PCS | Mod: HCNC,25,S$GLB, | Performed by: UROLOGY

## 2021-10-26 PROCEDURE — 1101F PR PT FALLS ASSESS DOC 0-1 FALLS W/OUT INJ PAST YR: ICD-10-PCS | Mod: HCNC,CPTII,S$GLB, | Performed by: UROLOGY

## 2021-10-26 PROCEDURE — 3288F PR FALLS RISK ASSESSMENT DOCUMENTED: ICD-10-PCS | Mod: HCNC,CPTII,S$GLB, | Performed by: UROLOGY

## 2021-10-26 PROCEDURE — 3288F FALL RISK ASSESSMENT DOCD: CPT | Mod: HCNC,CPTII,S$GLB, | Performed by: UROLOGY

## 2021-10-26 PROCEDURE — 1126F AMNT PAIN NOTED NONE PRSNT: CPT | Mod: HCNC,CPTII,S$GLB, | Performed by: UROLOGY

## 2021-10-26 PROCEDURE — 1160F RVW MEDS BY RX/DR IN RCRD: CPT | Mod: HCNC,CPTII,S$GLB, | Performed by: UROLOGY

## 2021-10-26 PROCEDURE — 1157F PR ADVANCE CARE PLAN OR EQUIV PRESENT IN MEDICAL RECORD: ICD-10-PCS | Mod: HCNC,CPTII,S$GLB, | Performed by: UROLOGY

## 2021-10-26 PROCEDURE — 99999 PR PBB SHADOW E&M-EST. PATIENT-LVL II: CPT | Mod: PBBFAC,HCNC,, | Performed by: UROLOGY

## 2021-10-26 RX ORDER — MV-MIN/FOLIC/K1/LYCOPEN/LUTEIN 300-60 MCG
TABLET ORAL DAILY
COMMUNITY

## 2021-11-01 ENCOUNTER — IMMUNIZATION (OUTPATIENT)
Dept: FAMILY MEDICINE | Facility: CLINIC | Age: 86
End: 2021-11-01
Payer: MEDICARE

## 2021-11-01 DIAGNOSIS — Z23 NEED FOR VACCINATION: Primary | ICD-10-CM

## 2021-11-01 PROCEDURE — 91300 COVID-19, MRNA, LNP-S, PF, 30 MCG/0.3 ML DOSE VACCINE: CPT | Mod: HCNC,PBBFAC | Performed by: FAMILY MEDICINE

## 2021-11-09 ENCOUNTER — PATIENT MESSAGE (OUTPATIENT)
Dept: FAMILY MEDICINE | Facility: CLINIC | Age: 86
End: 2021-11-09
Payer: MEDICARE

## 2021-11-15 ENCOUNTER — OFFICE VISIT (OUTPATIENT)
Dept: FAMILY MEDICINE | Facility: CLINIC | Age: 86
End: 2021-11-15
Payer: MEDICARE

## 2021-11-15 VITALS
BODY MASS INDEX: 21.21 KG/M2 | DIASTOLIC BLOOD PRESSURE: 68 MMHG | SYSTOLIC BLOOD PRESSURE: 122 MMHG | OXYGEN SATURATION: 100 % | RESPIRATION RATE: 16 BRPM | TEMPERATURE: 99 F | HEART RATE: 62 BPM | WEIGHT: 147.81 LBS

## 2021-11-15 DIAGNOSIS — R94.6 ABNORMAL THYROID FUNCTION TEST: Primary | ICD-10-CM

## 2021-11-15 DIAGNOSIS — C61 PROSTATE CANCER: ICD-10-CM

## 2021-11-15 DIAGNOSIS — N18.31 CHRONIC KIDNEY DISEASE, STAGE 3A: ICD-10-CM

## 2021-11-15 DIAGNOSIS — E78.5 HYPERLIPIDEMIA, UNSPECIFIED HYPERLIPIDEMIA TYPE: ICD-10-CM

## 2021-11-15 PROCEDURE — 3288F FALL RISK ASSESSMENT DOCD: CPT | Mod: CPTII,S$GLB,, | Performed by: FAMILY MEDICINE

## 2021-11-15 PROCEDURE — 1160F PR REVIEW ALL MEDS BY PRESCRIBER/CLIN PHARMACIST DOCUMENTED: ICD-10-PCS | Mod: CPTII,S$GLB,, | Performed by: FAMILY MEDICINE

## 2021-11-15 PROCEDURE — 99214 PR OFFICE/OUTPT VISIT, EST, LEVL IV, 30-39 MIN: ICD-10-PCS | Mod: S$GLB,,, | Performed by: FAMILY MEDICINE

## 2021-11-15 PROCEDURE — 36415 PR COLLECTION VENOUS BLOOD,VENIPUNCTURE: ICD-10-PCS | Mod: S$GLB,,, | Performed by: FAMILY MEDICINE

## 2021-11-15 PROCEDURE — 1101F PT FALLS ASSESS-DOCD LE1/YR: CPT | Mod: CPTII,S$GLB,, | Performed by: FAMILY MEDICINE

## 2021-11-15 PROCEDURE — 1159F MED LIST DOCD IN RCRD: CPT | Mod: CPTII,S$GLB,, | Performed by: FAMILY MEDICINE

## 2021-11-15 PROCEDURE — 1101F PR PT FALLS ASSESS DOC 0-1 FALLS W/OUT INJ PAST YR: ICD-10-PCS | Mod: CPTII,S$GLB,, | Performed by: FAMILY MEDICINE

## 2021-11-15 PROCEDURE — 3288F PR FALLS RISK ASSESSMENT DOCUMENTED: ICD-10-PCS | Mod: CPTII,S$GLB,, | Performed by: FAMILY MEDICINE

## 2021-11-15 PROCEDURE — 99499 UNLISTED E&M SERVICE: CPT | Mod: HCNC,S$GLB,, | Performed by: FAMILY MEDICINE

## 2021-11-15 PROCEDURE — 1126F PR PAIN SEVERITY QUANTIFIED, NO PAIN PRESENT: ICD-10-PCS | Mod: CPTII,S$GLB,, | Performed by: FAMILY MEDICINE

## 2021-11-15 PROCEDURE — 36415 COLL VENOUS BLD VENIPUNCTURE: CPT | Mod: S$GLB,,, | Performed by: FAMILY MEDICINE

## 2021-11-15 PROCEDURE — 1157F PR ADVANCE CARE PLAN OR EQUIV PRESENT IN MEDICAL RECORD: ICD-10-PCS | Mod: CPTII,S$GLB,, | Performed by: FAMILY MEDICINE

## 2021-11-15 PROCEDURE — 84443 ASSAY THYROID STIM HORMONE: CPT | Mod: HCNC | Performed by: FAMILY MEDICINE

## 2021-11-15 PROCEDURE — 1159F PR MEDICATION LIST DOCUMENTED IN MEDICAL RECORD: ICD-10-PCS | Mod: CPTII,S$GLB,, | Performed by: FAMILY MEDICINE

## 2021-11-15 PROCEDURE — 1126F AMNT PAIN NOTED NONE PRSNT: CPT | Mod: CPTII,S$GLB,, | Performed by: FAMILY MEDICINE

## 2021-11-15 PROCEDURE — 3074F PR MOST RECENT SYSTOLIC BLOOD PRESSURE < 130 MM HG: ICD-10-PCS | Mod: CPTII,S$GLB,, | Performed by: FAMILY MEDICINE

## 2021-11-15 PROCEDURE — 84439 ASSAY OF FREE THYROXINE: CPT | Mod: HCNC | Performed by: FAMILY MEDICINE

## 2021-11-15 PROCEDURE — 3078F PR MOST RECENT DIASTOLIC BLOOD PRESSURE < 80 MM HG: ICD-10-PCS | Mod: CPTII,S$GLB,, | Performed by: FAMILY MEDICINE

## 2021-11-15 PROCEDURE — 3074F SYST BP LT 130 MM HG: CPT | Mod: CPTII,S$GLB,, | Performed by: FAMILY MEDICINE

## 2021-11-15 PROCEDURE — 99499 RISK ADDL DX/OHS AUDIT: ICD-10-PCS | Mod: HCNC,S$GLB,, | Performed by: FAMILY MEDICINE

## 2021-11-15 PROCEDURE — 3078F DIAST BP <80 MM HG: CPT | Mod: CPTII,S$GLB,, | Performed by: FAMILY MEDICINE

## 2021-11-15 PROCEDURE — 1160F RVW MEDS BY RX/DR IN RCRD: CPT | Mod: CPTII,S$GLB,, | Performed by: FAMILY MEDICINE

## 2021-11-15 PROCEDURE — 99214 OFFICE O/P EST MOD 30 MIN: CPT | Mod: S$GLB,,, | Performed by: FAMILY MEDICINE

## 2021-11-15 PROCEDURE — 1157F ADVNC CARE PLAN IN RCRD: CPT | Mod: CPTII,S$GLB,, | Performed by: FAMILY MEDICINE

## 2021-11-16 LAB
T4 FREE SERPL-MCNC: 0.68 NG/DL (ref 0.71–1.51)
TSH SERPL DL<=0.005 MIU/L-ACNC: 6.26 UIU/ML (ref 0.4–4)

## 2021-11-18 ENCOUNTER — PATIENT MESSAGE (OUTPATIENT)
Dept: FAMILY MEDICINE | Facility: CLINIC | Age: 86
End: 2021-11-18
Payer: MEDICARE

## 2021-11-18 DIAGNOSIS — E03.4 HYPOTHYROIDISM DUE TO ACQUIRED ATROPHY OF THYROID: Primary | ICD-10-CM

## 2021-11-18 RX ORDER — LEVOTHYROXINE SODIUM 50 UG/1
50 TABLET ORAL
Qty: 30 TABLET | Refills: 1 | Status: SHIPPED | OUTPATIENT
Start: 2021-11-18 | End: 2022-01-04 | Stop reason: SDUPTHER

## 2021-11-22 ENCOUNTER — PATIENT MESSAGE (OUTPATIENT)
Dept: FAMILY MEDICINE | Facility: CLINIC | Age: 86
End: 2021-11-22
Payer: MEDICARE

## 2022-01-04 RX ORDER — LEVOTHYROXINE SODIUM 50 UG/1
50 TABLET ORAL
Qty: 90 TABLET | Refills: 3 | Status: SHIPPED | OUTPATIENT
Start: 2022-01-04 | End: 2022-02-26

## 2022-01-04 NOTE — TELEPHONE ENCOUNTER
No new care gaps identified.  Powered by [employer]. Reference number: [id number].   1/04/2022 9:58:39 AM CST

## 2022-01-04 NOTE — TELEPHONE ENCOUNTER
----- Message from Annemarie Dutta sent at 1/4/2022  9:31 AM CST -----  Contact: wife  Type: Needs Medical Advice  Who Called:  constance Fish  Best Call Back Number: [phone]  Additional Information: Her and Amor are both doing well with the levothyroxine (SYNTHROID) 75 MCG tablet. The prescription expires on Jan 20. Please send 3 month supply for each to     [facility] - [facility] [address]  [address] [facility] [address]  Phone: [phone] Fax: [phone]

## 2022-01-04 NOTE — TELEPHONE ENCOUNTER
Refill Authorization Note   Amor Alcazar  is requesting a refill authorization.  Brief Assessment and Rationale for Refill:  Approve     Medication Therapy Plan:  approve     Medication Reconciliation Completed: No   Comments:   --->Care Gap information included below if applicable.       Requested Prescriptions   Pending Prescriptions Disp Refills    levothyroxine (SYNTHROID) 50 MCG tablet 90 tablet 3     Sig: Take 1 tablet (50 mcg total) by mouth before breakfast.       Endocrinology:  Hypothyroid Agents Failed - 1/4/2022 12:27 PM        Failed - TSH in normal range and within 360 days     TSH   Date Value Ref Range Status   11/15/2021 6.257 (H) 0.400 - 4.000 uIU/mL Final   08/26/2021 5.376 (H) 0.400 - 4.000 uIU/mL Final   03/12/2019 1.494 0.400 - 4.000 uIU/mL Final              Passed - Patient is at least 18 years old        Passed - Valid encounter within last 15 months     Recent Visits  Date Type Provider Dept   11/15/21 Office Visit Dipti Dubose MD [facility]   08/19/21 Office Visit Dipti Dubose MD [facility]   01/11/21 Office Visit Dipti Dubose MD [facility]   Showing recent visits within past 720 days and meeting all other requirements  Future Appointments  No visits were found meeting these conditions.  Showing future appointments within next 150 days and meeting all other requirements      Future Appointments              In 1 month Dipti Dubose MD [facility]                Passed - Manual Review: FT4 is not required if last TSH is WNL.        Passed - T4 free within 1080 days     Free T4   Date Value Ref Range Status   11/15/2021 0.68 (L) 0.71 - 1.51 ng/dL Final   08/26/2021 0.66 (L) 0.71 - 1.51 ng/dL Final                  Appointments  past 12m or future 3m with PCP    Date Provider   Last Visit   11/15/2021 Dipti Dubose MD   Next Visit   2/18/2022 Dipti Dubose MD   ED visits in past 90 days: 0     Note composed:12:28  PM 01/04/2022

## 2022-02-09 ENCOUNTER — TELEPHONE (OUTPATIENT)
Dept: UROLOGY | Facility: CLINIC | Age: 87
End: 2022-02-09
Payer: MEDICARE

## 2022-02-09 DIAGNOSIS — C61 PROSTATE CA: Primary | ICD-10-CM

## 2022-02-09 NOTE — TELEPHONE ENCOUNTER
----- Message from Margarita Kay sent at 2/9/2022  2:10 PM CST -----  Contact: Wife  Type:  Needs Medical Advice    Who Called:  patient  Wife       Would the patient rather a call back or a response via [facility]?  Call    Best Call Back Number:  [phone] (home) [phone] (work)    Additional Information:   patient wife needs to speak to Mary about the appt on 04/13    Please call to advise

## 2022-02-09 NOTE — TELEPHONE ENCOUNTER
----- Message from Kiki Parkinson sent at 2/9/2022 12:25 PM CST -----  Contact: pt  Type: Needs Medical Advice    Who: Called: pt wife     Best Call Back Number: [phone]    Inquiry/Question: pt wife needs a call back regarding his next appts and lab work please advise  Thank you~

## 2022-02-09 NOTE — TELEPHONE ENCOUNTER
Spoke with patient wife, appointment rescheduled for 4/20/2022 @1030 am. Patient wife expressed understanding.

## 2022-02-18 ENCOUNTER — OFFICE VISIT (OUTPATIENT)
Dept: FAMILY MEDICINE | Facility: CLINIC | Age: 87
End: 2022-02-18
Payer: MEDICARE

## 2022-02-18 VITALS
OXYGEN SATURATION: 94 % | WEIGHT: 154.88 LBS | SYSTOLIC BLOOD PRESSURE: 132 MMHG | HEART RATE: 59 BPM | BODY MASS INDEX: 22.22 KG/M2 | TEMPERATURE: 97 F | DIASTOLIC BLOOD PRESSURE: 68 MMHG | RESPIRATION RATE: 14 BRPM

## 2022-02-18 DIAGNOSIS — E78.5 HYPERLIPIDEMIA, UNSPECIFIED HYPERLIPIDEMIA TYPE: ICD-10-CM

## 2022-02-18 DIAGNOSIS — C61 PROSTATE CANCER: ICD-10-CM

## 2022-02-18 DIAGNOSIS — E03.4 HYPOTHYROIDISM DUE TO ACQUIRED ATROPHY OF THYROID: Primary | ICD-10-CM

## 2022-02-18 DIAGNOSIS — N18.31 CHRONIC KIDNEY DISEASE, STAGE 3A: ICD-10-CM

## 2022-02-18 LAB
ALBUMIN SERPL BCP-MCNC: 3.6 G/DL (ref 3.5–5.2)
ALP SERPL-CCNC: 39 U/L (ref 55–135)
ALT SERPL W/O P-5'-P-CCNC: 11 U/L (ref 10–44)
ANION GAP SERPL CALC-SCNC: 11 MMOL/L (ref 8–16)
AST SERPL-CCNC: 19 U/L (ref 10–40)
BASOPHILS # BLD AUTO: 0.05 K/UL (ref 0–0.2)
BASOPHILS NFR BLD: 0.8 % (ref 0–1.9)
BILIRUB SERPL-MCNC: 0.2 MG/DL (ref 0.1–1)
BUN SERPL-MCNC: 16 MG/DL (ref 8–23)
CALCIUM SERPL-MCNC: 9.3 MG/DL (ref 8.7–10.5)
CHLORIDE SERPL-SCNC: 107 MMOL/L (ref 95–110)
CHOLEST SERPL-MCNC: 238 MG/DL (ref 120–199)
CHOLEST/HDLC SERPL: 6 {RATIO} (ref 2–5)
CO2 SERPL-SCNC: 24 MMOL/L (ref 23–29)
CREAT SERPL-MCNC: 1.2 MG/DL (ref 0.5–1.4)
DIFFERENTIAL METHOD: ABNORMAL
EOSINOPHIL # BLD AUTO: 0.4 K/UL (ref 0–0.5)
EOSINOPHIL NFR BLD: 6.1 % (ref 0–8)
ERYTHROCYTE [DISTWIDTH] IN BLOOD BY AUTOMATED COUNT: 13.4 % (ref 11.5–14.5)
EST. GFR  (AFRICAN AMERICAN): >60 ML/MIN/1.73 M^2
EST. GFR  (NON AFRICAN AMERICAN): 55.2 ML/MIN/1.73 M^2
GLUCOSE SERPL-MCNC: 106 MG/DL (ref 70–110)
HCT VFR BLD AUTO: 33.5 % (ref 40–54)
HDLC SERPL-MCNC: 40 MG/DL (ref 40–75)
HDLC SERPL: 16.8 % (ref 20–50)
HGB BLD-MCNC: 10.8 G/DL (ref 14–18)
IMM GRANULOCYTES # BLD AUTO: 0.03 K/UL (ref 0–0.04)
IMM GRANULOCYTES NFR BLD AUTO: 0.5 % (ref 0–0.5)
LDLC SERPL CALC-MCNC: 159.6 MG/DL (ref 63–159)
LYMPHOCYTES # BLD AUTO: 2.1 K/UL (ref 1–4.8)
LYMPHOCYTES NFR BLD: 32.5 % (ref 18–48)
MCH RBC QN AUTO: 30.6 PG (ref 27–31)
MCHC RBC AUTO-ENTMCNC: 32.2 G/DL (ref 32–36)
MCV RBC AUTO: 95 FL (ref 82–98)
MONOCYTES # BLD AUTO: 0.8 K/UL (ref 0.3–1)
MONOCYTES NFR BLD: 12.1 % (ref 4–15)
NEUTROPHILS # BLD AUTO: 3.1 K/UL (ref 1.8–7.7)
NEUTROPHILS NFR BLD: 48 % (ref 38–73)
NONHDLC SERPL-MCNC: 198 MG/DL
NRBC BLD-RTO: 0 /100 WBC
PLATELET # BLD AUTO: 221 K/UL (ref 150–450)
PMV BLD AUTO: 10.9 FL (ref 9.2–12.9)
POTASSIUM SERPL-SCNC: 4.3 MMOL/L (ref 3.5–5.1)
PROT SERPL-MCNC: 7 G/DL (ref 6–8.4)
RBC # BLD AUTO: 3.53 M/UL (ref 4.6–6.2)
SODIUM SERPL-SCNC: 142 MMOL/L (ref 136–145)
TRIGL SERPL-MCNC: 192 MG/DL (ref 30–150)
TSH SERPL DL<=0.005 MIU/L-ACNC: 4.46 UIU/ML (ref 0.4–4)
WBC # BLD AUTO: 6.52 K/UL (ref 3.9–12.7)

## 2022-02-18 PROCEDURE — 3078F DIAST BP <80 MM HG: CPT | Mod: HCNC,CPTII,S$GLB, | Performed by: FAMILY MEDICINE

## 2022-02-18 PROCEDURE — 1157F PR ADVANCE CARE PLAN OR EQUIV PRESENT IN MEDICAL RECORD: ICD-10-PCS | Mod: HCNC,CPTII,S$GLB, | Performed by: FAMILY MEDICINE

## 2022-02-18 PROCEDURE — 80053 COMPREHEN METABOLIC PANEL: CPT | Mod: HCNC | Performed by: FAMILY MEDICINE

## 2022-02-18 PROCEDURE — 99214 PR OFFICE/OUTPT VISIT, EST, LEVL IV, 30-39 MIN: ICD-10-PCS | Mod: HCNC,S$GLB,, | Performed by: FAMILY MEDICINE

## 2022-02-18 PROCEDURE — 84439 ASSAY OF FREE THYROXINE: CPT | Mod: HCNC | Performed by: FAMILY MEDICINE

## 2022-02-18 PROCEDURE — 3288F FALL RISK ASSESSMENT DOCD: CPT | Mod: HCNC,CPTII,S$GLB, | Performed by: FAMILY MEDICINE

## 2022-02-18 PROCEDURE — 85025 COMPLETE CBC W/AUTO DIFF WBC: CPT | Mod: HCNC | Performed by: FAMILY MEDICINE

## 2022-02-18 PROCEDURE — 1101F PR PT FALLS ASSESS DOC 0-1 FALLS W/OUT INJ PAST YR: ICD-10-PCS | Mod: HCNC,CPTII,S$GLB, | Performed by: FAMILY MEDICINE

## 2022-02-18 PROCEDURE — 3078F PR MOST RECENT DIASTOLIC BLOOD PRESSURE < 80 MM HG: ICD-10-PCS | Mod: HCNC,CPTII,S$GLB, | Performed by: FAMILY MEDICINE

## 2022-02-18 PROCEDURE — 1126F AMNT PAIN NOTED NONE PRSNT: CPT | Mod: HCNC,CPTII,S$GLB, | Performed by: FAMILY MEDICINE

## 2022-02-18 PROCEDURE — 3075F PR MOST RECENT SYSTOLIC BLOOD PRESS GE 130-139MM HG: ICD-10-PCS | Mod: HCNC,CPTII,S$GLB, | Performed by: FAMILY MEDICINE

## 2022-02-18 PROCEDURE — 1160F RVW MEDS BY RX/DR IN RCRD: CPT | Mod: HCNC,CPTII,S$GLB, | Performed by: FAMILY MEDICINE

## 2022-02-18 PROCEDURE — 1126F PR PAIN SEVERITY QUANTIFIED, NO PAIN PRESENT: ICD-10-PCS | Mod: HCNC,CPTII,S$GLB, | Performed by: FAMILY MEDICINE

## 2022-02-18 PROCEDURE — 99214 OFFICE O/P EST MOD 30 MIN: CPT | Mod: HCNC,S$GLB,, | Performed by: FAMILY MEDICINE

## 2022-02-18 PROCEDURE — 80061 LIPID PANEL: CPT | Mod: HCNC | Performed by: FAMILY MEDICINE

## 2022-02-18 PROCEDURE — 1101F PT FALLS ASSESS-DOCD LE1/YR: CPT | Mod: HCNC,CPTII,S$GLB, | Performed by: FAMILY MEDICINE

## 2022-02-18 PROCEDURE — 3288F PR FALLS RISK ASSESSMENT DOCUMENTED: ICD-10-PCS | Mod: HCNC,CPTII,S$GLB, | Performed by: FAMILY MEDICINE

## 2022-02-18 PROCEDURE — 1160F PR REVIEW ALL MEDS BY PRESCRIBER/CLIN PHARMACIST DOCUMENTED: ICD-10-PCS | Mod: HCNC,CPTII,S$GLB, | Performed by: FAMILY MEDICINE

## 2022-02-18 PROCEDURE — 1159F PR MEDICATION LIST DOCUMENTED IN MEDICAL RECORD: ICD-10-PCS | Mod: HCNC,CPTII,S$GLB, | Performed by: FAMILY MEDICINE

## 2022-02-18 PROCEDURE — 1159F MED LIST DOCD IN RCRD: CPT | Mod: HCNC,CPTII,S$GLB, | Performed by: FAMILY MEDICINE

## 2022-02-18 PROCEDURE — 1157F ADVNC CARE PLAN IN RCRD: CPT | Mod: HCNC,CPTII,S$GLB, | Performed by: FAMILY MEDICINE

## 2022-02-18 PROCEDURE — 84443 ASSAY THYROID STIM HORMONE: CPT | Mod: HCNC | Performed by: FAMILY MEDICINE

## 2022-02-18 PROCEDURE — 3075F SYST BP GE 130 - 139MM HG: CPT | Mod: HCNC,CPTII,S$GLB, | Performed by: FAMILY MEDICINE

## 2022-02-19 LAB — T4 FREE SERPL-MCNC: 0.68 NG/DL (ref 0.71–1.51)

## 2022-02-26 ENCOUNTER — PATIENT MESSAGE (OUTPATIENT)
Dept: FAMILY MEDICINE | Facility: CLINIC | Age: 87
End: 2022-02-26
Payer: MEDICARE

## 2022-02-26 DIAGNOSIS — E03.4 HYPOTHYROIDISM DUE TO ACQUIRED ATROPHY OF THYROID: ICD-10-CM

## 2022-02-26 DIAGNOSIS — D64.9 ANEMIA, UNSPECIFIED TYPE: Primary | ICD-10-CM

## 2022-02-26 PROCEDURE — 82270 POCT HEMOCULT STOOL X3: ICD-10-PCS | Mod: ,,, | Performed by: FAMILY MEDICINE

## 2022-02-26 PROCEDURE — 82270 OCCULT BLOOD FECES: CPT | Mod: ,,, | Performed by: FAMILY MEDICINE

## 2022-02-26 RX ORDER — LEVOTHYROXINE SODIUM 75 UG/1
75 TABLET ORAL
Qty: 30 TABLET | Refills: 1 | Status: SHIPPED | OUTPATIENT
Start: 2022-02-26 | End: 2022-04-26 | Stop reason: SDUPTHER

## 2022-02-27 NOTE — PROGRESS NOTES
Subjective:       Patient ID: Amor Alcazar is a 86 y.o. male.    Chief Complaint: Follow-up (3mo)    HPI   The patient is an 86-year-old who is here today for follow-up.  Overall, he is doing well.  Today we discussed the followin) hypothyroidism.  With his Synthroid, he has been a new man.  His family could tell a difference on day 1 of his medications.  His sluggishness and his appetite quickly improved  and has had sustained improvement.   2) prostate cancer.  He does continue follow-up with the urologist for treatment.  He is taking erleada daily and eligard injections with Urology    Of note, with his improved appetite, he has gained 7 lb since her last visit    Review of Systems   Constitutional: Negative for appetite change, chills, diaphoresis, fatigue, fever and unexpected weight change.   HENT: Negative for congestion, ear pain, postnasal drip, rhinorrhea, sinus pressure, sneezing, sore throat and trouble swallowing.    Eyes: Negative for pain, discharge and visual disturbance.   Respiratory: Negative for cough, chest tightness, shortness of breath and wheezing.    Cardiovascular: Negative for chest pain, palpitations and leg swelling.   Gastrointestinal: Negative for abdominal distention, abdominal pain, blood in stool, constipation, diarrhea, nausea and vomiting.   Skin: Negative for rash.       Objective:      Physical Exam  Constitutional:       General: He is not in acute distress.     Appearance: Normal appearance. He is well-developed.   HENT:      Head: Normocephalic and atraumatic.      Right Ear: Hearing, tympanic membrane, ear canal and external ear normal.      Left Ear: Hearing, tympanic membrane, ear canal and external ear normal.      Nose: Nose normal.      Mouth/Throat:      Mouth: No oral lesions.      Pharynx: No oropharyngeal exudate or posterior oropharyngeal erythema.   Eyes:      General: Lids are normal. No scleral icterus.     Extraocular Movements: Extraocular movements  intact.      Conjunctiva/sclera: Conjunctivae normal.      Pupils: Pupils are equal, round, and reactive to light.   Neck:      Thyroid: No thyroid mass or thyromegaly.      Vascular: No carotid bruit.   Cardiovascular:      Rate and Rhythm: Normal rate and regular rhythm.  No extrasystoles are present.     Chest Wall: PMI is not displaced.      Heart sounds: Normal heart sounds. No murmur heard.    No gallop.   Pulmonary:      Effort: Pulmonary effort is normal. No accessory muscle usage or respiratory distress.      Breath sounds: Normal breath sounds.   Chest:   Breasts:      Right: No supraclavicular adenopathy.      Left: No supraclavicular adenopathy.       Abdominal:      General: Bowel sounds are normal. There is no abdominal bruit.      Palpations: Abdomen is soft.      Tenderness: There is no abdominal tenderness. There is no rebound.   Musculoskeletal:      Cervical back: Normal range of motion and neck supple.   Lymphadenopathy:      Head:      Right side of head: No submental or submandibular adenopathy.      Left side of head: No submental or submandibular adenopathy.      Cervical:      Right cervical: No superficial, deep or posterior cervical adenopathy.     Left cervical: No superficial, deep or posterior cervical adenopathy.      Upper Body:      Right upper body: No supraclavicular adenopathy.      Left upper body: No supraclavicular adenopathy.   Skin:     General: Skin is warm and dry.   Neurological:      Mental Status: He is alert and oriented to person, place, and time.       Blood pressure 132/68, pulse (!) 59, temperature 97.3 °F (36.3 °C), temperature source Temporal, resp. rate 14, weight 70.2 kg (154 lb 14 oz), SpO2 (!) 94 %.Body mass index is 22.22 kg/m².          A/P:  1) hypothyroidism.  Improved.  We will check a TSH today.  He will continue with his Synthroid  2)  Prostate care.  Stable.  Continue current medication under the direction of Urology    3) anemia.  Status unknown.  We  will check a CBC today  4) chronic kidney disease.  We will check a BMP today  5)  health maintenance issues.  We will check labs today        As long as he does well, I will see him back in 6 months or sooner if needed

## 2022-02-28 ENCOUNTER — TELEPHONE (OUTPATIENT)
Dept: HEMATOLOGY/ONCOLOGY | Facility: CLINIC | Age: 87
End: 2022-02-28
Payer: MEDICARE

## 2022-02-28 NOTE — TELEPHONE ENCOUNTER
Received hemon referral for dx of anemia.  Not a new pt, pt is ex pt of Dr Hogan, last seen on 9/9/20.  Called and spoke with pt's wife, she sttated she schedules all the pt's appt's.    She said she did not remember pt seeing hemonc, Dr Hogan in the past for the anemia.   Scheduled pt as extended est pt, for anemia; confirmed date,time and location.

## 2022-03-07 ENCOUNTER — OFFICE VISIT (OUTPATIENT)
Dept: HEMATOLOGY/ONCOLOGY | Facility: CLINIC | Age: 87
End: 2022-03-07
Payer: MEDICARE

## 2022-03-07 ENCOUNTER — LAB VISIT (OUTPATIENT)
Dept: LAB | Facility: HOSPITAL | Age: 87
End: 2022-03-07
Payer: MEDICARE

## 2022-03-07 VITALS
OXYGEN SATURATION: 99 % | RESPIRATION RATE: 16 BRPM | SYSTOLIC BLOOD PRESSURE: 123 MMHG | WEIGHT: 152.31 LBS | BODY MASS INDEX: 21.81 KG/M2 | DIASTOLIC BLOOD PRESSURE: 69 MMHG | HEIGHT: 70 IN | HEART RATE: 86 BPM

## 2022-03-07 DIAGNOSIS — C61 MALIGNANT NEOPLASM OF PROSTATE: ICD-10-CM

## 2022-03-07 DIAGNOSIS — E03.9 HYPOTHYROIDISM, UNSPECIFIED TYPE: ICD-10-CM

## 2022-03-07 DIAGNOSIS — D64.9 ANEMIA, UNSPECIFIED TYPE: Primary | ICD-10-CM

## 2022-03-07 DIAGNOSIS — N18.31 CHRONIC KIDNEY DISEASE, STAGE 3A: ICD-10-CM

## 2022-03-07 DIAGNOSIS — D64.9 ANEMIA, UNSPECIFIED TYPE: ICD-10-CM

## 2022-03-07 LAB
BASOPHILS # BLD AUTO: 0.08 K/UL (ref 0–0.2)
BASOPHILS NFR BLD: 1.1 % (ref 0–1.9)
DIFFERENTIAL METHOD: ABNORMAL
EOSINOPHIL # BLD AUTO: 0.3 K/UL (ref 0–0.5)
EOSINOPHIL NFR BLD: 4.4 % (ref 0–8)
ERYTHROCYTE [DISTWIDTH] IN BLOOD BY AUTOMATED COUNT: 13.5 % (ref 11.5–14.5)
FOLATE SERPL-MCNC: 39.6 NG/ML (ref 4–24)
HCT VFR BLD AUTO: 32.7 % (ref 40–54)
HGB BLD-MCNC: 10.8 G/DL (ref 14–18)
IMM GRANULOCYTES # BLD AUTO: 0.04 K/UL (ref 0–0.04)
IMM GRANULOCYTES NFR BLD AUTO: 0.6 % (ref 0–0.5)
IRON SERPL-MCNC: 43 UG/DL (ref 45–160)
LYMPHOCYTES # BLD AUTO: 2.1 K/UL (ref 1–4.8)
LYMPHOCYTES NFR BLD: 29.8 % (ref 18–48)
MCH RBC QN AUTO: 30.5 PG (ref 27–31)
MCHC RBC AUTO-ENTMCNC: 33 G/DL (ref 32–36)
MCV RBC AUTO: 92 FL (ref 82–98)
MONOCYTES # BLD AUTO: 0.8 K/UL (ref 0.3–1)
MONOCYTES NFR BLD: 11.7 % (ref 4–15)
NEUTROPHILS # BLD AUTO: 3.7 K/UL (ref 1.8–7.7)
NEUTROPHILS NFR BLD: 52.4 % (ref 38–73)
NRBC BLD-RTO: 0 /100 WBC
PLATELET # BLD AUTO: 200 K/UL (ref 150–450)
PMV BLD AUTO: 9.7 FL (ref 9.2–12.9)
RBC # BLD AUTO: 3.54 M/UL (ref 4.6–6.2)
RETICS/RBC NFR AUTO: 1 % (ref 0.4–2)
SATURATED IRON: 19 % (ref 20–50)
TOTAL IRON BINDING CAPACITY: 225 UG/DL (ref 250–450)
TRANSFERRIN SERPL-MCNC: 152 MG/DL (ref 200–375)
VIT B12 SERPL-MCNC: 298 PG/ML (ref 210–950)
WBC # BLD AUTO: 7.08 K/UL (ref 3.9–12.7)

## 2022-03-07 PROCEDURE — 1157F ADVNC CARE PLAN IN RCRD: CPT | Mod: HCNC,CPTII,S$GLB,

## 2022-03-07 PROCEDURE — 1159F MED LIST DOCD IN RCRD: CPT | Mod: HCNC,CPTII,S$GLB,

## 2022-03-07 PROCEDURE — 1126F AMNT PAIN NOTED NONE PRSNT: CPT | Mod: HCNC,CPTII,S$GLB,

## 2022-03-07 PROCEDURE — 85025 COMPLETE CBC W/AUTO DIFF WBC: CPT | Mod: HCNC,PN

## 2022-03-07 PROCEDURE — 1101F PT FALLS ASSESS-DOCD LE1/YR: CPT | Mod: HCNC,CPTII,S$GLB,

## 2022-03-07 PROCEDURE — 84238 ASSAY NONENDOCRINE RECEPTOR: CPT | Mod: HCNC

## 2022-03-07 PROCEDURE — 99999 PR PBB SHADOW E&M-EST. PATIENT-LVL III: CPT | Mod: PBBFAC,HCNC,,

## 2022-03-07 PROCEDURE — 3078F PR MOST RECENT DIASTOLIC BLOOD PRESSURE < 80 MM HG: ICD-10-PCS | Mod: HCNC,CPTII,S$GLB,

## 2022-03-07 PROCEDURE — 99999 PR PBB SHADOW E&M-EST. PATIENT-LVL III: ICD-10-PCS | Mod: PBBFAC,HCNC,,

## 2022-03-07 PROCEDURE — 1126F PR PAIN SEVERITY QUANTIFIED, NO PAIN PRESENT: ICD-10-PCS | Mod: HCNC,CPTII,S$GLB,

## 2022-03-07 PROCEDURE — 3288F PR FALLS RISK ASSESSMENT DOCUMENTED: ICD-10-PCS | Mod: HCNC,CPTII,S$GLB,

## 2022-03-07 PROCEDURE — 3078F DIAST BP <80 MM HG: CPT | Mod: HCNC,CPTII,S$GLB,

## 2022-03-07 PROCEDURE — 82746 ASSAY OF FOLIC ACID SERUM: CPT | Mod: HCNC

## 2022-03-07 PROCEDURE — 99214 OFFICE O/P EST MOD 30 MIN: CPT | Mod: HCNC,S$GLB,,

## 2022-03-07 PROCEDURE — 85045 AUTOMATED RETICULOCYTE COUNT: CPT | Mod: HCNC,PN

## 2022-03-07 PROCEDURE — 1159F PR MEDICATION LIST DOCUMENTED IN MEDICAL RECORD: ICD-10-PCS | Mod: HCNC,CPTII,S$GLB,

## 2022-03-07 PROCEDURE — 3288F FALL RISK ASSESSMENT DOCD: CPT | Mod: HCNC,CPTII,S$GLB,

## 2022-03-07 PROCEDURE — 36415 COLL VENOUS BLD VENIPUNCTURE: CPT | Mod: HCNC,PN

## 2022-03-07 PROCEDURE — 84466 ASSAY OF TRANSFERRIN: CPT | Mod: HCNC

## 2022-03-07 PROCEDURE — 1101F PR PT FALLS ASSESS DOC 0-1 FALLS W/OUT INJ PAST YR: ICD-10-PCS | Mod: HCNC,CPTII,S$GLB,

## 2022-03-07 PROCEDURE — 3074F SYST BP LT 130 MM HG: CPT | Mod: HCNC,CPTII,S$GLB,

## 2022-03-07 PROCEDURE — 1157F PR ADVANCE CARE PLAN OR EQUIV PRESENT IN MEDICAL RECORD: ICD-10-PCS | Mod: HCNC,CPTII,S$GLB,

## 2022-03-07 PROCEDURE — 99214 PR OFFICE/OUTPT VISIT, EST, LEVL IV, 30-39 MIN: ICD-10-PCS | Mod: HCNC,S$GLB,,

## 2022-03-07 PROCEDURE — 3074F PR MOST RECENT SYSTOLIC BLOOD PRESSURE < 130 MM HG: ICD-10-PCS | Mod: HCNC,CPTII,S$GLB,

## 2022-03-07 PROCEDURE — 82607 VITAMIN B-12: CPT | Mod: HCNC

## 2022-03-07 NOTE — Clinical Note
Labs today: CBC, B12, folate, iron/TIBC, ferritin, STFR, reticulocyte Labs in 6 weeks CBC, CMP Follow-up with me in 6 weeks, labs prior to visit

## 2022-03-07 NOTE — PROGRESS NOTES
Subjective:     Name: Amor Alcazar  : 1935  MRN: [id number]  CC: Anemia     HPI:   Amor Alcazar is a 86 y.o. male with hypothyroid, arthritis, prostate cancer seen today at the request of Dr. Dubose for re-evaluation of his chronic anemia. He is accompanied by his wife.  Mr. Alcazar was previously seen by Dr. Hogan for macrocytic anemia with the last clinic visit in 2020. Folic acid supplement was added at that time. Macrocytosis had resolved and Hgb remained stable in the 11-12 g/dL range. Mr. Alcazar has been following with his PCP since that time. Now, patient's Hgb is 10.8 g/dL, normocytic. Is compliant with folic acid supplement and daily MVI. Has stool cards at home and is planning to turn those in this week. Denies abd pain, hematochezia, melena, dysuria, hematuria.   Prostate cancer is followed by urology, Dr. Arellano. Mr. Alcazar is compliant with po Erleada and Q 6 month Eligard injections. Last PSA 10/19/2021 was 1.8 ng/mL  Recently diagnosed with hypothyroid and started on Levothyroxine 75 mcg daily.  He is active, continues to get outside to care for his lawn. Denies any SOB, cough, CP, HA, swelling, numbness, tingling, LAD, fevers, chills.     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 TR  ECHO       Past Surgical History:   Procedure Laterality Date    CATARACT EXTRACTION W/  INTRAOCULAR LENS IMPLANT Bilateral     ESOPHAGOGASTRODUODENOSCOPY         Family History   Problem Relation Age of Onset    Lung cancer Sister          of       Social History     Socioeconomic History    Marital status:    Occupational History    Occupation: retired     Comment: army, steel, automobile industry   Tobacco Use    Smoking status: Never Smoker    Smokeless tobacco: Never Used   Substance and  "Sexual Activity    Alcohol use: Yes     Comment: few beers daily    Drug use: No    Sexual activity: Not Currently       Review of patient's allergies indicates:  No Known Allergies    Review of Systems   Constitutional: Negative for chills, decreased appetite, fever, malaise/fatigue and night sweats.   Eyes: Negative for visual disturbance.   Cardiovascular: Negative for chest pain, leg swelling and palpitations.   Respiratory: Negative for cough and shortness of breath.    Hematologic/Lymphatic: Negative for adenopathy. Does not bruise/bleed easily.   Skin: Negative for rash.   Musculoskeletal: Positive for arthritis. Negative for falls.   Gastrointestinal: Negative for abdominal pain, constipation, diarrhea, hematochezia, melena, nausea and vomiting.   Genitourinary: Negative for dysuria, flank pain and frequency.   Neurological: Negative for headaches.            Objective:     Vitals:    03/07/22 0959   BP: 123/69   BP Location: Left arm   Patient Position: Sitting   Pulse: 86   Resp: 16   SpO2: 99%   Weight: 69.1 kg (152 lb 5.4 oz)   Height: 5' 10" (1.778 m)        Physical Exam  Vitals reviewed.   Constitutional:       General: He is not in acute distress.     Appearance: He is not diaphoretic.   HENT:      Head: Normocephalic and atraumatic.   Eyes:      General: No scleral icterus.  Cardiovascular:      Rate and Rhythm: Normal rate and regular rhythm.      Heart sounds: Normal heart sounds. No murmur heard.    No gallop.   Pulmonary:      Effort: Pulmonary effort is normal. No respiratory distress.      Breath sounds: Normal breath sounds. No wheezing or rhonchi.   Abdominal:      General: Bowel sounds are normal. There is no distension.      Palpations: Abdomen is soft. There is no mass.      Tenderness: There is no abdominal tenderness. There is no guarding.   Musculoskeletal:      Cervical back: No tenderness.      Right lower leg: No edema.      Left lower leg: No edema.   Lymphadenopathy:      " Cervical: No cervical adenopathy.   Skin:     General: Skin is warm and dry.      Coloration: Skin is not pale.      Findings: No bruising or rash.   Neurological:      Mental Status: He is alert and oriented to person, place, and time.      Gait: Gait normal.   Psychiatric:         Mood and Affect: Mood normal.         Behavior: Behavior normal.         Judgment: Judgment normal.             Current Outpatient Medications on File Prior to Visit   Medication Sig    apalutamide (ERLEADA) 60 mg Tab Take 4 tablets (240 mg total) by mouth once daily.    ELIGARD, 6 MONTH, 45 mg injection     folic acid (FOLVITE) 400 MCG tablet Take 400 mcg by mouth once daily.    levothyroxine (SYNTHROID) 75 MCG tablet Take 1 tablet (75 mcg total) by mouth before breakfast.    multivit-min-FA-lycopen-lutein (CENTRUM SILVER MEN) 300-600-300 mcg Tab Take by mouth once daily.     No current facility-administered medications on file prior to visit.       CBC:  Lab Results   Component Value Date    WBC 7.08 03/07/2022    HGB 10.8 (L) 03/07/2022    HCT 32.7 (L) 03/07/2022    MCV 92 03/07/2022     03/07/2022         CMP:  Sodium   Date Value Ref Range Status   02/18/2022 142 136 - 145 mmol/L Final     Potassium   Date Value Ref Range Status   02/18/2022 4.3 3.5 - 5.1 mmol/L Final     Chloride   Date Value Ref Range Status   02/18/2022 107 95 - 110 mmol/L Final     CO2   Date Value Ref Range Status   02/18/2022 24 23 - 29 mmol/L Final     Glucose   Date Value Ref Range Status   02/18/2022 106 70 - 110 mg/dL Final     BUN   Date Value Ref Range Status   02/18/2022 16 8 - 23 mg/dL Final     Creatinine   Date Value Ref Range Status   02/18/2022 1.2 0.5 - 1.4 mg/dL Final     Calcium   Date Value Ref Range Status   02/18/2022 9.3 8.7 - 10.5 mg/dL Final     Total Protein   Date Value Ref Range Status   02/18/2022 7.0 6.0 - 8.4 g/dL Final     Albumin   Date Value Ref Range Status   02/18/2022 3.6 3.5 - 5.2 g/dL Final     Total Bilirubin    Date Value Ref Range Status   02/18/2022 0.2 0.1 - 1.0 mg/dL Final     Comment:     For infants and newborns, interpretation of results should be based  on gestational age, weight and in agreement with clinical  observations.    Premature Infant recommended reference ranges:  Up to 24 hours.............<8.0 mg/dL  Up to 48 hours............<12.0 mg/dL  3-5 days..................<15.0 mg/dL  6-29 days.................<15.0 mg/dL       Alkaline Phosphatase   Date Value Ref Range Status   02/18/2022 39 (L) 55 - 135 U/L Final     AST   Date Value Ref Range Status   02/18/2022 19 10 - 40 U/L Final     ALT   Date Value Ref Range Status   02/18/2022 11 10 - 44 U/L Final     Anion Gap   Date Value Ref Range Status   02/18/2022 11 8 - 16 mmol/L Final     eGFR if    Date Value Ref Range Status   02/18/2022 >60.0 >60 mL/min/1.73 m^2 Final     eGFR if non    Date Value Ref Range Status   02/18/2022 55.2 (A) >60 mL/min/1.73 m^2 Final     Comment:     Calculation used to obtain the estimated glomerular filtration  rate (eGFR) is the CKD-EPI equation.          DXA Bone Density Spine And Hip  Narrative: EXAMINATION:  DEXA BONE DENSITY SPINE HIP    CLINICAL HISTORY:  Disorder of bone, unspecified    TECHNIQUE:  DXA scanning was performed over the left hip and lumbar spine.  Review of the images confirms satisfactory positioning and technique.    COMPARISON:  None    FINDINGS:  The L1 to L4 vertebral bone mineral density is equal to 1.129 g/cm squared with a T score of 0.3.    The left femoral neck bone mineral density is equal to 0.861 g/cm squared with a T score of -0.5.    There is a 5% risk of a major osteoporotic fracture and a 1.5% risk of hip fracture in the next 10 years (FRAX).  Impression: No evidence of osteoporosis or osteopenia numerically    Consider FDA approved medical therapies in postmenopausal women and men aged 50 years and older, based on the following:    *A hip or vertebral  (clinical or morphometric) fracture  *T score less than or equal to -2.5 at the femoral neck or spine after appropriate evaluation to exclude secondary causes.  *Low bone mass -- also known as osteopenia (T score between -1.0 and -2.5 at the femoral neck or spine) and a 10 year probability of hip fracture greater than or equal to 3% or a 10 year probability of major osteoporosis-related fracture greater than or equal to 20% based on the [place]-adapted WHO algorithm.  *Clinicians judgment and/or patient preference may indicate treatment for people with 10 year fracture probabilities is above or below these levels.    Electronically signed by: Fred Arriaza MD  Date:    09/17/2019  Time:    15:00       All pertinent labs and imaging reviewed.    Assessment:       1. Anemia, unspecified type    2. Chronic kidney disease, stage 3a    3. Malignant neoplasm of prostate    4. Hypothyroidism, unspecified type    Anemia  -Discussed potential causes for anemia; will check B12, folic acid, iron/TIBC, ferritin, STFR, reticulocyte count, CBC today  -Will replete Iron, B12, folate if needed   -Patient to turn in stool cards as discussed with PCP prior  -Likely anemia of chronic disease; CKD. Would not initiate TULIO until Hgb were <10 g/dL  -Will monitor     CKD  -Stage 3 chronic kidney disease  -Followed by PCP   -Will monitor     Prostate Cancer   -Taking Erleada   -Getting Eligard injections Q 6 months  -Last PSA 1.8 ng/mL (10/2021)  -Followed by urology    Hypothyroidism  -Began Levothyroxine 2/26/22  -Managed per PCP     Labs today: B12, folic acid, iron/TIBC, ferritin, STFR, reticulocyte count, CBC     Follow-up in 6 weeks with CBC and CMP prior to visit.   Patient queried and all questions were answered.

## 2022-03-09 ENCOUNTER — PATIENT MESSAGE (OUTPATIENT)
Dept: FAMILY MEDICINE | Facility: CLINIC | Age: 87
End: 2022-03-09
Payer: MEDICARE

## 2022-03-09 LAB
CTP QC/QA: YES
FECAL OCCULT BLOOD, POC: NEGATIVE
STFR SERPL-MCNC: 2.7 MG/L (ref 1.8–4.6)

## 2022-03-10 DIAGNOSIS — D50.8 OTHER IRON DEFICIENCY ANEMIA: ICD-10-CM

## 2022-03-10 PROBLEM — D50.9 IRON DEFICIENCY ANEMIA: Status: ACTIVE | Noted: 2022-03-10

## 2022-03-11 ENCOUNTER — TELEPHONE (OUTPATIENT)
Dept: HEMATOLOGY/ONCOLOGY | Facility: CLINIC | Age: 87
End: 2022-03-11
Payer: MEDICARE

## 2022-03-11 NOTE — TELEPHONE ENCOUNTER
----- Message from Mylene Vazquez, Patient Care Assistant sent at 3/11/2022  2:18 PM CST -----  Contact: colton  Type:  Test Results    Who Called:  colton   Name of Test (Lab/Mammo/Etc):  blood  Date of Test:  3/7  Ordering Provider:  same  Where the test was performed:  office  Best Call Back Number:  [phone]    Additional Information: colton  is wanting to see if blood results  are back ,yet , please call for further discussion, thanks

## 2022-03-11 NOTE — TELEPHONE ENCOUNTER
Juju notified via staff message from preservice. Will change orders.----- Message from Kath Lorenzo sent at 3/11/2022  2:04 PM CST -----  Just letting you know patient insurance denied Feraheme

## 2022-03-14 RX ORDER — DIPHENHYDRAMINE HCL 25 MG
25 CAPSULE ORAL
Status: CANCELLED
Start: 2022-03-15

## 2022-03-14 RX ORDER — SODIUM CHLORIDE 0.9 % (FLUSH) 0.9 %
10 SYRINGE (ML) INJECTION
Status: CANCELLED | OUTPATIENT
Start: 2022-03-15

## 2022-03-14 RX ORDER — EPINEPHRINE 0.3 MG/.3ML
0.3 INJECTION SUBCUTANEOUS ONCE AS NEEDED
Status: CANCELLED | OUTPATIENT
Start: 2022-03-15

## 2022-03-14 RX ORDER — DEXAMETHASONE SODIUM PHOSPHATE 4 MG/ML
4 INJECTION, SOLUTION INTRA-ARTICULAR; INTRALESIONAL; INTRAMUSCULAR; INTRAVENOUS; SOFT TISSUE
Status: CANCELLED
Start: 2022-03-15

## 2022-03-14 RX ORDER — HEPARIN 100 UNIT/ML
500 SYRINGE INTRAVENOUS
Status: CANCELLED | OUTPATIENT
Start: 2022-03-15

## 2022-03-14 RX ORDER — FAMOTIDINE 10 MG/ML
20 INJECTION INTRAVENOUS
Status: CANCELLED
Start: 2022-03-15 | End: 2022-03-15

## 2022-03-14 RX ORDER — DIPHENHYDRAMINE HYDROCHLORIDE 50 MG/ML
50 INJECTION INTRAMUSCULAR; INTRAVENOUS ONCE AS NEEDED
Status: CANCELLED | OUTPATIENT
Start: 2022-03-15

## 2022-03-14 RX ORDER — METHYLPREDNISOLONE SOD SUCC 125 MG
125 VIAL (EA) INJECTION ONCE AS NEEDED
Status: CANCELLED | OUTPATIENT
Start: 2022-03-15

## 2022-03-14 NOTE — PROGRESS NOTES
Spoke with patient's wife regarding lab results, will move forward with Venofer 200 mg IV weekly x 4 doses.

## 2022-03-17 ENCOUNTER — INFUSION (OUTPATIENT)
Dept: INFUSION THERAPY | Facility: HOSPITAL | Age: 87
End: 2022-03-17
Payer: MEDICARE

## 2022-03-17 VITALS
SYSTOLIC BLOOD PRESSURE: 142 MMHG | OXYGEN SATURATION: 99 % | RESPIRATION RATE: 16 BRPM | DIASTOLIC BLOOD PRESSURE: 66 MMHG | HEIGHT: 70 IN | WEIGHT: 150.69 LBS | HEART RATE: 70 BPM | BODY MASS INDEX: 21.57 KG/M2 | TEMPERATURE: 98 F

## 2022-03-17 DIAGNOSIS — D50.8 OTHER IRON DEFICIENCY ANEMIA: Primary | ICD-10-CM

## 2022-03-17 PROCEDURE — 25000003 PHARM REV CODE 250: Mod: PN

## 2022-03-17 PROCEDURE — A4216 STERILE WATER/SALINE, 10 ML: HCPCS | Mod: PN

## 2022-03-17 PROCEDURE — 96365 THER/PROPH/DIAG IV INF INIT: CPT | Mod: PN

## 2022-03-17 PROCEDURE — 96375 TX/PRO/DX INJ NEW DRUG ADDON: CPT | Mod: PN

## 2022-03-17 PROCEDURE — 63600175 PHARM REV CODE 636 W HCPCS: Mod: PN

## 2022-03-17 RX ORDER — DIPHENHYDRAMINE HYDROCHLORIDE 50 MG/ML
50 INJECTION INTRAMUSCULAR; INTRAVENOUS ONCE AS NEEDED
Status: CANCELLED | OUTPATIENT
Start: 2022-03-24

## 2022-03-17 RX ORDER — HEPARIN 100 UNIT/ML
500 SYRINGE INTRAVENOUS
Status: CANCELLED | OUTPATIENT
Start: 2022-03-24

## 2022-03-17 RX ORDER — METHYLPREDNISOLONE SOD SUCC 125 MG
125 VIAL (EA) INJECTION ONCE AS NEEDED
Status: CANCELLED | OUTPATIENT
Start: 2022-03-24

## 2022-03-17 RX ORDER — DIPHENHYDRAMINE HCL 25 MG
25 CAPSULE ORAL
Status: CANCELLED
Start: 2022-03-24

## 2022-03-17 RX ORDER — EPINEPHRINE 0.3 MG/.3ML
0.3 INJECTION SUBCUTANEOUS ONCE AS NEEDED
Status: CANCELLED | OUTPATIENT
Start: 2022-03-24

## 2022-03-17 RX ORDER — DEXAMETHASONE SODIUM PHOSPHATE 4 MG/ML
4 INJECTION, SOLUTION INTRA-ARTICULAR; INTRALESIONAL; INTRAMUSCULAR; INTRAVENOUS; SOFT TISSUE
Status: CANCELLED
Start: 2022-03-24

## 2022-03-17 RX ORDER — SODIUM CHLORIDE 0.9 % (FLUSH) 0.9 %
10 SYRINGE (ML) INJECTION
Status: DISCONTINUED | OUTPATIENT
Start: 2022-03-17 | End: 2022-03-17 | Stop reason: HOSPADM

## 2022-03-17 RX ORDER — DIPHENHYDRAMINE HCL 25 MG
25 CAPSULE ORAL
Status: COMPLETED | OUTPATIENT
Start: 2022-03-17 | End: 2022-03-17

## 2022-03-17 RX ORDER — SODIUM CHLORIDE 0.9 % (FLUSH) 0.9 %
10 SYRINGE (ML) INJECTION
Status: CANCELLED | OUTPATIENT
Start: 2022-03-24

## 2022-03-17 RX ORDER — DEXAMETHASONE SODIUM PHOSPHATE 4 MG/ML
4 INJECTION, SOLUTION INTRA-ARTICULAR; INTRALESIONAL; INTRAMUSCULAR; INTRAVENOUS; SOFT TISSUE
Status: COMPLETED | OUTPATIENT
Start: 2022-03-17 | End: 2022-03-17

## 2022-03-17 RX ORDER — FAMOTIDINE 10 MG/ML
20 INJECTION INTRAVENOUS
Status: COMPLETED | OUTPATIENT
Start: 2022-03-17 | End: 2022-03-17

## 2022-03-17 RX ORDER — FAMOTIDINE 10 MG/ML
20 INJECTION INTRAVENOUS
Status: CANCELLED
Start: 2022-03-24 | End: 2022-03-24

## 2022-03-17 RX ADMIN — DEXAMETHASONE SODIUM PHOSPHATE 4 MG: 4 INJECTION INTRA-ARTICULAR; INTRALESIONAL; INTRAMUSCULAR; INTRAVENOUS; SOFT TISSUE at 12:03

## 2022-03-17 RX ADMIN — FAMOTIDINE 20 MG: 10 INJECTION INTRAVENOUS at 12:03

## 2022-03-17 RX ADMIN — Medication 10 ML: at 02:03

## 2022-03-17 RX ADMIN — DIPHENHYDRAMINE HYDROCHLORIDE 25 MG: 25 CAPSULE ORAL at 12:03

## 2022-03-17 RX ADMIN — IRON SUCROSE 200 MG: 20 INJECTION, SOLUTION INTRAVENOUS at 01:03

## 2022-03-17 RX ADMIN — SODIUM CHLORIDE: 9 INJECTION, SOLUTION INTRAVENOUS at 12:03

## 2022-03-17 NOTE — PLAN OF CARE
Problem: Adult Inpatient Plan of Care  Goal: Plan of Care Review  Outcome: Ongoing, Progressing  Flowsheets (Taken 3/17/2022 1435)  Plan of Care Reviewed With:   patient   spouse  Goal: Patient-Specific Goal (Individualized)  Outcome: Ongoing, Progressing  Flowsheets (Taken 3/17/2022 1435)  Anxieties, Fears or Concerns: none voiced  Individualized Care Needs: recliner, wife at bedside, education  Patient-Specific Goals (Include Timeframe): no signs of reaction during treatment and 30 min post watch     Problem: Fatigue  Goal: Improved Activity Tolerance  Outcome: Ongoing, Progressing  Intervention: Promote Improved Energy  Flowsheets (Taken 3/17/2022 1435)  Fatigue Management:   paced activity encouraged   frequent rest breaks encouraged   fatigue-related activity identified  Sleep/Rest Enhancement:   relaxation techniques promoted   consistent schedule promoted   noise level reduced   awakenings minimized   therapeutic touch utilized   family presence promoted  Activity Management: Ambulated -L4  Pt arrived to clinic for Venofer infusion and tolerated well with no changes throughout therapy, with being monitored 30 min post infusion. Pt aware of side effects and f/u appts and discharged to home in NAD with wife.

## 2022-03-25 ENCOUNTER — INFUSION (OUTPATIENT)
Dept: INFUSION THERAPY | Facility: HOSPITAL | Age: 87
End: 2022-03-25
Payer: MEDICARE

## 2022-03-25 VITALS
DIASTOLIC BLOOD PRESSURE: 67 MMHG | SYSTOLIC BLOOD PRESSURE: 150 MMHG | RESPIRATION RATE: 17 BRPM | TEMPERATURE: 98 F | HEART RATE: 76 BPM

## 2022-03-25 DIAGNOSIS — D50.8 OTHER IRON DEFICIENCY ANEMIA: Primary | ICD-10-CM

## 2022-03-25 PROCEDURE — 96375 TX/PRO/DX INJ NEW DRUG ADDON: CPT | Mod: PN

## 2022-03-25 PROCEDURE — 25000003 PHARM REV CODE 250: Mod: PN

## 2022-03-25 PROCEDURE — 96365 THER/PROPH/DIAG IV INF INIT: CPT | Mod: PN

## 2022-03-25 PROCEDURE — 63600175 PHARM REV CODE 636 W HCPCS: Mod: PN

## 2022-03-25 RX ORDER — HEPARIN 100 UNIT/ML
500 SYRINGE INTRAVENOUS
Status: CANCELLED | OUTPATIENT
Start: 2022-03-31

## 2022-03-25 RX ORDER — DIPHENHYDRAMINE HYDROCHLORIDE 50 MG/ML
50 INJECTION INTRAMUSCULAR; INTRAVENOUS ONCE AS NEEDED
Status: CANCELLED | OUTPATIENT
Start: 2022-03-31

## 2022-03-25 RX ORDER — METHYLPREDNISOLONE SOD SUCC 125 MG
125 VIAL (EA) INJECTION ONCE AS NEEDED
Status: CANCELLED | OUTPATIENT
Start: 2022-03-31

## 2022-03-25 RX ORDER — DEXAMETHASONE SODIUM PHOSPHATE 4 MG/ML
4 INJECTION, SOLUTION INTRA-ARTICULAR; INTRALESIONAL; INTRAMUSCULAR; INTRAVENOUS; SOFT TISSUE
Status: CANCELLED
Start: 2022-03-31

## 2022-03-25 RX ORDER — HEPARIN 100 UNIT/ML
500 SYRINGE INTRAVENOUS
Status: DISCONTINUED | OUTPATIENT
Start: 2022-03-25 | End: 2022-03-25 | Stop reason: HOSPADM

## 2022-03-25 RX ORDER — FAMOTIDINE 10 MG/ML
20 INJECTION INTRAVENOUS
Status: COMPLETED | OUTPATIENT
Start: 2022-03-25 | End: 2022-03-25

## 2022-03-25 RX ORDER — DIPHENHYDRAMINE HCL 25 MG
25 CAPSULE ORAL
Status: COMPLETED | OUTPATIENT
Start: 2022-03-25 | End: 2022-03-25

## 2022-03-25 RX ORDER — EPINEPHRINE 0.3 MG/.3ML
0.3 INJECTION SUBCUTANEOUS ONCE AS NEEDED
Status: CANCELLED | OUTPATIENT
Start: 2022-03-31

## 2022-03-25 RX ORDER — DEXAMETHASONE SODIUM PHOSPHATE 4 MG/ML
4 INJECTION, SOLUTION INTRA-ARTICULAR; INTRALESIONAL; INTRAMUSCULAR; INTRAVENOUS; SOFT TISSUE
Status: COMPLETED | OUTPATIENT
Start: 2022-03-25 | End: 2022-03-25

## 2022-03-25 RX ORDER — SODIUM CHLORIDE 0.9 % (FLUSH) 0.9 %
10 SYRINGE (ML) INJECTION
Status: CANCELLED | OUTPATIENT
Start: 2022-03-31

## 2022-03-25 RX ORDER — DIPHENHYDRAMINE HCL 25 MG
25 CAPSULE ORAL
Status: CANCELLED
Start: 2022-03-31

## 2022-03-25 RX ORDER — FAMOTIDINE 10 MG/ML
20 INJECTION INTRAVENOUS
Status: CANCELLED
Start: 2022-03-31 | End: 2022-03-31

## 2022-03-25 RX ORDER — SODIUM CHLORIDE 0.9 % (FLUSH) 0.9 %
10 SYRINGE (ML) INJECTION
Status: DISCONTINUED | OUTPATIENT
Start: 2022-03-25 | End: 2022-03-25 | Stop reason: HOSPADM

## 2022-03-25 RX ADMIN — FAMOTIDINE 20 MG: 10 INJECTION INTRAVENOUS at 10:03

## 2022-03-25 RX ADMIN — DIPHENHYDRAMINE HYDROCHLORIDE 25 MG: 25 CAPSULE ORAL at 10:03

## 2022-03-25 RX ADMIN — DEXAMETHASONE SODIUM PHOSPHATE 4 MG: 4 INJECTION INTRA-ARTICULAR; INTRALESIONAL; INTRAMUSCULAR; INTRAVENOUS; SOFT TISSUE at 10:03

## 2022-03-25 RX ADMIN — IRON SUCROSE 200 MG: 20 INJECTION, SOLUTION INTRAVENOUS at 10:03

## 2022-03-25 RX ADMIN — SODIUM CHLORIDE: 9 INJECTION, SOLUTION INTRAVENOUS at 10:03

## 2022-03-25 NOTE — PLAN OF CARE
Problem: Adult Inpatient Plan of Care  Goal: Plan of Care Review  Outcome: Ongoing, Progressing  Flowsheets (Taken 3/25/2022 1216)  Plan of Care Reviewed With:   patient   spouse   Pt tolerated venofer infusion well.  No adverse reaction noted.   IV flushed with NS and D/C per protocol.  Patient left clinic in no acute distress.

## 2022-03-25 NOTE — PLAN OF CARE
Problem: Adult Inpatient Plan of Care  Goal: Patient-Specific Goal (Individualized)  Outcome: Ongoing, Progressing  Flowsheets (Taken 3/25/2022 1021)  Anxieties, Fears or Concerns: NONE  Individualized Care Needs: RECLINER, WIFE AT BEDSIDE  Patient-Specific Goals (Include Timeframe): no s/s of rx during tx     Problem: Fatigue  Goal: Improved Activity Tolerance  Outcome: Ongoing, Progressing  Intervention: Promote Improved Energy  Flowsheets (Taken 3/25/2022 1021)  Fatigue Management: frequent rest breaks encouraged  Sleep/Rest Enhancement: relaxation techniques promoted  Activity Management: Ambulated -L4

## 2022-03-30 ENCOUNTER — TELEPHONE (OUTPATIENT)
Dept: UROLOGY | Facility: CLINIC | Age: 87
End: 2022-03-30
Payer: MEDICARE

## 2022-03-30 NOTE — TELEPHONE ENCOUNTER
----- Message from Josefina Estrada sent at 3/30/2022 12:16 PM CDT -----  Contact: Polina at [phone]  Type: Needs Medical Advice  Who Called:  Pts wife Polina  Best Call Back Number: [phone]    Additional Information: Pts wife is calling because Karinea faxed over a [employer] form for pts medication. The only thing filled out was the provider info and not the Pt portion. He will be out of his medication if this does not get done asap. Pls call Pts wife back.     She will be at the office tomorrow to talk to Lisa. Pls call back and  advise.

## 2022-03-31 ENCOUNTER — CLINICAL SUPPORT (OUTPATIENT)
Dept: UROLOGY | Facility: CLINIC | Age: 87
End: 2022-03-31
Payer: MEDICARE

## 2022-03-31 ENCOUNTER — INFUSION (OUTPATIENT)
Dept: INFUSION THERAPY | Facility: HOSPITAL | Age: 87
End: 2022-03-31
Payer: MEDICARE

## 2022-03-31 VITALS
HEIGHT: 70 IN | WEIGHT: 149.94 LBS | HEART RATE: 67 BPM | RESPIRATION RATE: 17 BRPM | BODY MASS INDEX: 21.47 KG/M2 | DIASTOLIC BLOOD PRESSURE: 70 MMHG | SYSTOLIC BLOOD PRESSURE: 154 MMHG | TEMPERATURE: 98 F

## 2022-03-31 DIAGNOSIS — D50.8 OTHER IRON DEFICIENCY ANEMIA: Primary | ICD-10-CM

## 2022-03-31 DIAGNOSIS — C61 PROSTATE CA: Primary | ICD-10-CM

## 2022-03-31 PROCEDURE — 96375 TX/PRO/DX INJ NEW DRUG ADDON: CPT | Mod: PN

## 2022-03-31 PROCEDURE — 63600175 PHARM REV CODE 636 W HCPCS: Mod: PN

## 2022-03-31 PROCEDURE — 25000003 PHARM REV CODE 250: Mod: PN

## 2022-03-31 PROCEDURE — 99999 PR PBB SHADOW E&M-EST. PATIENT-LVL I: CPT | Mod: PBBFAC,,,

## 2022-03-31 PROCEDURE — 99999 PR PBB SHADOW E&M-EST. PATIENT-LVL I: ICD-10-PCS | Mod: PBBFAC,,,

## 2022-03-31 PROCEDURE — 96365 THER/PROPH/DIAG IV INF INIT: CPT | Mod: PN

## 2022-03-31 RX ORDER — EPINEPHRINE 0.3 MG/.3ML
0.3 INJECTION SUBCUTANEOUS ONCE AS NEEDED
Status: CANCELLED | OUTPATIENT
Start: 2022-04-07

## 2022-03-31 RX ORDER — METHYLPREDNISOLONE SOD SUCC 125 MG
125 VIAL (EA) INJECTION ONCE AS NEEDED
Status: CANCELLED | OUTPATIENT
Start: 2022-04-07

## 2022-03-31 RX ORDER — DEXAMETHASONE SODIUM PHOSPHATE 4 MG/ML
4 INJECTION, SOLUTION INTRA-ARTICULAR; INTRALESIONAL; INTRAMUSCULAR; INTRAVENOUS; SOFT TISSUE
Status: COMPLETED | OUTPATIENT
Start: 2022-03-31 | End: 2022-03-31

## 2022-03-31 RX ORDER — DIPHENHYDRAMINE HCL 25 MG
25 CAPSULE ORAL
Status: CANCELLED
Start: 2022-04-07

## 2022-03-31 RX ORDER — FAMOTIDINE 10 MG/ML
20 INJECTION INTRAVENOUS
Status: COMPLETED | OUTPATIENT
Start: 2022-03-31 | End: 2022-03-31

## 2022-03-31 RX ORDER — DEXAMETHASONE SODIUM PHOSPHATE 4 MG/ML
4 INJECTION, SOLUTION INTRA-ARTICULAR; INTRALESIONAL; INTRAMUSCULAR; INTRAVENOUS; SOFT TISSUE
Status: CANCELLED
Start: 2022-04-07

## 2022-03-31 RX ORDER — SODIUM CHLORIDE 0.9 % (FLUSH) 0.9 %
10 SYRINGE (ML) INJECTION
Status: CANCELLED | OUTPATIENT
Start: 2022-04-07

## 2022-03-31 RX ORDER — DIPHENHYDRAMINE HCL 25 MG
25 CAPSULE ORAL
Status: COMPLETED | OUTPATIENT
Start: 2022-03-31 | End: 2022-03-31

## 2022-03-31 RX ORDER — FAMOTIDINE 10 MG/ML
20 INJECTION INTRAVENOUS
Status: CANCELLED
Start: 2022-04-07 | End: 2022-04-07

## 2022-03-31 RX ORDER — SODIUM CHLORIDE 0.9 % (FLUSH) 0.9 %
10 SYRINGE (ML) INJECTION
Status: DISCONTINUED | OUTPATIENT
Start: 2022-03-31 | End: 2022-03-31 | Stop reason: HOSPADM

## 2022-03-31 RX ORDER — DIPHENHYDRAMINE HYDROCHLORIDE 50 MG/ML
50 INJECTION INTRAMUSCULAR; INTRAVENOUS ONCE AS NEEDED
Status: CANCELLED | OUTPATIENT
Start: 2022-04-07

## 2022-03-31 RX ORDER — HEPARIN 100 UNIT/ML
500 SYRINGE INTRAVENOUS
Status: CANCELLED | OUTPATIENT
Start: 2022-04-07

## 2022-03-31 RX ADMIN — DIPHENHYDRAMINE HYDROCHLORIDE 25 MG: 25 CAPSULE ORAL at 10:03

## 2022-03-31 RX ADMIN — SODIUM CHLORIDE: 0.9 INJECTION, SOLUTION INTRAVENOUS at 10:03

## 2022-03-31 RX ADMIN — FAMOTIDINE 20 MG: 10 INJECTION INTRAVENOUS at 10:03

## 2022-03-31 RX ADMIN — IRON SUCROSE 200 MG: 20 INJECTION, SOLUTION INTRAVENOUS at 10:03

## 2022-03-31 RX ADMIN — DEXAMETHASONE SODIUM PHOSPHATE 4 MG: 4 INJECTION INTRA-ARTICULAR; INTRALESIONAL; INTRAMUSCULAR; INTRAVENOUS; SOFT TISSUE at 10:03

## 2022-03-31 NOTE — PROGRESS NOTES
Patient coming to fill out paperwork for medication assistance. Faxed to [employer] and [employer] erleada assistance when finished

## 2022-03-31 NOTE — PLAN OF CARE
Problem: Adult Inpatient Plan of Care  Goal: Plan of Care Review  Outcome: Ongoing, Progressing  Goal: Patient-Specific Goal (Individualized)  Outcome: Ongoing, Progressing     Problem: Fatigue  Goal: Improved Activity Tolerance  Outcome: Ongoing, Progressing       Pt tolerated venofer well this shift. VSS. Pt denied the need for a printed schedule. PIV removed; cath tip intact. Pt is safe for discharge.

## 2022-04-01 ENCOUNTER — TELEPHONE (OUTPATIENT)
Dept: INFUSION THERAPY | Facility: HOSPITAL | Age: 87
End: 2022-04-01
Payer: MEDICARE

## 2022-04-04 NOTE — TELEPHONE ENCOUNTER
No new care gaps identified.  Powered by [employer]. Reference number: [id number].   4/04/2022 11:36:26 AM CDT

## 2022-04-06 NOTE — TELEPHONE ENCOUNTER
Refill Routing Note   Medication(s) are not appropriate for processing by [facility] for the following reason(s):      - Medication requested has undergone a recent dosage adjustment (<3 months)    [facility] action(s):  Defer Medication-related problems identified: Dose adjustment        --->Care Gap information included in message below if applicable.   Medication reconciliation completed: No   Automatic Epic Generated Protocol Data:        Requested Prescriptions   Pending Prescriptions Disp Refills    levothyroxine (SYNTHROID) 75 MCG tablet [Pharmacy Med Name: LEVOTHYROXINE  75 MCG TAB] 90 tablet 3     Sig: Take 1 tablet (75 mcg total) by mouth before breakfast.       Endocrinology:  Hypothyroid Agents Failed - 4/4/2022 11:36 AM        Failed - Matches previous order       Previous Authorizing Provider: Dipti Dubose MD (levothyroxine (SYNTHROID) 75 MCG tablet)  Previous Pharmacy: [facility] [address] [address]            Failed - TSH in normal range and within 360 days     TSH   Date Value Ref Range Status   02/18/2022 4.458 (H) 0.400 - 4.000 uIU/mL Final   11/15/2021 6.257 (H) 0.400 - 4.000 uIU/mL Final   08/26/2021 5.376 (H) 0.400 - 4.000 uIU/mL Final              Passed - Patient is at least 18 years old        Passed - Valid encounter within last 15 months     Recent Visits  Date Type Provider Dept   02/18/22 Office Visit Dipti Dubose MD [facility]   11/15/21 Office Visit Dipti Dubose MD [facility]   08/19/21 Office Visit Dipti Dubose MD [facility]   01/11/21 Office Visit Dipti Dubose MD [facility]   Showing recent visits within past 720 days and meeting all other requirements  Future Appointments  No visits were found meeting these conditions.  Showing future appointments within next 150 days and meeting all other requirements      Future Appointments              Tomorrow CHAIR 39, [facility]  [facility] Infusion, [facility]    In 1 week CHAIR 29, [facility]  [facility] Infusion, [facility]    In 1 week LAB, [facility] DRAW STATION [facility] - Lab, [facility]    In 2 weeks GABRIELLE Bellamy MD [place] [facility]    In 2 weeks Juju Gore NP [facility] - Hematology Oncology, [facility]    In 2 weeks CHAIR 20, [facility] Infusion[facility]    In 3 weeks CHAIR 29, [facility]  [facility]fusion, [facility]    In 4 weeks CHAIR 29, [facility]  [facility] Infusion, [facility]    In 1 month CHAIR 29, [facility] - Infusion, [facility]    In 1 month CHAIR 29, [facility], [facility]    In 4 months Dipti Dubose MD [facility]                Passed - No ED/Hospital visits since last PCP visit     Last PCP Visit: 2/18/2022 Last Admission: 1/10/2017 Last ED Visit: 11/18/2016          Passed - Manual Review: FT4 is not required if last TSH is WNL.        Passed - T4 free within 1080 days     Free T4   Date Value Ref Range Status   02/18/2022 0.68 (L) 0.71 - 1.51 ng/dL Final   11/15/2021 0.68 (L) 0.71 - 1.51 ng/dL Final   08/26/2021 0.66 (L) 0.71 - 1.51 ng/dL Final                    Appointments  past 12m or future 3m with PCP    Date Provider   Last Visit   2/18/2022 Dipti Dubose MD   Next Visit   8/18/2022 Dipti Dubose MD   ED visits in past 90 days: 0        Note composed:5:34 AM 04/06/2022

## 2022-04-07 ENCOUNTER — INFUSION (OUTPATIENT)
Dept: INFUSION THERAPY | Facility: HOSPITAL | Age: 87
End: 2022-04-07
Payer: MEDICARE

## 2022-04-07 VITALS
TEMPERATURE: 98 F | SYSTOLIC BLOOD PRESSURE: 149 MMHG | HEART RATE: 68 BPM | RESPIRATION RATE: 18 BRPM | DIASTOLIC BLOOD PRESSURE: 79 MMHG

## 2022-04-07 DIAGNOSIS — D50.8 OTHER IRON DEFICIENCY ANEMIA: Primary | ICD-10-CM

## 2022-04-07 PROCEDURE — 96375 TX/PRO/DX INJ NEW DRUG ADDON: CPT | Mod: PN

## 2022-04-07 PROCEDURE — 96365 THER/PROPH/DIAG IV INF INIT: CPT | Mod: PN

## 2022-04-07 PROCEDURE — 25000003 PHARM REV CODE 250: Mod: PN

## 2022-04-07 PROCEDURE — 63600175 PHARM REV CODE 636 W HCPCS: Mod: PN

## 2022-04-07 RX ORDER — EPINEPHRINE 0.3 MG/.3ML
0.3 INJECTION SUBCUTANEOUS ONCE AS NEEDED
Status: CANCELLED | OUTPATIENT
Start: 2022-04-14

## 2022-04-07 RX ORDER — DEXAMETHASONE SODIUM PHOSPHATE 4 MG/ML
4 INJECTION, SOLUTION INTRA-ARTICULAR; INTRALESIONAL; INTRAMUSCULAR; INTRAVENOUS; SOFT TISSUE
Status: COMPLETED | OUTPATIENT
Start: 2022-04-07 | End: 2022-04-07

## 2022-04-07 RX ORDER — DIPHENHYDRAMINE HYDROCHLORIDE 50 MG/ML
50 INJECTION INTRAMUSCULAR; INTRAVENOUS ONCE AS NEEDED
Status: CANCELLED | OUTPATIENT
Start: 2022-04-14

## 2022-04-07 RX ORDER — SODIUM CHLORIDE 0.9 % (FLUSH) 0.9 %
10 SYRINGE (ML) INJECTION
Status: CANCELLED | OUTPATIENT
Start: 2022-04-14

## 2022-04-07 RX ORDER — SODIUM CHLORIDE 0.9 % (FLUSH) 0.9 %
10 SYRINGE (ML) INJECTION
Status: DISCONTINUED | OUTPATIENT
Start: 2022-04-07 | End: 2022-04-07 | Stop reason: HOSPADM

## 2022-04-07 RX ORDER — DIPHENHYDRAMINE HCL 25 MG
25 CAPSULE ORAL
Status: CANCELLED
Start: 2022-04-14

## 2022-04-07 RX ORDER — HEPARIN 100 UNIT/ML
500 SYRINGE INTRAVENOUS
Status: DISCONTINUED | OUTPATIENT
Start: 2022-04-07 | End: 2022-04-07 | Stop reason: HOSPADM

## 2022-04-07 RX ORDER — DEXAMETHASONE SODIUM PHOSPHATE 4 MG/ML
4 INJECTION, SOLUTION INTRA-ARTICULAR; INTRALESIONAL; INTRAMUSCULAR; INTRAVENOUS; SOFT TISSUE
Status: CANCELLED
Start: 2022-04-14

## 2022-04-07 RX ORDER — DIPHENHYDRAMINE HCL 25 MG
25 CAPSULE ORAL
Status: COMPLETED | OUTPATIENT
Start: 2022-04-07 | End: 2022-04-07

## 2022-04-07 RX ORDER — FAMOTIDINE 10 MG/ML
20 INJECTION INTRAVENOUS
Status: CANCELLED
Start: 2022-04-14 | End: 2022-04-14

## 2022-04-07 RX ORDER — LEVOTHYROXINE SODIUM 75 UG/1
75 TABLET ORAL
Qty: 90 TABLET | Refills: 3 | OUTPATIENT
Start: 2022-04-07 | End: 2023-04-07

## 2022-04-07 RX ORDER — FAMOTIDINE 10 MG/ML
20 INJECTION INTRAVENOUS
Status: COMPLETED | OUTPATIENT
Start: 2022-04-07 | End: 2022-04-07

## 2022-04-07 RX ORDER — METHYLPREDNISOLONE SOD SUCC 125 MG
125 VIAL (EA) INJECTION ONCE AS NEEDED
Status: CANCELLED | OUTPATIENT
Start: 2022-04-14

## 2022-04-07 RX ORDER — HEPARIN 100 UNIT/ML
500 SYRINGE INTRAVENOUS
Status: CANCELLED | OUTPATIENT
Start: 2022-04-14

## 2022-04-07 RX ADMIN — SODIUM CHLORIDE: 0.9 INJECTION, SOLUTION INTRAVENOUS at 10:04

## 2022-04-07 RX ADMIN — DIPHENHYDRAMINE HYDROCHLORIDE 25 MG: 25 CAPSULE ORAL at 10:04

## 2022-04-07 RX ADMIN — IRON SUCROSE 200 MG: 20 INJECTION, SOLUTION INTRAVENOUS at 10:04

## 2022-04-07 RX ADMIN — FAMOTIDINE 20 MG: 10 INJECTION INTRAVENOUS at 10:04

## 2022-04-07 RX ADMIN — DEXAMETHASONE SODIUM PHOSPHATE 4 MG: 4 INJECTION INTRA-ARTICULAR; INTRALESIONAL; INTRAMUSCULAR; INTRAVENOUS; SOFT TISSUE at 10:04

## 2022-04-08 NOTE — TELEPHONE ENCOUNTER
----- Message from Colin ZUÑIGA Frirudi sent at 4/8/2022  9:13 AM CDT -----  Contact: wife/Polina  Type:  RX Refill Request    Who Called:  wife/Polina  Refill or New Rx:  new    levothyroxine (SYNTHROID) 75 MCG tablet 30 tablet 1 2/26/2022 2/26/2023 --  Sig - Route: Take 1 tablet (75 mcg total) by mouth before breakfast. - Oral  Sent to pharmacy as: levothyroxine (SYNTHROID) 75 MCG tablet  Class: Normal    Preferred Pharmacy with phone number:      [facility] - [place], [place] - [address]  [address] [address]  Phone: [phone] Fax: [phone]      Ordering Provider:  Gracy Rebollar Call Back Number:  [phone]  Additional Information:  n/a

## 2022-04-08 NOTE — TELEPHONE ENCOUNTER
No new care gaps identified.  Powered by [employer]. Reference number: [id number].   4/08/2022 9:28:20 AM CDT

## 2022-04-10 ENCOUNTER — TELEPHONE (OUTPATIENT)
Dept: FAMILY MEDICINE | Facility: CLINIC | Age: 87
End: 2022-04-10
Payer: MEDICARE

## 2022-04-10 NOTE — TELEPHONE ENCOUNTER
----- Message from Peter Gaitan MA sent at 4/9/2022  8:34 AM CDT -----  Contact: PA PARK [[id number]]  Type: Needs Medical Advice    Who Called: PA PARK [[id number]]  Best Call Back Number: [phone]  Inquiry/Question: Would you kindly call PA PARK [[id number]] regarding a second attempt regarding medication refill  Thank you~

## 2022-04-11 RX ORDER — LEVOTHYROXINE SODIUM 75 UG/1
75 TABLET ORAL
Qty: 30 TABLET | Refills: 0 | OUTPATIENT
Start: 2022-04-11 | End: 2023-04-11

## 2022-04-12 ENCOUNTER — TELEPHONE (OUTPATIENT)
Dept: FAMILY MEDICINE | Facility: CLINIC | Age: 87
End: 2022-04-12
Payer: MEDICARE

## 2022-04-14 ENCOUNTER — INFUSION (OUTPATIENT)
Dept: INFUSION THERAPY | Facility: HOSPITAL | Age: 87
End: 2022-04-14
Payer: MEDICARE

## 2022-04-14 VITALS
SYSTOLIC BLOOD PRESSURE: 140 MMHG | HEART RATE: 101 BPM | TEMPERATURE: 98 F | WEIGHT: 150.81 LBS | BODY MASS INDEX: 21.59 KG/M2 | HEIGHT: 70 IN | RESPIRATION RATE: 18 BRPM | DIASTOLIC BLOOD PRESSURE: 86 MMHG

## 2022-04-14 DIAGNOSIS — D50.8 OTHER IRON DEFICIENCY ANEMIA: Primary | ICD-10-CM

## 2022-04-14 PROCEDURE — 63600175 PHARM REV CODE 636 W HCPCS: Mod: PN

## 2022-04-14 PROCEDURE — 96365 THER/PROPH/DIAG IV INF INIT: CPT | Mod: PN

## 2022-04-14 PROCEDURE — 25000003 PHARM REV CODE 250: Mod: PN

## 2022-04-14 PROCEDURE — 96375 TX/PRO/DX INJ NEW DRUG ADDON: CPT | Mod: PN

## 2022-04-14 PROCEDURE — A4216 STERILE WATER/SALINE, 10 ML: HCPCS | Mod: PN

## 2022-04-14 RX ORDER — METHYLPREDNISOLONE SOD SUCC 125 MG
125 VIAL (EA) INJECTION ONCE AS NEEDED
Status: CANCELLED | OUTPATIENT
Start: 2022-04-21

## 2022-04-14 RX ORDER — DIPHENHYDRAMINE HYDROCHLORIDE 50 MG/ML
50 INJECTION INTRAMUSCULAR; INTRAVENOUS ONCE AS NEEDED
Status: CANCELLED | OUTPATIENT
Start: 2022-04-21

## 2022-04-14 RX ORDER — SODIUM CHLORIDE 0.9 % (FLUSH) 0.9 %
10 SYRINGE (ML) INJECTION
Status: DISCONTINUED | OUTPATIENT
Start: 2022-04-14 | End: 2022-04-14 | Stop reason: HOSPADM

## 2022-04-14 RX ORDER — HEPARIN 100 UNIT/ML
500 SYRINGE INTRAVENOUS
Status: CANCELLED | OUTPATIENT
Start: 2022-04-21

## 2022-04-14 RX ORDER — DIPHENHYDRAMINE HCL 25 MG
25 CAPSULE ORAL
Status: CANCELLED
Start: 2022-04-21

## 2022-04-14 RX ORDER — DEXAMETHASONE SODIUM PHOSPHATE 4 MG/ML
4 INJECTION, SOLUTION INTRA-ARTICULAR; INTRALESIONAL; INTRAMUSCULAR; INTRAVENOUS; SOFT TISSUE
Status: CANCELLED
Start: 2022-04-21

## 2022-04-14 RX ORDER — FAMOTIDINE 10 MG/ML
20 INJECTION INTRAVENOUS
Status: CANCELLED
Start: 2022-04-21 | End: 2022-04-21

## 2022-04-14 RX ORDER — EPINEPHRINE 0.3 MG/.3ML
0.3 INJECTION SUBCUTANEOUS ONCE AS NEEDED
Status: CANCELLED | OUTPATIENT
Start: 2022-04-21

## 2022-04-14 RX ORDER — DIPHENHYDRAMINE HCL 25 MG
25 CAPSULE ORAL
Status: COMPLETED | OUTPATIENT
Start: 2022-04-14 | End: 2022-04-14

## 2022-04-14 RX ORDER — FAMOTIDINE 10 MG/ML
20 INJECTION INTRAVENOUS
Status: COMPLETED | OUTPATIENT
Start: 2022-04-14 | End: 2022-04-14

## 2022-04-14 RX ORDER — DEXAMETHASONE SODIUM PHOSPHATE 4 MG/ML
4 INJECTION, SOLUTION INTRA-ARTICULAR; INTRALESIONAL; INTRAMUSCULAR; INTRAVENOUS; SOFT TISSUE
Status: COMPLETED | OUTPATIENT
Start: 2022-04-14 | End: 2022-04-14

## 2022-04-14 RX ORDER — SODIUM CHLORIDE 0.9 % (FLUSH) 0.9 %
10 SYRINGE (ML) INJECTION
Status: CANCELLED | OUTPATIENT
Start: 2022-04-21

## 2022-04-14 RX ADMIN — Medication 10 ML: at 10:04

## 2022-04-14 RX ADMIN — FAMOTIDINE 20 MG: 10 INJECTION INTRAVENOUS at 10:04

## 2022-04-14 RX ADMIN — DIPHENHYDRAMINE HYDROCHLORIDE 25 MG: 25 CAPSULE ORAL at 10:04

## 2022-04-14 RX ADMIN — IRON SUCROSE 200 MG: 20 INJECTION, SOLUTION INTRAVENOUS at 11:04

## 2022-04-14 RX ADMIN — DEXAMETHASONE SODIUM PHOSPHATE 4 MG: 4 INJECTION INTRA-ARTICULAR; INTRALESIONAL; INTRAMUSCULAR; INTRAVENOUS; SOFT TISSUE at 10:04

## 2022-04-14 RX ADMIN — SODIUM CHLORIDE: 0.9 INJECTION, SOLUTION INTRAVENOUS at 10:04

## 2022-04-14 NOTE — PLAN OF CARE
Problem: Adult Inpatient Plan of Care  Goal: Plan of Care Review  4/14/2022 1304 by Letty Jones, RN  Outcome: Ongoing, Progressing  Flowsheets (Taken 4/14/2022 1255)  Plan of Care Reviewed With:   patient   spouse   Pt tolerated his Venofer infusion well, NAD. Pt observed for 30 minutes post infusion. Pt given a schedule and reviewed, pt verbalized understanding. Pt ambulated out of the clinic without difficulty accompanied by his wife.

## 2022-04-14 NOTE — PLAN OF CARE
Problem: Fatigue  Goal: Improved Activity Tolerance  Outcome: Ongoing, Progressing     Problem: Fatigue  Goal: Improved Activity Tolerance  Intervention: Promote Improved Energy  Flowsheets (Taken 4/14/2022 1132)  Fatigue Management:   activity schedule adjusted   fatigue-related activity identified   paced activity encouraged   frequent rest breaks encouraged  Sleep/Rest Enhancement:   regular sleep/rest pattern promoted   relaxation techniques promoted   natural light exposure provided  Activity Management:   Ambulated -L4   Ambulated in andersen - L4   Ambulated to bathroom - L4   Up in chair - L3     Problem: Adult Inpatient Plan of Care  Goal: Patient-Specific Goal (Individualized)  Outcome: Ongoing, Progressing  Flowsheets (Taken 4/14/2022 1132)  Anxieties, Fears or Concerns: none  Individualized Care Needs: recliner, snacks, wife at chairside  Patient-Specific Goals (Include Timeframe): no s/s of reaction during treatment

## 2022-04-18 RX ORDER — FLUOROURACIL 50 MG/G
CREAM TOPICAL
COMMUNITY
Start: 2021-12-22 | End: 2022-06-10 | Stop reason: ALTCHOICE

## 2022-04-18 RX ORDER — TRETINOIN 0.5 MG/G
CREAM TOPICAL
COMMUNITY
Start: 2021-12-02 | End: 2023-05-23 | Stop reason: ALTCHOICE

## 2022-04-19 ENCOUNTER — LAB VISIT (OUTPATIENT)
Dept: LAB | Facility: HOSPITAL | Age: 87
End: 2022-04-19
Attending: STUDENT IN AN ORGANIZED HEALTH CARE EDUCATION/TRAINING PROGRAM
Payer: MEDICARE

## 2022-04-19 DIAGNOSIS — E03.4 HYPOTHYROIDISM DUE TO ACQUIRED ATROPHY OF THYROID: ICD-10-CM

## 2022-04-19 DIAGNOSIS — N18.31 CHRONIC KIDNEY DISEASE, STAGE 3A: ICD-10-CM

## 2022-04-19 DIAGNOSIS — D64.9 ANEMIA, UNSPECIFIED TYPE: ICD-10-CM

## 2022-04-19 DIAGNOSIS — C61 PROSTATE CA: ICD-10-CM

## 2022-04-19 LAB
ALBUMIN SERPL BCP-MCNC: 3.4 G/DL (ref 3.5–5.2)
ALP SERPL-CCNC: 31 U/L (ref 55–135)
ALT SERPL W/O P-5'-P-CCNC: 5 U/L (ref 10–44)
ANION GAP SERPL CALC-SCNC: 11 MMOL/L (ref 8–16)
AST SERPL-CCNC: 19 U/L (ref 10–40)
BASOPHILS # BLD AUTO: 0.06 K/UL (ref 0–0.2)
BASOPHILS NFR BLD: 0.8 % (ref 0–1.9)
BILIRUB SERPL-MCNC: 0.2 MG/DL (ref 0.1–1)
BUN SERPL-MCNC: 17 MG/DL (ref 8–23)
CALCIUM SERPL-MCNC: 9.6 MG/DL (ref 8.7–10.5)
CHLORIDE SERPL-SCNC: 104 MMOL/L (ref 95–110)
CO2 SERPL-SCNC: 25 MMOL/L (ref 23–29)
COMPLEXED PSA SERPL-MCNC: 3.6 NG/ML (ref 0–4)
CREAT SERPL-MCNC: 1.1 MG/DL (ref 0.5–1.4)
DIFFERENTIAL METHOD: ABNORMAL
EOSINOPHIL # BLD AUTO: 0.3 K/UL (ref 0–0.5)
EOSINOPHIL NFR BLD: 3.2 % (ref 0–8)
ERYTHROCYTE [DISTWIDTH] IN BLOOD BY AUTOMATED COUNT: 14.4 % (ref 11.5–14.5)
EST. GFR  (AFRICAN AMERICAN): >60 ML/MIN/1.73 M^2
EST. GFR  (NON AFRICAN AMERICAN): >60 ML/MIN/1.73 M^2
FERRITIN SERPL-MCNC: 1782 NG/ML (ref 20–300)
GLUCOSE SERPL-MCNC: 106 MG/DL (ref 70–110)
HCT VFR BLD AUTO: 34.5 % (ref 40–54)
HGB BLD-MCNC: 11.2 G/DL (ref 14–18)
IMM GRANULOCYTES # BLD AUTO: 0.09 K/UL (ref 0–0.04)
IMM GRANULOCYTES NFR BLD AUTO: 1.2 % (ref 0–0.5)
LYMPHOCYTES # BLD AUTO: 2.3 K/UL (ref 1–4.8)
LYMPHOCYTES NFR BLD: 29.5 % (ref 18–48)
MCH RBC QN AUTO: 29.9 PG (ref 27–31)
MCHC RBC AUTO-ENTMCNC: 32.5 G/DL (ref 32–36)
MCV RBC AUTO: 92 FL (ref 82–98)
MONOCYTES # BLD AUTO: 0.8 K/UL (ref 0.3–1)
MONOCYTES NFR BLD: 10.3 % (ref 4–15)
NEUTROPHILS # BLD AUTO: 4.3 K/UL (ref 1.8–7.7)
NEUTROPHILS NFR BLD: 55 % (ref 38–73)
NRBC BLD-RTO: 0 /100 WBC
PLATELET # BLD AUTO: 222 K/UL (ref 150–450)
PMV BLD AUTO: 9.5 FL (ref 9.2–12.9)
POTASSIUM SERPL-SCNC: 4.5 MMOL/L (ref 3.5–5.1)
PROT SERPL-MCNC: 7.2 G/DL (ref 6–8.4)
RBC # BLD AUTO: 3.74 M/UL (ref 4.6–6.2)
SODIUM SERPL-SCNC: 140 MMOL/L (ref 136–145)
TSH SERPL DL<=0.005 MIU/L-ACNC: 3.47 UIU/ML (ref 0.4–4)
WBC # BLD AUTO: 7.73 K/UL (ref 3.9–12.7)

## 2022-04-19 PROCEDURE — 36415 COLL VENOUS BLD VENIPUNCTURE: CPT | Mod: PN | Performed by: UROLOGY

## 2022-04-19 PROCEDURE — 84443 ASSAY THYROID STIM HORMONE: CPT | Performed by: FAMILY MEDICINE

## 2022-04-19 PROCEDURE — 82728 ASSAY OF FERRITIN: CPT

## 2022-04-19 PROCEDURE — 80053 COMPREHEN METABOLIC PANEL: CPT | Mod: PN

## 2022-04-19 PROCEDURE — 84153 ASSAY OF PSA TOTAL: CPT | Performed by: UROLOGY

## 2022-04-19 PROCEDURE — 85025 COMPLETE CBC W/AUTO DIFF WBC: CPT | Mod: PN

## 2022-04-20 ENCOUNTER — DOCUMENTATION ONLY (OUTPATIENT)
Dept: INFUSION THERAPY | Facility: HOSPITAL | Age: 87
End: 2022-04-20
Payer: MEDICARE

## 2022-04-20 ENCOUNTER — OFFICE VISIT (OUTPATIENT)
Dept: UROLOGY | Facility: CLINIC | Age: 87
End: 2022-04-20
Payer: MEDICARE

## 2022-04-20 ENCOUNTER — PATIENT MESSAGE (OUTPATIENT)
Dept: FAMILY MEDICINE | Facility: CLINIC | Age: 87
End: 2022-04-20
Payer: MEDICARE

## 2022-04-20 VITALS — HEIGHT: 70 IN | WEIGHT: 150.81 LBS | BODY MASS INDEX: 21.59 KG/M2

## 2022-04-20 DIAGNOSIS — C61 PROSTATE CANCER: Primary | ICD-10-CM

## 2022-04-20 PROCEDURE — 1157F PR ADVANCE CARE PLAN OR EQUIV PRESENT IN MEDICAL RECORD: ICD-10-PCS | Mod: CPTII,S$GLB,, | Performed by: UROLOGY

## 2022-04-20 PROCEDURE — 1160F RVW MEDS BY RX/DR IN RCRD: CPT | Mod: CPTII,S$GLB,, | Performed by: UROLOGY

## 2022-04-20 PROCEDURE — 1159F PR MEDICATION LIST DOCUMENTED IN MEDICAL RECORD: ICD-10-PCS | Mod: CPTII,S$GLB,, | Performed by: UROLOGY

## 2022-04-20 PROCEDURE — 3288F PR FALLS RISK ASSESSMENT DOCUMENTED: ICD-10-PCS | Mod: CPTII,S$GLB,, | Performed by: UROLOGY

## 2022-04-20 PROCEDURE — 99999 PR PBB SHADOW E&M-EST. PATIENT-LVL III: ICD-10-PCS | Mod: PBBFAC,,, | Performed by: UROLOGY

## 2022-04-20 PROCEDURE — 99213 PR OFFICE/OUTPT VISIT, EST, LEVL III, 20-29 MIN: ICD-10-PCS | Mod: 25,S$GLB,, | Performed by: UROLOGY

## 2022-04-20 PROCEDURE — 99213 OFFICE O/P EST LOW 20 MIN: CPT | Mod: 25,S$GLB,, | Performed by: UROLOGY

## 2022-04-20 PROCEDURE — 1160F PR REVIEW ALL MEDS BY PRESCRIBER/CLIN PHARMACIST DOCUMENTED: ICD-10-PCS | Mod: CPTII,S$GLB,, | Performed by: UROLOGY

## 2022-04-20 PROCEDURE — 3288F FALL RISK ASSESSMENT DOCD: CPT | Mod: CPTII,S$GLB,, | Performed by: UROLOGY

## 2022-04-20 PROCEDURE — 1157F ADVNC CARE PLAN IN RCRD: CPT | Mod: CPTII,S$GLB,, | Performed by: UROLOGY

## 2022-04-20 PROCEDURE — 1101F PR PT FALLS ASSESS DOC 0-1 FALLS W/OUT INJ PAST YR: ICD-10-PCS | Mod: CPTII,S$GLB,, | Performed by: UROLOGY

## 2022-04-20 PROCEDURE — 1101F PT FALLS ASSESS-DOCD LE1/YR: CPT | Mod: CPTII,S$GLB,, | Performed by: UROLOGY

## 2022-04-20 PROCEDURE — 1126F PR PAIN SEVERITY QUANTIFIED, NO PAIN PRESENT: ICD-10-PCS | Mod: CPTII,S$GLB,, | Performed by: UROLOGY

## 2022-04-20 PROCEDURE — 1126F AMNT PAIN NOTED NONE PRSNT: CPT | Mod: CPTII,S$GLB,, | Performed by: UROLOGY

## 2022-04-20 PROCEDURE — 96402 PR CHEMOTHER HORMON ANTINEOPL SUB-Q/IM: ICD-10-PCS | Mod: S$GLB,,, | Performed by: UROLOGY

## 2022-04-20 PROCEDURE — 1159F MED LIST DOCD IN RCRD: CPT | Mod: CPTII,S$GLB,, | Performed by: UROLOGY

## 2022-04-20 PROCEDURE — 96402 CHEMO HORMON ANTINEOPL SQ/IM: CPT | Mod: S$GLB,,, | Performed by: UROLOGY

## 2022-04-20 PROCEDURE — 99999 PR PBB SHADOW E&M-EST. PATIENT-LVL III: CPT | Mod: PBBFAC,,, | Performed by: UROLOGY

## 2022-04-20 NOTE — PROGRESS NOTES
Subjective:       Patient ID: Amor Alcazar is a 87 y.o. male.    Chief Complaint: Injections (lupron)    HPI     87-year-old with a history of prostate cancer.  He was diagnosed in 2014. PSA was 70.  All sextants were positive.  Tio score 4+5.  He has been on androgen deprivation.  He is currently receiving Lupron and Erleada since 2019. His last PSA is 3.6 which is doubled in the last 6 months.  He has no complaints today.  He denies hematuria and dysuria.      Component PSA Diagnostic   Latest Ref Rng & Units 0.00 - 4.00 ng/mL   4/19/2022 3.6   10/19/2021 1.8   5/5/2021 0.89   10/26/2020 0.39   5/11/2020 0.23   10/1/2019 0.28   8/21/2019 0.28   4/17/2019 3.9   10/5/2018 2.7   4/5/2018 1.6       Review of Systems   Constitutional: Negative for fever.   Genitourinary: Negative for dysuria and hematuria.       Objective:      Physical Exam  Vitals reviewed.   Constitutional:       Appearance: He is well-developed.   Pulmonary:      Effort: Pulmonary effort is normal.   Skin:     Findings: No rash.   Neurological:      Mental Status: He is alert and oriented to person, place, and time.         I administered Lupron 45 mg to right glut.  No complication.       Assessment:       1. Prostate cancer        Plan:       Prostate cancer  -     NM PET CT Fluciclovine F18(Prostate Cancer Recurrence); Future; Expected date: 04/20/2022    Other orders  -     leuprolide (6 month) injection 45 mg      PSA is rising despite total androgen blockade.  I will get a baseline PET scan.  Recommend Oncology evaluation.  Follow-up 6 months for Lupron

## 2022-04-21 ENCOUNTER — OFFICE VISIT (OUTPATIENT)
Dept: HEMATOLOGY/ONCOLOGY | Facility: CLINIC | Age: 87
End: 2022-04-21
Payer: MEDICARE

## 2022-04-21 VITALS
HEIGHT: 70 IN | BODY MASS INDEX: 21.53 KG/M2 | OXYGEN SATURATION: 98 % | SYSTOLIC BLOOD PRESSURE: 110 MMHG | TEMPERATURE: 97 F | HEART RATE: 92 BPM | DIASTOLIC BLOOD PRESSURE: 70 MMHG | WEIGHT: 150.38 LBS

## 2022-04-21 DIAGNOSIS — E03.9 HYPOTHYROIDISM, UNSPECIFIED TYPE: ICD-10-CM

## 2022-04-21 DIAGNOSIS — C61 MALIGNANT NEOPLASM OF PROSTATE: ICD-10-CM

## 2022-04-21 DIAGNOSIS — D50.8 OTHER IRON DEFICIENCY ANEMIA: ICD-10-CM

## 2022-04-21 DIAGNOSIS — N18.31 CHRONIC KIDNEY DISEASE, STAGE 3A: ICD-10-CM

## 2022-04-21 DIAGNOSIS — D64.9 ANEMIA, UNSPECIFIED TYPE: Primary | ICD-10-CM

## 2022-04-21 PROCEDURE — 1101F PT FALLS ASSESS-DOCD LE1/YR: CPT | Mod: CPTII,S$GLB,,

## 2022-04-21 PROCEDURE — 99999 PR PBB SHADOW E&M-EST. PATIENT-LVL III: ICD-10-PCS | Mod: PBBFAC,,,

## 2022-04-21 PROCEDURE — 3078F PR MOST RECENT DIASTOLIC BLOOD PRESSURE < 80 MM HG: ICD-10-PCS | Mod: CPTII,S$GLB,,

## 2022-04-21 PROCEDURE — 1157F ADVNC CARE PLAN IN RCRD: CPT | Mod: CPTII,S$GLB,,

## 2022-04-21 PROCEDURE — 3074F PR MOST RECENT SYSTOLIC BLOOD PRESSURE < 130 MM HG: ICD-10-PCS | Mod: CPTII,S$GLB,,

## 2022-04-21 PROCEDURE — 99214 OFFICE O/P EST MOD 30 MIN: CPT | Mod: S$GLB,,,

## 2022-04-21 PROCEDURE — 3078F DIAST BP <80 MM HG: CPT | Mod: CPTII,S$GLB,,

## 2022-04-21 PROCEDURE — 1126F AMNT PAIN NOTED NONE PRSNT: CPT | Mod: CPTII,S$GLB,,

## 2022-04-21 PROCEDURE — 3288F FALL RISK ASSESSMENT DOCD: CPT | Mod: CPTII,S$GLB,,

## 2022-04-21 PROCEDURE — 99214 PR OFFICE/OUTPT VISIT, EST, LEVL IV, 30-39 MIN: ICD-10-PCS | Mod: S$GLB,,,

## 2022-04-21 PROCEDURE — 1159F MED LIST DOCD IN RCRD: CPT | Mod: CPTII,S$GLB,,

## 2022-04-21 PROCEDURE — 1126F PR PAIN SEVERITY QUANTIFIED, NO PAIN PRESENT: ICD-10-PCS | Mod: CPTII,S$GLB,,

## 2022-04-21 PROCEDURE — 3288F PR FALLS RISK ASSESSMENT DOCUMENTED: ICD-10-PCS | Mod: CPTII,S$GLB,,

## 2022-04-21 PROCEDURE — 1159F PR MEDICATION LIST DOCUMENTED IN MEDICAL RECORD: ICD-10-PCS | Mod: CPTII,S$GLB,,

## 2022-04-21 PROCEDURE — 99999 PR PBB SHADOW E&M-EST. PATIENT-LVL III: CPT | Mod: PBBFAC,,,

## 2022-04-21 PROCEDURE — 1157F PR ADVANCE CARE PLAN OR EQUIV PRESENT IN MEDICAL RECORD: ICD-10-PCS | Mod: CPTII,S$GLB,,

## 2022-04-21 PROCEDURE — 1101F PR PT FALLS ASSESS DOC 0-1 FALLS W/OUT INJ PAST YR: ICD-10-PCS | Mod: CPTII,S$GLB,,

## 2022-04-21 PROCEDURE — 3074F SYST BP LT 130 MM HG: CPT | Mod: CPTII,S$GLB,,

## 2022-04-21 NOTE — PROGRESS NOTES
Subjective:     Name: Amor Alcazar  : 1935  MRN: [id number]  CC: Anemia     HPI:   Amor Alcazar is a 87 y.o. male with hypothyroid, arthritis, prostate cancer seen today for follow up of iron deficiency anemia. He is accompanied by his wife.    Overall he is feeling well, no specific complaints. Endorses generalized fatigue after working in the yard.   Was seen by urology yesterday and was told he will need to follow-up with oncology due PSA doubling since October of last year. PET scan has been ordered, patient awaiting appointment. He is feeling a bit anxious about this news.   Denies any SOB, cough, CP, HA, swelling, numbness, tingling, LAD, fevers, chills. Denies abd pain, hematochezia, melena, dysuria, hematuria.    Prior History:  Mr. Alcazar was previously seen by Dr. Hogan for macrocytic anemia with the last clinic visit in 2020. Folic acid supplement was added at that time. Macrocytosis had resolved and Hgb remained stable in the 11-12 g/dL range. Mr. Alcazar has been following with his PCP since that time. Now, patient's Hgb is 10.8 g/dL, normocytic. Is compliant with folic acid supplement and daily MVI. Denies abd pain, hematochezia, melena, dysuria, hematuria.   Prostate cancer is followed by urology, Dr. Arellano. Mr. Alcazar is compliant with po Erleada and Q 6 month Eligard injections.   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 TR  ECHO       Past Surgical History:   Procedure Laterality Date    CATARACT EXTRACTION W/  INTRAOCULAR LENS IMPLANT Bilateral     ESOPHAGOGASTRODUODENOSCOPY         Family History   Problem Relation Age of Onset    Lung cancer Sister          of       Social  History     Socioeconomic History    Marital status:    Occupational History    Occupation: retired     Comment: army, steel, automobile industry   Tobacco Use    Smoking status: Never Smoker    Smokeless tobacco: Never Used   Substance and Sexual Activity    Alcohol use: Yes     Comment: few beers daily    Drug use: No    Sexual activity: Not Currently       Review of patient's allergies indicates:  No Known Allergies    Review of Systems   Constitutional: Negative for chills, decreased appetite, fever, malaise/fatigue and night sweats.   Eyes: Negative for visual disturbance.   Cardiovascular: Negative for chest pain, leg swelling and palpitations.   Respiratory: Negative for cough and shortness of breath.    Hematologic/Lymphatic: Negative for adenopathy. Does not bruise/bleed easily.   Skin: Negative for rash.   Musculoskeletal: Positive for arthritis. Negative for falls.   Gastrointestinal: Negative for abdominal pain, constipation, diarrhea, hematochezia, melena, nausea and vomiting.   Genitourinary: Negative for dysuria, flank pain, frequency and hematuria.   Neurological: Negative for headaches.          Objective:     There were no vitals filed for this visit.     Physical Exam  Vitals reviewed.   Constitutional:       General: He is not in acute distress.     Appearance: He is not diaphoretic.   HENT:      Head: Normocephalic and atraumatic.      Mouth/Throat:      Mouth: Mucous membranes are moist.      Pharynx: No oropharyngeal exudate.   Eyes:      General: No scleral icterus.  Cardiovascular:      Rate and Rhythm: Normal rate and regular rhythm.      Heart sounds: Normal heart sounds. No murmur heard.    No gallop.   Pulmonary:      Effort: Pulmonary effort is normal. No respiratory distress.      Breath sounds: Normal breath sounds. No wheezing or rhonchi.   Abdominal:      General: Bowel sounds are normal. There is no distension.      Palpations: Abdomen is soft. There is no mass.       Tenderness: There is no abdominal tenderness. There is no guarding.   Musculoskeletal:      Cervical back: No tenderness.      Right lower leg: No edema.      Left lower leg: No edema.   Lymphadenopathy:      Cervical: No cervical adenopathy.   Skin:     General: Skin is warm and dry.      Coloration: Skin is not pale.      Findings: No bruising or rash.   Neurological:      Mental Status: He is alert and oriented to person, place, and time.      Gait: Gait normal.   Psychiatric:         Mood and Affect: Mood normal.         Behavior: Behavior normal.         Judgment: Judgment normal.             Current Outpatient Medications on File Prior to Visit   Medication Sig    apalutamide (ERLEADA) 60 mg Tab Take 4 tablets (240 mg total) by mouth once daily.    fluorouraciL (EFUDEX) 5 % cream APPLY TO LESION TWICE DAILY FOR THREE WEEKS AS DIRECTED    folic acid (FOLVITE) 400 MCG tablet Take 400 mcg by mouth once daily.    levothyroxine (SYNTHROID) 75 MCG tablet Take 1 tablet (75 mcg total) by mouth before breakfast.    multivit-min-FA-lycopen-lutein (CENTRUM SILVER MEN) 300-600-300 mcg Tab Take by mouth once daily.    tretinoin (RETIN-A) 0.05 % cream apply TO right side of face AT BEDTIME     Current Facility-Administered Medications on File Prior to Visit   Medication    [COMPLETED] leuprolide (6 month) injection 45 mg       CBC:  Lab Results   Component Value Date    WBC 7.73 04/19/2022    HGB 11.2 (L) 04/19/2022    HCT 34.5 (L) 04/19/2022    MCV 92 04/19/2022     04/19/2022         CMP:  Sodium   Date Value Ref Range Status   04/19/2022 140 136 - 145 mmol/L Final     Potassium   Date Value Ref Range Status   04/19/2022 4.5 3.5 - 5.1 mmol/L Final     Chloride   Date Value Ref Range Status   04/19/2022 104 95 - 110 mmol/L Final     CO2   Date Value Ref Range Status   04/19/2022 25 23 - 29 mmol/L Final     Glucose   Date Value Ref Range Status   04/19/2022 106 70 - 110 mg/dL Final     BUN   Date Value Ref  Range Status   04/19/2022 17 8 - 23 mg/dL Final     Creatinine   Date Value Ref Range Status   04/19/2022 1.1 0.5 - 1.4 mg/dL Final     Calcium   Date Value Ref Range Status   04/19/2022 9.6 8.7 - 10.5 mg/dL Final     Total Protein   Date Value Ref Range Status   04/19/2022 7.2 6.0 - 8.4 g/dL Final     Albumin   Date Value Ref Range Status   04/19/2022 3.4 (L) 3.5 - 5.2 g/dL Final     Total Bilirubin   Date Value Ref Range Status   04/19/2022 0.2 0.1 - 1.0 mg/dL Final     Comment:     For infants and newborns, interpretation of results should be based  on gestational age, weight and in agreement with clinical  observations.    Premature Infant recommended reference ranges:  Up to 24 hours.............<8.0 mg/dL  Up to 48 hours............<12.0 mg/dL  3-5 days..................<15.0 mg/dL  6-29 days.................<15.0 mg/dL       Alkaline Phosphatase   Date Value Ref Range Status   04/19/2022 31 (L) 55 - 135 U/L Final     AST   Date Value Ref Range Status   04/19/2022 19 10 - 40 U/L Final     ALT   Date Value Ref Range Status   04/19/2022 5 (L) 10 - 44 U/L Final     Anion Gap   Date Value Ref Range Status   04/19/2022 11 8 - 16 mmol/L Final     eGFR if    Date Value Ref Range Status   04/19/2022 >60 >60 mL/min/1.73 m^2 Final     eGFR if non    Date Value Ref Range Status   04/19/2022 >60 >60 mL/min/1.73 m^2 Final     Comment:     Calculation used to obtain the estimated glomerular filtration  rate (eGFR) is the CKD-EPI equation.          All pertinent labs and imaging reviewed.    Assessment:       1. Anemia, unspecified type    2. Other iron deficiency anemia    3. Chronic kidney disease, stage 3a    4. Malignant neoplasm of prostate    5. Hypothyroidism, unspecified type         Anemia  -Found to be iron deficient per labs 3/7/22; received Venofer 200 mg weekly x 5 doses from 3/7-4/14  -Stool cards 3/9/22 negative for OB  -Hgb improved at 11.2 g/dL (from 10.8 g/dL on 3/7/22)  -CKD  in addition to Erleada for prostate cancer could be contributing factors to anemia   -Will continue to monitor. CBC in one month    CKD  -Stage 3 chronic kidney disease  -Followed by PCP   -Will monitor     Prostate Cancer   -Taking Erleada   -Getting Eligard injections Q 6 months; last given 4/20/22  -Most recent PSA 3.6 ng/mL, up from 1.8 ng/mL on 10/2021  -Followed by urology, referral has been placed for PET imaging and oncology f/u  -Will arrange for patient to see Dr. Jackson after the PET scan. Patient would like to keep his care here at this facility.     Hypothyroidism  -Began Levothyroxine 2/26/22  -Dose adjustments have been made in recent weeks  -Managed per PCP     Patient queried and all questions were answered.    Route Chart for Scheduling    Med Onc Chart Routing      Follow up with physician    Follow up with [facility] 1 month.   Labs CBC   Lab interval:     Imaging    Pharmacy appointment    Other referrals            Therapy Plan Information  4. Pre-Medications  famotidine (PF) injection 20 mg  20 mg, Intravenous, Every visit  dexamethasone injection 4 mg  4 mg, Intravenous, Every visit  diphenhydrAMINE capsule 25 mg  25 mg, Oral, Every visit  Medications  iron sucrose (VENOFER) 200 mg in sodium chloride 0.9% 100 mL IVPB  200 mg, Intravenous, 1 time a week  Flushes  sodium chloride 0.9% 250 mL flush bag  Intravenous, 1 time a week  sodium chloride 0.9% flush 10 mL  10 mL, Intravenous, 1 time a week  heparin, porcine (PF) 100 unit/mL injection flush 500 Units  500 Units, Intravenous, 1 time a week  alteplase injection 2 mg  2 mg, Intra-Catheter, 1 time a week  PRN Medications  EPINEPHrine (EPIPEN) 0.3 mg/0.3 mL pen injection 0.3 mg  0.3 mg, Intramuscular, PRN  diphenhydrAMINE injection 50 mg  50 mg, Intravenous, PRN  methylPREDNISolone sodium succinate injection 125 mg  125 mg, Intravenous, PRN  sodium chloride 0.9% bolus 1,000 mL  1,000 mL, Intravenous, PRN

## 2022-04-25 NOTE — TELEPHONE ENCOUNTER
----- Message from Gregory Medeiros sent at 4/25/2022  8:17 AM CDT -----  Type: Needs Medical Advice  Who Called:  Polina Ottoniel (Spouse    Best Call Back Number: [phone]  Additional Information: Caller states that she would like a callback regarding the patient's lab results.

## 2022-04-26 ENCOUNTER — TELEPHONE (OUTPATIENT)
Dept: FAMILY MEDICINE | Facility: CLINIC | Age: 87
End: 2022-04-26
Payer: MEDICARE

## 2022-04-26 RX ORDER — LEVOTHYROXINE SODIUM 75 UG/1
75 TABLET ORAL
Qty: 30 TABLET | Refills: 5 | Status: SHIPPED | OUTPATIENT
Start: 2022-04-26 | End: 2022-05-04

## 2022-04-26 NOTE — TELEPHONE ENCOUNTER
No new care gaps identified.  Powered by [employer]. Reference number: [id number].   4/26/2022 10:47:50 AM CDT

## 2022-04-26 NOTE — TELEPHONE ENCOUNTER
----- Message from Anthony Irvin sent at 4/26/2022 10:33 AM CDT -----  Contact: Polina Alcazar (Spouse)  Who Called: Polina Alcazar (Spouse)    Refill or New Rx: Refill     RX Name and Strength:levothyroxine (SYNTHROID) 75 MCG tablet    How is the patient currently taking it? (ex. 1XDay):    Is this a 30 day or 90 day RX:    Preferred Pharmacy with phone number: [facility]     Local or Mail Order: Mail Order     Ordering Provider:    Would the patient rather a call back or a response via [facility]?     Best Call Back Number: [phone]    Additional Information: the patient wife would like to know if the patient will continue the medication and results from blood work

## 2022-04-26 NOTE — TELEPHONE ENCOUNTER
"Patient's wife reports "he is doing fine on that dosage and I'd hate to up it and have him, ya know, that medication is tricky" Prefers not to make any changes to this prescription currently. She is requesting a 30 day supply to local pharmacy and will call back at a later time to request refill be sent to mail order. He is currently down to his last two tablets.  "

## 2022-04-29 DIAGNOSIS — C61 MALIGNANT NEOPLASM OF PROSTATE: ICD-10-CM

## 2022-04-29 NOTE — TELEPHONE ENCOUNTER
----- Message from Gregory Medeiros sent at 4/29/2022  8:17 AM CDT -----  Type: Needs Medical Advice  Who Called:  Polinamauro Alcazar (Spouse)    Best Call Back Number: [phone]-- Please leave message if no answer  Additional Information: Caller states that [facility] hasn't received the patient's order for   apalutamide (ERLEADA) 60 mg Tab    Please call to advise

## 2022-04-29 NOTE — TELEPHONE ENCOUNTER
----- Message from Gregory Medeiros sent at 4/29/2022  8:17 AM CDT -----  Type: Needs Medical Advice  Who Called:  Polinamauro Alcazar (Spouse)    Best Call Back Number: [phone]-- Please leave message if no answer  Additional Information: Caller states that [facility] hasn't received the patient's order for   apalutamide (ERLEADA) 60 mg Tab    Please call to advise

## 2022-05-04 RX ORDER — LEVOTHYROXINE SODIUM 75 UG/1
75 TABLET ORAL
Qty: 90 TABLET | Refills: 3 | Status: SHIPPED | OUTPATIENT
Start: 2022-05-04 | End: 2023-03-05

## 2022-05-04 RX ORDER — LEVOTHYROXINE SODIUM 75 UG/1
75 TABLET ORAL
Qty: 90 TABLET | Refills: 3 | Status: SHIPPED | OUTPATIENT
Start: 2022-05-04 | End: 2022-05-04 | Stop reason: SDUPTHER

## 2022-05-04 RX ORDER — LEVOTHYROXINE SODIUM 75 UG/1
75 TABLET ORAL
Qty: 90 TABLET | Refills: 3 | Status: SHIPPED | OUTPATIENT
Start: 2022-05-04 | End: 2022-08-27

## 2022-05-04 NOTE — TELEPHONE ENCOUNTER
----- Message from Marina Diaz sent at 5/4/2022  1:00 PM CDT -----  Contact: spouse  Type: Needs Medical Advice  Who Called:  Polina - spouse  Symptoms (please be specific):    How long has patient had these symptoms:    Pharmacy name and phone #:    [facility] - [place], [place] - [address]  [address] [address]  Phone: [phone] Fax: [phone]  Best Call Back Number: [phone] (home) [phone] (work)    Additional Information: prescription for levothyroxine (SYNTHROID) 75 MCG tablet was  not received by [facility], please contact to advise. , patient states she was told it would be sent and can not keep backtracking on things she was told that would be done.

## 2022-05-04 NOTE — TELEPHONE ENCOUNTER
Wife requesting 90 day supply be sent to [facility] as previous prescription sent to local pharmacy was to be short supply only

## 2022-05-04 NOTE — TELEPHONE ENCOUNTER
No new care gaps identified.  [facility] Embedded Care Gaps. Reference number: [id number]. 5/04/2022   1:18:11 PM CDT

## 2022-05-09 ENCOUNTER — HOSPITAL ENCOUNTER (OUTPATIENT)
Dept: RADIOLOGY | Facility: HOSPITAL | Age: 87
Discharge: HOME | End: 2022-05-09
Attending: UROLOGY
Payer: MEDICARE

## 2022-05-09 ENCOUNTER — TELEPHONE (OUTPATIENT)
Dept: UROLOGY | Facility: CLINIC | Age: 87
End: 2022-05-09
Payer: MEDICARE

## 2022-05-09 DIAGNOSIS — C61 PROSTATE CANCER: Primary | ICD-10-CM

## 2022-05-09 DIAGNOSIS — C61 PROSTATE CANCER: ICD-10-CM

## 2022-05-09 PROCEDURE — 78815 NM PET CT FLUCICLOVINE F18(PROSTATE CANCER RECURRENCE): ICD-10-PCS | Mod: 26,PS,, | Performed by: RADIOLOGY

## 2022-05-09 PROCEDURE — 78815 PET IMAGE W/CT SKULL-THIGH: CPT | Mod: 26,PS,, | Performed by: RADIOLOGY

## 2022-05-09 PROCEDURE — 78815 PET IMAGE W/CT SKULL-THIGH: CPT | Mod: TC,PN,PI

## 2022-05-09 NOTE — PROGRESS NOTES
PET Imaging Questionnaire    1. Are you a Diabetic? Recent Blood Sugar level? No    2. Are you anemic? Bone Marrow Stimulation Meds? No    3. Have you had a CT Scan, if so when & where was your last one? Yes -     4. Have you had a PET Scan, if so when & where was your last one? No    5. Chemotherapy or currently on Chemotherapy? No    6. Radiation therapy? No    Surgical History:   Past Surgical History:   Procedure Laterality Date    CATARACT EXTRACTION W/  INTRAOCULAR LENS IMPLANT Bilateral     ESOPHAGOGASTRODUODENOSCOPY     7.      8. Have you been fasting for at least 6 hours? Yes    9. Is there any chance you may be pregnant or breastfeeding? No    Assay: 10.27 MCi@:12:54   Injection Site:RT Forearm    Residual: .64 mCi@: 12:57   Technologist: Kulwinder Weldon Injected:9.63mCi

## 2022-05-09 NOTE — TELEPHONE ENCOUNTER
----- Message from Josefina Estrada sent at 5/9/2022  2:24 PM CDT -----  Contact: [phone]  Type: Needs Medical Advice  Who Called: Pts wife    Best Call Back Number: [phone]    Additional Information:Pts wife is calling about results from pts PRT scan today. Pls call back and advise

## 2022-05-10 ENCOUNTER — TELEPHONE (OUTPATIENT)
Dept: UROLOGY | Facility: CLINIC | Age: 87
End: 2022-05-10
Payer: MEDICARE

## 2022-05-10 NOTE — TELEPHONE ENCOUNTER
----- Message from Kiki Parkinson sent at 5/10/2022 12:28 PM CDT -----  Contact: pt wife  Type: Return Call    Who Called: Nurse     Who Left Message: Lisa Vazquez     Does the patient know what this is regarding: Yes    Best Call Back Number: [phone]      Pt wife is wanting a call back about the results is asking for a copy to sit and explain this to their kids, she does not remember what the dr said about the results,  please call pt wife back e mail at [email or web address]   Thank you~

## 2022-05-10 NOTE — TELEPHONE ENCOUNTER
----- Message from Marina Diaz sent at 5/10/2022  7:15 AM CDT -----  Contact: spouse  Type:  Test Results    Who Called:  Polina - spouse  Name of Test (Lab/Mammo/Etc):  Pet Scann  Date of Test:  05/09/22  Ordering Provider:  Romie Bellamy  Where the test was performed:  [facility]  Best Call Back Number:  [phone]     Additional Information:

## 2022-05-10 NOTE — TELEPHONE ENCOUNTER
----- Message from Fredi May sent at 5/10/2022 10:13 AM CDT -----  Contact: pt's wife Polina at  [phone]  Type: Needs Medical Advice  Who Called:  pt's wife Polina   Best Call Back Number: [phone]  Additional Information:  pt's wife Polina is calling the office to ask the office to call her after 1 pm today with 's results for his CT Scan he had on 5/9/22. Please call back and advise.

## 2022-05-10 NOTE — TELEPHONE ENCOUNTER
----- Message from Kiki Parkinson sent at 5/10/2022 12:28 PM CDT -----  Contact: pt wife  Type: Return Call    Who Called: Nurse     Who Left Message: Lisa Vazquez     Does the patient know what this is regarding: Yes    Best Call Back Number: [phone]      Pt wife is wanting a call back about the results is asking for a copy to sit and explain this to their kids, she does not remember what the dr said about the results,  please call pt wife back e mail at [email or web address]   Thank you~

## 2022-05-11 ENCOUNTER — TELEPHONE (OUTPATIENT)
Dept: HEMATOLOGY/ONCOLOGY | Facility: CLINIC | Age: 87
End: 2022-05-11
Payer: MEDICARE

## 2022-05-11 NOTE — NURSING
Spoke with patient and spouse.  Patient will come on 5/20 for prostate appointment and follow up on Anemia.  Both were instructed to come to waiting area 30 minutes prior to appointment.

## 2022-05-20 ENCOUNTER — TELEPHONE (OUTPATIENT)
Dept: SURGERY | Facility: CLINIC | Age: 87
End: 2022-05-20
Payer: MEDICARE

## 2022-05-20 ENCOUNTER — OFFICE VISIT (OUTPATIENT)
Dept: HEMATOLOGY/ONCOLOGY | Facility: CLINIC | Age: 87
End: 2022-05-20
Payer: MEDICARE

## 2022-05-20 ENCOUNTER — LAB VISIT (OUTPATIENT)
Dept: LAB | Facility: HOSPITAL | Age: 87
End: 2022-05-20
Attending: RADIOLOGY
Payer: MEDICARE

## 2022-05-20 VITALS
DIASTOLIC BLOOD PRESSURE: 68 MMHG | HEART RATE: 70 BPM | SYSTOLIC BLOOD PRESSURE: 125 MMHG | OXYGEN SATURATION: 98 % | WEIGHT: 148.81 LBS | BODY MASS INDEX: 21.3 KG/M2 | RESPIRATION RATE: 16 BRPM | TEMPERATURE: 98 F | HEIGHT: 70 IN

## 2022-05-20 DIAGNOSIS — C77.1 METASTASIS TO MEDIASTINAL LYMPH NODE: ICD-10-CM

## 2022-05-20 DIAGNOSIS — D64.9 ANEMIA, UNSPECIFIED TYPE: ICD-10-CM

## 2022-05-20 DIAGNOSIS — C61 PROSTATE CANCER: ICD-10-CM

## 2022-05-20 DIAGNOSIS — C61 PROSTATE CANCER: Primary | ICD-10-CM

## 2022-05-20 LAB
BASOPHILS # BLD AUTO: 0.05 K/UL (ref 0–0.2)
BASOPHILS NFR BLD: 0.6 % (ref 0–1.9)
DIFFERENTIAL METHOD: ABNORMAL
EOSINOPHIL # BLD AUTO: 0.2 K/UL (ref 0–0.5)
EOSINOPHIL NFR BLD: 2.7 % (ref 0–8)
ERYTHROCYTE [DISTWIDTH] IN BLOOD BY AUTOMATED COUNT: 14.4 % (ref 11.5–14.5)
HCT VFR BLD AUTO: 32.7 % (ref 40–54)
HGB BLD-MCNC: 10.8 G/DL (ref 14–18)
IMM GRANULOCYTES # BLD AUTO: 0.05 K/UL (ref 0–0.04)
IMM GRANULOCYTES NFR BLD AUTO: 0.6 % (ref 0–0.5)
LYMPHOCYTES # BLD AUTO: 2.4 K/UL (ref 1–4.8)
LYMPHOCYTES NFR BLD: 29.8 % (ref 18–48)
MCH RBC QN AUTO: 29.8 PG (ref 27–31)
MCHC RBC AUTO-ENTMCNC: 33 G/DL (ref 32–36)
MCV RBC AUTO: 90 FL (ref 82–98)
MONOCYTES # BLD AUTO: 0.8 K/UL (ref 0.3–1)
MONOCYTES NFR BLD: 10.2 % (ref 4–15)
NEUTROPHILS # BLD AUTO: 4.6 K/UL (ref 1.8–7.7)
NEUTROPHILS NFR BLD: 56.1 % (ref 38–73)
NRBC BLD-RTO: 0 /100 WBC
PLATELET # BLD AUTO: 210 K/UL (ref 150–450)
PMV BLD AUTO: 9.7 FL (ref 9.2–12.9)
RBC # BLD AUTO: 3.62 M/UL (ref 4.6–6.2)
WBC # BLD AUTO: 8.16 K/UL (ref 3.9–12.7)

## 2022-05-20 PROCEDURE — 1157F ADVNC CARE PLAN IN RCRD: CPT | Mod: CPTII,S$GLB,, | Performed by: INTERNAL MEDICINE

## 2022-05-20 PROCEDURE — 1126F PR PAIN SEVERITY QUANTIFIED, NO PAIN PRESENT: ICD-10-PCS | Mod: CPTII,S$GLB,, | Performed by: INTERNAL MEDICINE

## 2022-05-20 PROCEDURE — 3074F PR MOST RECENT SYSTOLIC BLOOD PRESSURE < 130 MM HG: ICD-10-PCS | Mod: CPTII,S$GLB,, | Performed by: INTERNAL MEDICINE

## 2022-05-20 PROCEDURE — 1159F MED LIST DOCD IN RCRD: CPT | Mod: CPTII,S$GLB,, | Performed by: INTERNAL MEDICINE

## 2022-05-20 PROCEDURE — 1160F RVW MEDS BY RX/DR IN RCRD: CPT | Mod: CPTII,S$GLB,, | Performed by: INTERNAL MEDICINE

## 2022-05-20 PROCEDURE — 88185 FLOWCYTOMETRY/TC ADD-ON: CPT | Mod: 59 | Performed by: PATHOLOGY

## 2022-05-20 PROCEDURE — 3288F FALL RISK ASSESSMENT DOCD: CPT | Mod: CPTII,S$GLB,, | Performed by: INTERNAL MEDICINE

## 2022-05-20 PROCEDURE — 3078F PR MOST RECENT DIASTOLIC BLOOD PRESSURE < 80 MM HG: ICD-10-PCS | Mod: CPTII,S$GLB,, | Performed by: INTERNAL MEDICINE

## 2022-05-20 PROCEDURE — 1159F PR MEDICATION LIST DOCUMENTED IN MEDICAL RECORD: ICD-10-PCS | Mod: CPTII,S$GLB,, | Performed by: INTERNAL MEDICINE

## 2022-05-20 PROCEDURE — 1160F PR REVIEW ALL MEDS BY PRESCRIBER/CLIN PHARMACIST DOCUMENTED: ICD-10-PCS | Mod: CPTII,S$GLB,, | Performed by: INTERNAL MEDICINE

## 2022-05-20 PROCEDURE — 85025 COMPLETE CBC W/AUTO DIFF WBC: CPT | Mod: PN

## 2022-05-20 PROCEDURE — 1101F PR PT FALLS ASSESS DOC 0-1 FALLS W/OUT INJ PAST YR: ICD-10-PCS | Mod: CPTII,S$GLB,, | Performed by: INTERNAL MEDICINE

## 2022-05-20 PROCEDURE — 1157F PR ADVANCE CARE PLAN OR EQUIV PRESENT IN MEDICAL RECORD: ICD-10-PCS | Mod: CPTII,S$GLB,, | Performed by: INTERNAL MEDICINE

## 2022-05-20 PROCEDURE — 1126F AMNT PAIN NOTED NONE PRSNT: CPT | Mod: CPTII,S$GLB,, | Performed by: INTERNAL MEDICINE

## 2022-05-20 PROCEDURE — 36415 COLL VENOUS BLD VENIPUNCTURE: CPT | Mod: PN

## 2022-05-20 PROCEDURE — 99999 PR PBB SHADOW E&M-EST. PATIENT-LVL IV: ICD-10-PCS | Mod: PBBFAC,,, | Performed by: INTERNAL MEDICINE

## 2022-05-20 PROCEDURE — 1101F PT FALLS ASSESS-DOCD LE1/YR: CPT | Mod: CPTII,S$GLB,, | Performed by: INTERNAL MEDICINE

## 2022-05-20 PROCEDURE — 88189 FLOWCYTOMETRY/READ 16 & >: CPT | Mod: ,,, | Performed by: PATHOLOGY

## 2022-05-20 PROCEDURE — 3288F PR FALLS RISK ASSESSMENT DOCUMENTED: ICD-10-PCS | Mod: CPTII,S$GLB,, | Performed by: INTERNAL MEDICINE

## 2022-05-20 PROCEDURE — 3078F DIAST BP <80 MM HG: CPT | Mod: CPTII,S$GLB,, | Performed by: INTERNAL MEDICINE

## 2022-05-20 PROCEDURE — 3074F SYST BP LT 130 MM HG: CPT | Mod: CPTII,S$GLB,, | Performed by: INTERNAL MEDICINE

## 2022-05-20 PROCEDURE — 99999 PR PBB SHADOW E&M-EST. PATIENT-LVL IV: CPT | Mod: PBBFAC,,, | Performed by: INTERNAL MEDICINE

## 2022-05-20 PROCEDURE — 88237 TISSUE CULTURE BONE MARROW: CPT | Performed by: INTERNAL MEDICINE

## 2022-05-20 PROCEDURE — 88271 CYTOGENETICS DNA PROBE: CPT | Mod: 59 | Performed by: INTERNAL MEDICINE

## 2022-05-20 PROCEDURE — 88184 FLOWCYTOMETRY/ TC 1 MARKER: CPT | Performed by: PATHOLOGY

## 2022-05-20 PROCEDURE — 99215 OFFICE O/P EST HI 40 MIN: CPT | Mod: S$GLB,,, | Performed by: INTERNAL MEDICINE

## 2022-05-20 PROCEDURE — 88189 PR  FLOWCYTOMETRY/READ, 16 & > MARKERS: ICD-10-PCS | Mod: ,,, | Performed by: PATHOLOGY

## 2022-05-20 PROCEDURE — 99499 RISK ADDL DX/OHS AUDIT: ICD-10-PCS | Mod: S$GLB,,, | Performed by: INTERNAL MEDICINE

## 2022-05-20 PROCEDURE — 99215 PR OFFICE/OUTPT VISIT, EST, LEVL V, 40-54 MIN: ICD-10-PCS | Mod: S$GLB,,, | Performed by: INTERNAL MEDICINE

## 2022-05-20 PROCEDURE — 99499 UNLISTED E&M SERVICE: CPT | Mod: S$GLB,,, | Performed by: INTERNAL MEDICINE

## 2022-05-20 NOTE — TELEPHONE ENCOUNTER
----- Message from Keyana Lepe MA sent at 5/20/2022  3:51 PM CDT -----  We are referring patient for port placement and Dr Jackson would like to coordinate doing a bone marrow biopsy at same time of port placement to save a patient time and additional sedation. Can you have the surgery scheduler contact me to work on this please?

## 2022-05-20 NOTE — PROGRESS NOTES
History of present illness:  The patient is an 87-year-old white gentleman previously followed by Dr. Hogan for iron deficiency anemia.  Patient has previously received Venofer.  Patient has some difficulties with chronic mild anemia in part related to underlying chronic kidney disease as well as prostate carcinoma for which patient sees Dr. Bellamy and is managed with Lupron/Earleada.  Patient has had a rising PSA despite ongoing therapy.  Dr. Bellamy subsequently obtain fluciclovine PET scan and has been referred back to our clinic for additional evaluation.  He has also been seen by our nurse practitioners in regard to recurrent difficulties with iron deficiency anemia.  Patient repeated a course of Venofer x5 doses without significant increment in his counts.  Patient denies difficulties with pain, weight loss, or urination.  He remains active but needs to pace himself for his daily chores.    Physical examination:  Well-developed, well-nourished, elderly, white gentleman, in no acute distress, who has a weight of 148.5 lb.  VITAL SIGNS: Documented  and reviewed this visit.  HEENT: Normocephalic, atraumatic. Oral mucosa pink and moist. Lips without lesions. Tongue midline. Oropharynx clear. Nonicteric sclerae.   NECK: Supple, no adenopathy. No carotid bruits, thyromegaly or thyroid nodule.   HEART: Regular rate and rhythm without murmur, gallop or rub.   LUNGS: Clear to auscultation bilaterally. Normal respiratory effort.   ABDOMEN: Soft, nontender, nondistended with positive normoactive bowel sounds, no hepatosplenomegaly.   EXTREMITIES: No cyanosis, clubbing or edema. Distal pulses are intact.   AXILLAE AND GROIN: No palpable pathologic lymphadenopathy is appreciated.   SKIN: Intact/turgor normal   NEUROLOGIC: Cranial nerves II-XII grossly intact. Motor: Good muscle bulk and tone. Strength/sensory 5/5 throughout. Gait stable.     Laboratory:  Most recent studies were obtained 04/19/2022.  Ferritin obtained on  the same date is elevated at 1782.  Sodium 140, potassium 4.5, chloride 104, CO2 25, BUN 17, creatinine 1.1, glucose 106, calcium 9.6, liver function test within normal limits, GFR greater than 60.  PSA 3.6 (1.8, 0.89, 0.39).    Most recent CBC was obtained today.  White count 8.2, hemoglobin 10.8, hematocrit 32.7, platelets 210, absolute neutrophil count 4600.     Fluciclovine PET scan:  Mediastinal blood pool max SUV: 3.7  L3 vertebral body bone marrow max SUV: 2.1  Head/neck:  No significant abnormal radiotracer accumulation is identified within the head and neck region.  No lymphadenopathy is present.  Chest:  There is an irregular focal zone of fibrosis in the right pulmonary apex which does not exhibit significantly increased radiotracer accumulation.  There is lymphadenopathy in the right paratracheal region of the mediastinum and in the subcarinal region of the mediastinum with abnormally increased radiotracer accumulation highly suspicious for metastatic disease.  A right paratracheal node on image 113 measures 2.0 x 1.3 cm with a max SUV of 4.2.  One of the larger subcarinal nodes on image 126 measures 1.8 x 1.6 cm with a max SUV of 3.8.  Abdomen/pelvis:  There is markedly increased radiotracer accumulation within the prostate gland with 2 distinct nodular masses occupying the majority of the gland.  The conglomerate size of these masses on image 275 is 3.8 x 3.0 cm with a max SUV of 11.2.  The findings are consistent with prostate cancer recurrence.  There are nonenlarged bilateral inguinal lymph nodes which do not exhibit significant increased radiotracer accumulation.   Skeleton:   No significant abnormal radiotracer accumulation is identified within the skeleton and there are no findings to suggest osseous metastatic disease.    Impression:  1. 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  2. Chronic anemia with previously documented depression and iron  stores-unresponsive to repeat course of Venofer.    Plan:  1. In regard to anemia, we will pursue bilateral bone marrow aspiration and biopsy with flow, cytogenetic analysis, and FISH panel for MDS.  2. Informed consent for bone marrow aspiration and biopsy was obtained during the course of today's office evaluation.  3. In regard to prostate carcinoma, patient's  Next line of therapy would consist of cytotoxic chemotherapy.  We discussed single agent Novantrone and single agent Taxotere as options.  4. Obtain additional lab to include CBC, CMP, LDH, and magnesium.  5. Obtain guardant 360 panel.  6. Obtain baseline echocardiogram to establish left ventricular ejection fraction.  7. Consult Dr. Rivero for assistance with implantation of a MediPort catheter.  8. Arrange to have port and bone marrow aspiration and biopsy performed at the same time in the Ambulatory Surgical Care unit.  9. Return to clinic to review results at earliest convenience.        This note was created using voice recognition software and may contain grammatical errors.

## 2022-05-20 NOTE — Clinical Note
Obtain cmp ldh mg Obtain echo for lvef Consult porter for port placement Bilateral bone marrow asp and biopsy with fish for mds Need to schedule simultaneous port placement and bmbx Send guardant 360 Rtc to review results.   Give pt's daughter handouts for novantrone and taxotere to read.

## 2022-05-20 NOTE — LETTER
May 20, 2022        GABRIELLE Bellamy MD  [address] [place] [address]             [facility]  [address] [place] [address]  Phone: [phone]  Fax: [phone]   Patient: Amor Alcazar   MR Number: [id number]   YOB: 1935   Date of Visit: 5/20/2022       Dear Dr. Bellamy:    Thank you for referring Amor Alcazar to me for evaluation of refractory iron deficiency anemia and androgen independent prostate cancer. Below are the relevant portions of my assessment and plan of care.  If you have questions, please do not hesitate to call me. I look forward to following Amor along with you.    Sincerely,      MD NAVEED Winkler MD Gary Brown, MD Joshua D. Parks, MD

## 2022-05-23 ENCOUNTER — TELEPHONE (OUTPATIENT)
Dept: SURGERY | Facility: CLINIC | Age: 87
End: 2022-05-23
Payer: MEDICARE

## 2022-05-23 ENCOUNTER — TELEPHONE (OUTPATIENT)
Dept: HEMATOLOGY/ONCOLOGY | Facility: CLINIC | Age: 87
End: 2022-05-23
Payer: MEDICARE

## 2022-05-23 DIAGNOSIS — D64.9 ANEMIA, UNSPECIFIED TYPE: Primary | ICD-10-CM

## 2022-05-23 DIAGNOSIS — Z01.818 PRE-OP TESTING: ICD-10-CM

## 2022-05-23 DIAGNOSIS — Z45.2 ENCOUNTER FOR INSERTION OF VENOUS ACCESS PORT: Primary | ICD-10-CM

## 2022-05-23 LAB
FLOW CYTOMETRY ANTIBODIES ANALYZED - BLOOD: NORMAL
FLOW CYTOMETRY COMMENT - BLOOD: NORMAL
FLOW CYTOMETRY INTERPRETATION - BLOOD: NORMAL

## 2022-05-23 RX ORDER — SODIUM CHLORIDE 9 MG/ML
INJECTION, SOLUTION INTRAVENOUS CONTINUOUS
Status: CANCELLED | OUTPATIENT
Start: 2022-05-23

## 2022-05-23 NOTE — TELEPHONE ENCOUNTER
Advised with Morris in Dr Rivero about scheduling BMBX on same day as port placement. Advised jUne 1 would be good to do; this. BMBX scheduled and all consents faxed to them . Patient wife advised and verbalized understanding of date and time and to follow prep instruction that were given to them at last visit.

## 2022-05-24 ENCOUNTER — TELEPHONE (OUTPATIENT)
Dept: HEMATOLOGY/ONCOLOGY | Facility: CLINIC | Age: 87
End: 2022-05-24
Payer: MEDICARE

## 2022-05-24 NOTE — TELEPHONE ENCOUNTER
Spoke to wife and advised we are still in preliminary stages of work up in relation to recommendations of treatment . Dr Jackson will have a better idea on how to answer these questions once all results are in and can spend time giving recommendations and advised on time lines. Wife verbalized understanding.   ----- Message from Annemarie Dutta sent at 5/24/2022 10:21 AM CDT -----  Contact: wife  Type: Needs Medical Advice  Who Called:  constance Fish  Best Call Back Number: [phone]  Additional Information: requesting a call back regarding some questions they have and forgot to ask during appt.   1. If patient continues like he is doing, how much time will he have and what changes can he expect?  2. How is the chemo going to affect him physically and mentally?  3. What is the doctors recommendation?  4. Can the erleada dosage be increased instead of chemo through IV?

## 2022-05-25 ENCOUNTER — CLINICAL SUPPORT (OUTPATIENT)
Dept: CARDIOLOGY | Facility: HOSPITAL | Age: 87
End: 2022-05-25
Attending: INTERNAL MEDICINE
Payer: MEDICARE

## 2022-05-25 VITALS — BODY MASS INDEX: 21.19 KG/M2 | HEART RATE: 90 BPM | WEIGHT: 148 LBS | HEIGHT: 70 IN

## 2022-05-25 DIAGNOSIS — C61 PROSTATE CANCER: ICD-10-CM

## 2022-05-25 DIAGNOSIS — C77.1 METASTASIS TO MEDIASTINAL LYMPH NODE: ICD-10-CM

## 2022-05-25 DIAGNOSIS — D64.9 ANEMIA, UNSPECIFIED TYPE: ICD-10-CM

## 2022-05-25 LAB
ASCENDING AORTA: 2.61 CM
AV INDEX (PROSTH): 0.55
AV MEAN GRADIENT: 5 MMHG
AV PEAK GRADIENT: 9 MMHG
AV VALVE AREA: 1.82 CM2
AV VELOCITY RATIO: 0.69
BSA FOR ECHO PROCEDURE: 1.82 M2
CV ECHO LV RWT: 0.4 CM
DOP CALC AO PEAK VEL: 1.48 M/S
DOP CALC AO VTI: 29.15 CM
DOP CALC LVOT AREA: 3.3 CM2
DOP CALC LVOT DIAMETER: 2.05 CM
DOP CALC LVOT PEAK VEL: 1.02 M/S
DOP CALC LVOT STROKE VOLUME: 53.08 CM3
DOP CALCLVOT PEAK VEL VTI: 16.09 CM
E WAVE DECELERATION TIME: 109.39 MSEC
E/A RATIO: 0.76
E/E' RATIO: 9.07 M/S
ECHO LV POSTERIOR WALL: 0.91 CM (ref 0.6–1.1)
EJECTION FRACTION: 55 %
FRACTIONAL SHORTENING: 39 % (ref 28–44)
INTERVENTRICULAR SEPTUM: 0.82 CM (ref 0.6–1.1)
IVRT: 31.4 MSEC
LA MAJOR: 4.02 CM
LA MINOR: 5.08 CM
LA WIDTH: 3.74 CM
LEFT ATRIUM SIZE: 2.89 CM
LEFT ATRIUM VOLUME INDEX: 22.4 ML/M2
LEFT ATRIUM VOLUME: 41.24 CM3
LEFT INTERNAL DIMENSION IN SYSTOLE: 2.75 CM (ref 2.1–4)
LEFT VENTRICLE DIASTOLIC VOLUME INDEX: 50.66 ML/M2
LEFT VENTRICLE DIASTOLIC VOLUME: 93.21 ML
LEFT VENTRICLE MASS INDEX: 69 G/M2
LEFT VENTRICLE SYSTOLIC VOLUME INDEX: 15.4 ML/M2
LEFT VENTRICLE SYSTOLIC VOLUME: 28.32 ML
LEFT VENTRICULAR INTERNAL DIMENSION IN DIASTOLE: 4.52 CM (ref 3.5–6)
LEFT VENTRICULAR MASS: 126.89 G
LV LATERAL E/E' RATIO: 8.5 M/S
LV SEPTAL E/E' RATIO: 9.71 M/S
MV A" WAVE DURATION": 10.28 MSEC
MV PEAK A VEL: 0.9 M/S
MV PEAK E VEL: 0.68 M/S
MV STENOSIS PRESSURE HALF TIME: 31.72 MS
MV VALVE AREA P 1/2 METHOD: 6.94 CM2
PISA MRMAX VEL: 0.02 M/S
PISA TR MAX VEL: 2.54 M/S
PULM VEIN S/D RATIO: 1.48
PV PEAK D VEL: 0.31 M/S
PV PEAK S VEL: 0.46 M/S
RA MAJOR: 3.31 CM
RA PRESSURE: 3 MMHG
RA WIDTH: 3.55 CM
RIGHT VENTRICULAR END-DIASTOLIC DIMENSION: 3.4 CM
RV TISSUE DOPPLER FREE WALL SYSTOLIC VELOCITY 1 (APICAL 4 CHAMBER VIEW): 18.48 CM/S
SINUS: 3.19 CM
STJ: 2.38 CM
TDI LATERAL: 0.08 M/S
TDI SEPTAL: 0.07 M/S
TDI: 0.08 M/S
TR MAX PG: 26 MMHG
TRICUSPID ANNULAR PLANE SYSTOLIC EXCURSION: 1.76 CM
TV REST PULMONARY ARTERY PRESSURE: 29 MMHG

## 2022-05-25 PROCEDURE — 93356 PR IMG, MYOCARDIAL STRAIN, SPECKLE TRACKING: ICD-10-PCS | Mod: ,,, | Performed by: INTERNAL MEDICINE

## 2022-05-25 PROCEDURE — 93306 ECHO (CUPID ONLY): ICD-10-PCS | Mod: 26,,, | Performed by: INTERNAL MEDICINE

## 2022-05-25 PROCEDURE — 93306 TTE W/DOPPLER COMPLETE: CPT | Mod: 26,,, | Performed by: INTERNAL MEDICINE

## 2022-05-25 PROCEDURE — 93356 MYOCRD STRAIN IMG SPCKL TRCK: CPT | Mod: PO

## 2022-05-25 PROCEDURE — 93356 MYOCRD STRAIN IMG SPCKL TRCK: CPT | Mod: ,,, | Performed by: INTERNAL MEDICINE

## 2022-05-27 ENCOUNTER — TELEPHONE (OUTPATIENT)
Dept: SURGERY | Facility: HOSPITAL | Age: 87
End: 2022-05-27
Payer: MEDICARE

## 2022-05-27 NOTE — TELEPHONE ENCOUNTER
Spoke to wife, Polina, for pre-op call. Her understanding is the pt will also be having a bone marrow biopsy done simultaneously with the port placement on 6/1/22. Can you please clarify with her and the OR  if both procedures are to be done 6/1? We only have him down for the port placement. Thank you.

## 2022-05-31 ENCOUNTER — ANESTHESIA EVENT (OUTPATIENT)
Dept: SURGERY | Facility: HOSPITAL | Age: 87
End: 2022-05-31
Payer: MEDICARE

## 2022-06-01 ENCOUNTER — TELEPHONE (OUTPATIENT)
Dept: SURGERY | Facility: HOSPITAL | Age: 87
End: 2022-06-01
Payer: MEDICARE

## 2022-06-01 ENCOUNTER — ANESTHESIA (OUTPATIENT)
Dept: SURGERY | Facility: HOSPITAL | Age: 87
End: 2022-06-01
Payer: MEDICARE

## 2022-06-01 ENCOUNTER — TELEPHONE (OUTPATIENT)
Dept: HEMATOLOGY/ONCOLOGY | Facility: CLINIC | Age: 87
End: 2022-06-01
Payer: MEDICARE

## 2022-06-01 NOTE — TELEPHONE ENCOUNTER
Wild rao of new date and time of 6/15 arrive at 6:30am to [address] for bone marrow biopsy and port placement by Dr Rivero to follow.   ----- Message from Rosario Ramsey sent at 6/1/2022  9:01 AM CDT -----  Type:Needs Medical Advice    Who Called:Soumya (Wife)  Best call back number:[phone]  Additional Info: Requesting a call back regarding#Mrs Dooley said please schedule ASAP, call when ready  Please Advise- Thank you

## 2022-06-01 NOTE — TELEPHONE ENCOUNTER
Sounds like a plan.    Message text       DARNELL Collado, [place]; Josefa ADAM Staff 10 minutes ago (8:46 AM)     LAST Caceres and I just spoke and looks like better day is 6/15. Manuel can do the Bone marrow at 7am with port to follow.    Message text

## 2022-06-01 NOTE — TELEPHONE ENCOUNTER
Patient's surgery with Dr. Rivero and Dr. Jackson will be cancelled for today. Per Dr. Jackson, patient is supposed to be scheduled for a port placement and bone marrow biopsy at the same time in the OR. Patient was not scheduled for both procedures today. Please call patient to reschedule. Thank you.

## 2022-06-02 ENCOUNTER — OFFICE VISIT (OUTPATIENT)
Dept: SURGERY | Facility: CLINIC | Age: 87
End: 2022-06-02
Payer: MEDICARE

## 2022-06-02 VITALS
DIASTOLIC BLOOD PRESSURE: 75 MMHG | HEART RATE: 106 BPM | BODY MASS INDEX: 21.08 KG/M2 | HEIGHT: 70 IN | SYSTOLIC BLOOD PRESSURE: 127 MMHG | WEIGHT: 147.25 LBS | TEMPERATURE: 98 F

## 2022-06-02 DIAGNOSIS — C61 PROSTATE CANCER: Primary | ICD-10-CM

## 2022-06-02 PROCEDURE — 1101F PR PT FALLS ASSESS DOC 0-1 FALLS W/OUT INJ PAST YR: ICD-10-PCS | Mod: CPTII,S$GLB,, | Performed by: SURGERY

## 2022-06-02 PROCEDURE — 3288F FALL RISK ASSESSMENT DOCD: CPT | Mod: CPTII,S$GLB,, | Performed by: SURGERY

## 2022-06-02 PROCEDURE — 1126F AMNT PAIN NOTED NONE PRSNT: CPT | Mod: CPTII,S$GLB,, | Performed by: SURGERY

## 2022-06-02 PROCEDURE — 3074F SYST BP LT 130 MM HG: CPT | Mod: CPTII,S$GLB,, | Performed by: SURGERY

## 2022-06-02 PROCEDURE — 3078F PR MOST RECENT DIASTOLIC BLOOD PRESSURE < 80 MM HG: ICD-10-PCS | Mod: CPTII,S$GLB,, | Performed by: SURGERY

## 2022-06-02 PROCEDURE — 99203 OFFICE O/P NEW LOW 30 MIN: CPT | Mod: S$GLB,,, | Performed by: SURGERY

## 2022-06-02 PROCEDURE — 3074F PR MOST RECENT SYSTOLIC BLOOD PRESSURE < 130 MM HG: ICD-10-PCS | Mod: CPTII,S$GLB,, | Performed by: SURGERY

## 2022-06-02 PROCEDURE — 99999 PR PBB SHADOW E&M-EST. PATIENT-LVL III: CPT | Mod: PBBFAC,,, | Performed by: SURGERY

## 2022-06-02 PROCEDURE — 99999 PR PBB SHADOW E&M-EST. PATIENT-LVL III: ICD-10-PCS | Mod: PBBFAC,,, | Performed by: SURGERY

## 2022-06-02 PROCEDURE — 1159F PR MEDICATION LIST DOCUMENTED IN MEDICAL RECORD: ICD-10-PCS | Mod: CPTII,S$GLB,, | Performed by: SURGERY

## 2022-06-02 PROCEDURE — 1101F PT FALLS ASSESS-DOCD LE1/YR: CPT | Mod: CPTII,S$GLB,, | Performed by: SURGERY

## 2022-06-02 PROCEDURE — 1126F PR PAIN SEVERITY QUANTIFIED, NO PAIN PRESENT: ICD-10-PCS | Mod: CPTII,S$GLB,, | Performed by: SURGERY

## 2022-06-02 PROCEDURE — 3288F PR FALLS RISK ASSESSMENT DOCUMENTED: ICD-10-PCS | Mod: CPTII,S$GLB,, | Performed by: SURGERY

## 2022-06-02 PROCEDURE — 1157F ADVNC CARE PLAN IN RCRD: CPT | Mod: CPTII,S$GLB,, | Performed by: SURGERY

## 2022-06-02 PROCEDURE — 1159F MED LIST DOCD IN RCRD: CPT | Mod: CPTII,S$GLB,, | Performed by: SURGERY

## 2022-06-02 PROCEDURE — 3078F DIAST BP <80 MM HG: CPT | Mod: CPTII,S$GLB,, | Performed by: SURGERY

## 2022-06-02 PROCEDURE — 99203 PR OFFICE/OUTPT VISIT, NEW, LEVL III, 30-44 MIN: ICD-10-PCS | Mod: S$GLB,,, | Performed by: SURGERY

## 2022-06-02 PROCEDURE — 1157F PR ADVANCE CARE PLAN OR EQUIV PRESENT IN MEDICAL RECORD: ICD-10-PCS | Mod: CPTII,S$GLB,, | Performed by: SURGERY

## 2022-06-02 NOTE — PROGRESS NOTES
Subjective:       Patient ID: Amor Alcazar is a 87 y.o. male.    Chief Complaint: Consult (Port placement)    HPI  86 yo M who is referred to me for evaluation of port placement. Pt with known metastatic prostate cancer.  He is in need of port to allow for chemotherapy.  He has no history of dialysis or of surgery in his chest.      Review of Systems   Constitutional: Negative for activity change and appetite change.   Cardiovascular: Negative for chest pain and claudication.   Gastrointestinal: Negative for abdominal distention, abdominal pain, change in bowel habit, vomiting and change in bowel habit.   Hematological: Negative for adenopathy. Does not bruise/bleed easily.   Psychiatric/Behavioral: Negative for agitation and decreased concentration.         Objective:      Physical Exam  Vitals reviewed.   Cardiovascular:      Rate and Rhythm: Normal rate.   Pulmonary:      Effort: Pulmonary effort is normal.   Abdominal:      General: Abdomen is flat. There is no distension.      Tenderness: There is no abdominal tenderness.      Hernia: No hernia is present.   Neurological:      Mental Status: He is alert.         Assessment:     prostate cancer  Problem List Items Addressed This Visit    None         Plan:       To have port placed on 6/15.

## 2022-06-10 ENCOUNTER — TELEPHONE (OUTPATIENT)
Dept: HEMATOLOGY/ONCOLOGY | Facility: CLINIC | Age: 87
End: 2022-06-10
Payer: MEDICARE

## 2022-06-10 NOTE — TELEPHONE ENCOUNTER
Pt's wife stated that she have some questions that they forgot to ask at the visit and it has been hard to grasp all the information and what to expect going forward:    1. Prognosis?  2. How long would he have without chemo since it is slow moving cancer?  3. Is the chemo he is getting going to stop it?

## 2022-06-10 NOTE — TELEPHONE ENCOUNTER
----- Message from Irene Fowler, Patient Care Assistant sent at 6/10/2022 11:13 AM CDT -----  Regarding: advice  Contact: colton miller's wife  Type: Needs Medical Advice  Who Called:  colton miller's wife   Best Call Back Number: [phone] (home) [phone] (work)    Additional Information: colton miller's wife states she would like a callback regarding an questions. Please call pt to advise. Thanks!

## 2022-06-15 ENCOUNTER — HOSPITAL ENCOUNTER (OUTPATIENT)
Dept: RADIOLOGY | Facility: HOSPITAL | Age: 87
Discharge: HOME | End: 2022-06-15
Attending: SURGERY | Admitting: INTERNAL MEDICINE
Payer: MEDICARE

## 2022-06-15 ENCOUNTER — HOSPITAL ENCOUNTER (OUTPATIENT)
Facility: HOSPITAL | Age: 87
Discharge: HOME | End: 2022-06-15
Attending: INTERNAL MEDICINE | Admitting: INTERNAL MEDICINE
Payer: MEDICARE

## 2022-06-15 DIAGNOSIS — Z45.2 ENCOUNTER FOR INSERTION OF VENOUS ACCESS PORT: ICD-10-CM

## 2022-06-15 DIAGNOSIS — D64.9 ANEMIA, UNSPECIFIED TYPE: ICD-10-CM

## 2022-06-15 DIAGNOSIS — Z95.828 PORT-A-CATH IN PLACE: ICD-10-CM

## 2022-06-15 DIAGNOSIS — D64.89 ANEMIA DUE TO OTHER CAUSE, NOT CLASSIFIED: Primary | ICD-10-CM

## 2022-06-15 PROCEDURE — 88313 SPECIAL STAINS GROUP 2: CPT | Performed by: PATHOLOGY

## 2022-06-15 PROCEDURE — 88313 SPECIAL STAINS GROUP 2: CPT | Mod: 26,,, | Performed by: PATHOLOGY

## 2022-06-15 PROCEDURE — 88271 CYTOGENETICS DNA PROBE: CPT | Performed by: INTERNAL MEDICINE

## 2022-06-15 PROCEDURE — 88311 DECALCIFY TISSUE: CPT | Performed by: PATHOLOGY

## 2022-06-15 PROCEDURE — 63600175 PHARM REV CODE 636 W HCPCS: Mod: PO | Performed by: ANESTHESIOLOGY

## 2022-06-15 PROCEDURE — 25000003 PHARM REV CODE 250: Mod: PO | Performed by: INTERNAL MEDICINE

## 2022-06-15 PROCEDURE — 88342 IMHCHEM/IMCYTCHM 1ST ANTB: CPT | Mod: 26,59,, | Performed by: PATHOLOGY

## 2022-06-15 PROCEDURE — D9220A PRA ANESTHESIA: ICD-10-PCS | Mod: ANES,,, | Performed by: ANESTHESIOLOGY

## 2022-06-15 PROCEDURE — 63600175 PHARM REV CODE 636 W HCPCS: Mod: PO | Performed by: INTERNAL MEDICINE

## 2022-06-15 PROCEDURE — D9220A PRA ANESTHESIA: ICD-10-PCS | Mod: CRNA,,, | Performed by: NURSE ANESTHETIST, CERTIFIED REGISTERED

## 2022-06-15 PROCEDURE — 88341 IMHCHEM/IMCYTCHM EA ADD ANTB: CPT | Mod: 26,59,, | Performed by: PATHOLOGY

## 2022-06-15 PROCEDURE — 88311 PR  DECALCIFY TISSUE: ICD-10-PCS | Mod: 26,,, | Performed by: PATHOLOGY

## 2022-06-15 PROCEDURE — 88305 TISSUE EXAM BY PATHOLOGIST: CPT | Mod: 26,,, | Performed by: PATHOLOGY

## 2022-06-15 PROCEDURE — 77001 CHG FLUOROGUIDE CNTRL VEN ACCESS,PLACE,REPLACE,REMOVE: ICD-10-PCS | Mod: 26,,, | Performed by: SURGERY

## 2022-06-15 PROCEDURE — 36561 PR INSERT TUNNELED CV CATH WITH PORT: ICD-10-PCS | Mod: LT,,, | Performed by: SURGERY

## 2022-06-15 PROCEDURE — 88237 TISSUE CULTURE BONE MARROW: CPT | Performed by: INTERNAL MEDICINE

## 2022-06-15 PROCEDURE — 38222 DX BONE MARROW BX & ASPIR: CPT | Mod: 50,,, | Performed by: INTERNAL MEDICINE

## 2022-06-15 PROCEDURE — 37000008 HC ANESTHESIA 1ST 15 MINUTES: Mod: PO | Performed by: INTERNAL MEDICINE

## 2022-06-15 PROCEDURE — 88189 PR  FLOWCYTOMETRY/READ, 16 & > MARKERS: ICD-10-PCS | Mod: ,,, | Performed by: PATHOLOGY

## 2022-06-15 PROCEDURE — 36000707: Mod: PO | Performed by: INTERNAL MEDICINE

## 2022-06-15 PROCEDURE — 88184 FLOWCYTOMETRY/ TC 1 MARKER: CPT | Performed by: PATHOLOGY

## 2022-06-15 PROCEDURE — 71045 X-RAY EXAM CHEST 1 VIEW: CPT | Mod: TC,FY,PO

## 2022-06-15 PROCEDURE — 88342 CHG IMMUNOCYTOCHEMISTRY: ICD-10-PCS | Mod: 26,59,, | Performed by: PATHOLOGY

## 2022-06-15 PROCEDURE — 36000706: Mod: PO | Performed by: INTERNAL MEDICINE

## 2022-06-15 PROCEDURE — 88185 FLOWCYTOMETRY/TC ADD-ON: CPT | Mod: 59 | Performed by: PATHOLOGY

## 2022-06-15 PROCEDURE — 88311 DECALCIFY TISSUE: CPT | Mod: 26,,, | Performed by: PATHOLOGY

## 2022-06-15 PROCEDURE — 37000009 HC ANESTHESIA EA ADD 15 MINS: Mod: PO | Performed by: INTERNAL MEDICINE

## 2022-06-15 PROCEDURE — D9220A PRA ANESTHESIA: Mod: CRNA,,, | Performed by: NURSE ANESTHETIST, CERTIFIED REGISTERED

## 2022-06-15 PROCEDURE — 88313 PR  SPECIAL STAINS,GROUP II: ICD-10-PCS | Mod: 26,,, | Performed by: PATHOLOGY

## 2022-06-15 PROCEDURE — 88305 TISSUE EXAM BY PATHOLOGIST: CPT | Performed by: PATHOLOGY

## 2022-06-15 PROCEDURE — 88341 PR IHC OR ICC EACH ADD'L SINGLE ANTIBODY  STAINPR: ICD-10-PCS | Mod: 26,59,, | Performed by: PATHOLOGY

## 2022-06-15 PROCEDURE — 88264 CHROMOSOME ANALYSIS 20-25: CPT | Performed by: INTERNAL MEDICINE

## 2022-06-15 PROCEDURE — 77001 FLUOROGUIDE FOR VEIN DEVICE: CPT | Mod: 26,,, | Performed by: SURGERY

## 2022-06-15 PROCEDURE — 71045 XR CHEST 1 VIEW: ICD-10-PCS | Mod: 26,,, | Performed by: RADIOLOGY

## 2022-06-15 PROCEDURE — 88305 TISSUE EXAM BY PATHOLOGIST: ICD-10-PCS | Mod: 26,,, | Performed by: PATHOLOGY

## 2022-06-15 PROCEDURE — 71000033 HC RECOVERY, INTIAL HOUR: Mod: PO | Performed by: INTERNAL MEDICINE

## 2022-06-15 PROCEDURE — 88342 IMHCHEM/IMCYTCHM 1ST ANTB: CPT | Mod: 59 | Performed by: PATHOLOGY

## 2022-06-15 PROCEDURE — 77001 FLUOROGUIDE FOR VEIN DEVICE: CPT | Mod: TC,PO

## 2022-06-15 PROCEDURE — 63600175 PHARM REV CODE 636 W HCPCS: Mod: PO | Performed by: NURSE ANESTHETIST, CERTIFIED REGISTERED

## 2022-06-15 PROCEDURE — 88341 IMHCHEM/IMCYTCHM EA ADD ANTB: CPT | Performed by: PATHOLOGY

## 2022-06-15 PROCEDURE — D9220A PRA ANESTHESIA: Mod: ANES,,, | Performed by: ANESTHESIOLOGY

## 2022-06-15 PROCEDURE — C1788 PORT, INDWELLING, IMP: HCPCS | Mod: PO | Performed by: INTERNAL MEDICINE

## 2022-06-15 PROCEDURE — 71045 X-RAY EXAM CHEST 1 VIEW: CPT | Mod: 26,,, | Performed by: RADIOLOGY

## 2022-06-15 PROCEDURE — 85097 PR  BONE MARROW,SMEAR INTERPRETATION: ICD-10-PCS | Mod: ,,, | Performed by: PATHOLOGY

## 2022-06-15 PROCEDURE — 85097 BONE MARROW INTERPRETATION: CPT | Mod: ,,, | Performed by: PATHOLOGY

## 2022-06-15 PROCEDURE — 88189 FLOWCYTOMETRY/READ 16 & >: CPT | Mod: ,,, | Performed by: PATHOLOGY

## 2022-06-15 PROCEDURE — 25000003 PHARM REV CODE 250: Mod: PO | Performed by: NURSE ANESTHETIST, CERTIFIED REGISTERED

## 2022-06-15 PROCEDURE — 36561 INSERT TUNNELED CV CATH: CPT | Mod: LT,,, | Performed by: SURGERY

## 2022-06-15 PROCEDURE — 38222 PR BONE MARROW BIOPSY(IES) W/ASPIRATION(S); DIAGNOSTIC: ICD-10-PCS | Mod: 50,,, | Performed by: INTERNAL MEDICINE

## 2022-06-15 DEVICE — KIT POWERPORT SINGLE 8FR: Type: IMPLANTABLE DEVICE | Site: CHEST | Status: FUNCTIONAL

## 2022-06-15 RX ORDER — PROPOFOL 10 MG/ML
VIAL (ML) INTRAVENOUS
Status: DISCONTINUED | OUTPATIENT
Start: 2022-06-15 | End: 2022-06-15

## 2022-06-15 RX ORDER — BUPIVACAINE HCL/EPINEPHRINE 0.25-.0005
VIAL (ML) INJECTION
Status: DISCONTINUED | OUTPATIENT
Start: 2022-06-15 | End: 2022-06-15 | Stop reason: HOSPADM

## 2022-06-15 RX ORDER — HYDROCODONE BITARTRATE AND ACETAMINOPHEN 5; 325 MG/1; MG/1
1 TABLET ORAL EVERY 4 HOURS PRN
Status: DISCONTINUED | OUTPATIENT
Start: 2022-06-15 | End: 2022-06-15 | Stop reason: HOSPADM

## 2022-06-15 RX ORDER — EPHEDRINE SULFATE 50 MG/ML
INJECTION, SOLUTION INTRAVENOUS
Status: DISCONTINUED | OUTPATIENT
Start: 2022-06-15 | End: 2022-06-15

## 2022-06-15 RX ORDER — LIDOCAINE HYDROCHLORIDE 10 MG/ML
INJECTION, SOLUTION EPIDURAL; INFILTRATION; INTRACAUDAL; PERINEURAL
Status: DISCONTINUED | OUTPATIENT
Start: 2022-06-15 | End: 2022-06-15 | Stop reason: HOSPADM

## 2022-06-15 RX ORDER — SODIUM CHLORIDE 9 MG/ML
INJECTION, SOLUTION INTRAVENOUS CONTINUOUS
Status: DISCONTINUED | OUTPATIENT
Start: 2022-06-15 | End: 2022-06-15 | Stop reason: HOSPADM

## 2022-06-15 RX ORDER — ONDANSETRON 2 MG/ML
4 INJECTION INTRAMUSCULAR; INTRAVENOUS EVERY 12 HOURS PRN
Status: DISCONTINUED | OUTPATIENT
Start: 2022-06-15 | End: 2022-06-15 | Stop reason: HOSPADM

## 2022-06-15 RX ORDER — PROPOFOL 10 MG/ML
VIAL (ML) INTRAVENOUS CONTINUOUS PRN
Status: DISCONTINUED | OUTPATIENT
Start: 2022-06-15 | End: 2022-06-15

## 2022-06-15 RX ORDER — SODIUM CHLORIDE, SODIUM LACTATE, POTASSIUM CHLORIDE, CALCIUM CHLORIDE 600; 310; 30; 20 MG/100ML; MG/100ML; MG/100ML; MG/100ML
INJECTION, SOLUTION INTRAVENOUS CONTINUOUS
Status: DISCONTINUED | OUTPATIENT
Start: 2022-06-15 | End: 2022-06-15 | Stop reason: HOSPADM

## 2022-06-15 RX ORDER — FENTANYL CITRATE 50 UG/ML
INJECTION, SOLUTION INTRAMUSCULAR; INTRAVENOUS
Status: DISCONTINUED | OUTPATIENT
Start: 2022-06-15 | End: 2022-06-15

## 2022-06-15 RX ORDER — HYDROCODONE BITARTRATE AND ACETAMINOPHEN 5; 325 MG/1; MG/1
1 TABLET ORAL EVERY 6 HOURS PRN
Qty: 20 TABLET | Refills: 0 | Status: SHIPPED | OUTPATIENT
Start: 2022-06-15 | End: 2023-02-23

## 2022-06-15 RX ORDER — ONDANSETRON 2 MG/ML
INJECTION INTRAMUSCULAR; INTRAVENOUS
Status: DISCONTINUED | OUTPATIENT
Start: 2022-06-15 | End: 2022-06-15

## 2022-06-15 RX ORDER — HEPARIN 100 UNIT/ML
SYRINGE INTRAVENOUS
Status: DISCONTINUED | OUTPATIENT
Start: 2022-06-15 | End: 2022-06-15 | Stop reason: HOSPADM

## 2022-06-15 RX ORDER — OXYCODONE HYDROCHLORIDE 5 MG/1
5 TABLET ORAL
Status: DISCONTINUED | OUTPATIENT
Start: 2022-06-15 | End: 2022-06-15 | Stop reason: HOSPADM

## 2022-06-15 RX ORDER — LIDOCAINE HCL/PF 100 MG/5ML
SYRINGE (ML) INTRAVENOUS
Status: DISCONTINUED | OUTPATIENT
Start: 2022-06-15 | End: 2022-06-15

## 2022-06-15 RX ORDER — LIDOCAINE HYDROCHLORIDE 10 MG/ML
1 INJECTION, SOLUTION EPIDURAL; INFILTRATION; INTRACAUDAL; PERINEURAL ONCE
Status: DISCONTINUED | OUTPATIENT
Start: 2022-06-15 | End: 2022-06-15 | Stop reason: HOSPADM

## 2022-06-15 RX ORDER — HYDROMORPHONE HYDROCHLORIDE 2 MG/ML
0.2 INJECTION, SOLUTION INTRAMUSCULAR; INTRAVENOUS; SUBCUTANEOUS EVERY 5 MIN PRN
Status: DISCONTINUED | OUTPATIENT
Start: 2022-06-15 | End: 2022-06-15 | Stop reason: HOSPADM

## 2022-06-15 RX ORDER — FENTANYL CITRATE 50 UG/ML
25 INJECTION, SOLUTION INTRAMUSCULAR; INTRAVENOUS EVERY 5 MIN PRN
Status: DISCONTINUED | OUTPATIENT
Start: 2022-06-15 | End: 2022-06-15 | Stop reason: HOSPADM

## 2022-06-15 RX ADMIN — PROPOFOL 30 MG: 10 INJECTION, EMULSION INTRAVENOUS at 07:06

## 2022-06-15 RX ADMIN — FENTANYL CITRATE 25 MCG: 50 INJECTION, SOLUTION INTRAMUSCULAR; INTRAVENOUS at 07:06

## 2022-06-15 RX ADMIN — LIDOCAINE HYDROCHLORIDE 25 MG: 20 INJECTION, SOLUTION INTRAVENOUS at 07:06

## 2022-06-15 RX ADMIN — PROPOFOL 10 MG: 10 INJECTION, EMULSION INTRAVENOUS at 07:06

## 2022-06-15 RX ADMIN — SODIUM CHLORIDE 2 G: 9 INJECTION, SOLUTION INTRAVENOUS at 07:06

## 2022-06-15 RX ADMIN — EPHEDRINE SULFATE 10 MG: 50 INJECTION INTRAVENOUS at 07:06

## 2022-06-15 RX ADMIN — ONDANSETRON 4 MG: 2 INJECTION INTRAMUSCULAR; INTRAVENOUS at 07:06

## 2022-06-15 RX ADMIN — SODIUM CHLORIDE, SODIUM LACTATE, POTASSIUM CHLORIDE, AND CALCIUM CHLORIDE: .6; .31; .03; .02 INJECTION, SOLUTION INTRAVENOUS at 06:06

## 2022-06-15 RX ADMIN — PROPOFOL 50 MCG/KG/MIN: 10 INJECTION, EMULSION INTRAVENOUS at 07:06

## 2022-06-15 NOTE — ANESTHESIA POSTPROCEDURE EVALUATION
Anesthesia Post Evaluation    Patient: Amor Alcazar    Procedure(s) Performed: Procedure(s) (LRB):  Biopsy-bone marrow (Bilateral)  INSERTION, PORT-A-CATH (N/A)    Final Anesthesia Type: general      Patient location during evaluation: PACU  Patient participation: Yes- Able to Participate  Level of consciousness: awake and alert and oriented  Post-procedure vital signs: reviewed and stable  Pain management: adequate  Airway patency: patent    PONV status at discharge: No PONV  Anesthetic complications: no      Cardiovascular status: blood pressure returned to baseline  Respiratory status: unassisted, spontaneous ventilation and room air  Hydration status: euvolemic  Follow-up not needed.          Vitals Value Taken Time   /74 06/15/22 0830   Temp  06/15/22 0833   Pulse 114 06/15/22 0830   Resp 17 06/15/22 0830   SpO2 100 % 06/15/22 0830         No case tracking events are documented in the log.      Pain/Michelle Score: Michelle Score: 10 (6/15/2022  8:30 AM)

## 2022-06-15 NOTE — PROCEDURES
BILATERAL BONE MARROW ASPIRATION AND BIOPSY    PREOP DIAGNOSIS:  1. UNSPECIFIED ANEMIA      2. PROSTATE CANCER    POSTOP DIAGNOSIS:  SAME    SURGEON:  WILLIAMS    SUMMARY:  Time-out was obtained in the room.  After obtaining informed consent, the patient was taken to the Ambulatory Surgical Care unit, placed under MAC, prepped and draped in the usual sterile fashion, and anesthetized with 1% xylocaine.  Jam Shidi needles were subsequently advanced into the left and then the right posterior, superior, iliac crest.  Aspirates, biopsies, and separate aliquot for flow, cytogenetic analysis, and FISH for MDS were obtained on the 1st passes.  The needles were withdrawn and the wounds dressed with 4x4s and Elastoplast applied as a pressure dressing.  The patient tolerated the procedure well.  There were no immediate complications.  Patient was subsequently repositioned so as to allow Dr. Rivero to proceed with implantation of a MediPort catheter.  Estimated blood loss is less than 20 cc.    This note was created using voice recognition software and may contain grammatical errors.

## 2022-06-15 NOTE — DISCHARGE INSTRUCTIONS
WOUND CARE    After Surgery    DO:  May shower in 48 hours.  Minimal activity for 48 hours.  May remove outer dressing in 48 hours. If steri-strips are present, leave them in place. If they get wet, pat them dry. Steri-strips will fall off on their own. Do not pull them off.  Advance diet as tolerated.  Resume home medications as prescribed.    DO NOT:  Do not soak in a tub or pool until directed by your physician.  Do not drive for 24 hours or while taking narcotic pain medication.  Do not take additional tylenol/acetaminophen while taking narcotic pain medication that contains tylenol/acetaminophen.     CALL PHYSICIAN FOR:  Redness, swelling or bleeding.  Fever greater than 101.  Drainage from the incision site that appears infected.  Persistent pain not relieved by pain medication.    RETURN APPOINTMENT: Call to schedule appointment in 10-14 days.            BONE MARROW ASPIRATION    DO:   Minimal activity and light meals for 24 hours.   Keep operative site clean and dry for 24 hours.   May remove dressing and shower in 24 hours.   May take Tylenol for discomfort.    Resume all home medications.    DO NOT:   Do not drive for 24 hours or while taking narcotic pain medication.     CALL PHYSICIAN FOR:   Bleeding or any signs of infection (redness, draining pus or warm to touch).   Fever greater than 101.   Persistent pain not relieved by pain medications.    FOLLOW-UP APPOINTMENT AS INSTRUCTED.    CALL [phone]  TO CONTACT DOCTOR

## 2022-06-15 NOTE — H&P
History of present illness:  The patient is an 87-year-old white gentleman previously followed by Dr. Hogan for iron deficiency anemia.  Patient has previously received Venofer.  Patient has some difficulties with chronic mild anemia in part related to underlying chronic kidney disease as well as prostate carcinoma for which patient sees Dr. Bellamy and is managed with Lupron/Earleada.  Patient has had a rising PSA despite ongoing therapy.  Dr. Bellamy subsequently obtain fluciclovine PET scan and has been referred back to our clinic for additional evaluation.  He has also been seen by our nurse practitioners in regard to recurrent difficulties with iron deficiency anemia.  Patient repeated a course of Venofer x5 doses without significant increment in his counts.  Patient denies difficulties with pain, weight loss, or urination.  He remains active but needs to pace himself for his daily chores.     Physical examination:  Well-developed, well-nourished, elderly, white gentleman, in no acute distress, who has a weight of 148.5 lb.  VITAL SIGNS: Documented  and reviewed this visit.  HEENT: Normocephalic, atraumatic. Oral mucosa pink and moist. Lips without lesions. Tongue midline. Oropharynx clear. Nonicteric sclerae.   NECK: Supple, no adenopathy. No carotid bruits, thyromegaly or thyroid nodule.   HEART: Regular rate and rhythm without murmur, gallop or rub.   LUNGS: Clear to auscultation bilaterally. Normal respiratory effort.   ABDOMEN: Soft, nontender, nondistended with positive normoactive bowel sounds, no hepatosplenomegaly.   EXTREMITIES: No cyanosis, clubbing or edema. Distal pulses are intact.   AXILLAE AND GROIN: No palpable pathologic lymphadenopathy is appreciated.   SKIN: Intact/turgor normal   NEUROLOGIC: Cranial nerves II-XII grossly intact. Motor: Good muscle bulk and tone. Strength/sensory 5/5 throughout. Gait stable.      Laboratory:  Most recent studies were obtained 04/19/2022.  Ferritin obtained  on the same date is elevated at 1782.  Sodium 140, potassium 4.5, chloride 104, CO2 25, BUN 17, creatinine 1.1, glucose 106, calcium 9.6, liver function test within normal limits, GFR greater than 60.  PSA 3.6 (1.8, 0.89, 0.39).    Most recent CBC was obtained today.  White count 8.2, hemoglobin 10.8, hematocrit 32.7, platelets 210, absolute neutrophil count 4600.     Fluciclovine PET scan:  Mediastinal blood pool max SUV: 3.7  L3 vertebral body bone marrow max SUV: 2.1  Head/neck:  No significant abnormal radiotracer accumulation is identified within the head and neck region.  No lymphadenopathy is present.  Chest:  There is an irregular focal zone of fibrosis in the right pulmonary apex which does not exhibit significantly increased radiotracer accumulation.  There is lymphadenopathy in the right paratracheal region of the mediastinum and in the subcarinal region of the mediastinum with abnormally increased radiotracer accumulation highly suspicious for metastatic disease.  A right paratracheal node on image 113 measures 2.0 x 1.3 cm with a max SUV of 4.2.  One of the larger subcarinal nodes on image 126 measures 1.8 x 1.6 cm with a max SUV of 3.8.  Abdomen/pelvis:  There is markedly increased radiotracer accumulation within the prostate gland with 2 distinct nodular masses occupying the majority of the gland.  The conglomerate size of these masses on image 275 is 3.8 x 3.0 cm with a max SUV of 11.2.  The findings are consistent with prostate cancer recurrence.  There are nonenlarged bilateral inguinal lymph nodes which do not exhibit significant increased radiotracer accumulation.   Skeleton:   No significant abnormal radiotracer accumulation is identified within the skeleton and there are no findings to suggest osseous metastatic disease.     Impression:  1. 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  2. Chronic anemia with previously documented depression and  iron stores-unresponsive to repeat course of Venofer.     Plan:  1. In regard to anemia, we will pursue bilateral bone marrow aspiration and biopsy with flow, cytogenetic analysis, and FISH panel for MDS.  2. Informed consent for bone marrow aspiration and biopsy was obtained during the course of today's office evaluation.  3. In regard to prostate carcinoma, patient's  Next line of therapy would consist of cytotoxic chemotherapy.  We discussed single agent Novantrone and single agent Taxotere as options.  4. Obtain additional lab to include CBC, CMP, LDH, and magnesium.  5. Obtain guardant 360 panel.  6. Obtain baseline echocardiogram to establish left ventricular ejection fraction.  7. Consult Dr. Rivero for assistance with implantation of a MediPort catheter.  8. Arrange to have port and bone marrow aspiration and biopsy performed at the same time in the Ambulatory Surgical Care unit.  9. Return to clinic to review results at earliest convenience.          This note was created using voice recognition software and may contain grammatical errors.        There have been no significant changes to the patient's history or physical examination since my evaluation as above.

## 2022-06-15 NOTE — TRANSFER OF CARE
"Anesthesia Transfer of Care Note    Patient: Amor Alcazar    Procedure(s) Performed: Procedure(s) (LRB):  Biopsy-bone marrow (Bilateral)  INSERTION, PORT-A-CATH (N/A)    Patient location: PACU    Anesthesia Type: MAC    Transport from OR: Transported from OR on room air with adequate spontaneous ventilation    Post pain: adequate analgesia    Post assessment: no apparent anesthetic complications    Post vital signs: stable    Level of consciousness: awake    Nausea/Vomiting: no nausea/vomiting    Complications: none    Transfer of care protocol was followed      Last vitals:   Visit Vitals  /86 (BP Location: Right arm, Patient Position: Lying)   Pulse 98   Temp 36.7 °C (98.1 °F) (Skin)   Resp 16   Ht 5' 10" (1.778 m)   Wt 67.1 kg (148 lb)   SpO2 99%   BMI 21.24 kg/m²     "

## 2022-06-15 NOTE — ANESTHESIA PREPROCEDURE EVALUATION
06/15/2022  Amor Alcazar is a 87 y.o., male.      Pre-op Assessment    I have reviewed the Patient Summary Reports.     I have reviewed the Nursing Notes. I have reviewed the NPO Status.   I have reviewed the Medications.     Review of Systems  Anesthesia Hx:  No problems with previous Anesthesia Denies Hx of Anesthetic complications    Social:  Non-Smoker    Cardiovascular:   Denies Hypertension.  Denies MI.  Denies CAD.    Denies CABG/stent.   Denies Angina.    Pulmonary:   Denies COPD.  Denies Asthma.  Denies Recent URI.    Renal/:   Denies Chronic Renal Disease.     Hepatic/GI:   Denies GERD. Denies Liver Disease.    Neurological:   Denies TIA. Denies CVA. Denies Seizures.    Endocrine:   Denies Diabetes. Denies Hypothyroidism.    Psych:   Denies Psychiatric History.          Physical Exam  General: Well nourished, Cooperative, Alert and Oriented    Airway:  Mallampati: II / II  Mouth Opening: Normal  TM Distance: 4 - 6 cm  Tongue: Normal    Dental:  Intact    Chest/Lungs:  Clear to auscultation, Normal Respiratory Rate    Heart:  Rate: Normal  Rhythm: Regular Rhythm  Sounds: Normal        Anesthesia Plan  Type of Anesthesia, risks & benefits discussed:    Anesthesia Type: MAC  Intra-op Monitoring Plan: Standard ASA Monitors  Induction:  IV  Informed Consent: Informed consent signed with the Patient and all parties understand the risks and agree with anesthesia plan.  All questions answered.   ASA Score: 3    Ready For Surgery From Anesthesia Perspective.     .

## 2022-06-15 NOTE — OP NOTE
Date of Procedure: 6/15/22    Staff surgeon: Dr Marques Rivero    Preoperative Diagnosis:  Prostate cancer    Postoperative Diagnosis: Same    Procedure: Placement of Left subclavian port a cath    Anesthesia: MAC    Indication for procedure:  86 yo M in need of port for chemotherapy.     Description of procedure:    Following signing of informed consent patient was taken to the operating room and placed in supine position.  SCDs were placed. Monitored anesthesia was administered without event.  Both arms were tucked and shoulder roll was placed. The patient was prepped and draped in standard fashion. An appropriate time-out procedure was then performed. Patient is placed in Trendelenburg. I obtained access to the Left subclavian vein with one percutaneous stick.  Guidewire was placed and position confirmed with fluoroscopy.  Next I make an incision incorporating the insertion site into my incision. I next took the tunneling sheath and dilator passed over the guidewire under fluoroscopic visualization. I removed the guidewire and dilator.  I next passed the catheter through the tunneling sheath and removed the tunneling sheath.  I used fluoroscopy to position the tip of the catheter such that the proximal tip is at the confluence of the subclavian vein. I then cut the distal tip of the catheter.  I secured the catheter to the port. I secure the port down to the chest wall with 2 Vicryl suture. Skin incision closed with 3 0 Vicryl and 4 0 Monocryl.  Patient was awakened taken recovery room stable condition there were no immediate complications blood loss 10 cc.  Port was aspirated and flushed easily.  POrt is ok to use if CxR is without complication.

## 2022-06-15 NOTE — BRIEF OP NOTE
[facility] - Surgery  Brief Operative Note    SUMMARY     Surgery Date: 6/15/2022     Surgeon(s) and Role:  Panel 1:     * Hermann Jackson MD - Primary  Panel 2:     * Marques Rivero MD - Primary    Assisting Surgeon: None    Pre-op Diagnosis:  Anemia, unspecified type [D64.9]  Encounter for insertion of venous access port [Z45.2]    Post-op Diagnosis:  Post-Op Diagnosis Codes:     * Anemia, unspecified type [D64.9]     * Encounter for insertion of venous access port [Z45.2]    Procedure(s) (LRB):  Biopsy-bone marrow (Bilateral)  INSERTION, PORT-A-CATH (N/A)    Anesthesia: General/MAC    Operative Findings: Normal venous anatomy    Estimated Blood Loss: 5 cc's    Estimated Blood Loss has been documented.         Specimens:   Specimen (24h ago, onward)             Start     Ordered    06/15/22 0722  Specimen to Pathology, Bone Marrow Aspiration/Biopsy  Once        Question Answer Comment   Specimen Source: Bone Marrow Aspirate, Right Iliac Crest    Clinical Information: anemia    Release to patient Immediate        06/15/22 0724    06/15/22 0722  Specimen to Pathology, Bone Marrow Aspiration/Biopsy  Once        Question Answer Comment   Specimen Source: Bone Marrow Aspirate, Left Iliac Crest    Clinical Information: anemia    Release to patient Immediate        06/15/22 0724                [id number]

## 2022-06-16 VITALS
TEMPERATURE: 98 F | RESPIRATION RATE: 15 BRPM | BODY MASS INDEX: 21.19 KG/M2 | WEIGHT: 148 LBS | SYSTOLIC BLOOD PRESSURE: 128 MMHG | OXYGEN SATURATION: 100 % | HEIGHT: 70 IN | HEART RATE: 110 BPM | DIASTOLIC BLOOD PRESSURE: 75 MMHG

## 2022-06-20 ENCOUNTER — TELEPHONE (OUTPATIENT)
Dept: HEMATOLOGY/ONCOLOGY | Facility: CLINIC | Age: 87
End: 2022-06-20
Payer: MEDICARE

## 2022-06-20 ENCOUNTER — TELEPHONE (OUTPATIENT)
Dept: FAMILY MEDICINE | Facility: CLINIC | Age: 87
End: 2022-06-20
Payer: MEDICARE

## 2022-06-20 LAB
BODY SITE - BONE MARROW: NORMAL
CLINICAL DIAGNOSIS - BONE MARROW: NORMAL
FLOW CYTOMETRY ANTIBODIES ANALYZED - BONE MARROW: NORMAL
FLOW CYTOMETRY COMMENT - BONE MARROW: NORMAL
FLOW CYTOMETRY INTERPRETATION - BONE MARROW: NORMAL

## 2022-06-20 NOTE — TELEPHONE ENCOUNTER
Spoke to patient's wife and advised advanced directive is on file for both patient and herself. Jessy to print and mail copies to home address on file. Verbalized understanding

## 2022-06-20 NOTE — TELEPHONE ENCOUNTER
Advised patient we do have one on file from 2014, asked if she had any changes that need to be updated since then and she advised it is the same. ----- Message from Mylene Vazquez, Patient Care Assistant sent at 6/20/2022 10:32 AM CDT -----  Type: Needs Medical Advice  Who Called:  colton  Best Call Back Number: [phone]    Additional Information: colton would like to speak to nurse about concerns about the ADVANCE  DIRECT PAPERWORK ( CARE GIVER CONSENT FORM )  and why it is not on file . And who does she bring paperwork to , please call to further discuss, with colton , thanks

## 2022-06-20 NOTE — TELEPHONE ENCOUNTER
----- Message from Peter Gaitan MA sent at 6/20/2022  8:28 AM CDT -----  Contact: PA PARK [[id number]]  Type: Needs Medical Advice    Who Called: PA PARK [[id number]]  Best Call Back Number: [phone]    Inquiry/Question: Would you kindly call PA PARK [[id number]] regarding paperwork concerns     Thank you~

## 2022-06-22 LAB
CHROM BANDING METHOD: NORMAL
CHROMOSOME ANALYSIS BM ADDITIONAL INFORMATION: NORMAL
CHROMOSOME ANALYSIS BM RELEASED BY: NORMAL
CHROMOSOME ANALYSIS BM RESULT SUMMARY: NORMAL
CLINICAL CYTOGENETICIST REVIEW: NORMAL
FINAL PATHOLOGIC DIAGNOSIS: NORMAL
GROSS: NORMAL
KARYOTYP MAR: NORMAL
Lab: NORMAL
MICROSCOPIC EXAM: NORMAL
REASON FOR REFERRAL (NARRATIVE): NORMAL
REF LAB TEST METHOD: NORMAL
SPECIMEN SOURCE: NORMAL
SPECIMEN: NORMAL
SUPPLEMENTAL DIAGNOSIS: NORMAL

## 2022-06-27 ENCOUNTER — TELEPHONE (OUTPATIENT)
Dept: HEMATOLOGY/ONCOLOGY | Facility: CLINIC | Age: 87
End: 2022-06-27

## 2022-06-27 ENCOUNTER — OFFICE VISIT (OUTPATIENT)
Dept: HEMATOLOGY/ONCOLOGY | Facility: CLINIC | Age: 87
End: 2022-06-27
Payer: MEDICARE

## 2022-06-27 VITALS
DIASTOLIC BLOOD PRESSURE: 86 MMHG | WEIGHT: 147.69 LBS | HEART RATE: 77 BPM | TEMPERATURE: 97 F | RESPIRATION RATE: 16 BRPM | BODY MASS INDEX: 21.14 KG/M2 | HEIGHT: 70 IN | SYSTOLIC BLOOD PRESSURE: 127 MMHG | OXYGEN SATURATION: 98 %

## 2022-06-27 DIAGNOSIS — C61 PROSTATE CANCER: Primary | ICD-10-CM

## 2022-06-27 DIAGNOSIS — N18.31 CHRONIC KIDNEY DISEASE, STAGE 3A: ICD-10-CM

## 2022-06-27 DIAGNOSIS — D64.9 ANEMIA, UNSPECIFIED TYPE: ICD-10-CM

## 2022-06-27 DIAGNOSIS — C77.1 METASTASIS TO MEDIASTINAL LYMPH NODE: ICD-10-CM

## 2022-06-27 PROCEDURE — 3079F DIAST BP 80-89 MM HG: CPT | Mod: CPTII,S$GLB,, | Performed by: INTERNAL MEDICINE

## 2022-06-27 PROCEDURE — 1126F PR PAIN SEVERITY QUANTIFIED, NO PAIN PRESENT: ICD-10-PCS | Mod: CPTII,S$GLB,, | Performed by: INTERNAL MEDICINE

## 2022-06-27 PROCEDURE — 1159F MED LIST DOCD IN RCRD: CPT | Mod: CPTII,S$GLB,, | Performed by: INTERNAL MEDICINE

## 2022-06-27 PROCEDURE — 1101F PR PT FALLS ASSESS DOC 0-1 FALLS W/OUT INJ PAST YR: ICD-10-PCS | Mod: CPTII,S$GLB,, | Performed by: INTERNAL MEDICINE

## 2022-06-27 PROCEDURE — 1160F RVW MEDS BY RX/DR IN RCRD: CPT | Mod: CPTII,S$GLB,, | Performed by: INTERNAL MEDICINE

## 2022-06-27 PROCEDURE — 3288F FALL RISK ASSESSMENT DOCD: CPT | Mod: CPTII,S$GLB,, | Performed by: INTERNAL MEDICINE

## 2022-06-27 PROCEDURE — 3074F PR MOST RECENT SYSTOLIC BLOOD PRESSURE < 130 MM HG: ICD-10-PCS | Mod: CPTII,S$GLB,, | Performed by: INTERNAL MEDICINE

## 2022-06-27 PROCEDURE — 1157F ADVNC CARE PLAN IN RCRD: CPT | Mod: CPTII,S$GLB,, | Performed by: INTERNAL MEDICINE

## 2022-06-27 PROCEDURE — 3074F SYST BP LT 130 MM HG: CPT | Mod: CPTII,S$GLB,, | Performed by: INTERNAL MEDICINE

## 2022-06-27 PROCEDURE — 1157F PR ADVANCE CARE PLAN OR EQUIV PRESENT IN MEDICAL RECORD: ICD-10-PCS | Mod: CPTII,S$GLB,, | Performed by: INTERNAL MEDICINE

## 2022-06-27 PROCEDURE — 1126F AMNT PAIN NOTED NONE PRSNT: CPT | Mod: CPTII,S$GLB,, | Performed by: INTERNAL MEDICINE

## 2022-06-27 PROCEDURE — 99999 PR PBB SHADOW E&M-EST. PATIENT-LVL IV: ICD-10-PCS | Mod: PBBFAC,,, | Performed by: INTERNAL MEDICINE

## 2022-06-27 PROCEDURE — 1160F PR REVIEW ALL MEDS BY PRESCRIBER/CLIN PHARMACIST DOCUMENTED: ICD-10-PCS | Mod: CPTII,S$GLB,, | Performed by: INTERNAL MEDICINE

## 2022-06-27 PROCEDURE — 3079F PR MOST RECENT DIASTOLIC BLOOD PRESSURE 80-89 MM HG: ICD-10-PCS | Mod: CPTII,S$GLB,, | Performed by: INTERNAL MEDICINE

## 2022-06-27 PROCEDURE — 3288F PR FALLS RISK ASSESSMENT DOCUMENTED: ICD-10-PCS | Mod: CPTII,S$GLB,, | Performed by: INTERNAL MEDICINE

## 2022-06-27 PROCEDURE — 1159F PR MEDICATION LIST DOCUMENTED IN MEDICAL RECORD: ICD-10-PCS | Mod: CPTII,S$GLB,, | Performed by: INTERNAL MEDICINE

## 2022-06-27 PROCEDURE — 99215 OFFICE O/P EST HI 40 MIN: CPT | Mod: S$GLB,,, | Performed by: INTERNAL MEDICINE

## 2022-06-27 PROCEDURE — 99215 PR OFFICE/OUTPT VISIT, EST, LEVL V, 40-54 MIN: ICD-10-PCS | Mod: S$GLB,,, | Performed by: INTERNAL MEDICINE

## 2022-06-27 PROCEDURE — 99999 PR PBB SHADOW E&M-EST. PATIENT-LVL IV: CPT | Mod: PBBFAC,,, | Performed by: INTERNAL MEDICINE

## 2022-06-27 PROCEDURE — 1101F PT FALLS ASSESS-DOCD LE1/YR: CPT | Mod: CPTII,S$GLB,, | Performed by: INTERNAL MEDICINE

## 2022-06-27 RX ORDER — PREDNISONE 5 MG/1
10 TABLET ORAL DAILY
Qty: 90 TABLET | Refills: 2 | Status: SHIPPED | OUTPATIENT
Start: 2022-07-11 | End: 2022-08-12 | Stop reason: SDUPTHER

## 2022-06-27 RX ORDER — HEPARIN 100 UNIT/ML
500 SYRINGE INTRAVENOUS
Status: CANCELLED | OUTPATIENT
Start: 2022-07-01

## 2022-06-27 RX ORDER — SODIUM CHLORIDE 0.9 % (FLUSH) 0.9 %
10 SYRINGE (ML) INJECTION
Status: CANCELLED | OUTPATIENT
Start: 2022-07-01

## 2022-06-27 RX ORDER — PROCHLORPERAZINE MALEATE 10 MG
10 TABLET ORAL EVERY 6 HOURS PRN
Qty: 60 TABLET | Refills: 6 | Status: SHIPPED | OUTPATIENT
Start: 2022-06-27 | End: 2023-02-23

## 2022-06-27 RX ORDER — LEUPROLIDE ACETATE 45 MG
KIT INTRAMUSCULAR
COMMUNITY
Start: 2022-04-20

## 2022-06-27 RX ORDER — ONDANSETRON 2 MG/ML
8 INJECTION INTRAMUSCULAR; INTRAVENOUS
Status: CANCELLED | OUTPATIENT
Start: 2022-07-01

## 2022-06-27 NOTE — PROGRESS NOTES
History of present illness:  The patient is an 87-year-old white gentleman previously followed by Dr. Hogan for iron deficiency anemia.  Patient has previously received Venofer.  Patient has some difficulties with chronic mild anemia in part related to underlying chronic kidney disease as well as prostate carcinoma for which patient sees Dr. Bellamy and is managed with Lupron/Earleada.  Patient has had a rising PSA despite ongoing therapy.  Dr. Bellamy subsequently obtain fluciclovine PET scan and has been referred back to our clinic for additional evaluation.  He has also been seen by our nurse practitioners in regard to recurrent difficulties with iron deficiency anemia.  Patient repeated a course of Venofer x5 doses without significant increment in his counts.  Patient denies difficulties with pain, weight loss, or urination.  He remains active but needs to pace himself for his daily chores.    Patient returns to clinic to review results of interval bone marrow aspiration and biopsy as well as to discuss long-term plan of care in regard to prostate carcinoma.  Patient has had MediPort catheter placed without difficulty.    Physical examination:  Well-developed, well-nourished, elderly, white gentleman, in no acute distress, who has a weight of 147.5 lb (decreased by 1 lb).  VITAL SIGNS: Documented  and reviewed this visit.  HEENT: Normocephalic, atraumatic. Oral mucosa pink and moist. Lips without lesions. Tongue midline. Oropharynx clear. Nonicteric sclerae.   NECK: Supple, no adenopathy. No carotid bruits, thyromegaly or thyroid nodule.   HEART: Regular rate and rhythm without murmur, gallop or rub.   LUNGS: Clear to auscultation bilaterally. Normal respiratory effort.   ABDOMEN: Soft, nontender, nondistended with positive normoactive bowel sounds, no hepatosplenomegaly.   EXTREMITIES: No cyanosis, clubbing or edema. Distal pulses are intact.   AXILLAE AND GROIN: No palpable pathologic lymphadenopathy is  appreciated.   SKIN: Intact/turgor normal.  MediPort catheter present within the left chest.  Wound well approximated, healed, and free from signs of infection.    NEUROLOGIC: Cranial nerves II-XII grossly intact. Motor: Good muscle bulk and tone. Strength/sensory 5/5 throughout. Gait stable.     Laboratory:  Studies obtained 05/20/2022.  White count 8.2, hemoglobin 10.8, hematocrit 32.7, platelets 210, absolute neutrophil count 4600.  Studies obtained 04/19/2022.  Sodium 140, potassium 4.5, chloride 104, CO2 25, BUN 17, creatinine 1.1, glucose 106, calcium 9.6, albumin 3.4, liver function tests are otherwise within acceptable limits, GFR is greater than 60.  TSH 3.469.  PSA 3.6.    Echocardiogram:  · The left ventricle is normal in size with normal systolic function.  · The estimated ejection fraction is 55-60%.  · Normal left ventricular diastolic function.  · Normal right ventricular size with normal right ventricular systolic function.  · There is mild aortic valve stenosis.  · Aortic valve area is 1.82 cm2; peak velocity is 1.48 m/s; mean gradient is 5 mmHg.  · Normal central venous pressure (3 mmHg).  · The estimated PA systolic pressure is 29 mmHg.  · The left ventricular global longitudinal strain is -18.32%.    Bone marrow aspiration and biopsy:  Bone marrow, bilateral iliac crests, aspirates, clots, and core biopsies:   --Normocellular marrow for age, 20-30%, with trilineage hematopoiesis, see   comment   --No increase in blasts   --Adequate megakaryocytes   --Moderately increased stainable iron   Comment:  Concomitantly submitted flow cytometric analysis detects no   diagnostic abnormal hematopoietic population.  Lymphocytes   show  populations of polyclonal B lymphocytes and T lymphocytes with no   diagnostic abnormal immunophenotype.  The blast gate is not increased.       Please also see cytogenetic karyotype results reported in Epic from [facility] ¿ [facility], [address]  "[facility], [place] [address], which give, in part, the following results:   "45,X,-Y[10]/46,XY[10]   Comment:  Of 20 metaphases, 10 were normal and 10 had loss of the Y   chromosome. In adult males, the absence of a Y chromosome without any other   abnormality in metaphases from bone marrow is likely age-related.     [place] [id number] Panel:  TP 53 mutation positive.  No evidence of MSI high.    Impression:  1. 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  2. Chronic disease physiology anemia.    Plan:  1. Begin Novantrone 12 milligram/meter squared every 21 days at earliest convenience.  2. Consultations for nurse navigation, chemotherapy teaching, and oncologic Psychology.  3. See me prior to cycle 2 with interval CBC, CMP, LDH, magnesium, PSA prior to appointment.  4. Continue Q 6 month Lupron.  Next dose would be due on July 13, 2022.  5. 40 minutes of contact time was spent counseling patient regarding the results of his interval testing, rationale for use of systemic, palliative chemotherapy for management of prostate carcinoma, antineoplastic/supportive care medications to be employed in therapy, aspects of their administration, potential side effects associated with therapy, interventions for such side effects, etcetera.  He voices understanding wishes to proceed as above.      This note was created using voice recognition software and may contain grammatical errors.                                             "

## 2022-06-27 NOTE — NURSING
Spoke with patient's wife to schedule chemo school. 1st date and time offered; pt's wife accepted. Date and time of June 30th at 1100 confirmed.       Oncology Navigation   Intake  Date of Diagnosis: 6/11/2014  Cancer Type:   Internal / External Referral: Internal  Referral Source: MD Josesito  Date of Referral: 5/10/2022  Initial Nurse Navigator Contact: 5/11/2022  Referral to Initial Contact Timeline (days): 1  Date Worked: 6/27/2022  First Appointment Available: 5/20/2022  Appointment Date: 5/20/2022  First Available Date vs. Scheduled Date (days): 0  Reason if booked > 7 days after scheduling: Specific provider / access     Treatment  Current Status: Active       Medical Oncologist: Dr. Jackson  Consult Date: 6/27/2022  Chemotherapy: Planned  Chemotherapy Regimen: mitaxantron/prednisone  Immunotherapy: N/A  Oral Therapy: N/A       Procedures: Biopsy; Port / PICC (bone marrow biopsy)  Biopsy Schedule Date: 6/15/2022  Port / PICC Schedule Date: 6/15/2022    General Referrals: Dietitian; Psychology; Social work  Dietitian Referral Date: 6/27/2022  Social Work Referral Date: 6/27/2022              Acuity  Treatment Tolerability: Has not started treatment yet/treatment fully completed and side effects resolved  Transportation: 0  Navigation Acuity: 0     Follow Up  No follow-ups on file.

## 2022-06-27 NOTE — PLAN OF CARE
START OFF PATHWAY REGIMEN - Prostate            Prednisone       Mitoxantrone (Novantrone)     **Always confirm dose/schedule in your pharmacy ordering system**    Patient Characteristics:  Adenocarcinoma, Recurrent/New Systemic Disease, Castration Resistant, M1, Prior   Novel Hormonal Agent, No Molecular Alteration or Targeted Therapy Exhausted, No   Prior Docetaxel  Histology: Adenocarcinoma  Therapeutic Status: Recurrent/New Systemic Disease    Intent of Therapy:  Non-Curative / Palliative Intent, Discussed with Patient

## 2022-06-27 NOTE — Clinical Note
Rtc for first cycle Novantrone prednisone. See me prior to c2 with cbc cmp ldh mg psa prior Consults nurse melecio, chemo school and onc psych

## 2022-06-28 LAB
CLINICAL CYTOGENETICIST REVIEW: NORMAL
FMDS SPECIMEN: NORMAL
MDS FISH ADDITIONAL INFORMATION: NORMAL
MDS FISH DISCLAIMER: NORMAL
MDS FISH REASON FOR REFERRAL (BM): NORMAL
MDS FISH RELEASED BY: NORMAL
MDS FISH RESULT (BM): NORMAL
MDS FISH RESULT SUMMARY: NORMAL
MDS FISH RESULT TABLE: NORMAL
REF LAB TEST METHOD: NORMAL
SPECIMEN SOURCE: NORMAL

## 2022-06-29 ENCOUNTER — TELEPHONE (OUTPATIENT)
Dept: INFUSION THERAPY | Facility: HOSPITAL | Age: 87
End: 2022-06-29
Payer: MEDICARE

## 2022-06-29 NOTE — TELEPHONE ENCOUNTER
[10:10 AM] YSABEL Alcazar [id number] needs to start tx    [10:16 AM] Yany Hsu  i can do 07/01/22 @2:30

## 2022-06-29 NOTE — PROGRESS NOTES
PATIENT: Amor Alcazar  MRN: [id number]  DATE: 6/30/2022      Diagnosis:   1. 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 Anemia, unspecified type    4. Other iron deficiency anemia    5. Chronic kidney disease, stage 3a      Chief Complaint: Patient Education (class)    Subjective:   HPI: Mr. Alcazar is a 87 y.o. male with hypothyroid, arthritis, iron deficiency anemia, who recently established care with  who is seen today for education prior to starting treatment with Mitoxantrone/Prednisone. He is accompanied by his wife.    Patient had port placed and the site is healing well. Has not picked up prescriptions for Prednisone, Compazine, or Dukes MW yet.   He remains active and feels well. Denies HA, CP, SOB, abd pain, N/V, changes in bowel habits, dysuria, swelling, new lumps or bumps, fevers, chills.     Prior History:   Anemia: Mr. Alcazar was previously seen by Dr. Hogan for macrocytic anemia with the last clinic visit in September 2020. Folic acid supplement was added at that time. Macrocytosis had resolved and Hgb remained stable in the 11-12 g/dL range. Mr. Alcazar has been following with his PCP since that time.  2/2022: Diagnosed with hypothyroid and started on Levothyroxine   3/2022: Patient's Hgb is 10.8 g/dL, normocytic.  3/9/22: Stool cards negative   3/17-4/14/22: Venofer 200 mg IV x 5 doses   6/15/22: BMBX -Normocellular marrow for age, 20-30%, with trilineage hematopoiesis -No increase in blasts -Adequate megakaryocytes -Moderately increased stainable iron Comment: Concomitantly submitted flow cytometric analysis detects no diagnostic abnormal hematopoietic population.  Lymphocytes show  populations of polyclonal B lymphocytes and T lymphocytes with no diagnostic abnormal immunophenotype.  The blast gate is not increased.    Prostate cancer was followed by urology and was undergoing treatment with po Erleada and Q 6 month Eligard injections.   5/2022: Rising PSA led to PET  "imaging which revealed "Increased radiotracer accumulation within enlarged mediastinal lymph nodes highly suspicious for metastatic disease. Markedly hypermetabolic prostate gland which is almost completely replaced by tumor consistent with prostate cancer recurrence."  2022: ECHO with EF 55-60%  22: [facility] Panel: TP 53 mutation positive. No evidence of MSI high.     Past Medical History:   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 TR  ECHO       Past Surgical HIstory:   Past Surgical History:   Procedure Laterality Date    BONE MARROW BIOPSY Bilateral 6/15/2022    Procedure: Biopsy-bone marrow;  Surgeon: Hermann Jackson MD;  [facility]: [facility] OR;  Service: Oncology;  Laterality: Bilateral;    CATARACT EXTRACTION W/  INTRAOCULAR LENS IMPLANT Bilateral     ESOPHAGOGASTRODUODENOSCOPY      INSERTION OF TUNNELED CENTRAL VENOUS CATHETER (CVC) WITH SUBCUTANEOUS PORT N/A 6/15/2022    Procedure: INSERTION, PORT-A-CATH;  Surgeon: Marques Rivero MD;  Location: [facility] OR;  Service: General;  Laterality: N/A;       Family History:   Family History   Problem Relation Age of Onset    Lung cancer Sister          of       Social History:  reports that he has never smoked. He has never used smokeless tobacco. He reports previous alcohol use. He reports that he does not use drugs.    Allergies:  Review of patient's allergies indicates:  No Known Allergies    Medications:  Current Outpatient Medications   Medication Sig Dispense Refill    folic acid (FOLVITE) 400 MCG tablet Take 400 mcg by mouth once daily.      levothyroxine (SYNTHROID) 75 MCG tablet Take 1 tablet (75 mcg total) by mouth before breakfast. 90 tablet 3    levothyroxine (SYNTHROID) 75 MCG tablet Take 1 tablet (75 mcg total) by mouth before breakfast. 90 " tablet 3    LUPRON DEPOT, 6 MONTH, 45 mg SyKt injection       multivit-min-FA-lycopen-lutein (CENTRUM SILVER MEN) 300-600-300 mcg Tab Take by mouth once daily.      duke's soln (benadryl 30 mL, mylanta 30 mL, LIDOcaine 30 mL, nystatin 30 mL) 120mL Take 10 mLs by mouth 4 (four) times daily. (Patient not taking: Reported on 6/30/2022) 120 mL 6    HYDROcodone-acetaminophen (NORCO) 5-325 mg per tablet Take 1 tablet by mouth every 6 (six) hours as needed for Pain. (Patient not taking: No sig reported) 20 tablet 0    LIDOcaine-prilocaine (EMLA) cream Apply topically as needed (prior to port access). 30 g 2    [START ON 7/11/2022] predniSONE (DELTASONE) 5 MG tablet Take 2 tablets (10 mg total) by mouth once daily. (Patient not taking: Reported on 6/30/2022) 90 tablet 2    prochlorperazine (COMPAZINE) 10 MG tablet Take 1 tablet (10 mg total) by mouth every 6 (six) hours as needed (nausea and vomiting). (Patient not taking: Reported on 6/30/2022) 60 tablet 6    tretinoin (RETIN-A) 0.05 % cream apply TO right side of face AT BEDTIME       No current facility-administered medications for this visit.       Review of Systems   Constitutional: Negative for activity change, appetite change, chills, fever and unexpected weight change.   HENT: Negative for trouble swallowing.    Respiratory: Negative for cough and shortness of breath.    Cardiovascular: Negative for chest pain and leg swelling.   Gastrointestinal: Negative for abdominal pain, constipation, diarrhea and nausea.   Genitourinary: Negative for dysuria.   Musculoskeletal: Negative for arthralgias.   Skin: Negative for rash.   Neurological: Negative for headaches.   Hematological: Negative for adenopathy. Does not bruise/bleed easily.   Psychiatric/Behavioral: The patient is not nervous/anxious.        ECOG Performance Status:   ECOG SCORE    0 - Fully active-able to carry on all pre-disease performance without restriction         Objective:      Vitals:   Vitals:     06/30/22 1108   BP: 108/68   BP Location: Left arm   Patient Position: Sitting   BP Method: Medium (Automatic)   Pulse: 96   Resp: 18   Temp: 96.4 °F (35.8 °C)   TempSrc: Temporal   SpO2: 99%   Weight: 67.3 kg (148 lb 5.9 oz)     BMI: Body mass index is 21.29 kg/m².    Physical Exam  Vitals reviewed.   Constitutional:       General: He is not in acute distress.     Appearance: He is not diaphoretic.   HENT:      Head: Normocephalic and atraumatic.   Eyes:      General: No scleral icterus.        Right eye: No discharge.         Left eye: No discharge.   Pulmonary:      Effort: Pulmonary effort is normal. No respiratory distress.   Abdominal:      General: Abdomen is flat.   Musculoskeletal:      Right lower leg: No edema.      Left lower leg: No edema.   Skin:     General: Skin is dry.      Coloration: Skin is not jaundiced.      Findings: No bruising or rash.   Neurological:      Mental Status: He is alert and oriented to person, place, and time.      Gait: Gait normal.   Psychiatric:         Mood and Affect: Mood normal.         Behavior: Behavior normal.         Laboratory Data:  Lab Results   Component Value Date    WBC 8.16 05/20/2022    HGB 10.8 (L) 05/20/2022    HCT 32.7 (L) 05/20/2022    MCV 90 05/20/2022     05/20/2022      Assessment:       1. 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 Anemia, unspecified type    4. Other iron deficiency anemia    5. 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 Jackson on 7/21/22    Education  -Treatment plan of Mitoxantrone 12mg/m2 IV every 21 days and Prednisone 10 mg po daily discussed with patient and spouse.  -Handouts provided from [employer] on  chemotherapy agents.    -Discussed the mechanism of action, potential side effects of this treatment as well as ways to manage them at home. Some of these side effects include but not limited to fever, nausea, vomiting, decreased appetite, fatigue, weakness, cytopenias, myalgia/arthralgia, constipation, diarrhea, bleeding, headache, nail changes, taste change, hair thinning/loss, peripheral neuropathy, kidney toxicity, or ototoxicity.   -Dietary modifications discussed.  Detail instructions to be provided by dietician.   -Chemotherapy Binder supplied with contact information.   -Discussed follow-up with the physician for toxicity monitoring throughout treatment.    -No refill requests at this time. Prescription for EMLA cream sent to pharmacy. I reviewed instructions for EMLA cream, Prednisone, Dukes MW, and Compazine with patient and wife.  -The patient and spouse verbalized understanding. All questions were answered and an informed consent was obtained.  -Chemo  declined, do not wish to participate    Anemia  -Anemia of chronic disease  -Found to be iron deficient per labs 3/7/22; received Venofer 200 mg weekly x 5 doses from 3/7-4/14  -Stool cards 3/9/22 negative for OB  -Hgb stable at 10.8 g/dL 5/20/22  -Will continue to monitor     CKD  -Stage 3 chronic kidney disease  -Followed by PCP   -Will monitor           Patient queried and all questions were answered.                                                    Route Chart for Scheduling    Med Onc Chart Routing      Follow up with physician Other. Keep appointment for treatment 7/1/22; follow up with Dr. Jackson on 7/22 as planned    Follow up with [facility]    Infusion scheduling note    Injection scheduling note    Labs Other   Lab interval:  Labs on 7/21 as scheduled    Imaging    Pharmacy appointment    Other referrals          Treatment Plan Information   OP MITOXANTRONE PREDNISONE Q3W   Hermann Jackson MD   Upcoming Treatment Dates - OP MITOXANTRONE  PREDNISONE Q3W    7/1/2022       Chemotherapy       mitoXANTRONE (NOVANTRONE) 12 mg/m2 = 22 mg in sodium chloride 0.9% 50 mL chemo infusion       Antiemetics       ondansetron injection 8 mg  7/22/2022       Chemotherapy       mitoXANTRONE (NOVANTRONE) 12 mg/m2 = 22 mg in sodium chloride 0.9% 50 mL chemo infusion       Antiemetics       ondansetron injection 8 mg  8/12/2022       Chemotherapy       mitoXANTRONE (NOVANTRONE) 12 mg/m2 = 22 mg in sodium chloride 0.9% 50 mL chemo infusion       Antiemetics       ondansetron injection 8 mg  9/2/2022       Chemotherapy       mitoXANTRONE (NOVANTRONE) 12 mg/m2 = 22 mg in sodium chloride 0.9% 50 mL chemo infusion       Antiemetics       ondansetron injection 8 mg    Therapy Plan Information  VENOFER (IRON SUCROSE) QW  4. Pre-Medications  famotidine (PF) injection 20 mg  20 mg, Intravenous, Every visit  dexamethasone injection 4 mg  4 mg, Intravenous, Every visit  diphenhydrAMINE capsule 25 mg  25 mg, Oral, Every visit  Medications  iron sucrose (VENOFER) 200 mg in sodium chloride 0.9% 100 mL IVPB  200 mg, Intravenous, 1 time a week  Flushes  sodium chloride 0.9% 250 mL flush bag  Intravenous, 1 time a week  sodium chloride 0.9% flush 10 mL  10 mL, Intravenous, 1 time a week  heparin, porcine (PF) 100 unit/mL injection flush 500 Units  500 Units, Intravenous, 1 time a week  alteplase injection 2 mg  2 mg, Intra-Catheter, 1 time a week  PRN Medications  EPINEPHrine (EPIPEN) 0.3 mg/0.3 mL pen injection 0.3 mg  0.3 mg, Intramuscular, PRN  diphenhydrAMINE injection 50 mg  50 mg, Intravenous, PRN  methylPREDNISolone sodium succinate injection 125 mg  125 mg, Intravenous, PRN  sodium chloride 0.9% bolus 1,000 mL  1,000 mL, Intravenous, PRN    INF LEUPROLIDE (LUPRON) PROSTATE 6 MONTH  Medications  leuprolide acetate (6 month) injection 45 mg  45 mg, Intramuscular, Every 24 weeks

## 2022-06-30 ENCOUNTER — CLINICAL SUPPORT (OUTPATIENT)
Dept: HEMATOLOGY/ONCOLOGY | Facility: CLINIC | Age: 87
End: 2022-06-30
Payer: MEDICARE

## 2022-06-30 ENCOUNTER — TELEPHONE (OUTPATIENT)
Dept: HEMATOLOGY/ONCOLOGY | Facility: CLINIC | Age: 87
End: 2022-06-30
Payer: MEDICARE

## 2022-06-30 ENCOUNTER — DOCUMENTATION ONLY (OUTPATIENT)
Dept: INFUSION THERAPY | Facility: HOSPITAL | Age: 87
End: 2022-06-30
Payer: MEDICARE

## 2022-06-30 VITALS
OXYGEN SATURATION: 99 % | WEIGHT: 148.38 LBS | TEMPERATURE: 96 F | DIASTOLIC BLOOD PRESSURE: 68 MMHG | RESPIRATION RATE: 18 BRPM | BODY MASS INDEX: 21.29 KG/M2 | HEART RATE: 96 BPM | SYSTOLIC BLOOD PRESSURE: 108 MMHG

## 2022-06-30 DIAGNOSIS — D64.9 ANEMIA, UNSPECIFIED TYPE: ICD-10-CM

## 2022-06-30 DIAGNOSIS — N18.31 CHRONIC KIDNEY DISEASE, STAGE 3A: ICD-10-CM

## 2022-06-30 DIAGNOSIS — C77.1 METASTASIS TO MEDIASTINAL LYMPH NODE: ICD-10-CM

## 2022-06-30 DIAGNOSIS — C61 PROSTATE CANCER: Primary | ICD-10-CM

## 2022-06-30 DIAGNOSIS — D50.8 OTHER IRON DEFICIENCY ANEMIA: ICD-10-CM

## 2022-06-30 PROCEDURE — 99215 OFFICE O/P EST HI 40 MIN: CPT | Mod: S$GLB,,,

## 2022-06-30 PROCEDURE — 99499 UNLISTED E&M SERVICE: CPT | Mod: S$GLB,,,

## 2022-06-30 PROCEDURE — 99999 PR PBB SHADOW E&M-EST. PATIENT-LVL V: ICD-10-PCS | Mod: PBBFAC,,,

## 2022-06-30 PROCEDURE — 99999 PR PBB SHADOW E&M-EST. PATIENT-LVL V: CPT | Mod: PBBFAC,,,

## 2022-06-30 PROCEDURE — 99417 PROLNG OP E/M EACH 15 MIN: CPT | Mod: S$GLB,,,

## 2022-06-30 PROCEDURE — 99417 PR PROLONGED SVC, OUTPT, W/WO DIRECT PT CONTACT,  EA ADDTL 15 MIN: ICD-10-PCS | Mod: S$GLB,,,

## 2022-06-30 PROCEDURE — 99215 PR OFFICE/OUTPT VISIT, EST, LEVL V, 40-54 MIN: ICD-10-PCS | Mod: S$GLB,,,

## 2022-06-30 PROCEDURE — 99499 RISK ADDL DX/OHS AUDIT: ICD-10-PCS | Mod: S$GLB,,,

## 2022-06-30 RX ORDER — LIDOCAINE AND PRILOCAINE 25; 25 MG/G; MG/G
CREAM TOPICAL
Qty: 30 G | Refills: 2 | Status: SHIPPED | OUTPATIENT
Start: 2022-06-30 | End: 2023-02-23

## 2022-06-30 NOTE — TELEPHONE ENCOUNTER
Per Juju Gore, pt to start his Prednisone tomorrow.  Called pt and given Juju's recommendation, pt verbalized understanding.

## 2022-06-30 NOTE — PROGRESS NOTES
Oncology Nutrition   New Patient Education  Amor Alcazar   1935    Nutrition Education   This is a 87 y.o.male with a medical diagnosis of prostate.     Met w/ pt to discuss current nutritional status and nutrition as it relates to cancer and cancer treatment. Pt currently mild nutrition risk. Pt's spouse, Polina, present during education. Pt has been on oral chemotherapy for 4 years. Begins infusion chemotherapy tomorrow. Provided pt w/ Boost coupons and a port pillow. Pt and wife very pleasant to speak with.     Wt Readings from Last 10 Encounters:   06/30/22 67.3 kg (148 lb 5.9 oz)   06/27/22 67 kg (147 lb 11.3 oz)   06/15/22 67.1 kg (148 lb)   06/02/22 66.8 kg (147 lb 4.3 oz)   05/25/22 67.1 kg (148 lb)   05/20/22 67.5 kg (148 lb 13 oz)   04/21/22 68.2 kg (150 lb 5.7 oz)   04/20/22 68.4 kg (150 lb 12.7 oz)   04/14/22 68.4 kg (150 lb 12.7 oz)   03/31/22 68 kg (149 lb 14.6 oz)      [x] PMHx reviewed  [x] Labs reviewed    Educated on food safety and common nutrition impact symptoms associated with chemotherapy treatment. Reinforced the importance of good hydration. Handouts provided.    Answered all nutrition related questions.     Patient provided with dietitian contact number and advised to call with questions or make future appointment if further intervention is needed. RD to follow throughout tx prn.    Margaret Knutson RDN, LDN  06/30/2022  1:42 PM

## 2022-06-30 NOTE — TELEPHONE ENCOUNTER
Pt calling for clarification on prednisone prescription.  Will send question to Juju Gore NP and let pt know answer to his question.----- Message from Mylene Vazquez, Patient Care Assistant sent at 6/30/2022  3:56 PM CDT -----  Type: Needs Medical Advice  Who Called:  ruby  Smooth Call Back Number: [phone]    Additional Information: patient has questions please call to speak with, thanks

## 2022-07-01 ENCOUNTER — DOCUMENTATION ONLY (OUTPATIENT)
Dept: INFUSION THERAPY | Facility: HOSPITAL | Age: 87
End: 2022-07-01
Payer: MEDICARE

## 2022-07-01 ENCOUNTER — PES CALL (OUTPATIENT)
Dept: ADMINISTRATIVE | Facility: CLINIC | Age: 87
End: 2022-07-01
Payer: MEDICARE

## 2022-07-01 ENCOUNTER — INFUSION (OUTPATIENT)
Dept: INFUSION THERAPY | Facility: HOSPITAL | Age: 87
End: 2022-07-01
Payer: MEDICARE

## 2022-07-01 VITALS
WEIGHT: 149.94 LBS | TEMPERATURE: 98 F | HEART RATE: 53 BPM | BODY MASS INDEX: 21.51 KG/M2 | RESPIRATION RATE: 18 BRPM | SYSTOLIC BLOOD PRESSURE: 116 MMHG | DIASTOLIC BLOOD PRESSURE: 68 MMHG

## 2022-07-01 DIAGNOSIS — C61 PROSTATE CANCER: Primary | ICD-10-CM

## 2022-07-01 PROCEDURE — 25000003 PHARM REV CODE 250: Mod: PN

## 2022-07-01 PROCEDURE — 63600175 PHARM REV CODE 636 W HCPCS: Mod: PN | Performed by: INTERNAL MEDICINE

## 2022-07-01 PROCEDURE — 25000003 PHARM REV CODE 250: Mod: PN | Performed by: INTERNAL MEDICINE

## 2022-07-01 PROCEDURE — 96413 CHEMO IV INFUSION 1 HR: CPT | Mod: PN

## 2022-07-01 PROCEDURE — 96375 TX/PRO/DX INJ NEW DRUG ADDON: CPT | Mod: PN

## 2022-07-01 RX ORDER — SODIUM CHLORIDE 0.9 % (FLUSH) 0.9 %
10 SYRINGE (ML) INJECTION
Status: DISCONTINUED | OUTPATIENT
Start: 2022-07-01 | End: 2022-07-01 | Stop reason: HOSPADM

## 2022-07-01 RX ORDER — HEPARIN 100 UNIT/ML
500 SYRINGE INTRAVENOUS
Status: DISCONTINUED | OUTPATIENT
Start: 2022-07-01 | End: 2022-07-01 | Stop reason: HOSPADM

## 2022-07-01 RX ORDER — ONDANSETRON 2 MG/ML
8 INJECTION INTRAMUSCULAR; INTRAVENOUS
Status: COMPLETED | OUTPATIENT
Start: 2022-07-01 | End: 2022-07-01

## 2022-07-01 RX ADMIN — SODIUM CHLORIDE: 0.9 INJECTION, SOLUTION INTRAVENOUS at 02:07

## 2022-07-01 RX ADMIN — ONDANSETRON 8 MG: 2 INJECTION INTRAMUSCULAR; INTRAVENOUS at 02:07

## 2022-07-01 RX ADMIN — MITOXANTRONE 22 MG: 2 INJECTION, SOLUTION, CONCENTRATE INTRAVENOUS at 03:07

## 2022-07-01 NOTE — PROGRESS NOTES
Oncology Nutrition   Chemotherapy Infusion Visit    Nutrition Follow Up   RD brought SW, Hamida Weiner, to meet pt. RD said jeffery. Pt and spouse, Polina, very pleasant to speak with.     Wt Readings from Last 10 Encounters:   07/01/22 68 kg (149 lb 14.6 oz)   06/30/22 67.3 kg (148 lb 5.9 oz)   06/27/22 67 kg (147 lb 11.3 oz)   06/15/22 67.1 kg (148 lb)   06/02/22 66.8 kg (147 lb 4.3 oz)   05/25/22 67.1 kg (148 lb)   05/20/22 67.5 kg (148 lb 13 oz)   04/21/22 68.2 kg (150 lb 5.7 oz)   04/20/22 68.4 kg (150 lb 12.7 oz)   04/14/22 68.4 kg (150 lb 12.7 oz)       All other nutrition questions/concerns addressed as appropriate. Will continue to monitor prn throughout treatment.     Margaret Knutson RDN, LDN  07/01/2022  3:13 PM

## 2022-07-01 NOTE — PLAN OF CARE
Tolerated infusion without difficulty. Explained some  of the chemo highlights. When complete patient stated he feels so much more comfortable with one under his belt.

## 2022-07-06 ENCOUNTER — TELEPHONE (OUTPATIENT)
Dept: UROLOGY | Facility: CLINIC | Age: 87
End: 2022-07-06
Payer: MEDICARE

## 2022-07-06 DIAGNOSIS — C61 PROSTATE CANCER: Primary | ICD-10-CM

## 2022-07-06 NOTE — TELEPHONE ENCOUNTER
----- Message from Hamida Flowers sent at 7/6/2022 10:11 AM CDT -----  Contact: pt  Type: Needs Appt  Who Called: Wife  Best Call Back Number: [phone]    Inquiry/Question: Wife states pt needs an appt for  6 month injection in October.        Thank you~

## 2022-07-06 NOTE — TELEPHONE ENCOUNTER
Spoke with patient  Wife, appt scheduled for 10/6/2022 @ 930 , patient wife expressed understanding.

## 2022-07-22 ENCOUNTER — OFFICE VISIT (OUTPATIENT)
Dept: HEMATOLOGY/ONCOLOGY | Facility: CLINIC | Age: 87
End: 2022-07-22
Payer: MEDICARE

## 2022-07-22 ENCOUNTER — DOCUMENTATION ONLY (OUTPATIENT)
Dept: INFUSION THERAPY | Facility: HOSPITAL | Age: 87
End: 2022-07-22
Payer: MEDICARE

## 2022-07-22 ENCOUNTER — TELEPHONE (OUTPATIENT)
Dept: HEMATOLOGY/ONCOLOGY | Facility: CLINIC | Age: 87
End: 2022-07-22

## 2022-07-22 ENCOUNTER — INFUSION (OUTPATIENT)
Dept: INFUSION THERAPY | Facility: HOSPITAL | Age: 87
End: 2022-07-22
Payer: MEDICARE

## 2022-07-22 VITALS
DIASTOLIC BLOOD PRESSURE: 52 MMHG | HEIGHT: 70 IN | SYSTOLIC BLOOD PRESSURE: 107 MMHG | TEMPERATURE: 98 F | BODY MASS INDEX: 21.53 KG/M2 | HEART RATE: 70 BPM | RESPIRATION RATE: 16 BRPM | WEIGHT: 150.38 LBS

## 2022-07-22 VITALS
BODY MASS INDEX: 21.55 KG/M2 | DIASTOLIC BLOOD PRESSURE: 62 MMHG | SYSTOLIC BLOOD PRESSURE: 105 MMHG | OXYGEN SATURATION: 94 % | HEIGHT: 70 IN | HEART RATE: 80 BPM | WEIGHT: 150.56 LBS | TEMPERATURE: 97 F | RESPIRATION RATE: 16 BRPM

## 2022-07-22 DIAGNOSIS — D64.9 ANEMIA, UNSPECIFIED TYPE: ICD-10-CM

## 2022-07-22 DIAGNOSIS — C77.1 METASTASIS TO MEDIASTINAL LYMPH NODE: ICD-10-CM

## 2022-07-22 DIAGNOSIS — C61 PROSTATE CANCER: Primary | ICD-10-CM

## 2022-07-22 DIAGNOSIS — R00.9 ABNORMAL HEART RATE: Primary | ICD-10-CM

## 2022-07-22 PROCEDURE — 3074F PR MOST RECENT SYSTOLIC BLOOD PRESSURE < 130 MM HG: ICD-10-PCS | Mod: CPTII,S$GLB,, | Performed by: INTERNAL MEDICINE

## 2022-07-22 PROCEDURE — 1157F PR ADVANCE CARE PLAN OR EQUIV PRESENT IN MEDICAL RECORD: ICD-10-PCS | Mod: CPTII,S$GLB,, | Performed by: INTERNAL MEDICINE

## 2022-07-22 PROCEDURE — 25000003 PHARM REV CODE 250: Mod: PN | Performed by: INTERNAL MEDICINE

## 2022-07-22 PROCEDURE — 3078F DIAST BP <80 MM HG: CPT | Mod: CPTII,S$GLB,, | Performed by: INTERNAL MEDICINE

## 2022-07-22 PROCEDURE — 99999 PR PBB SHADOW E&M-EST. PATIENT-LVL IV: CPT | Mod: PBBFAC,,, | Performed by: INTERNAL MEDICINE

## 2022-07-22 PROCEDURE — 99214 PR OFFICE/OUTPT VISIT, EST, LEVL IV, 30-39 MIN: ICD-10-PCS | Mod: S$GLB,,, | Performed by: INTERNAL MEDICINE

## 2022-07-22 PROCEDURE — 96413 CHEMO IV INFUSION 1 HR: CPT | Mod: PN

## 2022-07-22 PROCEDURE — 63600175 PHARM REV CODE 636 W HCPCS: Mod: PN | Performed by: INTERNAL MEDICINE

## 2022-07-22 PROCEDURE — 3078F PR MOST RECENT DIASTOLIC BLOOD PRESSURE < 80 MM HG: ICD-10-PCS | Mod: CPTII,S$GLB,, | Performed by: INTERNAL MEDICINE

## 2022-07-22 PROCEDURE — 1160F RVW MEDS BY RX/DR IN RCRD: CPT | Mod: CPTII,S$GLB,, | Performed by: INTERNAL MEDICINE

## 2022-07-22 PROCEDURE — 1126F PR PAIN SEVERITY QUANTIFIED, NO PAIN PRESENT: ICD-10-PCS | Mod: CPTII,S$GLB,, | Performed by: INTERNAL MEDICINE

## 2022-07-22 PROCEDURE — 96375 TX/PRO/DX INJ NEW DRUG ADDON: CPT | Mod: PN

## 2022-07-22 PROCEDURE — 99999 PR PBB SHADOW E&M-EST. PATIENT-LVL IV: ICD-10-PCS | Mod: PBBFAC,,, | Performed by: INTERNAL MEDICINE

## 2022-07-22 PROCEDURE — 1126F AMNT PAIN NOTED NONE PRSNT: CPT | Mod: CPTII,S$GLB,, | Performed by: INTERNAL MEDICINE

## 2022-07-22 PROCEDURE — 1159F MED LIST DOCD IN RCRD: CPT | Mod: CPTII,S$GLB,, | Performed by: INTERNAL MEDICINE

## 2022-07-22 PROCEDURE — 1160F PR REVIEW ALL MEDS BY PRESCRIBER/CLIN PHARMACIST DOCUMENTED: ICD-10-PCS | Mod: CPTII,S$GLB,, | Performed by: INTERNAL MEDICINE

## 2022-07-22 PROCEDURE — 1157F ADVNC CARE PLAN IN RCRD: CPT | Mod: CPTII,S$GLB,, | Performed by: INTERNAL MEDICINE

## 2022-07-22 PROCEDURE — 99214 OFFICE O/P EST MOD 30 MIN: CPT | Mod: S$GLB,,, | Performed by: INTERNAL MEDICINE

## 2022-07-22 PROCEDURE — 3074F SYST BP LT 130 MM HG: CPT | Mod: CPTII,S$GLB,, | Performed by: INTERNAL MEDICINE

## 2022-07-22 PROCEDURE — 1159F PR MEDICATION LIST DOCUMENTED IN MEDICAL RECORD: ICD-10-PCS | Mod: CPTII,S$GLB,, | Performed by: INTERNAL MEDICINE

## 2022-07-22 RX ORDER — ONDANSETRON 2 MG/ML
8 INJECTION INTRAMUSCULAR; INTRAVENOUS
Status: CANCELLED | OUTPATIENT
Start: 2022-07-22

## 2022-07-22 RX ORDER — ONDANSETRON 2 MG/ML
8 INJECTION INTRAMUSCULAR; INTRAVENOUS
Status: COMPLETED | OUTPATIENT
Start: 2022-07-22 | End: 2022-07-22

## 2022-07-22 RX ORDER — SODIUM CHLORIDE 0.9 % (FLUSH) 0.9 %
10 SYRINGE (ML) INJECTION
Status: DISCONTINUED | OUTPATIENT
Start: 2022-07-22 | End: 2022-07-22 | Stop reason: HOSPADM

## 2022-07-22 RX ORDER — SODIUM CHLORIDE 0.9 % (FLUSH) 0.9 %
10 SYRINGE (ML) INJECTION
Status: CANCELLED | OUTPATIENT
Start: 2022-07-22

## 2022-07-22 RX ORDER — PREDNISONE 5 MG/1
10 TABLET ORAL DAILY
Qty: 90 TABLET | Refills: 2
Start: 2022-07-22 | End: 2022-08-09 | Stop reason: SDUPTHER

## 2022-07-22 RX ORDER — HEPARIN 100 UNIT/ML
500 SYRINGE INTRAVENOUS
Status: CANCELLED | OUTPATIENT
Start: 2022-07-22

## 2022-07-22 RX ORDER — HEPARIN 100 UNIT/ML
500 SYRINGE INTRAVENOUS
Status: DISCONTINUED | OUTPATIENT
Start: 2022-07-22 | End: 2022-07-22 | Stop reason: HOSPADM

## 2022-07-22 RX ADMIN — MITOXANTRONE 22 MG: 2 INJECTION, SOLUTION, CONCENTRATE INTRAVENOUS at 11:07

## 2022-07-22 RX ADMIN — Medication 500 UNITS: at 12:07

## 2022-07-22 RX ADMIN — ONDANSETRON 8 MG: 2 INJECTION INTRAMUSCULAR; INTRAVENOUS at 11:07

## 2022-07-22 NOTE — PROGRESS NOTES
Late entry 7/1/22    SW went to meet pt with Margaret DILL. SW met pt and wife at chairside. Introduced self and explained role. Pt denied any needs at this time.     Hamida Weiner, NICOLEW

## 2022-07-22 NOTE — PROGRESS NOTES
Oncology   Chemotherapy Infusion Visit    Quick Social Service Status Follow Up   Met w/ pt briefly to follow up on social and emotional needs since initiation of treatment.      SW followed up with pt and wife at chairside. Pt reports he had to go to [facility] today and get checked due to A' Fib. He is currently waiting to see if he will be able to have his chemo today. Despite this pt is in good spirits and joking with staff and his wife. Pt denied any needs from SW at this time.          Hamida Weiner LCSW  07/22/2022  11:19 AM

## 2022-07-22 NOTE — PROGRESS NOTES
History of present illness:  The patient is an 87-year-old white gentleman previously followed by Dr. Hogan for iron deficiency anemia.  Patient has previously received Venofer.  Patient has some difficulties with chronic mild anemia in part related to underlying chronic kidney disease as well as prostate carcinoma for which patient sees Dr. Bellamy and is managed with Lupron/Earleada.  Patient has had a rising PSA despite ongoing therapy.  Dr. Bellamy subsequently obtain fluciclovine PET scan and has been referred back to our clinic for additional evaluation.  He has also been seen by our nurse practitioners in regard to recurrent difficulties with iron deficiency anemia.  Patient repeated a course of Venofer x5 doses without significant increment in his counts.  Patient denies difficulties with pain, weight loss, or urination.  He remains active but needs to pace himself for his daily chores.    Patient has undergone interval bone marrow aspiration and biopsy which is consistent with chronic disease physiology anemia.  Patient has begun palliative Novantrone/prednisone for his prostate carcinoma and returns to clinic for re-evaluation prior to cycle 2 of therapy.  He remains on biannual Lupron.  Patient had no difficulties with 1st cycle of Novantrone.  He did not experience nausea, vomiting, fevers, chills, mouth sores, diarrhea etcetera.    Physical examination:  Well-developed, well-nourished, elderly, white gentleman, in no acute distress, who has a weight of 150.5 lb (increased by 3 lb).  VITAL SIGNS: Documented  and reviewed this visit.  HEENT: Normocephalic, atraumatic. Oral mucosa pink and moist. Lips without lesions. Tongue midline. Oropharynx clear. Nonicteric sclerae.   NECK: Supple, no adenopathy. No carotid bruits, thyromegaly or thyroid nodule.   HEART: Regular rate and rhythm without murmur, gallop or rub.   LUNGS: Clear to auscultation bilaterally. Normal respiratory effort.   ABDOMEN: Soft,  nontender, nondistended with positive normoactive bowel sounds, no hepatosplenomegaly.   EXTREMITIES: No cyanosis, clubbing or edema. Distal pulses are intact.   AXILLAE AND GROIN: No palpable pathologic lymphadenopathy is appreciated.   SKIN: Intact/turgor normal.  MediPort catheter present within the left chest.  Wound well approximated, healed, and free from signs of infection.    NEUROLOGIC: Cranial nerves II-XII grossly intact. Motor: Good muscle bulk and tone. Strength/sensory 5/5 throughout. Gait stable.     Laboratory:  White count 6.1, hemoglobin 10.8, hematocrit 33.6, platelets 172, absolute neutrophil count is 3300.  Room 140, potassium 4.1, chloride 106, CO2 24, BUN 19, creatinine 1.2, glucose 99, calcium 9.4, magnesium 2.2, liver function test within normal limits, LDH is 169, GFR is 55.  PSA is pending at the time of dictation (3.6, 1.8, 0.89).       Guardant 360 Panel:  TP 53 mutation positive.  No evidence of MSI high.    Impression:  1. 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  2. Chronic disease physiology anemia.    Plan:  1. Proceed with cycle 2 of Novantrone/prednisone.  2. Re-evaluate 3 weeks from now with interval CBC, CMP, LDH, magnesium, and PSA.    This note was created using voice recognition software and may contain grammatical errors.       Route Chart for Scheduling    Med Onc Chart Routing      Follow up with physician 6 weeks. Same labs   Follow up with [facility] 3 weeks.   Infusion scheduling note    Injection scheduling note    Labs CBC, CMP, LDH, magnesium and PSA   Lab interval:     Imaging    Pharmacy appointment    Other referrals          Treatment Plan Information   OP MITOXANTRONE PREDNISONE Q3W   Hermann Jackson MD   Upcoming Treatment Dates - OP MITOXANTRONE PREDNISONE Q3W    7/22/2022       Chemotherapy       mitoXANTRONE (NOVANTRONE) 12 mg/m2 = 22 mg in sodium chloride 0.9% 50 mL chemo infusion       Antiemetics        ondansetron injection 8 mg  8/12/2022       Chemotherapy       mitoXANTRONE (NOVANTRONE) 12 mg/m2 = 22 mg in sodium chloride 0.9% 50 mL chemo infusion       Antiemetics       ondansetron injection 8 mg  9/2/2022       Chemotherapy       mitoXANTRONE (NOVANTRONE) 12 mg/m2 = 22 mg in sodium chloride 0.9% 50 mL chemo infusion       Antiemetics       ondansetron injection 8 mg  9/23/2022       Chemotherapy       mitoXANTRONE (NOVANTRONE) 12 mg/m2 = 22 mg in sodium chloride 0.9% 50 mL chemo infusion       Antiemetics       ondansetron injection 8 mg    Therapy Plan Information  VENOFER (IRON SUCROSE) QW  4. Pre-Medications  famotidine (PF) injection 20 mg  20 mg, Intravenous, Every visit  dexamethasone injection 4 mg  4 mg, Intravenous, Every visit  diphenhydrAMINE capsule 25 mg  25 mg, Oral, Every visit  Medications  iron sucrose (VENOFER) 200 mg in sodium chloride 0.9% 100 mL IVPB  200 mg, Intravenous, 1 time a week  Flushes  sodium chloride 0.9% 250 mL flush bag  Intravenous, 1 time a week  sodium chloride 0.9% flush 10 mL  10 mL, Intravenous, 1 time a week  heparin, porcine (PF) 100 unit/mL injection flush 500 Units  500 Units, Intravenous, 1 time a week  alteplase injection 2 mg  2 mg, Intra-Catheter, 1 time a week  PRN Medications  EPINEPHrine (EPIPEN) 0.3 mg/0.3 mL pen injection 0.3 mg  0.3 mg, Intramuscular, PRN  diphenhydrAMINE injection 50 mg  50 mg, Intravenous, PRN  methylPREDNISolone sodium succinate injection 125 mg  125 mg, Intravenous, PRN  sodium chloride 0.9% bolus 1,000 mL  1,000 mL, Intravenous, PRN    INF LEUPROLIDE (LUPRON) PROSTATE 6 MONTH  Medications  leuprolide acetate (6 month) injection 45 mg  45 mg, Intramuscular, Every 24 weeks

## 2022-07-22 NOTE — NURSING
Patient arrived to Infusion with his wife for Mitoxantrone following his appointment with Dr. Jackson. His HR on arrival was 139. /85. I placed him on a pulse ox and observed HRs from 45 to 140. He remained asymptomatic. Patient with no history of a-fib and not on any anticoagulation. Charge nurses and Dr. Jackson notified. Patient scheduled for EKG at the [facility] [place]. Discussed with patient and wife. They verbalize understanding. Portacath left accessed and clamped off until his return today.

## 2022-07-22 NOTE — TELEPHONE ENCOUNTER
Patient asking for PSA results that are not resulted yet. Will call back Monday to check on that. Patient advised to follow up with cardiologist or PCP regarding abnormal EKG results.    ----- Message from Estelle Escalera sent at 7/22/2022  3:26 PM CDT -----  Type: Needs Medical Advice  Who Called: Patient  Symptoms (please be specific):    How long has patient had these symptoms:    Pharmacy name and phone #:    Best Call Back Number: [phone]  Additional Information: Patient is requesting a call back regarding test results.

## 2022-07-22 NOTE — PLAN OF CARE
Problem: Adult Inpatient Plan of Care  Goal: Plan of Care Review  Outcome: Ongoing, Progressing  Flowsheets (Taken 7/22/2022 1131)  Plan of Care Reviewed With: patient  Goal: Patient-Specific Goal (Individualized)  Outcome: Ongoing, Progressing  Flowsheets (Taken 7/22/2022 1131)  Anxieties, Fears or Concerns: None  Individualized Care Needs: Recliner, warm blanket.  Patient-Specific Goals (Include Timeframe): Free of S/S of reaction with infusion.     Problem: Fatigue  Goal: Improved Activity Tolerance  Outcome: Ongoing, Progressing  Intervention: Promote Improved Energy  Flowsheets (Taken 7/22/2022 1131)  Fatigue Management:   frequent rest breaks encouraged   paced activity encouraged  Sleep/Rest Enhancement: regular sleep/rest pattern promoted  Activity Management: Ambulated -L4       Patient to Infusion for Mitoxantrone after his appointment with Dr. Jackson. Accompanied by his wife. HR initially elevated to 140 and irregular. Dr. Jackson ordered patient to be sent for EKG at the [facility] [place]. Once he  returned, Dr. Jackson ordered to proceed with chemo. Treatment plan discussed with patient and wife. Tolerated infusion. Provided with copy of upcoming appointment schedule. Escorted to the front lobby for discharge to home.

## 2022-07-25 ENCOUNTER — TELEPHONE (OUTPATIENT)
Dept: HEMATOLOGY/ONCOLOGY | Facility: CLINIC | Age: 87
End: 2022-07-25
Payer: MEDICARE

## 2022-07-25 NOTE — TELEPHONE ENCOUNTER
Advised wife of PSA results and within normal range.----- Message from Irene Fowler, Patient Care Assistant sent at 7/25/2022  2:05 PM CDT -----  Regarding: results  Contact: colton miller's wife  Type:  Test Results    Who Called:  colton miller's wife   Name of Test (Lab/Mammo/Etc):  labs   Date of Test:  7/22/22  Ordering Provider:  [place]   Where the test was performed:  [facility]   Best Call Back Number:  [phone] (home) [phone] (work)    Additional Information:  please call colton miller's wife  to advise. Thanks!

## 2022-08-09 DIAGNOSIS — C61 PROSTATE CANCER: ICD-10-CM

## 2022-08-09 RX ORDER — PREDNISONE 5 MG/1
10 TABLET ORAL DAILY
Qty: 90 TABLET | Refills: 2
Start: 2022-08-09 | End: 2023-01-06 | Stop reason: SDUPTHER

## 2022-08-09 NOTE — TELEPHONE ENCOUNTER
----- Message from Maribel Alan sent at 8/9/2022  7:21 AM CDT -----  Contact: wife Polina  Type:  RX Refill Request    Who Called:  polina  Refill or New Rx:  refill  RX Name and Strength:  predniSONE (DELTASONE) 5 MG tablet  How is the patient currently taking it? (ex. 1XDay):  as directed  Is this a 30 day or 90 day RX:  90  Preferred Pharmacy with phone number:    [facility] (Now [facility]) - [place], [place] - [address]  [address] [address]  Phone: [phone] Fax: [phone]  Local or Mail Order:  mail order   Ordering Provider:  Dr Manuel Rebollar Call Back Number:  [phone]  Additional Information:  patient is down to a 1 wk supply and sarbjit has reached out several time for this refill is goes to them not the local pharm. Please get this sent out and call the patient back to advise and thanks

## 2022-08-09 NOTE — BRIEF OP NOTE
[facility] - Surgery  Brief Operative Note    Surgery Date: 6/15/2022     Surgeon(s) and Role:  Panel 1:     * Hermann Jackson MD - Primary  Panel 2:     * Marques Rivero MD - Primary    Assisting Surgeon: None    Pre-op Diagnosis:  Anemia, unspecified type [D64.9]  Encounter for insertion of venous access port [Z45.2]    Post-op Diagnosis:  Post-Op Diagnosis Codes:     * Anemia, unspecified type [D64.9]     * Encounter for insertion of venous access port [Z45.2]    Procedure(s) (LRB):  Biopsy-bone marrow (Bilateral)  INSERTION, PORT-A-CATH (N/A)    Anesthesia: General/MAC    Operative Findings: Normal anatomy.  Port placed without event    Estimated Blood Loss: 5 cc'ss         Specimens:   Specimen (24h ago, onward)            None            Discharge Note    OUTCOME: Patient tolerated treatment/procedure well without complication and is now ready for discharge.    DISPOSITION: Home or Self Care    FINAL DIAGNOSIS:  Encounter for insertion of venous access port    FOLLOWUP: In clinic    DISCHARGE INSTRUCTIONS:    Discharge Procedure Orders   Diet general     Call MD for:  extreme fatigue     Call MD for:  persistent dizziness or light-headedness     Call MD for:  hives     Call MD for:  redness, tenderness, or signs of infection (pain, swelling, redness, odor or green/yellow discharge around incision site)     Call MD for:  difficulty breathing, headache or visual disturbances     Call MD for:  severe uncontrolled pain     Call MD for:  persistent nausea and vomiting     Call MD for:  temperature >100.4     Activity as tolerated

## 2022-08-11 NOTE — PROGRESS NOTES
PATIENT: Amor Alcazar  MRN: [id number]  DATE: 8/12/2022      Diagnosis:   1. 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 Abnormal heart rate    4. Anemia, unspecified type    5. Other iron deficiency anemia    6. Chronic kidney disease, stage 3a      Chief Complaint: Prostate Cancer (3 week follow up)    Subjective:   HPI: Mr. Alcazar is a 87 y.o. male with hypothyroid, arthritis, iron deficiency anemia, who is established with Dr. Jackson for diagnosis of metastatic prostate cancer who is seen today prior to C3 Mitoxantrone/Prednisone. He is accompanied by his wife and daughter today.  Patient is doing well, tolerating treatment well. Had some difficulty getting Prednisone refilled but has enough on hand for 2 weeks. Appetite is good, weight is stable.   Planned follow-up with PCP next week, will discuss last EKG at that time. No palpitations, dizziness reported.   He remains active, has continued to work outdoors. Denies HA, CP, SOB, abd pain, N/V, changes in bowel habits, dysuria, swelling, new lumps or bumps, fevers, chills.     Prior History:   Anemia: Mr. Alcazar was previously seen by Dr. Hogan for macrocytic anemia with the last clinic visit in September 2020. Folic acid supplement was added at that time. Macrocytosis had resolved and Hgb remained stable in the 11-12 g/dL range. Mr. Alcazar has been following with his PCP since that time.  2/2022: Diagnosed with hypothyroid and started on Levothyroxine   3/2022: Patient's Hgb is 10.8 g/dL, normocytic.  3/9/22: Stool cards negative   3/17-4/14/22: Venofer 200 mg IV x 5 doses   6/15/22: BMBX -Normocellular marrow for age, 20-30%, with trilineage hematopoiesis -No increase in blasts -Adequate megakaryocytes -Moderately increased stainable iron Comment: Concomitantly submitted flow cytometric analysis detects no diagnostic abnormal hematopoietic population.  Lymphocytes show  populations of polyclonal B lymphocytes and T lymphocytes with no  "diagnostic abnormal immunophenotype.  The blast gate is not increased.    Prostate cancer was followed by urology and was undergoing treatment with po Erleada and Q 6 month Eligard injections.   2022: Rising PSA led to PET imaging which revealed "Increased radiotracer accumulation within enlarged mediastinal lymph nodes highly suspicious for metastatic disease. Markedly hypermetabolic prostate gland which is almost completely replaced by tumor consistent with prostate cancer recurrence."  2022: ECHO with EF 55-60%  22: Guardant 360 Panel: TP 53 mutation positive. No evidence of MSI high.     Past Medical History:   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 TR  ECHO       Past Surgical HIstory:   Past Surgical History:   Procedure Laterality Date    BONE MARROW BIOPSY Bilateral 6/15/2022    Procedure: Biopsy-bone marrow;  Surgeon: Hermann Jackson MD;  Location: [facility] OR;  Service: Oncology;  Laterality: Bilateral;    CATARACT EXTRACTION W/  INTRAOCULAR LENS IMPLANT Bilateral     ESOPHAGOGASTRODUODENOSCOPY      INSERTION OF TUNNELED CENTRAL VENOUS CATHETER (CVC) WITH SUBCUTANEOUS PORT N/A 6/15/2022    Procedure: INSERTION, PORT-A-CATH;  Surgeon: Marques Rivero MD;  Location: [facility] OR;  Service: General;  Laterality: N/A;       Family History:   Family History   Problem Relation Age of Onset    Lung cancer Sister          of       Social History:  reports that he has never smoked. He has never used smokeless tobacco. He reports previous alcohol use. He reports that he does not use drugs.    Allergies:  Review of patient's allergies indicates:  No Known Allergies    Medications:  Current Outpatient Medications   Medication Sig Dispense Refill    duke's soln (benadryl 30 mL, mylanta 30 mL, LIDOcaine 30 mL, " nystatin 30 mL) 120mL Take 10 mLs by mouth 4 (four) times daily. 120 mL 6    folic acid (FOLVITE) 400 MCG tablet Take 400 mcg by mouth once daily.      HYDROcodone-acetaminophen (NORCO) 5-325 mg per tablet Take 1 tablet by mouth every 6 (six) hours as needed for Pain. 20 tablet 0    levothyroxine (SYNTHROID) 75 MCG tablet Take 1 tablet (75 mcg total) by mouth before breakfast. 90 tablet 3    levothyroxine (SYNTHROID) 75 MCG tablet Take 1 tablet (75 mcg total) by mouth before breakfast. 90 tablet 3    LIDOcaine-prilocaine (EMLA) cream Apply topically as needed (prior to port access). 30 g 2    LUPRON DEPOT, 6 MONTH, 45 mg SyKt injection       multivit-min-FA-lycopen-lutein (CENTRUM SILVER MEN) 300-600-300 mcg Tab Take by mouth once daily.      predniSONE (DELTASONE) 5 MG tablet Take 2 tablets (10 mg total) by mouth once daily. 90 tablet 2    predniSONE (DELTASONE) 5 MG tablet Take 2 tablets (10 mg total) by mouth once daily. 90 tablet 2    predniSONE (DELTASONE) 5 MG tablet Take 2 tablets (10 mg total) by mouth once daily. 90 tablet 1    prochlorperazine (COMPAZINE) 10 MG tablet Take 1 tablet (10 mg total) by mouth every 6 (six) hours as needed (nausea and vomiting). 60 tablet 6    tretinoin (RETIN-A) 0.05 % cream apply TO right side of face AT BEDTIME       No current facility-administered medications for this visit.     Facility-Administered Medications Ordered in Other Visits   Medication Dose Route Frequency Provider Last Rate Last Admin    alteplase injection 2 mg  2 mg Intra-Catheter PRN Juju Gore NP        heparin, porcine (PF) 100 unit/mL injection flush 500 Units  500 Units Intravenous PRN Juju Gore NP        mitoXANTRONE (NOVANTRONE) 12 mg/m2 = 22 mg in sodium chloride 0.9% 50 mL chemo infusion  12 mg/m2 (Treatment Plan Recorded) Intravenous 1 time in Clinic/HOD Juju Gore NP        ondansetron injection 8 mg  8 mg Intravenous 1 time in Clinic/HOD Juju Gore NP        sodium  "chloride 0.9% flush 10 mL  10 mL Intravenous PRN Juju Gore, BRENDA           Review of Systems   Constitutional: Negative for activity change, appetite change, chills, fever and unexpected weight change.   HENT: Negative for trouble swallowing.    Respiratory: Negative for cough and shortness of breath.    Cardiovascular: Negative for chest pain and leg swelling.   Gastrointestinal: Negative for abdominal pain, constipation, diarrhea and nausea.   Genitourinary: Negative for dysuria.   Musculoskeletal: Negative for arthralgias.   Skin: Negative for rash.   Neurological: Negative for headaches.   Hematological: Negative for adenopathy. Does not bruise/bleed easily.   Psychiatric/Behavioral: The patient is not nervous/anxious.        ECOG Performance Status:   ECOG SCORE    1 - Restricted in strenuous activity-ambulatory and able to carry out work of a light nature         Objective:      Vitals:   Vitals:    08/12/22 0927   BP: 100/70   BP Location: Left arm   Patient Position: Sitting   BP Method: Medium (Manual)   Pulse: 107   Temp: 97.6 °F (36.4 °C)   TempSrc: Temporal   SpO2: 97%   Weight: 68.5 kg (151 lb 0.2 oz)   Height: 5' 10" (1.778 m)     BMI: Body mass index is 21.67 kg/m².    Physical Exam  Vitals reviewed.   Constitutional:       General: He is not in acute distress.     Appearance: He is not diaphoretic.   HENT:      Head: Normocephalic and atraumatic.      Mouth/Throat:      Mouth: Mucous membranes are moist.      Pharynx: No oropharyngeal exudate.      Comments: Dentures fit properly  Eyes:      General: No scleral icterus.        Right eye: No discharge.         Left eye: No discharge.   Cardiovascular:      Rate and Rhythm: Normal rate and regular rhythm.      Heart sounds: Normal heart sounds. No murmur heard.  Pulmonary:      Effort: Pulmonary effort is normal. No respiratory distress.   Abdominal:      General: Abdomen is flat.   Musculoskeletal:      Cervical back: No tenderness.      Right lower " leg: No edema.      Left lower leg: No edema.   Lymphadenopathy:      Cervical: No cervical adenopathy.   Skin:     General: Skin is dry.      Coloration: Skin is not jaundiced.      Findings: No bruising or rash.   Neurological:      Mental Status: He is alert and oriented to person, place, and time.      Gait: Gait normal.   Psychiatric:         Mood and Affect: Mood normal.         Behavior: Behavior normal.         Laboratory Data:  Lab Results   Component Value Date    WBC 7.96 08/12/2022    HGB 11.4 (L) 08/12/2022    HCT 35.4 (L) 08/12/2022    MCV 97 08/12/2022     08/12/2022      Assessment:       1. 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 Abnormal heart rate    4. Anemia, unspecified type    5. Other iron deficiency anemia    6. 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 Jackson on 9/2/22, CBC, CMP, LDH, Mg, PSA prior to appt     Anemia  -Anemia of chronic disease  -Found to be iron deficient per labs 3/7/22; received Venofer 200 mg weekly x 5 doses from 3/7-4/14  -Stool cards 3/9/22 negative for OB  -Hgb stable at 10.4 g/dL 8/12/22  -Will continue to monitor     CKD  -Stage 3 chronic kidney disease  -Followed by PCP   -Will monitor           Patient queried and all questions were answered.  Assessment/Plan reviewed and approved by Dr. Jackson       Route Chart for Scheduling    Med Onc Chart Routing      Follow up with physician Other. As scheduled with Dr. Jackson on 9/2/22   Follow up with [facility]    Infusion scheduling note Proceed with C3 Mitoxantrone today    Injection scheduling note    Labs CBC, CMP, magnesium, LDH and PSA   Lab interval:  Prior to appointment in 3 weeks    Imaging ECHO    Prior to next cycle; follow up with PCP next week as scheduled    Pharmacy appointment    Other referrals          Treatment Plan Information   OP MITOXANTRONE PREDNISONE Q3W   Hermann Jackson MD   Upcoming Treatment Dates - OP MITOXANTRONE PREDNISONE Q3W    9/2/2022       Chemotherapy       mitoXANTRONE (NOVANTRONE) 12 mg/m2 = 22 mg in sodium chloride 0.9% 50 mL chemo infusion       Antiemetics       ondansetron injection 8 mg  9/23/2022       Chemotherapy       mitoXANTRONE (NOVANTRONE) 12 mg/m2 = 22 mg in sodium chloride 0.9% 50 mL chemo infusion       Antiemetics       ondansetron injection 8 mg  10/14/2022       Chemotherapy       mitoXANTRONE (NOVANTRONE) 12 mg/m2 = 22 mg in sodium chloride 0.9% 50 mL chemo infusion       Antiemetics       ondansetron injection 8 mg  11/4/2022       Chemotherapy       mitoXANTRONE (NOVANTRONE) 12 mg/m2 = 22 mg in sodium chloride 0.9% 50 mL chemo infusion       Antiemetics       ondansetron injection 8 mg    Therapy Plan Information  VENOFER (IRON SUCROSE) QW  4. Pre-Medications  famotidine (PF) injection 20 mg  20 mg, Intravenous, Every visit  dexamethasone injection 4 mg  4 mg, Intravenous, Every visit  diphenhydrAMINE capsule 25 mg  25 mg, Oral, Every visit  Medications  iron sucrose (VENOFER) 200 mg in sodium chloride 0.9% 100 mL IVPB  200 mg, Intravenous, 1 time a week  Flushes  sodium chloride 0.9% 250 mL flush bag  Intravenous, 1 time a week  sodium chloride 0.9% flush 10 mL  10 mL, Intravenous, 1 time a week  heparin, porcine (PF) 100 unit/mL injection flush 500 Units  500 Units, Intravenous, 1 time a week  alteplase injection 2 mg  2 mg, Intra-Catheter, 1 time a week  PRN Medications  EPINEPHrine (EPIPEN) 0.3 mg/0.3 mL pen injection 0.3 mg  0.3 mg, Intramuscular, PRN  diphenhydrAMINE injection 50 mg  50 mg, Intravenous, PRN  methylPREDNISolone sodium succinate injection 125 mg  125 mg, Intravenous, PRN  sodium chloride 0.9% bolus 1,000 mL  1,000 mL, Intravenous,  PRN    INF LEUPROLIDE (LUPRON) PROSTATE 6 MONTH  Medications  leuprolide acetate (6 month) injection 45 mg  45 mg, Intramuscular, Every 24 weeks

## 2022-08-12 ENCOUNTER — INFUSION (OUTPATIENT)
Dept: INFUSION THERAPY | Facility: HOSPITAL | Age: 87
End: 2022-08-12
Attending: INTERNAL MEDICINE
Payer: MEDICARE

## 2022-08-12 ENCOUNTER — DOCUMENTATION ONLY (OUTPATIENT)
Dept: INFUSION THERAPY | Facility: HOSPITAL | Age: 87
End: 2022-08-12

## 2022-08-12 ENCOUNTER — OFFICE VISIT (OUTPATIENT)
Dept: HEMATOLOGY/ONCOLOGY | Facility: CLINIC | Age: 87
End: 2022-08-12
Payer: MEDICARE

## 2022-08-12 ENCOUNTER — OFFICE VISIT (OUTPATIENT)
Dept: PSYCHIATRY | Facility: CLINIC | Age: 87
End: 2022-08-12
Payer: MEDICARE

## 2022-08-12 VITALS
BODY MASS INDEX: 21.67 KG/M2 | HEART RATE: 68 BPM | RESPIRATION RATE: 18 BRPM | SYSTOLIC BLOOD PRESSURE: 126 MMHG | DIASTOLIC BLOOD PRESSURE: 69 MMHG | WEIGHT: 151 LBS | TEMPERATURE: 98 F

## 2022-08-12 VITALS
WEIGHT: 151 LBS | HEART RATE: 107 BPM | TEMPERATURE: 98 F | BODY MASS INDEX: 21.62 KG/M2 | DIASTOLIC BLOOD PRESSURE: 70 MMHG | OXYGEN SATURATION: 97 % | SYSTOLIC BLOOD PRESSURE: 100 MMHG | HEIGHT: 70 IN

## 2022-08-12 DIAGNOSIS — D50.8 OTHER IRON DEFICIENCY ANEMIA: ICD-10-CM

## 2022-08-12 DIAGNOSIS — C61 PROSTATE CANCER: ICD-10-CM

## 2022-08-12 DIAGNOSIS — R00.9 ABNORMAL HEART RATE: ICD-10-CM

## 2022-08-12 DIAGNOSIS — C77.1 METASTASIS TO MEDIASTINAL LYMPH NODE: ICD-10-CM

## 2022-08-12 DIAGNOSIS — C61 PROSTATE CANCER: Primary | ICD-10-CM

## 2022-08-12 DIAGNOSIS — F43.22 ADJUSTMENT DISORDER WITH ANXIETY: Primary | ICD-10-CM

## 2022-08-12 DIAGNOSIS — N18.31 CHRONIC KIDNEY DISEASE, STAGE 3A: ICD-10-CM

## 2022-08-12 DIAGNOSIS — D64.9 ANEMIA, UNSPECIFIED TYPE: ICD-10-CM

## 2022-08-12 PROCEDURE — 25000003 PHARM REV CODE 250: Mod: PN

## 2022-08-12 PROCEDURE — 3288F FALL RISK ASSESSMENT DOCD: CPT | Mod: CPTII,S$GLB,,

## 2022-08-12 PROCEDURE — 96413 CHEMO IV INFUSION 1 HR: CPT | Mod: PN

## 2022-08-12 PROCEDURE — 3074F SYST BP LT 130 MM HG: CPT | Mod: CPTII,S$GLB,,

## 2022-08-12 PROCEDURE — A4216 STERILE WATER/SALINE, 10 ML: HCPCS | Mod: PN

## 2022-08-12 PROCEDURE — 99215 OFFICE O/P EST HI 40 MIN: CPT | Mod: S$GLB,,,

## 2022-08-12 PROCEDURE — 1157F ADVNC CARE PLAN IN RCRD: CPT | Mod: CPTII,S$GLB,, | Performed by: PSYCHOLOGIST

## 2022-08-12 PROCEDURE — 1157F PR ADVANCE CARE PLAN OR EQUIV PRESENT IN MEDICAL RECORD: ICD-10-PCS | Mod: CPTII,S$GLB,,

## 2022-08-12 PROCEDURE — 3074F PR MOST RECENT SYSTOLIC BLOOD PRESSURE < 130 MM HG: ICD-10-PCS | Mod: CPTII,S$GLB,,

## 2022-08-12 PROCEDURE — 99999 PR PBB SHADOW E&M-EST. PATIENT-LVL III: CPT | Mod: PBBFAC,,,

## 2022-08-12 PROCEDURE — 1157F ADVNC CARE PLAN IN RCRD: CPT | Mod: CPTII,S$GLB,,

## 2022-08-12 PROCEDURE — 3288F PR FALLS RISK ASSESSMENT DOCUMENTED: ICD-10-PCS | Mod: CPTII,S$GLB,,

## 2022-08-12 PROCEDURE — 96375 TX/PRO/DX INJ NEW DRUG ADDON: CPT | Mod: PN

## 2022-08-12 PROCEDURE — 90791 PSYCH DIAGNOSTIC EVALUATION: CPT | Mod: S$GLB,,, | Performed by: PSYCHOLOGIST

## 2022-08-12 PROCEDURE — 3078F DIAST BP <80 MM HG: CPT | Mod: CPTII,S$GLB,,

## 2022-08-12 PROCEDURE — 1101F PT FALLS ASSESS-DOCD LE1/YR: CPT | Mod: CPTII,S$GLB,,

## 2022-08-12 PROCEDURE — 1126F PR PAIN SEVERITY QUANTIFIED, NO PAIN PRESENT: ICD-10-PCS | Mod: CPTII,S$GLB,,

## 2022-08-12 PROCEDURE — 99215 PR OFFICE/OUTPT VISIT, EST, LEVL V, 40-54 MIN: ICD-10-PCS | Mod: S$GLB,,,

## 2022-08-12 PROCEDURE — 1157F PR ADVANCE CARE PLAN OR EQUIV PRESENT IN MEDICAL RECORD: ICD-10-PCS | Mod: CPTII,S$GLB,, | Performed by: PSYCHOLOGIST

## 2022-08-12 PROCEDURE — 1101F PR PT FALLS ASSESS DOC 0-1 FALLS W/OUT INJ PAST YR: ICD-10-PCS | Mod: CPTII,S$GLB,,

## 2022-08-12 PROCEDURE — 90791 PR PSYCHIATRIC DIAGNOSTIC EVALUATION: ICD-10-PCS | Mod: S$GLB,,, | Performed by: PSYCHOLOGIST

## 2022-08-12 PROCEDURE — 3078F PR MOST RECENT DIASTOLIC BLOOD PRESSURE < 80 MM HG: ICD-10-PCS | Mod: CPTII,S$GLB,,

## 2022-08-12 PROCEDURE — 63600175 PHARM REV CODE 636 W HCPCS: Mod: PN

## 2022-08-12 PROCEDURE — 99999 PR PBB SHADOW E&M-EST. PATIENT-LVL III: ICD-10-PCS | Mod: PBBFAC,,,

## 2022-08-12 PROCEDURE — 1126F AMNT PAIN NOTED NONE PRSNT: CPT | Mod: CPTII,S$GLB,,

## 2022-08-12 RX ORDER — HEPARIN 100 UNIT/ML
500 SYRINGE INTRAVENOUS
Status: CANCELLED | OUTPATIENT
Start: 2022-08-12

## 2022-08-12 RX ORDER — SODIUM CHLORIDE 0.9 % (FLUSH) 0.9 %
10 SYRINGE (ML) INJECTION
Status: CANCELLED | OUTPATIENT
Start: 2022-08-12

## 2022-08-12 RX ORDER — HEPARIN 100 UNIT/ML
500 SYRINGE INTRAVENOUS
Status: DISCONTINUED | OUTPATIENT
Start: 2022-08-12 | End: 2022-08-12 | Stop reason: HOSPADM

## 2022-08-12 RX ORDER — ONDANSETRON 2 MG/ML
8 INJECTION INTRAMUSCULAR; INTRAVENOUS
Status: COMPLETED | OUTPATIENT
Start: 2022-08-12 | End: 2022-08-12

## 2022-08-12 RX ORDER — ONDANSETRON 2 MG/ML
8 INJECTION INTRAMUSCULAR; INTRAVENOUS
Status: CANCELLED | OUTPATIENT
Start: 2022-08-12

## 2022-08-12 RX ORDER — SODIUM CHLORIDE 0.9 % (FLUSH) 0.9 %
10 SYRINGE (ML) INJECTION
Status: DISCONTINUED | OUTPATIENT
Start: 2022-08-12 | End: 2022-08-12 | Stop reason: HOSPADM

## 2022-08-12 RX ADMIN — MITOXANTRONE 22 MG: 2 INJECTION, SOLUTION, CONCENTRATE INTRAVENOUS at 11:08

## 2022-08-12 RX ADMIN — ONDANSETRON 8 MG: 2 INJECTION INTRAMUSCULAR; INTRAVENOUS at 11:08

## 2022-08-12 RX ADMIN — Medication 500 UNITS: at 12:08

## 2022-08-12 RX ADMIN — SODIUM CHLORIDE: 0.9 INJECTION, SOLUTION INTRAVENOUS at 11:08

## 2022-08-12 RX ADMIN — Medication 10 ML: at 12:08

## 2022-08-12 NOTE — PROGRESS NOTES
PSYCHO-ONCOLOGY INTAKE    Diagnostic Interview - CPT 65796    Date: 8/12/2022  Site: [place], [place]    Evaluation Length (direct face-to-face time):  45 minutes    This includes face to face time and non-face to face time preparing to see the patient, obtaining and/or reviewing separately obtained history, documenting clinical information in the electronic or other health record, independently interpreting results and communicating results to the patient/family/caregiver, or care coordinator.     Referral Source: Hermann Jackson MD   Oncologist: Hermann Jackson MD   PCP: Dipti Dubose MD    Clinical status of patient: Outpatient    Amor Alcazar, a 87 y.o. male, seen for initial evaluation visit.    Amor Alcazar reviewed and agreed to informed consent and the limits of confidentiality.    Chief complaint/reason for encounter: adjustment to illness, anxiety    Medical/Surgical History:    Patient Active Problem List   Diagnosis    Malignant neoplasm of prostate    Arthritis    Hypertriglyceridemia    Pseudophakia of both eyes    History of skin cancer    Anemia    Chronic kidney disease, stage 3a    Iron deficiency anemia    Encounter for insertion of venous access port    Prostate cancer       Health Behaviors:       ETOH Use: No (past social)       Tobacco Use: No   Illicit Drug Use:  No     Prescription Misuse:No   Caffeine: minimal   Exercise:Pt engages in regular activity in maintenance and care of home/land   Firearms:  Yes, kept separate from ammunition   Advanced directives:Yes (05/09/2014)    Family History:   Psychiatric illness: No     Alcohol/Drug Abuse: No     Suicide: No      Past Psychiatric History:   Inpatient treatment: No     Outpatient treatment: No     Prior substance abuse treatment: No     Suicide Attempts: No     Psychotropic Medications:  Current: none       Past: none    Current medications as per below, allergies reviewed in chart.    Current Outpatient Medications    Medication    duke's soln (benadryl 30 mL, mylanta 30 mL, LIDOcaine 30 mL, nystatin 30 mL) 120mL    folic acid (FOLVITE) 400 MCG tablet    HYDROcodone-acetaminophen (NORCO) 5-325 mg per tablet    levothyroxine (SYNTHROID) 75 MCG tablet    levothyroxine (SYNTHROID) 75 MCG tablet    LIDOcaine-prilocaine (EMLA) cream    LUPRON DEPOT, 6 MONTH, 45 mg SyKt injection    multivit-min-FA-lycopen-lutein (CENTRUM SILVER MEN) 300-600-300 mcg Tab    predniSONE (DELTASONE) 5 MG tablet    predniSONE (DELTASONE) 5 MG tablet    predniSONE (DELTASONE) 5 MG tablet    prochlorperazine (COMPAZINE) 10 MG tablet    tretinoin (RETIN-A) 0.05 % cream     No current facility-administered medications for this visit.     Facility-Administered Medications Ordered in Other Visits   Medication Frequency    alteplase injection 2 mg PRN    heparin, porcine (PF) 100 unit/mL injection flush 500 Units PRN    sodium chloride 0.9% flush 10 mL PRN              Social situation/Stressors: Amor Alcazar lives with his wife on [address] acres in [place], [place].  He is retired but previously served in the [employer] ([place]    Amor Alcazar has been  59 years and has 4 children.  His partner is Polina (Sandy).   The patient reports excellent social support.  Amor Alcazar is an active [employer].  Amor Alcazar's hobbies include yard work, home projects, and spending time w/ his family.  Additional stressors: advanced care planning/familial wellbeing concerns    Strengths:Housing stability, Able to vocalize needs, Values and traditions, Vocational interests, hobbies and/or talents, Interpersonal relationships and supports available - family, relatives, friends and Financial stability  Liabilities: Complicated medical illness    Current Evaluation:     Mental Status Exam: Amor Alcazar arrived promptly for the assessment session. His wife (Sandy) and daughter (Shireen) were present at intake w/ the consent of the pt. The  patient was fully cooperative throughout the interview and was an adequate historian   Appearance: age appropriate, casually  dressed, well groomed  Behavior/Cooperation: friendly and cooperative  Speech: normal in rate, volume, and tone and appropriate quality, quantity and organization of sentences  Mood: steady, happy  Affect: mood congruent and appropriate  Thought Process: goal-directed, logical  Thought Content: normal, no suicidality, no homicidality, delusions, or paranoia;did not appear to be responding to internal stimuli during the interview.   Orientation: grossly intact  Memory: grossly intact  Attention Span/Concentration: Attends to interview without distraction; reports no difficulty  Fund of Knowledge: average  Estimate of Intelligence: average from verbal skills and history  Cognition: grossly intact  Insight: patient has awareness of illness; good insight into own behavior and behavior of others  Judgment: the patient's behavior is adequate to circumstances      History of present illness:    Oncology History   Prostate cancer   6/27/2022 Initial Diagnosis    Prostate cancer     7/1/2022 -  Chemotherapy    Treatment Summary   Plan Name: OP MITOXANTRONE PREDNISONE Q3W  Treatment Goal: Palliative  Status: Active  Start Date: 7/1/2022  End Date: 1/6/2023 (Planned)  Provider: Hermann Jackson MD  Chemotherapy: predniSONE (DELTASONE) 5 MG tablet, 10 mg (100 % of original dose 10 mg), Oral, Daily, 3 of 10 cycles  Dose modification: 10 mg (original dose 10 mg, Cycle 1)  mitoXANTRONE (NOVANTRONE) 12 mg/m2 = 22 mg in sodium chloride 0.9% 50 mL chemo infusion, 12 mg/m2 = 22 mg, Intravenous, Clinic/HOD 1 time, 3 of 10 cycles  Administration: 22 mg (7/1/2022), 22 mg (7/22/2022)           Amor Alcazar has adjusted to illness with slight difficulty primarily through active coping strategies, focus on alternative activities and focus on family. He expressed that much of his concern stems from care  planning/the wellbeing of his family in the event of his passing. He has engaged in appropriate information gathering.  The patient has excellent family/friend support.  His support system is coping well with the diagnosis/treatment/prognosis but may benefit from additional psychotherapeutic care. Illness-related psychosocial stressors include difficulty meeting family responsibilities, changes in ability to engage in leisure activities and absence from home.  The patient has a excellent partnership with his [facility] treatment team. The patient reports the following barriers to cancer care:none.   Symptoms:   · Mood: denied;  no prior; no SI/HI; NCCN Distress Screener=0/10 (07/22/22)  · Anxiety: Feeling nervous, anxious, or on edge and Fear of unknown; no prior  · Substance abuse: denied  · Cognitive functioning: denied  · Health behaviors: noncontributory  · Sleep:  restful sleep  and no concerns        Assessment - Diagnosis - Goals:       ICD-10-CM ICD-9-CM   1. Adjustment disorder with anxiety  F43.22 309.24   2. Prostate cancer  C61 185         Plan:individual psychotherapy    Summary and Recommendations  Amor Alcazar is a 87 y.o. male referred by Hermann Jackson MD for psychological evaluation and treatment.  Mr. Alcazar appears to be having slight difficulty coping with his diagnosis and proposed treatment course, w/ further concern placed upon advanced care planning.  Mood protective strategies and caregiver wellbeing during cancer treatment were discussed.  He is interested in individual therapy for cancer coping skills and will follow up with me for that purpose. He was encouraged to continue to adapt interests to current abilities. His wife and daughter were made aware of the availability of caregiver support services and provided w/ clinic contact information, in the case that they are needed.    Return to clinic: 2 months    GOALS:   Adaptive coping  Pacing behaviors (follow  up)  Caregiver support (continued)               Coy Burger Psy.D.  Clinical Psychologist  [place] License #[id number]  [place] License #[id number]

## 2022-08-12 NOTE — PROGRESS NOTES
Oncology Nutrition       Weight Check   Chart reviewed. Weight check completed on pt.       Wt Readings from Last 10 Encounters:   08/12/22 68.5 kg (151 lb 0.2 oz)   08/12/22 68.5 kg (151 lb 0.2 oz)   07/22/22 68.2 kg (150 lb 5.7 oz)   07/22/22 68.3 kg (150 lb 9.2 oz)   07/01/22 68 kg (149 lb 14.6 oz)   06/30/22 67.3 kg (148 lb 5.9 oz)   06/27/22 67 kg (147 lb 11.3 oz)   06/15/22 67.1 kg (148 lb)   06/02/22 66.8 kg (147 lb 4.3 oz)   05/25/22 67.1 kg (148 lb)          RD plan of care: Weight is currently 151#. No significant change at this time. Per nursing nutrition risk report, pt denies any nutritional concerns or challenges at this time. RD to continue to monitor prn; no nutritional interventions are needed at this time.     Margaret Knutson, LYNN, LDN  08/12/2022  1:59 PM

## 2022-08-13 RX ORDER — PREDNISONE 5 MG/1
10 TABLET ORAL DAILY
Qty: 180 TABLET | Refills: 2 | Status: SHIPPED | OUTPATIENT
Start: 2022-08-13 | End: 2022-12-15 | Stop reason: SDUPTHER

## 2022-08-18 ENCOUNTER — OFFICE VISIT (OUTPATIENT)
Dept: FAMILY MEDICINE | Facility: CLINIC | Age: 87
End: 2022-08-18
Payer: MEDICARE

## 2022-08-18 VITALS
OXYGEN SATURATION: 100 % | SYSTOLIC BLOOD PRESSURE: 126 MMHG | WEIGHT: 152.25 LBS | DIASTOLIC BLOOD PRESSURE: 64 MMHG | TEMPERATURE: 98 F | HEART RATE: 90 BPM | HEIGHT: 70 IN | RESPIRATION RATE: 20 BRPM | BODY MASS INDEX: 21.8 KG/M2

## 2022-08-18 DIAGNOSIS — C61 METASTASIS FROM HORMONE-REFRACTORY PROSTATE CANCER: ICD-10-CM

## 2022-08-18 DIAGNOSIS — E03.4 HYPOTHYROIDISM DUE TO ACQUIRED ATROPHY OF THYROID: Primary | ICD-10-CM

## 2022-08-18 DIAGNOSIS — C79.9 METASTASIS FROM HORMONE-REFRACTORY PROSTATE CANCER: ICD-10-CM

## 2022-08-18 PROCEDURE — 1101F PT FALLS ASSESS-DOCD LE1/YR: CPT | Mod: CPTII,S$GLB,, | Performed by: FAMILY MEDICINE

## 2022-08-18 PROCEDURE — 3074F SYST BP LT 130 MM HG: CPT | Mod: CPTII,S$GLB,, | Performed by: FAMILY MEDICINE

## 2022-08-18 PROCEDURE — 3078F DIAST BP <80 MM HG: CPT | Mod: CPTII,S$GLB,, | Performed by: FAMILY MEDICINE

## 2022-08-18 PROCEDURE — 3288F FALL RISK ASSESSMENT DOCD: CPT | Mod: CPTII,S$GLB,, | Performed by: FAMILY MEDICINE

## 2022-08-18 PROCEDURE — 3288F PR FALLS RISK ASSESSMENT DOCUMENTED: ICD-10-PCS | Mod: CPTII,S$GLB,, | Performed by: FAMILY MEDICINE

## 2022-08-18 PROCEDURE — 1157F ADVNC CARE PLAN IN RCRD: CPT | Mod: CPTII,S$GLB,, | Performed by: FAMILY MEDICINE

## 2022-08-18 PROCEDURE — 1157F PR ADVANCE CARE PLAN OR EQUIV PRESENT IN MEDICAL RECORD: ICD-10-PCS | Mod: CPTII,S$GLB,, | Performed by: FAMILY MEDICINE

## 2022-08-18 PROCEDURE — 3074F PR MOST RECENT SYSTOLIC BLOOD PRESSURE < 130 MM HG: ICD-10-PCS | Mod: CPTII,S$GLB,, | Performed by: FAMILY MEDICINE

## 2022-08-18 PROCEDURE — 1126F AMNT PAIN NOTED NONE PRSNT: CPT | Mod: CPTII,S$GLB,, | Performed by: FAMILY MEDICINE

## 2022-08-18 PROCEDURE — 3078F PR MOST RECENT DIASTOLIC BLOOD PRESSURE < 80 MM HG: ICD-10-PCS | Mod: CPTII,S$GLB,, | Performed by: FAMILY MEDICINE

## 2022-08-18 PROCEDURE — 1160F PR REVIEW ALL MEDS BY PRESCRIBER/CLIN PHARMACIST DOCUMENTED: ICD-10-PCS | Mod: CPTII,S$GLB,, | Performed by: FAMILY MEDICINE

## 2022-08-18 PROCEDURE — 99213 OFFICE O/P EST LOW 20 MIN: CPT | Mod: S$GLB,,, | Performed by: FAMILY MEDICINE

## 2022-08-18 PROCEDURE — 1159F MED LIST DOCD IN RCRD: CPT | Mod: CPTII,S$GLB,, | Performed by: FAMILY MEDICINE

## 2022-08-18 PROCEDURE — 99213 PR OFFICE/OUTPT VISIT, EST, LEVL III, 20-29 MIN: ICD-10-PCS | Mod: S$GLB,,, | Performed by: FAMILY MEDICINE

## 2022-08-18 PROCEDURE — 1101F PR PT FALLS ASSESS DOC 0-1 FALLS W/OUT INJ PAST YR: ICD-10-PCS | Mod: CPTII,S$GLB,, | Performed by: FAMILY MEDICINE

## 2022-08-18 PROCEDURE — 1159F PR MEDICATION LIST DOCUMENTED IN MEDICAL RECORD: ICD-10-PCS | Mod: CPTII,S$GLB,, | Performed by: FAMILY MEDICINE

## 2022-08-18 PROCEDURE — 1160F RVW MEDS BY RX/DR IN RCRD: CPT | Mod: CPTII,S$GLB,, | Performed by: FAMILY MEDICINE

## 2022-08-18 PROCEDURE — 1126F PR PAIN SEVERITY QUANTIFIED, NO PAIN PRESENT: ICD-10-PCS | Mod: CPTII,S$GLB,, | Performed by: FAMILY MEDICINE

## 2022-08-26 ENCOUNTER — CLINICAL SUPPORT (OUTPATIENT)
Dept: CARDIOLOGY | Facility: HOSPITAL | Age: 87
End: 2022-08-26
Payer: MEDICARE

## 2022-08-26 VITALS — WEIGHT: 152 LBS | HEIGHT: 70 IN | BODY MASS INDEX: 21.76 KG/M2

## 2022-08-26 DIAGNOSIS — R00.9 ABNORMAL HEART RATE: ICD-10-CM

## 2022-08-26 LAB
ASCENDING AORTA: 3.12 CM
AV INDEX (PROSTH): 0.43
AV MEAN GRADIENT: 10 MMHG
AV PEAK GRADIENT: 18 MMHG
AV VALVE AREA: 1.72 CM2
AV VELOCITY RATIO: 0.46
BSA FOR ECHO PROCEDURE: 1.85 M2
CV ECHO LV RWT: 0.59 CM
DOP CALC AO PEAK VEL: 2.13 M/S
DOP CALC AO VTI: 39.4 CM
DOP CALC LVOT AREA: 4 CM2
DOP CALC LVOT DIAMETER: 2.25 CM
DOP CALC LVOT PEAK VEL: 0.99 M/S
DOP CALC LVOT STROKE VOLUME: 67.96 CM3
DOP CALCLVOT PEAK VEL VTI: 17.1 CM
E WAVE DECELERATION TIME: 174.37 MSEC
E/A RATIO: 0.76
E/E' RATIO: 8.35 M/S
ECHO LV POSTERIOR WALL: 0.9 CM (ref 0.6–1.1)
EJECTION FRACTION: 60 %
FRACTIONAL SHORTENING: 31 % (ref 28–44)
INTERVENTRICULAR SEPTUM: 0.86 CM (ref 0.6–1.1)
IVRT: 119.89 MSEC
LA MAJOR: 4.59 CM
LA MINOR: 4.64 CM
LA WIDTH: 3.9 CM
LEFT ATRIUM SIZE: 3.09 CM
LEFT ATRIUM VOLUME INDEX: 25.4 ML/M2
LEFT ATRIUM VOLUME: 47.27 CM3
LEFT INTERNAL DIMENSION IN SYSTOLE: 2.09 CM (ref 2.1–4)
LEFT VENTRICLE DIASTOLIC VOLUME INDEX: 19.42 ML/M2
LEFT VENTRICLE DIASTOLIC VOLUME: 36.13 ML
LEFT VENTRICLE MASS INDEX: 37 G/M2
LEFT VENTRICLE SYSTOLIC VOLUME INDEX: 7.6 ML/M2
LEFT VENTRICLE SYSTOLIC VOLUME: 14.22 ML
LEFT VENTRICULAR INTERNAL DIMENSION IN DIASTOLE: 3.04 CM (ref 3.5–6)
LEFT VENTRICULAR MASS: 69.24 G
LV LATERAL E/E' RATIO: 7.89 M/S
LV SEPTAL E/E' RATIO: 8.88 M/S
LVOT MG: 2.09 MMHG
LVOT MV: 0.69 CM/S
MV PEAK A VEL: 0.94 M/S
MV PEAK E VEL: 0.71 M/S
MV STENOSIS PRESSURE HALF TIME: 50.57 MS
MV VALVE AREA P 1/2 METHOD: 4.35 CM2
PISA MRMAX VEL: 5.18 M/S
PISA TR MAX VEL: 2.73 M/S
PULM VEIN S/D RATIO: 2.65
PV PEAK D VEL: 0.2 M/S
PV PEAK S VEL: 0.53 M/S
RA MAJOR: 4.35 CM
RA PRESSURE: 3 MMHG
RA WIDTH: 3.33 CM
RIGHT VENTRICULAR END-DIASTOLIC DIMENSION: 3.82 CM
RIGHT VENTRICULAR LENGTH IN DIASTOLE (APICAL 4-CHAMBER VIEW): 4.35 CM
RV MID DIAMA: 3.95 CM
RV TISSUE DOPPLER FREE WALL SYSTOLIC VELOCITY 1 (APICAL 4 CHAMBER VIEW): 0.02 CM/S
SINUS: 3.74 CM
STJ: 2.99 CM
TDI LATERAL: 0.09 M/S
TDI SEPTAL: 0.08 M/S
TDI: 0.09 M/S
TR MAX PG: 30 MMHG
TRICUSPID ANNULAR PLANE SYSTOLIC EXCURSION: 1.9 CM
TV REST PULMONARY ARTERY PRESSURE: 33 MMHG

## 2022-08-26 PROCEDURE — 93356 MYOCRD STRAIN IMG SPCKL TRCK: CPT | Mod: PO

## 2022-08-26 PROCEDURE — 93306 ECHO (CUPID ONLY): ICD-10-PCS | Mod: 26,,, | Performed by: INTERNAL MEDICINE

## 2022-08-26 PROCEDURE — 93356 ECHO (CUPID ONLY): ICD-10-PCS | Mod: ,,, | Performed by: INTERNAL MEDICINE

## 2022-08-26 PROCEDURE — 93356 MYOCRD STRAIN IMG SPCKL TRCK: CPT | Mod: ,,, | Performed by: INTERNAL MEDICINE

## 2022-08-26 PROCEDURE — 93306 TTE W/DOPPLER COMPLETE: CPT | Mod: 26,,, | Performed by: INTERNAL MEDICINE

## 2022-08-28 NOTE — PROGRESS NOTES
Subjective:       Patient ID: Amor Alcazar is a 87 y.o. male.    Chief Complaint: Follow-up    HPI  The patient is an 87-year-old who is here today for follow-up.  Since I have seen him last, he has been diagnosed with metastatic prostate cancer.  He is undergoing chemotherapy with oncology in addition to his hormonal injections with Urology.  He is following closely with his urologist and oncologist.  He is able to do all of his ADLs.  His appetite has been good.  He and his wife feel that he is tolerating these treatments well and much better than the anticipated.    Regarding his hypothyroidism, he is doing well with his Synthroid.  We discussed checking a TSH with his next set of labs which he would be willing to do    Review of Systems   Constitutional:  Negative for appetite change, chills, diaphoresis, fatigue, fever and unexpected weight change.   HENT:  Negative for congestion, ear pain, postnasal drip, rhinorrhea, sinus pressure, sneezing, sore throat and trouble swallowing.    Eyes:  Negative for pain, discharge and visual disturbance.   Respiratory:  Negative for cough, chest tightness, shortness of breath and wheezing.    Cardiovascular:  Negative for chest pain, palpitations and leg swelling.   Gastrointestinal:  Negative for abdominal distention, abdominal pain, blood in stool, constipation, diarrhea, nausea and vomiting.   Skin:  Negative for rash.     Objective:      Physical Exam  Constitutional:       General: He is not in acute distress.     Appearance: Normal appearance. He is well-developed.   HENT:      Head: Normocephalic and atraumatic.      Right Ear: Hearing, tympanic membrane, ear canal and external ear normal.      Left Ear: Hearing, tympanic membrane, ear canal and external ear normal.      Nose: Nose normal.      Mouth/Throat:      Mouth: No oral lesions.      Pharynx: No oropharyngeal exudate or posterior oropharyngeal erythema.   Eyes:      General: Lids are normal. No scleral  "icterus.     Extraocular Movements: Extraocular movements intact.      Conjunctiva/sclera: Conjunctivae normal.      Pupils: Pupils are equal, round, and reactive to light.   Neck:      Thyroid: No thyroid mass or thyromegaly.      Vascular: No carotid bruit.   Cardiovascular:      Rate and Rhythm: Normal rate and regular rhythm. No extrasystoles are present.     Chest Wall: PMI is not displaced.      Heart sounds: Normal heart sounds. No murmur heard.    No gallop.   Pulmonary:      Effort: Pulmonary effort is normal. No accessory muscle usage or respiratory distress.      Breath sounds: Normal breath sounds.   Abdominal:      General: Bowel sounds are normal. There is no abdominal bruit.      Palpations: Abdomen is soft.      Tenderness: There is no abdominal tenderness. There is no rebound.   Musculoskeletal:      Cervical back: Normal range of motion and neck supple.   Lymphadenopathy:      Head:      Right side of head: No submental or submandibular adenopathy.      Left side of head: No submental or submandibular adenopathy.      Cervical:      Right cervical: No superficial, deep or posterior cervical adenopathy.     Left cervical: No superficial, deep or posterior cervical adenopathy.      Upper Body:      Right upper body: No supraclavicular adenopathy.      Left upper body: No supraclavicular adenopathy.   Skin:     General: Skin is warm and dry.   Neurological:      Mental Status: He is alert and oriented to person, place, and time.     Blood pressure 126/64, pulse 90, temperature 98.4 °F (36.9 °C), resp. rate 20, height 5' 10" (1.778 m), weight 69 kg (152 lb 3.6 oz), SpO2 100 %.Body mass index is 21.84 kg/m².            A/P:  1) metastatic prostate cancer.  Follow-up with Oncology and Urology continue with medication under their direction  2) hypothyroidism.  Previously well controlled.  Continue with Synthroid.  We will check a TSH with his next set of labs        As long as he does well, I will see him " back in 6 months or sooner if needed

## 2022-09-01 ENCOUNTER — PATIENT MESSAGE (OUTPATIENT)
Dept: HEMATOLOGY/ONCOLOGY | Facility: CLINIC | Age: 87
End: 2022-09-01
Payer: MEDICARE

## 2022-09-01 ENCOUNTER — TELEPHONE (OUTPATIENT)
Dept: HEMATOLOGY/ONCOLOGY | Facility: CLINIC | Age: 87
End: 2022-09-01
Payer: MEDICARE

## 2022-09-02 ENCOUNTER — OFFICE VISIT (OUTPATIENT)
Dept: HEMATOLOGY/ONCOLOGY | Facility: CLINIC | Age: 87
End: 2022-09-02
Payer: MEDICARE

## 2022-09-02 ENCOUNTER — INFUSION (OUTPATIENT)
Dept: INFUSION THERAPY | Facility: HOSPITAL | Age: 87
End: 2022-09-02
Attending: INTERNAL MEDICINE
Payer: MEDICARE

## 2022-09-02 VITALS
BODY MASS INDEX: 21.64 KG/M2 | TEMPERATURE: 98 F | HEIGHT: 70 IN | OXYGEN SATURATION: 99 % | DIASTOLIC BLOOD PRESSURE: 58 MMHG | SYSTOLIC BLOOD PRESSURE: 111 MMHG | WEIGHT: 151.13 LBS | RESPIRATION RATE: 18 BRPM | HEART RATE: 85 BPM

## 2022-09-02 VITALS
RESPIRATION RATE: 18 BRPM | WEIGHT: 151.13 LBS | TEMPERATURE: 98 F | HEART RATE: 83 BPM | BODY MASS INDEX: 21.64 KG/M2 | SYSTOLIC BLOOD PRESSURE: 131 MMHG | DIASTOLIC BLOOD PRESSURE: 65 MMHG | HEIGHT: 70 IN | OXYGEN SATURATION: 99 %

## 2022-09-02 DIAGNOSIS — D64.9 ANEMIA, UNSPECIFIED TYPE: ICD-10-CM

## 2022-09-02 DIAGNOSIS — C61 PROSTATE CANCER: Primary | ICD-10-CM

## 2022-09-02 DIAGNOSIS — C77.1 METASTASIS TO MEDIASTINAL LYMPH NODE: ICD-10-CM

## 2022-09-02 PROCEDURE — 99999 PR PBB SHADOW E&M-EST. PATIENT-LVL IV: ICD-10-PCS | Mod: PBBFAC,,, | Performed by: INTERNAL MEDICINE

## 2022-09-02 PROCEDURE — 99214 OFFICE O/P EST MOD 30 MIN: CPT | Mod: S$GLB,,, | Performed by: INTERNAL MEDICINE

## 2022-09-02 PROCEDURE — 99999 PR PBB SHADOW E&M-EST. PATIENT-LVL IV: CPT | Mod: PBBFAC,,, | Performed by: INTERNAL MEDICINE

## 2022-09-02 PROCEDURE — 1159F MED LIST DOCD IN RCRD: CPT | Mod: CPTII,S$GLB,, | Performed by: INTERNAL MEDICINE

## 2022-09-02 PROCEDURE — 3078F PR MOST RECENT DIASTOLIC BLOOD PRESSURE < 80 MM HG: ICD-10-PCS | Mod: CPTII,S$GLB,, | Performed by: INTERNAL MEDICINE

## 2022-09-02 PROCEDURE — 1101F PR PT FALLS ASSESS DOC 0-1 FALLS W/OUT INJ PAST YR: ICD-10-PCS | Mod: CPTII,S$GLB,, | Performed by: INTERNAL MEDICINE

## 2022-09-02 PROCEDURE — 1160F PR REVIEW ALL MEDS BY PRESCRIBER/CLIN PHARMACIST DOCUMENTED: ICD-10-PCS | Mod: CPTII,S$GLB,, | Performed by: INTERNAL MEDICINE

## 2022-09-02 PROCEDURE — 1126F PR PAIN SEVERITY QUANTIFIED, NO PAIN PRESENT: ICD-10-PCS | Mod: CPTII,S$GLB,, | Performed by: INTERNAL MEDICINE

## 2022-09-02 PROCEDURE — 3288F FALL RISK ASSESSMENT DOCD: CPT | Mod: CPTII,S$GLB,, | Performed by: INTERNAL MEDICINE

## 2022-09-02 PROCEDURE — 3288F PR FALLS RISK ASSESSMENT DOCUMENTED: ICD-10-PCS | Mod: CPTII,S$GLB,, | Performed by: INTERNAL MEDICINE

## 2022-09-02 PROCEDURE — 25000003 PHARM REV CODE 250: Mod: PN | Performed by: INTERNAL MEDICINE

## 2022-09-02 PROCEDURE — 99214 PR OFFICE/OUTPT VISIT, EST, LEVL IV, 30-39 MIN: ICD-10-PCS | Mod: S$GLB,,, | Performed by: INTERNAL MEDICINE

## 2022-09-02 PROCEDURE — 1160F RVW MEDS BY RX/DR IN RCRD: CPT | Mod: CPTII,S$GLB,, | Performed by: INTERNAL MEDICINE

## 2022-09-02 PROCEDURE — 1126F AMNT PAIN NOTED NONE PRSNT: CPT | Mod: CPTII,S$GLB,, | Performed by: INTERNAL MEDICINE

## 2022-09-02 PROCEDURE — 3075F SYST BP GE 130 - 139MM HG: CPT | Mod: CPTII,S$GLB,, | Performed by: INTERNAL MEDICINE

## 2022-09-02 PROCEDURE — 1157F ADVNC CARE PLAN IN RCRD: CPT | Mod: CPTII,S$GLB,, | Performed by: INTERNAL MEDICINE

## 2022-09-02 PROCEDURE — 96375 TX/PRO/DX INJ NEW DRUG ADDON: CPT | Mod: PN

## 2022-09-02 PROCEDURE — 3078F DIAST BP <80 MM HG: CPT | Mod: CPTII,S$GLB,, | Performed by: INTERNAL MEDICINE

## 2022-09-02 PROCEDURE — 1157F PR ADVANCE CARE PLAN OR EQUIV PRESENT IN MEDICAL RECORD: ICD-10-PCS | Mod: CPTII,S$GLB,, | Performed by: INTERNAL MEDICINE

## 2022-09-02 PROCEDURE — 3075F PR MOST RECENT SYSTOLIC BLOOD PRESS GE 130-139MM HG: ICD-10-PCS | Mod: CPTII,S$GLB,, | Performed by: INTERNAL MEDICINE

## 2022-09-02 PROCEDURE — 63600175 PHARM REV CODE 636 W HCPCS: Mod: JG,PN | Performed by: INTERNAL MEDICINE

## 2022-09-02 PROCEDURE — 1159F PR MEDICATION LIST DOCUMENTED IN MEDICAL RECORD: ICD-10-PCS | Mod: CPTII,S$GLB,, | Performed by: INTERNAL MEDICINE

## 2022-09-02 PROCEDURE — 1101F PT FALLS ASSESS-DOCD LE1/YR: CPT | Mod: CPTII,S$GLB,, | Performed by: INTERNAL MEDICINE

## 2022-09-02 PROCEDURE — 96413 CHEMO IV INFUSION 1 HR: CPT | Mod: PN

## 2022-09-02 RX ORDER — HEPARIN 100 UNIT/ML
500 SYRINGE INTRAVENOUS
Status: DISCONTINUED | OUTPATIENT
Start: 2022-09-02 | End: 2022-09-02 | Stop reason: HOSPADM

## 2022-09-02 RX ORDER — ONDANSETRON 2 MG/ML
8 INJECTION INTRAMUSCULAR; INTRAVENOUS
Status: CANCELLED | OUTPATIENT
Start: 2022-09-02

## 2022-09-02 RX ORDER — HEPARIN 100 UNIT/ML
500 SYRINGE INTRAVENOUS
Status: CANCELLED | OUTPATIENT
Start: 2022-09-02

## 2022-09-02 RX ORDER — SODIUM CHLORIDE 0.9 % (FLUSH) 0.9 %
10 SYRINGE (ML) INJECTION
Status: CANCELLED | OUTPATIENT
Start: 2022-09-02

## 2022-09-02 RX ORDER — PREDNISONE 5 MG/1
10 TABLET ORAL DAILY
Qty: 90 TABLET | Refills: 2
Start: 2022-09-02 | End: 2022-12-15 | Stop reason: SDUPTHER

## 2022-09-02 RX ORDER — SODIUM CHLORIDE 0.9 % (FLUSH) 0.9 %
10 SYRINGE (ML) INJECTION
Status: DISCONTINUED | OUTPATIENT
Start: 2022-09-02 | End: 2022-09-02 | Stop reason: HOSPADM

## 2022-09-02 RX ORDER — ONDANSETRON 2 MG/ML
8 INJECTION INTRAMUSCULAR; INTRAVENOUS
Status: COMPLETED | OUTPATIENT
Start: 2022-09-02 | End: 2022-09-02

## 2022-09-02 RX ADMIN — ONDANSETRON 8 MG: 2 INJECTION INTRAMUSCULAR; INTRAVENOUS at 11:09

## 2022-09-02 RX ADMIN — MITOXANTRONE 22 MG: 2 INJECTION, SOLUTION, CONCENTRATE INTRAVENOUS at 11:09

## 2022-09-02 RX ADMIN — SODIUM CHLORIDE: 0.9 INJECTION, SOLUTION INTRAVENOUS at 11:09

## 2022-09-02 RX ADMIN — Medication 500 UNITS: at 12:09

## 2022-09-02 NOTE — PROGRESS NOTES
History of present illness:  The patient is an 87-year-old white gentleman previously followed by Dr. Hogan for iron deficiency anemia.  Patient has previously received Venofer.  Patient has some difficulties with chronic mild anemia in part related to underlying chronic kidney disease as well as prostate carcinoma for which patient sees Dr. Bellamy and is managed with Lupron/Earleada.  Patient has had a rising PSA despite ongoing therapy.  Dr. Bellamy subsequently obtain fluciclovine PET scan and has been referred back to our clinic for additional evaluation.  He has also been seen by our nurse practitioners in regard to recurrent difficulties with iron deficiency anemia.  Patient repeated a course of Venofer x5 doses without significant increment in his counts.  Patient denies difficulties with pain, weight loss, or urination.  He remains active but needs to pace himself for his daily chores.    Patient has undergone interval bone marrow aspiration and biopsy which is consistent with chronic disease physiology anemia.  Patient has begun palliative Novantrone/prednisone for his prostate carcinoma and returns to clinic for re-evaluation prior to cycle 4 of therapy.  He remains on biannual Lupron.  Patient had no difficulties with 1st 3 cycles of Novantrone.  He did not experience nausea, vomiting, fevers, chills, mouth sores, diarrhea etcetera.    Physical examination:  Well-developed, well-nourished, elderly, white gentleman, in no acute distress, who has a weight of 151 lb (increased by 0.5 lb).  VITAL SIGNS: Documented  and reviewed this visit.  HEENT: Normocephalic, atraumatic. Oral mucosa pink and moist. Lips without lesions. Tongue midline. Oropharynx clear. Nonicteric sclerae.   NECK: Supple, no adenopathy. No carotid bruits, thyromegaly or thyroid nodule.   HEART: Regular rate and rhythm without murmur, gallop or rub.   LUNGS: Clear to auscultation bilaterally. Normal respiratory effort.   ABDOMEN: Soft,  nontender, nondistended with positive normoactive bowel sounds, no hepatosplenomegaly.   EXTREMITIES: No cyanosis, clubbing or edema. Distal pulses are intact.   AXILLAE AND GROIN: No palpable pathologic lymphadenopathy is appreciated.   SKIN: Intact/turgor normal.  MediPort catheter present within the left chest.  Wound well approximated, healed, and free from signs of infection.    NEUROLOGIC: Cranial nerves II-XII grossly intact. Motor: Good muscle bulk and tone. Strength/sensory 5/5 throughout. Gait stable.     Laboratory:  White count 8.4, hemoglobin 11.4, hematocrit 34.3, platelets 168, absolute neutrophil count 5500.    Sodium 141, potassium 4.3, chloride 106, CO2 21, BUN 21, creatinine 1.2, glucose 112, calcium 9.3, magnesium 2.2, liver function test within normal limits, , GFR 59.  PSA is pending the time of dictation (5.0, 3.8, 3.6).       Guardant 360 Panel:  TP 53 mutation positive.  No evidence of MSI high.    Impression:  1. 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  2. Chronic disease physiology anemia.    Plan:  1. Proceed with cycle 4 of Novantrone/prednisone.  2. Re-evaluate 3 weeks from now with interval CBC, CMP, LDH, magnesium, and PSA.    This note was created using voice recognition software and may contain grammatical errors.

## 2022-09-02 NOTE — PLAN OF CARE
Problem: Adult Inpatient Plan of Care  Goal: Plan of Care Review  Outcome: Ongoing, Progressing  Flowsheets (Taken 9/2/2022 1230)  Plan of Care Reviewed With:   patient   spouse  Goal: Patient-Specific Goal (Individualized)  Outcome: Ongoing, Progressing  Flowsheets (Taken 9/2/2022 1230)  Anxieties, Fears or Concerns: None  Individualized Care Needs: Recliner, warm blanket  Patient-Specific Goals (Include Timeframe): Free of S/S of reaction with infusion.     Problem: Fatigue  Goal: Improved Activity Tolerance  Outcome: Ongoing, Progressing  Intervention: Promote Improved Energy  Flowsheets (Taken 9/2/2022 1230)  Fatigue Management:   frequent rest breaks encouraged   paced activity encouraged  Sleep/Rest Enhancement: regular sleep/rest pattern promoted  Activity Management: Ambulated -L4      Patient to Infusion for Mitoxantrone following appointment with the provider. Treatment plan reviewed with patient. VSS. Tolerated treatment. Provided with copy of upcoming appointment schedule. Escorted to the front lobby for discharge to home.

## 2022-09-03 ENCOUNTER — PATIENT MESSAGE (OUTPATIENT)
Dept: FAMILY MEDICINE | Facility: CLINIC | Age: 87
End: 2022-09-03
Payer: MEDICARE

## 2022-09-03 DIAGNOSIS — E03.4 HYPOTHYROIDISM DUE TO ACQUIRED ATROPHY OF THYROID: Primary | ICD-10-CM

## 2022-09-20 ENCOUNTER — LAB VISIT (OUTPATIENT)
Dept: LAB | Facility: HOSPITAL | Age: 87
End: 2022-09-20
Attending: INTERNAL MEDICINE
Payer: MEDICARE

## 2022-09-20 DIAGNOSIS — C61 PROSTATE CANCER: ICD-10-CM

## 2022-09-20 LAB
ALBUMIN SERPL BCP-MCNC: 3.8 G/DL (ref 3.5–5.2)
ALP SERPL-CCNC: 17 U/L (ref 55–135)
ALT SERPL W/O P-5'-P-CCNC: 9 U/L (ref 10–44)
ANION GAP SERPL CALC-SCNC: 10 MMOL/L (ref 8–16)
AST SERPL-CCNC: 20 U/L (ref 10–40)
BASOPHILS # BLD AUTO: 0.05 K/UL (ref 0–0.2)
BASOPHILS NFR BLD: 0.9 % (ref 0–1.9)
BILIRUB SERPL-MCNC: 0.3 MG/DL (ref 0.1–1)
BUN SERPL-MCNC: 23 MG/DL (ref 8–23)
CALCIUM SERPL-MCNC: 9.7 MG/DL (ref 8.7–10.5)
CHLORIDE SERPL-SCNC: 106 MMOL/L (ref 95–110)
CO2 SERPL-SCNC: 25 MMOL/L (ref 23–29)
COMPLEXED PSA SERPL-MCNC: 5.1 NG/ML (ref 0–4)
CREAT SERPL-MCNC: 1.2 MG/DL (ref 0.5–1.4)
DIFFERENTIAL METHOD: ABNORMAL
EOSINOPHIL # BLD AUTO: 0 K/UL (ref 0–0.5)
EOSINOPHIL NFR BLD: 0.4 % (ref 0–8)
ERYTHROCYTE [DISTWIDTH] IN BLOOD BY AUTOMATED COUNT: 14.6 % (ref 11.5–14.5)
EST. GFR  (NO RACE VARIABLE): 59.3 ML/MIN/1.73 M^2
GLUCOSE SERPL-MCNC: 113 MG/DL (ref 70–110)
HCT VFR BLD AUTO: 34.8 % (ref 40–54)
HGB BLD-MCNC: 11.7 G/DL (ref 14–18)
IMM GRANULOCYTES # BLD AUTO: 0.2 K/UL (ref 0–0.04)
IMM GRANULOCYTES NFR BLD AUTO: 3.6 % (ref 0–0.5)
LDH SERPL L TO P-CCNC: 193 U/L (ref 110–260)
LYMPHOCYTES # BLD AUTO: 0.9 K/UL (ref 1–4.8)
LYMPHOCYTES NFR BLD: 16.1 % (ref 18–48)
MAGNESIUM SERPL-MCNC: 2.3 MG/DL (ref 1.6–2.6)
MCH RBC QN AUTO: 32.7 PG (ref 27–31)
MCHC RBC AUTO-ENTMCNC: 33.6 G/DL (ref 32–36)
MCV RBC AUTO: 97 FL (ref 82–98)
MONOCYTES # BLD AUTO: 0.7 K/UL (ref 0.3–1)
MONOCYTES NFR BLD: 13.5 % (ref 4–15)
NEUTROPHILS # BLD AUTO: 3.6 K/UL (ref 1.8–7.7)
NEUTROPHILS NFR BLD: 65.5 % (ref 38–73)
NRBC BLD-RTO: 0 /100 WBC
PLATELET # BLD AUTO: 169 K/UL (ref 150–450)
PMV BLD AUTO: 8.9 FL (ref 9.2–12.9)
POTASSIUM SERPL-SCNC: 4 MMOL/L (ref 3.5–5.1)
PROT SERPL-MCNC: 7.4 G/DL (ref 6–8.4)
RBC # BLD AUTO: 3.58 M/UL (ref 4.6–6.2)
SODIUM SERPL-SCNC: 141 MMOL/L (ref 136–145)
WBC # BLD AUTO: 5.48 K/UL (ref 3.9–12.7)

## 2022-09-20 PROCEDURE — 84153 ASSAY OF PSA TOTAL: CPT | Performed by: INTERNAL MEDICINE

## 2022-09-20 PROCEDURE — 83735 ASSAY OF MAGNESIUM: CPT | Mod: PN | Performed by: INTERNAL MEDICINE

## 2022-09-20 PROCEDURE — 36415 COLL VENOUS BLD VENIPUNCTURE: CPT | Mod: PN | Performed by: INTERNAL MEDICINE

## 2022-09-20 PROCEDURE — 83615 LACTATE (LD) (LDH) ENZYME: CPT | Mod: PN | Performed by: INTERNAL MEDICINE

## 2022-09-20 PROCEDURE — 80053 COMPREHEN METABOLIC PANEL: CPT | Mod: PN | Performed by: INTERNAL MEDICINE

## 2022-09-20 PROCEDURE — 85025 COMPLETE CBC W/AUTO DIFF WBC: CPT | Mod: PN | Performed by: INTERNAL MEDICINE

## 2022-09-21 ENCOUNTER — TELEPHONE (OUTPATIENT)
Dept: HEMATOLOGY/ONCOLOGY | Facility: CLINIC | Age: 87
End: 2022-09-21
Payer: MEDICARE

## 2022-09-21 ENCOUNTER — PATIENT MESSAGE (OUTPATIENT)
Dept: HEMATOLOGY/ONCOLOGY | Facility: CLINIC | Age: 87
End: 2022-09-21
Payer: MEDICARE

## 2022-09-21 NOTE — TELEPHONE ENCOUNTER
I spoke with the patient's wife about getting an IO appt scheduled per the recommendation of his Hem/Onc MD. I explained in detail what we offer and listed some symptoms/side effects that we can help address using our support services. She voiced understanding but denied scheduling at this time. She said he has no s/e currently. Location was reviewed and a message was sent to the portal if they have any follow up questions.

## 2022-09-23 ENCOUNTER — PATIENT MESSAGE (OUTPATIENT)
Dept: HEMATOLOGY/ONCOLOGY | Facility: CLINIC | Age: 87
End: 2022-09-23

## 2022-09-23 ENCOUNTER — TELEPHONE (OUTPATIENT)
Dept: HEMATOLOGY/ONCOLOGY | Facility: CLINIC | Age: 87
End: 2022-09-23
Payer: MEDICARE

## 2022-09-23 ENCOUNTER — OFFICE VISIT (OUTPATIENT)
Dept: PSYCHIATRY | Facility: CLINIC | Age: 87
End: 2022-09-23
Payer: MEDICARE

## 2022-09-23 ENCOUNTER — INFUSION (OUTPATIENT)
Dept: INFUSION THERAPY | Facility: HOSPITAL | Age: 87
End: 2022-09-23
Attending: INTERNAL MEDICINE
Payer: MEDICARE

## 2022-09-23 ENCOUNTER — OFFICE VISIT (OUTPATIENT)
Dept: HEMATOLOGY/ONCOLOGY | Facility: CLINIC | Age: 87
End: 2022-09-23
Payer: MEDICARE

## 2022-09-23 VITALS
TEMPERATURE: 97 F | RESPIRATION RATE: 18 BRPM | WEIGHT: 153.25 LBS | DIASTOLIC BLOOD PRESSURE: 71 MMHG | HEART RATE: 80 BPM | BODY MASS INDEX: 21.94 KG/M2 | OXYGEN SATURATION: 98 % | SYSTOLIC BLOOD PRESSURE: 120 MMHG | HEIGHT: 70 IN

## 2022-09-23 VITALS
HEART RATE: 68 BPM | SYSTOLIC BLOOD PRESSURE: 112 MMHG | DIASTOLIC BLOOD PRESSURE: 61 MMHG | RESPIRATION RATE: 18 BRPM | WEIGHT: 153.25 LBS | HEIGHT: 70 IN | OXYGEN SATURATION: 98 % | BODY MASS INDEX: 21.94 KG/M2 | TEMPERATURE: 97 F

## 2022-09-23 DIAGNOSIS — C61 PROSTATE CANCER: Primary | ICD-10-CM

## 2022-09-23 DIAGNOSIS — E03.9 HYPOTHYROIDISM, UNSPECIFIED TYPE: ICD-10-CM

## 2022-09-23 DIAGNOSIS — C61 PROSTATE CANCER: ICD-10-CM

## 2022-09-23 DIAGNOSIS — F43.22 ADJUSTMENT DISORDER WITH ANXIETY: Primary | ICD-10-CM

## 2022-09-23 DIAGNOSIS — D63.8 ANEMIA OF CHRONIC DISEASE: ICD-10-CM

## 2022-09-23 DIAGNOSIS — C77.1 METASTASIS TO MEDIASTINAL LYMPH NODE: ICD-10-CM

## 2022-09-23 PROCEDURE — 99215 PR OFFICE/OUTPT VISIT, EST, LEVL V, 40-54 MIN: ICD-10-PCS | Mod: S$GLB,,, | Performed by: INTERNAL MEDICINE

## 2022-09-23 PROCEDURE — 99215 OFFICE O/P EST HI 40 MIN: CPT | Mod: S$GLB,,, | Performed by: INTERNAL MEDICINE

## 2022-09-23 PROCEDURE — 90832 PSYTX W PT 30 MINUTES: CPT | Mod: S$GLB,,, | Performed by: PSYCHOLOGIST

## 2022-09-23 PROCEDURE — 25000003 PHARM REV CODE 250: Mod: PN | Performed by: INTERNAL MEDICINE

## 2022-09-23 PROCEDURE — 96375 TX/PRO/DX INJ NEW DRUG ADDON: CPT | Mod: PN

## 2022-09-23 PROCEDURE — 99999 PR PBB SHADOW E&M-EST. PATIENT-LVL IV: CPT | Mod: PBBFAC,,, | Performed by: INTERNAL MEDICINE

## 2022-09-23 PROCEDURE — 1157F ADVNC CARE PLAN IN RCRD: CPT | Mod: CPTII,S$GLB,, | Performed by: INTERNAL MEDICINE

## 2022-09-23 PROCEDURE — 3078F PR MOST RECENT DIASTOLIC BLOOD PRESSURE < 80 MM HG: ICD-10-PCS | Mod: CPTII,S$GLB,, | Performed by: INTERNAL MEDICINE

## 2022-09-23 PROCEDURE — 1126F AMNT PAIN NOTED NONE PRSNT: CPT | Mod: CPTII,S$GLB,, | Performed by: INTERNAL MEDICINE

## 2022-09-23 PROCEDURE — 3288F PR FALLS RISK ASSESSMENT DOCUMENTED: ICD-10-PCS | Mod: CPTII,S$GLB,, | Performed by: INTERNAL MEDICINE

## 2022-09-23 PROCEDURE — 1126F PR PAIN SEVERITY QUANTIFIED, NO PAIN PRESENT: ICD-10-PCS | Mod: CPTII,S$GLB,, | Performed by: INTERNAL MEDICINE

## 2022-09-23 PROCEDURE — 1101F PR PT FALLS ASSESS DOC 0-1 FALLS W/OUT INJ PAST YR: ICD-10-PCS | Mod: CPTII,S$GLB,, | Performed by: INTERNAL MEDICINE

## 2022-09-23 PROCEDURE — 63600175 PHARM REV CODE 636 W HCPCS: Mod: PN | Performed by: INTERNAL MEDICINE

## 2022-09-23 PROCEDURE — 1157F PR ADVANCE CARE PLAN OR EQUIV PRESENT IN MEDICAL RECORD: ICD-10-PCS | Mod: CPTII,S$GLB,, | Performed by: INTERNAL MEDICINE

## 2022-09-23 PROCEDURE — 90832 PR PSYCHOTHERAPY W/PATIENT, 30 MIN: ICD-10-PCS | Mod: S$GLB,,, | Performed by: PSYCHOLOGIST

## 2022-09-23 PROCEDURE — 3074F PR MOST RECENT SYSTOLIC BLOOD PRESSURE < 130 MM HG: ICD-10-PCS | Mod: CPTII,S$GLB,, | Performed by: INTERNAL MEDICINE

## 2022-09-23 PROCEDURE — 3074F SYST BP LT 130 MM HG: CPT | Mod: CPTII,S$GLB,, | Performed by: INTERNAL MEDICINE

## 2022-09-23 PROCEDURE — 1101F PT FALLS ASSESS-DOCD LE1/YR: CPT | Mod: CPTII,S$GLB,, | Performed by: INTERNAL MEDICINE

## 2022-09-23 PROCEDURE — 3288F FALL RISK ASSESSMENT DOCD: CPT | Mod: CPTII,S$GLB,, | Performed by: INTERNAL MEDICINE

## 2022-09-23 PROCEDURE — 3078F DIAST BP <80 MM HG: CPT | Mod: CPTII,S$GLB,, | Performed by: INTERNAL MEDICINE

## 2022-09-23 PROCEDURE — 1157F ADVNC CARE PLAN IN RCRD: CPT | Mod: CPTII,S$GLB,, | Performed by: PSYCHOLOGIST

## 2022-09-23 PROCEDURE — 96413 CHEMO IV INFUSION 1 HR: CPT | Mod: PN

## 2022-09-23 PROCEDURE — 99999 PR PBB SHADOW E&M-EST. PATIENT-LVL IV: ICD-10-PCS | Mod: PBBFAC,,, | Performed by: INTERNAL MEDICINE

## 2022-09-23 PROCEDURE — 1157F PR ADVANCE CARE PLAN OR EQUIV PRESENT IN MEDICAL RECORD: ICD-10-PCS | Mod: CPTII,S$GLB,, | Performed by: PSYCHOLOGIST

## 2022-09-23 RX ORDER — ONDANSETRON 2 MG/ML
8 INJECTION INTRAMUSCULAR; INTRAVENOUS
Status: COMPLETED | OUTPATIENT
Start: 2022-09-23 | End: 2022-09-23

## 2022-09-23 RX ORDER — SODIUM CHLORIDE 0.9 % (FLUSH) 0.9 %
10 SYRINGE (ML) INJECTION
Status: DISCONTINUED | OUTPATIENT
Start: 2022-09-23 | End: 2022-09-23 | Stop reason: HOSPADM

## 2022-09-23 RX ORDER — PREDNISONE 5 MG/1
10 TABLET ORAL DAILY
Qty: 90 TABLET | Refills: 2
Start: 2022-09-23 | End: 2022-12-15 | Stop reason: SDUPTHER

## 2022-09-23 RX ORDER — HEPARIN 100 UNIT/ML
500 SYRINGE INTRAVENOUS
Status: CANCELLED | OUTPATIENT
Start: 2022-09-23

## 2022-09-23 RX ORDER — ONDANSETRON 2 MG/ML
8 INJECTION INTRAMUSCULAR; INTRAVENOUS
Status: CANCELLED | OUTPATIENT
Start: 2022-09-23

## 2022-09-23 RX ORDER — HEPARIN 100 UNIT/ML
500 SYRINGE INTRAVENOUS
Status: DISCONTINUED | OUTPATIENT
Start: 2022-09-23 | End: 2022-09-23 | Stop reason: HOSPADM

## 2022-09-23 RX ORDER — SODIUM CHLORIDE 0.9 % (FLUSH) 0.9 %
10 SYRINGE (ML) INJECTION
Status: CANCELLED | OUTPATIENT
Start: 2022-09-23

## 2022-09-23 RX ADMIN — MITOXANTRONE HYDROCHLORIDE 22 MG: 2 INJECTION, SOLUTION INTRAVENOUS at 11:09

## 2022-09-23 RX ADMIN — ONDANSETRON 8 MG: 2 INJECTION INTRAMUSCULAR; INTRAVENOUS at 11:09

## 2022-09-23 RX ADMIN — SODIUM CHLORIDE: 0.9 INJECTION, SOLUTION INTRAVENOUS at 10:09

## 2022-09-23 RX ADMIN — Medication 500 UNITS: at 12:09

## 2022-09-23 NOTE — PLAN OF CARE
Problem: Adult Inpatient Plan of Care  Goal: Plan of Care Review  Outcome: Ongoing, Progressing  Flowsheets (Taken 9/23/2022 1115)  Plan of Care Reviewed With:   patient   spouse  Goal: Patient-Specific Goal (Individualized)  Outcome: Ongoing, Progressing  Flowsheets (Taken 9/23/2022 1115)  Anxieties, Fears or Concerns: None  Individualized Care Needs: Recliner, warm blanket, conersation  Patient-Specific Goals (Include Timeframe): Free of S/S of reaction with infusion.     Problem: Fatigue  Goal: Improved Activity Tolerance  Outcome: Ongoing, Progressing  Intervention: Promote Improved Energy  Flowsheets (Taken 9/23/2022 1115)  Fatigue Management:   frequent rest breaks encouraged   paced activity encouraged  Sleep/Rest Enhancement: regular sleep/rest pattern promoted  Activity Management: Ambulated -L4    Patient to Infusion for Mitoxantrone following appointment with Dr. Alvarez. Accompanied by his wife. Treatment plan reviewed with patient. VSS. Tolerated treatment. Provided with copy of upcoming appointment schedule. Escorted to the front lobby for discharge to home.

## 2022-09-23 NOTE — TELEPHONE ENCOUNTER
----- Message from Belinda Lauren RN sent at 9/23/2022 10:40 AM CDT -----   Pt will need an appt with Dr Kasper in Cardiology.

## 2022-09-23 NOTE — PROGRESS NOTES
PATIENT: Amor Alcazar  MRN: [id number]  DATE: 9/23/2022      Diagnosis:   1. 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 Anemia of chronic disease    4. Hypothyroidism, unspecified type        Chief Complaint: Follow-up (Prostate Cancer)    Subjective:    Initial History: Mr. Alcazar is a 87 y.o. male with Arthritis, hypothyroidism, valvular heart disease, iron deficiency anemia who presents for follow up of prostate cancer.  The patietn was diagnosed with prostate cancer Tio 9 (4+5) 6/03/14.  He had a rising PSA and underwent PSMA PET/CT 5/09/22 showing disease in the prostate and mediastinal LN's.  The patient has been receiving Lupron 45mg 6 months dosing with Dr Bellamy with last treatment on 4/20/22.  He has previously been on Casodex and Apalutamide with progression.  He was started on Mitoxantrone and prednisone 7/01/22.  Last Echo 8/26/22 showed normal EF.  Currently the patient states he is feeling well.  The patient denies CP, cough, SOB, abdominal pain, N/V, constipation, diarrhea.  The patient denies fever, chills, night sweats, weight loss, new lumps or bumps, easy bruising or bleeding.    Past Medical History:   Past Medical History:   Diagnosis Date    Arthritis     History of skin cancer     details unknown    Hypothyroidism, unspecified     Prostate cancer     injections q 6 months    Valvular heart disease     mild AS, MR and TR 9/19 ECHO       Past Surgical HIstory:   Past Surgical History:   Procedure Laterality Date    BONE MARROW BIOPSY Bilateral 6/15/2022    Procedure: Biopsy-bone marrow;  Surgeon: Hermann Jackson MD;  Location: [facility] OR;  Service: Oncology;  Laterality: Bilateral;    CATARACT EXTRACTION W/  INTRAOCULAR LENS IMPLANT Bilateral     ESOPHAGOGASTRODUODENOSCOPY      INSERTION OF TUNNELED CENTRAL VENOUS CATHETER (CVC) WITH SUBCUTANEOUS PORT N/A 6/15/2022    Procedure: INSERTION, PORT-A-CATH;  Surgeon: Marques Rivero MD;  Location: [facility] OR;  Service:  General;  Laterality: N/A;       Family History:   Family History   Problem Relation Age of Onset    Lung cancer Sister          of       Social History:  reports that he has never smoked. He has never used smokeless tobacco. He reports that he does not currently use alcohol. He reports that he does not use drugs.    Allergies:  Review of patient's allergies indicates:  No Known Allergies    Medications:  Current Outpatient Medications   Medication Sig Dispense Refill    folic acid (FOLVITE) 400 MCG tablet Take 400 mcg by mouth once daily.      levothyroxine (SYNTHROID) 75 MCG tablet Take 1 tablet (75 mcg total) by mouth before breakfast. 90 tablet 3    LIDOcaine-prilocaine (EMLA) cream Apply topically as needed (prior to port access). 30 g 2    LUPRON DEPOT, 6 MONTH, 45 mg SyKt injection       multivit-min-FA-lycopen-lutein (CENTRUM SILVER MEN) 300-600-300 mcg Tab Take by mouth once daily.      predniSONE (DELTASONE) 5 MG tablet Take 2 tablets (10 mg total) by mouth once daily. 90 tablet 2    predniSONE (DELTASONE) 5 MG tablet Take 2 tablets (10 mg total) by mouth once daily. 90 tablet 1    predniSONE (DELTASONE) 5 MG tablet Take 2 tablets (10 mg total) by mouth once daily. 180 tablet 2    predniSONE (DELTASONE) 5 MG tablet Take 2 tablets (10 mg total) by mouth once daily. 90 tablet 2    prochlorperazine (COMPAZINE) 10 MG tablet Take 1 tablet (10 mg total) by mouth every 6 (six) hours as needed (nausea and vomiting). 60 tablet 6    duke's soln (benadryl 30 mL, mylanta 30 mL, LIDOcaine 30 mL, nystatin 30 mL) 120mL Take 10 mLs by mouth 4 (four) times daily. (Patient not taking: Reported on 2022) 120 mL 6    HYDROcodone-acetaminophen (NORCO) 5-325 mg per tablet Take 1 tablet by mouth every 6 (six) hours as needed for Pain. (Patient not taking: No sig reported) 20 tablet 0    predniSONE (DELTASONE) 5 MG tablet Take 2 tablets (10 mg total) by mouth once daily. 90 tablet 2    tretinoin (RETIN-A) 0.05 % cream  "apply TO right side of face AT BEDTIME       No current facility-administered medications for this visit.     Facility-Administered Medications Ordered in Other Visits   Medication Dose Route Frequency Provider Last Rate Last Admin    heparin, porcine (PF) 100 unit/mL injection flush 500 Units  500 Units Intravenous PRN Beka Alvarez MD   500 Units at 09/23/22 1218    sodium chloride 0.9% flush 10 mL  10 mL Intravenous PRN Beka Alvarez MD           Review of Systems   Constitutional:  Negative for chills, diaphoresis, fatigue, fever and unexpected weight change.   Respiratory:  Negative for cough and shortness of breath.    Cardiovascular:  Negative for chest pain and palpitations.   Gastrointestinal:  Negative for abdominal pain, constipation, diarrhea, nausea and vomiting.   Skin:  Negative for color change and rash.   Neurological:  Negative for headaches.   Hematological:  Negative for adenopathy. Does not bruise/bleed easily.     ECOG Performance Status: 1   Objective:      Vitals:   Vitals:    09/23/22 0957   BP: 120/71   BP Location: Left arm   Patient Position: Sitting   BP Method: Medium (Automatic)   Pulse: 80   Resp: 18   Temp: 96.9 °F (36.1 °C)   TempSrc: Temporal   SpO2: 98%   Weight: 69.5 kg (153 lb 3.5 oz)   Height: 5' 10" (1.778 m)       Physical Exam  Constitutional:       General: He is not in acute distress.     Appearance: He is well-developed. He is not diaphoretic.   HENT:      Head: Normocephalic and atraumatic.   Cardiovascular:      Rate and Rhythm: Normal rate and regular rhythm.      Heart sounds: Normal heart sounds. No murmur heard.    No friction rub. No gallop.   Pulmonary:      Effort: Pulmonary effort is normal. No respiratory distress.      Breath sounds: Normal breath sounds. No wheezing or rales.   Chest:      Chest wall: No tenderness.   Abdominal:      General: Bowel sounds are normal. There is no distension.      Palpations: Abdomen is soft. There is no mass.      " Tenderness: There is no abdominal tenderness. There is no rebound.   Musculoskeletal:      Cervical back: Normal range of motion.      Comments: PORT in left chest   Lymphadenopathy:      Cervical: No cervical adenopathy.      Upper Body:      Right upper body: No supraclavicular or axillary adenopathy.      Left upper body: No supraclavicular or axillary adenopathy.   Skin:     General: Skin is warm and dry.      Findings: No erythema or rash.   Neurological:      Mental Status: He is alert and oriented to person, place, and time.   Psychiatric:         Behavior: Behavior normal.       Laboratory Data:  Lab Visit on 09/20/2022   Component Date Value Ref Range Status    WBC 09/20/2022 5.48  3.90 - 12.70 K/uL Final    RBC 09/20/2022 3.58 (L)  4.60 - 6.20 M/uL Final    Hemoglobin 09/20/2022 11.7 (L)  14.0 - 18.0 g/dL Final    Hematocrit 09/20/2022 34.8 (L)  40.0 - 54.0 % Final    MCV 09/20/2022 97  82 - 98 fL Final    MCH 09/20/2022 32.7 (H)  27.0 - 31.0 pg Final    MCHC 09/20/2022 33.6  32.0 - 36.0 g/dL Final    RDW 09/20/2022 14.6 (H)  11.5 - 14.5 % Final    Platelets 09/20/2022 169  150 - 450 K/uL Final    MPV 09/20/2022 8.9 (L)  9.2 - 12.9 fL Final    Immature Granulocytes 09/20/2022 3.6 (H)  0.0 - 0.5 % Final    Gran # (ANC) 09/20/2022 3.6  1.8 - 7.7 K/uL Final    Immature Grans (Abs) 09/20/2022 0.20 (H)  0.00 - 0.04 K/uL Final    Comment: Mild elevation in immature granulocytes is non specific and   can be seen in a variety of conditions including stress response,   acute inflammation, trauma and pregnancy. Correlation with other   laboratory and clinical findings is essential.      Lymph # 09/20/2022 0.9 (L)  1.0 - 4.8 K/uL Final    Mono # 09/20/2022 0.7  0.3 - 1.0 K/uL Final    Eos # 09/20/2022 0.0  0.0 - 0.5 K/uL Final    Baso # 09/20/2022 0.05  0.00 - 0.20 K/uL Final    nRBC 09/20/2022 0  0 /100 WBC Final    Gran % 09/20/2022 65.5  38.0 - 73.0 % Final    Lymph % 09/20/2022 16.1 (L)  18.0 - 48.0 % Final     Mono % 09/20/2022 13.5  4.0 - 15.0 % Final    Eosinophil % 09/20/2022 0.4  0.0 - 8.0 % Final    Basophil % 09/20/2022 0.9  0.0 - 1.9 % Final    Differential Method 09/20/2022 Automated   Final    Sodium 09/20/2022 141  136 - 145 mmol/L Final    Potassium 09/20/2022 4.0  3.5 - 5.1 mmol/L Final    Chloride 09/20/2022 106  95 - 110 mmol/L Final    CO2 09/20/2022 25  23 - 29 mmol/L Final    Glucose 09/20/2022 113 (H)  70 - 110 mg/dL Final    BUN 09/20/2022 23  8 - 23 mg/dL Final    Creatinine 09/20/2022 1.2  0.5 - 1.4 mg/dL Final    Calcium 09/20/2022 9.7  8.7 - 10.5 mg/dL Final    Total Protein 09/20/2022 7.4  6.0 - 8.4 g/dL Final    Albumin 09/20/2022 3.8  3.5 - 5.2 g/dL Final    Total Bilirubin 09/20/2022 0.3  0.1 - 1.0 mg/dL Final    Comment: For infants and newborns, interpretation of results should be based  on gestational age, weight and in agreement with clinical  observations.    Premature Infant recommended reference ranges:  Up to 24 hours.............<8.0 mg/dL  Up to 48 hours............<12.0 mg/dL  3-5 days..................<15.0 mg/dL  6-29 days.................<15.0 mg/dL      Alkaline Phosphatase 09/20/2022 17 (L)  55 - 135 U/L Final    AST 09/20/2022 20  10 - 40 U/L Final    ALT 09/20/2022 9 (L)  10 - 44 U/L Final    Anion Gap 09/20/2022 10  8 - 16 mmol/L Final    eGFR 09/20/2022 59.3 (A)  >60 mL/min/1.73 m^2 Final    LD 09/20/2022 193  110 - 260 U/L Final    Results are increased in hemolyzed samples.    Magnesium 09/20/2022 2.3  1.6 - 2.6 mg/dL Final    PSA Diagnostic 09/20/2022 5.1 (H)  0.00 - 4.00 ng/mL Final    Comment: The testing method is a chemiluminescent microparticle immunoassay   manufactured by [employer] and performed on the [employer]   or   [employer] system. Values obtained with different assay manufacturers   for   methods may be different and cannot be used interchangeably.  PSA Expected levels:  Hormonal Therapy: <0.05 ng/ml  Prostatectomy: <0.01 ng/ml  Radiation Therapy:  <1.00 ng/ml           Imaging: PSMA PET/CT 5/09/22    Mediastinal blood pool max SUV: 3.7     L3 vertebral body bone marrow max SUV: 2.1     Head/neck:     No significant abnormal radiotracer accumulation is identified within the head and neck region.  No lymphadenopathy is present.     Chest:     There is an irregular focal zone of fibrosis in the right pulmonary apex which does not exhibit significantly increased radiotracer accumulation.  There is lymphadenopathy in the right paratracheal region of the mediastinum and in the subcarinal region of the mediastinum with abnormally increased radiotracer accumulation highly suspicious for metastatic disease.  A right paratracheal node on image 113 measures 2.0 x 1.3 cm with a max SUV of 4.2.  One of the larger subcarinal nodes on image 126 measures 1.8 x 1.6 cm with a max SUV of 3.8.     Abdomen/pelvis:     There is markedly increased radiotracer accumulation within the prostate gland with 2 distinct nodular masses occupying the majority of the gland.  The conglomerate size of these masses on image 275 is 3.8 x 3.0 cm with a max SUV of 11.2.  The findings are consistent with prostate cancer recurrence.  There are nonenlarged bilateral inguinal lymph nodes which do not exhibit significant increased radiotracer accumulation.     Skeleton:     No significant abnormal radiotracer accumulation is identified within the skeleton and there are no findings to suggest osseous metastatic disease.    Echo 8/26/22    The left ventricle is normal in size with concentric remodeling and normal systolic function.  The estimated ejection fraction is 60%.  Normal left ventricular diastolic function.  Normal right ventricular size with normal right ventricular systolic function.  There is mild aortic valve stenosis.  Aortic valve area is 1.72 cm2; peak velocity is 2.13 m/s; mean gradient is 10 mmHg.  Mild mitral regurgitation.  Mild tricuspid regurgitation.  Normal central venous pressure  (3 mmHg).  The estimated PA systolic pressure is 33 mmHg.  The left ventricular global longitudinal strain is -18.1%.       Assessment:       1. 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 Anemia of chronic disease    4. Hypothyroidism, unspecified type           Plan:     Prostate Cancer - PT has metastatic prostate cancer with mediastinal LN involvement  -Pt with prior progression on Casodex and Apalutamide  -Pt currently on Mitoxantrone with cycle 5 to be given today  -PSA stable  -Last Echo 8/26/22.  Pt to establish care with Cardio-Oncology given risk of cardiac toxicity  -Pt requested to get next Lupron injection in the [facility]  -Pt to receive Lupron 45mg on 10/14/22    Anemia of Chronic Disease - Hemoglobin was 11.7g/dL on 9/20/22  -hemoglobin stable  -Will monitor    Hypothyroidism - pt on synthroid  -management per PCP    Route Chart for Scheduling    Med Onc Chart Routing      Follow up with physician 3 weeks. The patient can proceed with treatment.  He will need a CBC, CMP, LDH, Mg, PSA on 10/11/22 with an appt with me and treatment on 10/14/22.  Pt will need an appt with Dr Kasper in Cardiology.  The patient will need approval for Lupron with treatmetn on 10/14/22.   Follow up with [facility]    Infusion scheduling note    Injection scheduling note    Labs    Imaging    Pharmacy appointment    Other referrals        Treatment Plan Information   OP MITOXANTRONE PREDNISONE Q3W   Hermann Jackson MD   Upcoming Treatment Dates - OP MITOXANTRONE PREDNISONE Q3W    10/14/2022       Chemotherapy       mitoXANTRONE (NOVANTRONE) 12 mg/m2 = 22 mg in sodium chloride 0.9% 50 mL chemo infusion       Antiemetics       ondansetron injection 8 mg  11/4/2022       Chemotherapy       mitoXANTRONE (NOVANTRONE) 12 mg/m2 = 22 mg in sodium chloride 0.9% 50 mL chemo infusion       Antiemetics       ondansetron injection 8 mg  11/25/2022       Chemotherapy       mitoXANTRONE (NOVANTRONE) 12 mg/m2 =  22 mg in sodium chloride 0.9% 50 mL chemo infusion       Antiemetics       ondansetron injection 8 mg  12/16/2022       Chemotherapy       mitoXANTRONE (NOVANTRONE) 12 mg/m2 = 22 mg in sodium chloride 0.9% 50 mL chemo infusion       Antiemetics       ondansetron injection 8 mg    Supportive Plan Information  OP PROSTATE LEUPROLIDE (LUPRON) 6 MONTH   Beka Alvarez MD   Upcoming Treatment Dates - OP PROSTATE LEUPROLIDE (LUPRON) 6 MONTH    10/14/2022       Chemotherapy       leuprolide acetate (6 month) injection 45 mg  3/31/2023       Chemotherapy       leuprolide acetate (6 month) injection 45 mg    Therapy Plan Information  VENOFER (IRON SUCROSE) QW  4. Pre-Medications  famotidine (PF) injection 20 mg  20 mg, Intravenous, Every visit  dexamethasone injection 4 mg  4 mg, Intravenous, Every visit  diphenhydrAMINE capsule 25 mg  25 mg, Oral, Every visit  Medications  iron sucrose (VENOFER) 200 mg in sodium chloride 0.9% 100 mL IVPB  200 mg, Intravenous, 1 time a week  Flushes  sodium chloride 0.9% 250 mL flush bag  Intravenous, 1 time a week  sodium chloride 0.9% flush 10 mL  10 mL, Intravenous, 1 time a week  heparin, porcine (PF) 100 unit/mL injection flush 500 Units  500 Units, Intravenous, 1 time a week  alteplase injection 2 mg  2 mg, Intra-Catheter, 1 time a week  PRN Medications  EPINEPHrine (EPIPEN) 0.3 mg/0.3 mL pen injection 0.3 mg  0.3 mg, Intramuscular, PRN  diphenhydrAMINE injection 50 mg  50 mg, Intravenous, PRN  methylPREDNISolone sodium succinate injection 125 mg  125 mg, Intravenous, PRN  sodium chloride 0.9% bolus 1,000 mL  1,000 mL, Intravenous, PRN    INF LEUPROLIDE (LUPRON) PROSTATE 6 MONTH  Medications  leuprolide acetate (6 month) injection 45 mg  45 mg, Intramuscular, Every 24 weeks      Beka Alvarez MD  [facility]  Hematology and Oncology  [facility]   [address] [place] [address]   O: [phone]  F: [phone]

## 2022-09-29 ENCOUNTER — CLINICAL SUPPORT (OUTPATIENT)
Dept: CARDIOLOGY | Facility: HOSPITAL | Age: 87
End: 2022-09-29
Attending: INTERNAL MEDICINE
Payer: MEDICARE

## 2022-09-29 DIAGNOSIS — C61 PROSTATE CANCER: ICD-10-CM

## 2022-09-29 PROCEDURE — 93010 ELECTROCARDIOGRAM REPORT: CPT | Mod: ,,, | Performed by: INTERNAL MEDICINE

## 2022-09-29 PROCEDURE — 93005 ELECTROCARDIOGRAM TRACING: CPT | Mod: PO

## 2022-09-29 PROCEDURE — 93010 EKG 12-LEAD: ICD-10-PCS | Mod: ,,, | Performed by: INTERNAL MEDICINE

## 2022-09-30 ENCOUNTER — OFFICE VISIT (OUTPATIENT)
Dept: CARDIOLOGY | Facility: CLINIC | Age: 87
End: 2022-09-30
Payer: MEDICARE

## 2022-09-30 DIAGNOSIS — C61 PROSTATE CANCER: ICD-10-CM

## 2022-09-30 DIAGNOSIS — C77.1 METASTASIS TO MEDIASTINAL LYMPH NODE: Primary | ICD-10-CM

## 2022-09-30 DIAGNOSIS — I35.0 NON-RHEUMATIC AORTIC STENOSIS: ICD-10-CM

## 2022-09-30 PROCEDURE — 1157F PR ADVANCE CARE PLAN OR EQUIV PRESENT IN MEDICAL RECORD: ICD-10-PCS | Mod: CPTII,S$GLB,, | Performed by: INTERNAL MEDICINE

## 2022-09-30 PROCEDURE — 1160F RVW MEDS BY RX/DR IN RCRD: CPT | Mod: CPTII,S$GLB,, | Performed by: INTERNAL MEDICINE

## 2022-09-30 PROCEDURE — 3288F FALL RISK ASSESSMENT DOCD: CPT | Mod: CPTII,S$GLB,, | Performed by: INTERNAL MEDICINE

## 2022-09-30 PROCEDURE — 99204 OFFICE O/P NEW MOD 45 MIN: CPT | Mod: S$GLB,,, | Performed by: INTERNAL MEDICINE

## 2022-09-30 PROCEDURE — 3288F PR FALLS RISK ASSESSMENT DOCUMENTED: ICD-10-PCS | Mod: CPTII,S$GLB,, | Performed by: INTERNAL MEDICINE

## 2022-09-30 PROCEDURE — 1157F ADVNC CARE PLAN IN RCRD: CPT | Mod: CPTII,S$GLB,, | Performed by: INTERNAL MEDICINE

## 2022-09-30 PROCEDURE — 1159F MED LIST DOCD IN RCRD: CPT | Mod: CPTII,S$GLB,, | Performed by: INTERNAL MEDICINE

## 2022-09-30 PROCEDURE — 1160F PR REVIEW ALL MEDS BY PRESCRIBER/CLIN PHARMACIST DOCUMENTED: ICD-10-PCS | Mod: CPTII,S$GLB,, | Performed by: INTERNAL MEDICINE

## 2022-09-30 PROCEDURE — 99999 PR PBB SHADOW E&M-EST. PATIENT-LVL IV: ICD-10-PCS | Mod: PBBFAC,,, | Performed by: INTERNAL MEDICINE

## 2022-09-30 PROCEDURE — 1101F PT FALLS ASSESS-DOCD LE1/YR: CPT | Mod: CPTII,S$GLB,, | Performed by: INTERNAL MEDICINE

## 2022-09-30 PROCEDURE — 99999 PR PBB SHADOW E&M-EST. PATIENT-LVL IV: CPT | Mod: PBBFAC,,, | Performed by: INTERNAL MEDICINE

## 2022-09-30 PROCEDURE — 1159F PR MEDICATION LIST DOCUMENTED IN MEDICAL RECORD: ICD-10-PCS | Mod: CPTII,S$GLB,, | Performed by: INTERNAL MEDICINE

## 2022-09-30 PROCEDURE — 99204 PR OFFICE/OUTPT VISIT, NEW, LEVL IV, 45-59 MIN: ICD-10-PCS | Mod: S$GLB,,, | Performed by: INTERNAL MEDICINE

## 2022-09-30 PROCEDURE — 1101F PR PT FALLS ASSESS DOC 0-1 FALLS W/OUT INJ PAST YR: ICD-10-PCS | Mod: CPTII,S$GLB,, | Performed by: INTERNAL MEDICINE

## 2022-09-30 NOTE — PROGRESS NOTES
Subjective:    Patient ID:  Amor Alcazar is a 87 y.o. male who presents for evaluation of valvular heart disease.    HPI  He has a history of arthritis, hypothyroidism, aortic stenosis, iron deficiency anemia and prostate cancer, on Lupron and mitoxantrone.  He is here today to establish care. He stays physically active and walks around his one acre land without major problems. No chest pain, palpitations, syncope, leg swelling. He gets SOB only if he tries to do too much.    OP MITOXANTRONE PREDNISONE Q3W   Hermann Jackson MD   Upcoming Treatment Dates - OP MITOXANTRONE PREDNISONE Q3W     10/14/2022       Chemotherapy       mitoXANTRONE (NOVANTRONE) 12 mg/m2 = 22 mg in sodium chloride 0.9% 50 mL chemo infusion       Antiemetics       ondansetron injection 8 mg  11/4/2022       Chemotherapy       mitoXANTRONE (NOVANTRONE) 12 mg/m2 = 22 mg in sodium chloride 0.9% 50 mL chemo infusion       Antiemetics       ondansetron injection 8 mg  11/25/2022       Chemotherapy       mitoXANTRONE (NOVANTRONE) 12 mg/m2 = 22 mg in sodium chloride 0.9% 50 mL chemo infusion       Antiemetics       ondansetron injection 8 mg  12/16/2022       Chemotherapy       mitoXANTRONE (NOVANTRONE) 12 mg/m2 = 22 mg in sodium chloride 0.9% 50 mL chemo infusion       Antiemetics       ondansetron injection 8 mg     Supportive Plan Information  OP PROSTATE LEUPROLIDE (LUPRON) 6 MONTH   Beka Alvarez MD   Upcoming Treatment Dates - OP PROSTATE LEUPROLIDE (LUPRON) 6 MONTH     10/14/2022       Chemotherapy       leuprolide acetate (6 month) injection 45 mg  3/31/2023       Chemotherapy       leuprolide acetate (6 month) injection 45 mg     Review of Systems   Constitutional: Negative for chills and fever.   HENT:  Positive for hearing loss.    Eyes:  Negative for redness.   Cardiovascular:  Negative for chest pain, irregular heartbeat, leg swelling, palpitations and syncope.   Respiratory:  Negative for cough and shortness of breath.     Endocrine: Negative for cold intolerance.   Musculoskeletal:  Positive for joint pain.   Gastrointestinal:  Negative for vomiting.   Genitourinary:  Negative for hematuria.   Neurological:  Negative for focal weakness and seizures.   Psychiatric/Behavioral:  The patient is not nervous/anxious.    Allergic/Immunologic: Negative for hives.      Current Outpatient Medications   Medication Sig    duke's soln (benadryl 30 mL, mylanta 30 mL, LIDOcaine 30 mL, nystatin 30 mL) 120mL Take 10 mLs by mouth 4 (four) times daily. (Patient not taking: Reported on 9/23/2022)    folic acid (FOLVITE) 400 MCG tablet Take 400 mcg by mouth once daily.    HYDROcodone-acetaminophen (NORCO) 5-325 mg per tablet Take 1 tablet by mouth every 6 (six) hours as needed for Pain. (Patient not taking: No sig reported)    levothyroxine (SYNTHROID) 75 MCG tablet Take 1 tablet (75 mcg total) by mouth before breakfast.    LIDOcaine-prilocaine (EMLA) cream Apply topically as needed (prior to port access).    LUPRON DEPOT, 6 MONTH, 45 mg SyKt injection     multivit-min-FA-lycopen-lutein (CENTRUM SILVER MEN) 300-600-300 mcg Tab Take by mouth once daily.    predniSONE (DELTASONE) 5 MG tablet Take 2 tablets (10 mg total) by mouth once daily.    predniSONE (DELTASONE) 5 MG tablet Take 2 tablets (10 mg total) by mouth once daily.    predniSONE (DELTASONE) 5 MG tablet Take 2 tablets (10 mg total) by mouth once daily.    predniSONE (DELTASONE) 5 MG tablet Take 2 tablets (10 mg total) by mouth once daily.    predniSONE (DELTASONE) 5 MG tablet Take 2 tablets (10 mg total) by mouth once daily.    prochlorperazine (COMPAZINE) 10 MG tablet Take 1 tablet (10 mg total) by mouth every 6 (six) hours as needed (nausea and vomiting).    tretinoin (RETIN-A) 0.05 % cream apply TO right side of face AT BEDTIME     No current facility-administered medications for this visit.       Objective:    Physical Exam  Vitals reviewed.   Constitutional:       General: He is not in  acute distress.     Appearance: He is well-developed.   HENT:      Head: Normocephalic and atraumatic.   Neck:      Vascular: No JVD.   Cardiovascular:      Rate and Rhythm: Normal rate and regular rhythm.      Heart sounds: Murmur heard.   Systolic murmur is present with a grade of 1/6 at the upper right sternal border.   Pulmonary:      Effort: Pulmonary effort is normal.      Breath sounds: Normal breath sounds.   Abdominal:      Palpations: Abdomen is soft.      Tenderness: There is no abdominal tenderness.   Musculoskeletal:      Cervical back: Neck supple.   Skin:     General: Skin is warm and dry.   Neurological:      Mental Status: He is alert and oriented to person, place, and time.     Vitals:    09/30/22 1028   BP: (P) 131/81   Pulse: (P) 83   Resp: (P) 18   Temp: (P) 98.2 °F (36.8 °C)           Assessment:       1. 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 Prostate cancer    3. Non-rheumatic aortic stenosis         Plan:       The EKG yesterday showed normal sinus rhythm with premature supraventricular complexes, left axis deviation.  I have reviewed the labs and ancillary data.    5/25/22 echo interpretation:  The left ventricle is normal in size with normal systolic function.  The estimated ejection fraction is 55-60%.  Normal left ventricular diastolic function.  Normal right ventricular size with normal right ventricular systolic function.  There is mild aortic valve stenosis.  Aortic valve area is 1.82 cm2; peak velocity is 1.48 m/s; mean gradient is 5 mmHg.  Normal central venous pressure (3 mmHg).  The estimated PA systolic pressure is 29 mmHg.  The left ventricular global longitudinal strain is -18.32%.    8/12/22 echo interpretation:  The left ventricle is normal in size with concentric remodeling and normal systolic function.  The estimated ejection fraction is 60%.  Normal left ventricular diastolic function.  Normal right ventricular size with normal right ventricular systolic  function.  There is mild aortic valve stenosis.  Aortic valve area is 1.72 cm2; peak velocity is 2.13 m/s; mean gradient is 10 mmHg.  Mild mitral regurgitation.  Mild tricuspid regurgitation.  Normal central venous pressure (3 mmHg).  The estimated PA systolic pressure is 33 mmHg.  The left ventricular global longitudinal strain is -18.1%.    Impression and Plan:  1) prostate ca: followed by oncology; on ADT and mitoxantrone q3w; recent echo as above, continue 3 mo echo surveillance; no symptoms/echo findings suggestive of CTRCD at this point. Lipids/HbA1c next visit.  2) aortic stenosis: mild; no intervention needed at this time.

## 2022-10-11 ENCOUNTER — LAB VISIT (OUTPATIENT)
Dept: LAB | Facility: HOSPITAL | Age: 87
End: 2022-10-11
Attending: INTERNAL MEDICINE
Payer: MEDICARE

## 2022-10-11 DIAGNOSIS — C61 PROSTATE CANCER: ICD-10-CM

## 2022-10-11 LAB
ALBUMIN SERPL BCP-MCNC: 3.7 G/DL (ref 3.5–5.2)
ALP SERPL-CCNC: 23 U/L (ref 55–135)
ALT SERPL W/O P-5'-P-CCNC: 6 U/L (ref 10–44)
ANION GAP SERPL CALC-SCNC: 9 MMOL/L (ref 8–16)
AST SERPL-CCNC: 21 U/L (ref 10–40)
BASOPHILS # BLD AUTO: 0.05 K/UL (ref 0–0.2)
BASOPHILS NFR BLD: 0.8 % (ref 0–1.9)
BILIRUB SERPL-MCNC: 0.3 MG/DL (ref 0.1–1)
BUN SERPL-MCNC: 19 MG/DL (ref 8–23)
CALCIUM SERPL-MCNC: 9.6 MG/DL (ref 8.7–10.5)
CHLORIDE SERPL-SCNC: 106 MMOL/L (ref 95–110)
CO2 SERPL-SCNC: 27 MMOL/L (ref 23–29)
COMPLEXED PSA SERPL-MCNC: 4.7 NG/ML (ref 0–4)
CREAT SERPL-MCNC: 1.1 MG/DL (ref 0.5–1.4)
DIFFERENTIAL METHOD: ABNORMAL
EOSINOPHIL # BLD AUTO: 0 K/UL (ref 0–0.5)
EOSINOPHIL NFR BLD: 0.3 % (ref 0–8)
ERYTHROCYTE [DISTWIDTH] IN BLOOD BY AUTOMATED COUNT: 14.4 % (ref 11.5–14.5)
EST. GFR  (NO RACE VARIABLE): >60 ML/MIN/1.73 M^2
GLUCOSE SERPL-MCNC: 95 MG/DL (ref 70–110)
HCT VFR BLD AUTO: 34.8 % (ref 40–54)
HGB BLD-MCNC: 11.6 G/DL (ref 14–18)
IMM GRANULOCYTES # BLD AUTO: 0.27 K/UL (ref 0–0.04)
IMM GRANULOCYTES NFR BLD AUTO: 4.3 % (ref 0–0.5)
LDH SERPL L TO P-CCNC: 186 U/L (ref 110–260)
LYMPHOCYTES # BLD AUTO: 1.4 K/UL (ref 1–4.8)
LYMPHOCYTES NFR BLD: 22.8 % (ref 18–48)
MAGNESIUM SERPL-MCNC: 2.3 MG/DL (ref 1.6–2.6)
MCH RBC QN AUTO: 32.8 PG (ref 27–31)
MCHC RBC AUTO-ENTMCNC: 33.3 G/DL (ref 32–36)
MCV RBC AUTO: 98 FL (ref 82–98)
MONOCYTES # BLD AUTO: 0.7 K/UL (ref 0.3–1)
MONOCYTES NFR BLD: 11.8 % (ref 4–15)
NEUTROPHILS # BLD AUTO: 3.8 K/UL (ref 1.8–7.7)
NEUTROPHILS NFR BLD: 60 % (ref 38–73)
NRBC BLD-RTO: 0 /100 WBC
PLATELET # BLD AUTO: 153 K/UL (ref 150–450)
PMV BLD AUTO: 8.5 FL (ref 9.2–12.9)
POTASSIUM SERPL-SCNC: 4.8 MMOL/L (ref 3.5–5.1)
PROT SERPL-MCNC: 7.2 G/DL (ref 6–8.4)
RBC # BLD AUTO: 3.54 M/UL (ref 4.6–6.2)
SODIUM SERPL-SCNC: 142 MMOL/L (ref 136–145)
WBC # BLD AUTO: 6.28 K/UL (ref 3.9–12.7)

## 2022-10-11 PROCEDURE — 83615 LACTATE (LD) (LDH) ENZYME: CPT | Mod: PN | Performed by: INTERNAL MEDICINE

## 2022-10-11 PROCEDURE — 84153 ASSAY OF PSA TOTAL: CPT | Performed by: INTERNAL MEDICINE

## 2022-10-11 PROCEDURE — 83735 ASSAY OF MAGNESIUM: CPT | Mod: PN | Performed by: INTERNAL MEDICINE

## 2022-10-11 PROCEDURE — 80053 COMPREHEN METABOLIC PANEL: CPT | Mod: PN | Performed by: INTERNAL MEDICINE

## 2022-10-11 PROCEDURE — 85025 COMPLETE CBC W/AUTO DIFF WBC: CPT | Mod: PN | Performed by: INTERNAL MEDICINE

## 2022-10-11 PROCEDURE — 36415 COLL VENOUS BLD VENIPUNCTURE: CPT | Mod: PN | Performed by: INTERNAL MEDICINE

## 2022-10-14 ENCOUNTER — INFUSION (OUTPATIENT)
Dept: INFUSION THERAPY | Facility: HOSPITAL | Age: 87
End: 2022-10-14
Attending: INTERNAL MEDICINE
Payer: MEDICARE

## 2022-10-14 ENCOUNTER — OFFICE VISIT (OUTPATIENT)
Dept: HEMATOLOGY/ONCOLOGY | Facility: CLINIC | Age: 87
End: 2022-10-14
Payer: MEDICARE

## 2022-10-14 VITALS
TEMPERATURE: 98 F | HEIGHT: 70 IN | BODY MASS INDEX: 21.9 KG/M2 | RESPIRATION RATE: 18 BRPM | SYSTOLIC BLOOD PRESSURE: 101 MMHG | OXYGEN SATURATION: 98 % | WEIGHT: 153 LBS | HEART RATE: 74 BPM | DIASTOLIC BLOOD PRESSURE: 56 MMHG

## 2022-10-14 VITALS
SYSTOLIC BLOOD PRESSURE: 131 MMHG | DIASTOLIC BLOOD PRESSURE: 79 MMHG | BODY MASS INDEX: 21.9 KG/M2 | HEIGHT: 70 IN | WEIGHT: 153 LBS | TEMPERATURE: 98 F | OXYGEN SATURATION: 98 % | HEART RATE: 120 BPM

## 2022-10-14 DIAGNOSIS — C77.1 METASTASIS TO MEDIASTINAL LYMPH NODE: ICD-10-CM

## 2022-10-14 DIAGNOSIS — D63.8 ANEMIA OF CHRONIC DISEASE: ICD-10-CM

## 2022-10-14 DIAGNOSIS — C61 PROSTATE CANCER: Primary | ICD-10-CM

## 2022-10-14 DIAGNOSIS — E03.9 HYPOTHYROIDISM, UNSPECIFIED TYPE: ICD-10-CM

## 2022-10-14 PROCEDURE — 96413 CHEMO IV INFUSION 1 HR: CPT | Mod: PN

## 2022-10-14 PROCEDURE — 96375 TX/PRO/DX INJ NEW DRUG ADDON: CPT | Mod: PN

## 2022-10-14 PROCEDURE — 1101F PR PT FALLS ASSESS DOC 0-1 FALLS W/OUT INJ PAST YR: ICD-10-PCS | Mod: CPTII,S$GLB,, | Performed by: INTERNAL MEDICINE

## 2022-10-14 PROCEDURE — 3288F PR FALLS RISK ASSESSMENT DOCUMENTED: ICD-10-PCS | Mod: CPTII,S$GLB,, | Performed by: INTERNAL MEDICINE

## 2022-10-14 PROCEDURE — 1101F PT FALLS ASSESS-DOCD LE1/YR: CPT | Mod: CPTII,S$GLB,, | Performed by: INTERNAL MEDICINE

## 2022-10-14 PROCEDURE — 63600175 PHARM REV CODE 636 W HCPCS: Mod: JG,PN | Performed by: INTERNAL MEDICINE

## 2022-10-14 PROCEDURE — 99999 PR PBB SHADOW E&M-EST. PATIENT-LVL III: CPT | Mod: PBBFAC,,, | Performed by: INTERNAL MEDICINE

## 2022-10-14 PROCEDURE — 3075F SYST BP GE 130 - 139MM HG: CPT | Mod: CPTII,S$GLB,, | Performed by: INTERNAL MEDICINE

## 2022-10-14 PROCEDURE — 99215 OFFICE O/P EST HI 40 MIN: CPT | Mod: S$GLB,,, | Performed by: INTERNAL MEDICINE

## 2022-10-14 PROCEDURE — 25000003 PHARM REV CODE 250: Mod: PN | Performed by: INTERNAL MEDICINE

## 2022-10-14 PROCEDURE — 3075F PR MOST RECENT SYSTOLIC BLOOD PRESS GE 130-139MM HG: ICD-10-PCS | Mod: CPTII,S$GLB,, | Performed by: INTERNAL MEDICINE

## 2022-10-14 PROCEDURE — 1157F ADVNC CARE PLAN IN RCRD: CPT | Mod: CPTII,S$GLB,, | Performed by: INTERNAL MEDICINE

## 2022-10-14 PROCEDURE — 99999 PR PBB SHADOW E&M-EST. PATIENT-LVL III: ICD-10-PCS | Mod: PBBFAC,,, | Performed by: INTERNAL MEDICINE

## 2022-10-14 PROCEDURE — 3078F PR MOST RECENT DIASTOLIC BLOOD PRESSURE < 80 MM HG: ICD-10-PCS | Mod: CPTII,S$GLB,, | Performed by: INTERNAL MEDICINE

## 2022-10-14 PROCEDURE — 3078F DIAST BP <80 MM HG: CPT | Mod: CPTII,S$GLB,, | Performed by: INTERNAL MEDICINE

## 2022-10-14 PROCEDURE — 1126F AMNT PAIN NOTED NONE PRSNT: CPT | Mod: CPTII,S$GLB,, | Performed by: INTERNAL MEDICINE

## 2022-10-14 PROCEDURE — 96402 CHEMO HORMON ANTINEOPL SQ/IM: CPT | Mod: PN

## 2022-10-14 PROCEDURE — 3288F FALL RISK ASSESSMENT DOCD: CPT | Mod: CPTII,S$GLB,, | Performed by: INTERNAL MEDICINE

## 2022-10-14 PROCEDURE — 99215 PR OFFICE/OUTPT VISIT, EST, LEVL V, 40-54 MIN: ICD-10-PCS | Mod: S$GLB,,, | Performed by: INTERNAL MEDICINE

## 2022-10-14 PROCEDURE — 1126F PR PAIN SEVERITY QUANTIFIED, NO PAIN PRESENT: ICD-10-PCS | Mod: CPTII,S$GLB,, | Performed by: INTERNAL MEDICINE

## 2022-10-14 PROCEDURE — 1157F PR ADVANCE CARE PLAN OR EQUIV PRESENT IN MEDICAL RECORD: ICD-10-PCS | Mod: CPTII,S$GLB,, | Performed by: INTERNAL MEDICINE

## 2022-10-14 RX ORDER — SODIUM CHLORIDE 0.9 % (FLUSH) 0.9 %
10 SYRINGE (ML) INJECTION
Status: DISCONTINUED | OUTPATIENT
Start: 2022-10-14 | End: 2022-10-14 | Stop reason: HOSPADM

## 2022-10-14 RX ORDER — HEPARIN 100 UNIT/ML
500 SYRINGE INTRAVENOUS
Status: CANCELLED | OUTPATIENT
Start: 2022-10-14

## 2022-10-14 RX ORDER — PREDNISONE 5 MG/1
10 TABLET ORAL DAILY
Qty: 90 TABLET | Refills: 2
Start: 2022-10-14 | End: 2022-12-15 | Stop reason: SDUPTHER

## 2022-10-14 RX ORDER — SODIUM CHLORIDE 0.9 % (FLUSH) 0.9 %
10 SYRINGE (ML) INJECTION
Status: CANCELLED | OUTPATIENT
Start: 2022-10-14

## 2022-10-14 RX ORDER — ONDANSETRON 2 MG/ML
8 INJECTION INTRAMUSCULAR; INTRAVENOUS
Status: CANCELLED | OUTPATIENT
Start: 2022-10-14

## 2022-10-14 RX ORDER — ONDANSETRON 2 MG/ML
8 INJECTION INTRAMUSCULAR; INTRAVENOUS
Status: COMPLETED | OUTPATIENT
Start: 2022-10-14 | End: 2022-10-14

## 2022-10-14 RX ORDER — HEPARIN 100 UNIT/ML
500 SYRINGE INTRAVENOUS
Status: DISCONTINUED | OUTPATIENT
Start: 2022-10-14 | End: 2022-10-14 | Stop reason: HOSPADM

## 2022-10-14 RX ADMIN — SODIUM CHLORIDE: 0.9 INJECTION, SOLUTION INTRAVENOUS at 11:10

## 2022-10-14 RX ADMIN — MITOXANTRONE HYDROCHLORIDE 22 MG: 2 INJECTION, SOLUTION INTRAVENOUS at 12:10

## 2022-10-14 RX ADMIN — Medication 500 UNITS: at 01:10

## 2022-10-14 RX ADMIN — LEUPROLIDE ACETATE 45 MG: KIT at 12:10

## 2022-10-14 RX ADMIN — ONDANSETRON 8 MG: 2 INJECTION INTRAMUSCULAR; INTRAVENOUS at 12:10

## 2022-10-14 NOTE — PROGRESS NOTES
PATIENT: Amor Alcazar  MRN: [id number]  DATE: 10/14/2022      Diagnosis:   1. 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 Anemia of chronic disease    4. Hypothyroidism, unspecified type          Chief Complaint: Prostate Cancer (3 Week Follow Up/)    Subjective:    Interval History: Mr. Alcazar is a 87 y.o. male with Arthritis, hypothyroidism, valvular heart disease, iron deficiency anemia who presents for follow up of prostate cancer.  Since the last clinic visit the patient saw Dr Kasper in cardiology on 9/30/22 and recommended repeat Echo in 3 months.  The patient denies CP, cough, SOB, abdominal pain, N/V, constipation, diarrhea.  The patient denies fever, chills, night sweats, weight loss, new lumps or bumps, easy bruising or bleeding.    Prior History:  The patient was diagnosed with prostate cancer [employer] (4+5) 6/03/14.  He had a rising PSA and underwent PSMA PET/CT 5/09/22 showing disease in the prostate and mediastinal LN's.  The patient has been receiving Lupron 45mg 6 months dosing with Dr Bellamy with last treatment on 4/20/22.  He has previously been on Casodex and Apalutamide with progression.  He was started on Mitoxantrone and prednisone 7/01/22.  Last Echo 8/26/22 showed normal EF.    Past Medical History:   Past Medical History:   Diagnosis Date    Arthritis     History of skin cancer     details unknown    Hypothyroidism, unspecified     Prostate cancer     injections q 6 months    Valvular heart disease     mild AS, MR and TR 9/19 ECHO       Past Surgical HIstory:   Past Surgical History:   Procedure Laterality Date    BONE MARROW BIOPSY Bilateral 6/15/2022    Procedure: Biopsy-bone marrow;  Surgeon: Hermann Jackson MD;  Location: [facility] OR;  Service: Oncology;  Laterality: Bilateral;    CATARACT EXTRACTION W/  INTRAOCULAR LENS IMPLANT Bilateral     ESOPHAGOGASTRODUODENOSCOPY      INSERTION OF TUNNELED CENTRAL VENOUS CATHETER (CVC) WITH SUBCUTANEOUS PORT N/A 6/15/2022     Procedure: INSERTION, PORT-A-CATH;  Surgeon: Marques Rivero MD;  Location: [facility];  Service: General;  Laterality: N/A;       Family History:   Family History   Problem Relation Age of Onset    Lung cancer Sister          of       Social History:  reports that he has never smoked. He has never used smokeless tobacco. He reports that he does not currently use alcohol. He reports that he does not use drugs.    Allergies:  Review of patient's allergies indicates:  No Known Allergies    Medications:  Current Outpatient Medications   Medication Sig Dispense Refill    folic acid (FOLVITE) 400 MCG tablet Take 400 mcg by mouth once daily.      levothyroxine (SYNTHROID) 75 MCG tablet Take 1 tablet (75 mcg total) by mouth before breakfast. 90 tablet 3    LIDOcaine-prilocaine (EMLA) cream Apply topically as needed (prior to port access). 30 g 2    LUPRON DEPOT, 6 MONTH, 45 mg SyKt injection       multivit-min-FA-lycopen-lutein (CENTRUM SILVER MEN) 300-600-300 mcg Tab Take by mouth once daily.      predniSONE (DELTASONE) 5 MG tablet Take 2 tablets (10 mg total) by mouth once daily. 90 tablet 2    predniSONE (DELTASONE) 5 MG tablet Take 2 tablets (10 mg total) by mouth once daily. 90 tablet 1    predniSONE (DELTASONE) 5 MG tablet Take 2 tablets (10 mg total) by mouth once daily. 180 tablet 2    predniSONE (DELTASONE) 5 MG tablet Take 2 tablets (10 mg total) by mouth once daily. 90 tablet 2    predniSONE (DELTASONE) 5 MG tablet Take 2 tablets (10 mg total) by mouth once daily. 90 tablet 2    duke's soln (benadryl 30 mL, mylanta 30 mL, LIDOcaine 30 mL, nystatin 30 mL) 120mL Take 10 mLs by mouth 4 (four) times daily. (Patient not taking: No sig reported) 120 mL 6    HYDROcodone-acetaminophen (NORCO) 5-325 mg per tablet Take 1 tablet by mouth every 6 (six) hours as needed for Pain. (Patient not taking: Reported on 10/14/2022) 20 tablet 0    predniSONE (DELTASONE) 5 MG tablet Take 2 tablets (10 mg total) by mouth once daily.  "90 tablet 2    prochlorperazine (COMPAZINE) 10 MG tablet Take 1 tablet (10 mg total) by mouth every 6 (six) hours as needed (nausea and vomiting). (Patient not taking: Reported on 10/14/2022) 60 tablet 6    tretinoin (RETIN-A) 0.05 % cream apply TO right side of face AT BEDTIME       No current facility-administered medications for this visit.     Facility-Administered Medications Ordered in Other Visits   Medication Dose Route Frequency Provider Last Rate Last Admin    heparin, porcine (PF) 100 unit/mL injection flush 500 Units  500 Units Intravenous PRN Beka Alvarez MD        leuprolide acetate (6 month) injection 45 mg  45 mg Intramuscular Q6 Months Hermann Jackson MD   45 mg at 10/14/22 1239    mitoXANTRONE (NOVANTRONE) 12 mg/m2 = 22 mg in sodium chloride 0.9% 50 mL chemo infusion  12 mg/m2 (Treatment Plan Recorded) Intravenous 1 time in Clinic/HOD Beka Alvarez  mL/hr at 10/14/22 1253 22 mg at 10/14/22 1253    sodium chloride 0.9% 100 mL flush bag   Intravenous 1 time in Clinic/HOD Beka Alvarez MD        sodium chloride 0.9% flush 10 mL  10 mL Intravenous PRN Beka Alvarez MD           Review of Systems   Constitutional:  Negative for chills, diaphoresis, fatigue, fever and unexpected weight change.   Respiratory:  Negative for cough and shortness of breath.    Cardiovascular:  Negative for chest pain and palpitations.   Gastrointestinal:  Negative for abdominal pain, constipation, diarrhea, nausea and vomiting.   Skin:  Negative for color change and rash.   Neurological:  Negative for headaches.   Hematological:  Negative for adenopathy. Does not bruise/bleed easily.     ECOG Performance Status: 1   Objective:      Vitals:   Vitals:    10/14/22 1050   BP: 131/79   BP Location: Left arm   Patient Position: Sitting   BP Method: Medium (Automatic)   Pulse: (!) 120   Temp: 97.5 °F (36.4 °C)   TempSrc: Temporal   SpO2: 98%   Weight: 69.4 kg (153 lb)   Height: 5' 10" (1.778 m)       Physical " Exam  Constitutional:       General: He is not in acute distress.     Appearance: He is well-developed. He is not diaphoretic.   HENT:      Head: Normocephalic and atraumatic.   Cardiovascular:      Rate and Rhythm: Normal rate and regular rhythm.      Heart sounds: Normal heart sounds. No murmur heard.    No friction rub. No gallop.   Pulmonary:      Effort: Pulmonary effort is normal. No respiratory distress.      Breath sounds: Normal breath sounds. No wheezing or rales.   Chest:      Chest wall: No tenderness.   Abdominal:      General: Bowel sounds are normal. There is no distension.      Palpations: Abdomen is soft. There is no mass.      Tenderness: There is no abdominal tenderness. There is no rebound.   Musculoskeletal:      Cervical back: Normal range of motion.      Comments: PORT in left chest   Lymphadenopathy:      Cervical: No cervical adenopathy.      Upper Body:      Right upper body: No supraclavicular or axillary adenopathy.      Left upper body: No supraclavicular or axillary adenopathy.   Skin:     General: Skin is warm and dry.      Findings: No erythema or rash.   Neurological:      Mental Status: He is alert and oriented to person, place, and time.   Psychiatric:         Behavior: Behavior normal.       Laboratory Data:  Lab Visit on 10/11/2022   Component Date Value Ref Range Status    WBC 10/11/2022 6.28  3.90 - 12.70 K/uL Final    RBC 10/11/2022 3.54 (L)  4.60 - 6.20 M/uL Final    Hemoglobin 10/11/2022 11.6 (L)  14.0 - 18.0 g/dL Final    Hematocrit 10/11/2022 34.8 (L)  40.0 - 54.0 % Final    MCV 10/11/2022 98  82 - 98 fL Final    MCH 10/11/2022 32.8 (H)  27.0 - 31.0 pg Final    MCHC 10/11/2022 33.3  32.0 - 36.0 g/dL Final    RDW 10/11/2022 14.4  11.5 - 14.5 % Final    Platelets 10/11/2022 153  150 - 450 K/uL Final    MPV 10/11/2022 8.5 (L)  9.2 - 12.9 fL Final    Immature Granulocytes 10/11/2022 4.3 (H)  0.0 - 0.5 % Final    Gran # (ANC) 10/11/2022 3.8  1.8 - 7.7 K/uL Final    Immature  Grans (Abs) 10/11/2022 0.27 (H)  0.00 - 0.04 K/uL Final    Comment: Mild elevation in immature granulocytes is non specific and   can be seen in a variety of conditions including stress response,   acute inflammation, trauma and pregnancy. Correlation with other   laboratory and clinical findings is essential.      Lymph # 10/11/2022 1.4  1.0 - 4.8 K/uL Final    Mono # 10/11/2022 0.7  0.3 - 1.0 K/uL Final    Eos # 10/11/2022 0.0  0.0 - 0.5 K/uL Final    Baso # 10/11/2022 0.05  0.00 - 0.20 K/uL Final    nRBC 10/11/2022 0  0 /100 WBC Final    Gran % 10/11/2022 60.0  38.0 - 73.0 % Final    Lymph % 10/11/2022 22.8  18.0 - 48.0 % Final    Mono % 10/11/2022 11.8  4.0 - 15.0 % Final    Eosinophil % 10/11/2022 0.3  0.0 - 8.0 % Final    Basophil % 10/11/2022 0.8  0.0 - 1.9 % Final    Differential Method 10/11/2022 Automated   Final    Sodium 10/11/2022 142  136 - 145 mmol/L Final    Potassium 10/11/2022 4.8  3.5 - 5.1 mmol/L Final    Chloride 10/11/2022 106  95 - 110 mmol/L Final    CO2 10/11/2022 27  23 - 29 mmol/L Final    Glucose 10/11/2022 95  70 - 110 mg/dL Final    BUN 10/11/2022 19  8 - 23 mg/dL Final    Creatinine 10/11/2022 1.1  0.5 - 1.4 mg/dL Final    Calcium 10/11/2022 9.6  8.7 - 10.5 mg/dL Final    Total Protein 10/11/2022 7.2  6.0 - 8.4 g/dL Final    Albumin 10/11/2022 3.7  3.5 - 5.2 g/dL Final    Total Bilirubin 10/11/2022 0.3  0.1 - 1.0 mg/dL Final    Comment: For infants and newborns, interpretation of results should be based  on gestational age, weight and in agreement with clinical  observations.    Premature Infant recommended reference ranges:  Up to 24 hours.............<8.0 mg/dL  Up to 48 hours............<12.0 mg/dL  3-5 days..................<15.0 mg/dL  6-29 days.................<15.0 mg/dL      Alkaline Phosphatase 10/11/2022 23 (L)  55 - 135 U/L Final    AST 10/11/2022 21  10 - 40 U/L Final    ALT 10/11/2022 6 (L)  10 - 44 U/L Final    Anion Gap 10/11/2022 9  8 - 16 mmol/L Final    eGFR  10/11/2022 >60.0  >60 mL/min/1.73 m^2 Final    LD 10/11/2022 186  110 - 260 U/L Final    Results are increased in hemolyzed samples.    Magnesium 10/11/2022 2.3  1.6 - 2.6 mg/dL Final    PSA Diagnostic 10/11/2022 4.7 (H)  0.00 - 4.00 ng/mL Final    Comment: The testing method is a chemiluminescent microparticle immunoassay   manufactured by [employer] and performed on the [employer]   or   [employer] system. Values obtained with different assay manufacturers   for   methods may be different and cannot be used interchangeably.  PSA Expected levels:  Hormonal Therapy: <0.05 ng/ml  Prostatectomy: <0.01 ng/ml  Radiation Therapy: <1.00 ng/ml           Imaging: PSMA PET/CT 5/09/22    Mediastinal blood pool max SUV: 3.7     L3 vertebral body bone marrow max SUV: 2.1     Head/neck:     No significant abnormal radiotracer accumulation is identified within the head and neck region.  No lymphadenopathy is present.     Chest:     There is an irregular focal zone of fibrosis in the right pulmonary apex which does not exhibit significantly increased radiotracer accumulation.  There is lymphadenopathy in the right paratracheal region of the mediastinum and in the subcarinal region of the mediastinum with abnormally increased radiotracer accumulation highly suspicious for metastatic disease.  A right paratracheal node on image 113 measures 2.0 x 1.3 cm with a max SUV of 4.2.  One of the larger subcarinal nodes on image 126 measures 1.8 x 1.6 cm with a max SUV of 3.8.     Abdomen/pelvis:     There is markedly increased radiotracer accumulation within the prostate gland with 2 distinct nodular masses occupying the majority of the gland.  The conglomerate size of these masses on image 275 is 3.8 x 3.0 cm with a max SUV of 11.2.  The findings are consistent with prostate cancer recurrence.  There are nonenlarged bilateral inguinal lymph nodes which do not exhibit significant increased radiotracer accumulation.     Skeleton:      No significant abnormal radiotracer accumulation is identified within the skeleton and there are no findings to suggest osseous metastatic disease.    Echo 8/26/22    The left ventricle is normal in size with concentric remodeling and normal systolic function.  The estimated ejection fraction is 60%.  Normal left ventricular diastolic function.  Normal right ventricular size with normal right ventricular systolic function.  There is mild aortic valve stenosis.  Aortic valve area is 1.72 cm2; peak velocity is 2.13 m/s; mean gradient is 10 mmHg.  Mild mitral regurgitation.  Mild tricuspid regurgitation.  Normal central venous pressure (3 mmHg).  The estimated PA systolic pressure is 33 mmHg.  The left ventricular global longitudinal strain is -18.1%.       Assessment:       1. 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 Anemia of chronic disease    4. Hypothyroidism, unspecified type             Plan:     Prostate Cancer - PT has metastatic prostate cancer with mediastinal LN involvement  -Pt with prior progression on Casodex and Apalutamide  -Pt currently on Mitoxantrone with cycle 6 to be given today  -PSA stable  -Last Echo 8/26/22  -Pt following with Dr Kasper with Cardio-Oncology given risk of cardiac toxicity  -Pt to receive Lupron 45mg today  -Pt to return in 3 weeks    Anemia of Chronic Disease - Hemoglobin was 11.6g/dL on 10/11/22  -hemoglobin stable  -Will monitor    Hypothyroidism - pt on synthroid  -management per PCP    Route Chart for Scheduling    Med Onc Chart Routing      Follow up with physician 3 weeks. The patient can proceed with treatment.  He will need a CBC, CMP, Mg, PSA, and LDH on 11/03/22 with an appt with me and treatment on 11/04/22.   Follow up with [facility]    Infusion scheduling note    Injection scheduling note    Labs    Imaging    Pharmacy appointment    Other referrals        Treatment Plan Information   OP MITOXANTRONE PREDNISONE Q3W   Hermann Jackson MD    Upcoming Treatment Dates - OP MITOXANTRONE PREDNISONE Q3W    11/4/2022       Chemotherapy       mitoXANTRONE (NOVANTRONE) 12 mg/m2 = 22 mg in sodium chloride 0.9% 50 mL chemo infusion       Antiemetics       ondansetron injection 8 mg  11/25/2022       Chemotherapy       mitoXANTRONE (NOVANTRONE) 12 mg/m2 = 22 mg in sodium chloride 0.9% 50 mL chemo infusion       Antiemetics       ondansetron injection 8 mg  12/16/2022       Chemotherapy       mitoXANTRONE (NOVANTRONE) 12 mg/m2 = 22 mg in sodium chloride 0.9% 50 mL chemo infusion       Antiemetics       ondansetron injection 8 mg  1/6/2023       Chemotherapy       mitoXANTRONE (NOVANTRONE) 12 mg/m2 = 22 mg in sodium chloride 0.9% 50 mL chemo infusion       Antiemetics       ondansetron injection 8 mg    Supportive Plan Information  OP PROSTATE LEUPROLIDE (LUPRON) 6 MONTH   Beka Alvarez MD   Upcoming Treatment Dates - OP PROSTATE LEUPROLIDE (LUPRON) 6 MONTH    10/14/2022       Chemotherapy       leuprolide acetate (6 month) injection 45 mg  3/31/2023       Chemotherapy       leuprolide acetate (6 month) injection 45 mg    Therapy Plan Information  VENOFER (IRON SUCROSE) QW  4. Pre-Medications  famotidine (PF) injection 20 mg  20 mg, Intravenous, Every visit  dexamethasone injection 4 mg  4 mg, Intravenous, Every visit  diphenhydrAMINE capsule 25 mg  25 mg, Oral, Every visit  Medications  iron sucrose (VENOFER) 200 mg in sodium chloride 0.9% 100 mL IVPB  200 mg, Intravenous, 1 time a week  Flushes  sodium chloride 0.9% 250 mL flush bag  Intravenous, 1 time a week  sodium chloride 0.9% flush 10 mL  10 mL, Intravenous, 1 time a week  heparin, porcine (PF) 100 unit/mL injection flush 500 Units  500 Units, Intravenous, 1 time a week  alteplase injection 2 mg  2 mg, Intra-Catheter, 1 time a week  PRN Medications  EPINEPHrine (EPIPEN) 0.3 mg/0.3 mL pen injection 0.3 mg  0.3 mg, Intramuscular, PRN  diphenhydrAMINE injection 50 mg  50 mg, Intravenous,  PRN  methylPREDNISolone sodium succinate injection 125 mg  125 mg, Intravenous, PRN  sodium chloride 0.9% bolus 1,000 mL  1,000 mL, Intravenous, PRN    INF LEUPROLIDE (LUPRON) PROSTATE 6 MONTH  Medications  leuprolide acetate (6 month) injection 45 mg  45 mg, Intramuscular, Every 24 weeks      Beka Alvarez MD  [facility]  Hematology and Oncology  [facility]   [address]   [place] [address]   O: [phone]  F: [phone]

## 2022-10-14 NOTE — PLAN OF CARE
Problem: Adult Inpatient Plan of Care  Goal: Plan of Care Review  Outcome: Ongoing, Progressing  Flowsheets (Taken 10/14/2022 1130)  Plan of Care Reviewed With: patient  Goal: Patient-Specific Goal (Individualized)  Outcome: Ongoing, Progressing  Flowsheets (Taken 10/14/2022 1130)  Anxieties, Fears or Concerns: None  Individualized Care Needs: Recliner, conversation, wife at chairside  Patient-Specific Goals (Include Timeframe): Free of S/S of reaction with infusion.     Problem: Fatigue  Goal: Improved Activity Tolerance  Outcome: Ongoing, Progressing  Intervention: Promote Improved Energy  Flowsheets (Taken 10/14/2022 1130)  Fatigue Management:   frequent rest breaks encouraged   paced activity encouraged  Sleep/Rest Enhancement: regular sleep/rest pattern promoted  Activity Management: Ambulated -L4   Patient to Infusion for Mitoxantrone and Lupron following appointment with the provider. Accompanied by his wife. Treatment plan reviewed with patient. VSS. Tolerated treatment. Provided with copy of upcoming appointment schedule. Escorted to the front lobby for discharge to home.

## 2022-10-20 ENCOUNTER — PATIENT MESSAGE (OUTPATIENT)
Dept: PSYCHIATRY | Facility: CLINIC | Age: 87
End: 2022-10-20
Payer: MEDICARE

## 2022-10-31 ENCOUNTER — LAB VISIT (OUTPATIENT)
Dept: LAB | Facility: HOSPITAL | Age: 87
End: 2022-10-31
Attending: INTERNAL MEDICINE
Payer: MEDICARE

## 2022-10-31 DIAGNOSIS — C61 PROSTATE CANCER: ICD-10-CM

## 2022-10-31 LAB
ALBUMIN SERPL BCP-MCNC: 3.6 G/DL (ref 3.5–5.2)
ALP SERPL-CCNC: 22 U/L (ref 55–135)
ALT SERPL W/O P-5'-P-CCNC: 8 U/L (ref 10–44)
ANION GAP SERPL CALC-SCNC: 11 MMOL/L (ref 8–16)
AST SERPL-CCNC: 20 U/L (ref 10–40)
BASOPHILS # BLD AUTO: 0.05 K/UL (ref 0–0.2)
BASOPHILS NFR BLD: 0.9 % (ref 0–1.9)
BILIRUB SERPL-MCNC: 0.3 MG/DL (ref 0.1–1)
BUN SERPL-MCNC: 16 MG/DL (ref 8–23)
CALCIUM SERPL-MCNC: 9.8 MG/DL (ref 8.7–10.5)
CHLORIDE SERPL-SCNC: 105 MMOL/L (ref 95–110)
CO2 SERPL-SCNC: 25 MMOL/L (ref 23–29)
COMPLEXED PSA SERPL-MCNC: 4.6 NG/ML (ref 0–4)
CREAT SERPL-MCNC: 1.1 MG/DL (ref 0.5–1.4)
DIFFERENTIAL METHOD: ABNORMAL
EOSINOPHIL # BLD AUTO: 0 K/UL (ref 0–0.5)
EOSINOPHIL NFR BLD: 0.4 % (ref 0–8)
ERYTHROCYTE [DISTWIDTH] IN BLOOD BY AUTOMATED COUNT: 14.6 % (ref 11.5–14.5)
EST. GFR  (NO RACE VARIABLE): >60 ML/MIN/1.73 M^2
GLUCOSE SERPL-MCNC: 108 MG/DL (ref 70–110)
HCT VFR BLD AUTO: 34.5 % (ref 40–54)
HGB BLD-MCNC: 11.5 G/DL (ref 14–18)
IMM GRANULOCYTES # BLD AUTO: 0.2 K/UL (ref 0–0.04)
IMM GRANULOCYTES NFR BLD AUTO: 3.6 % (ref 0–0.5)
LYMPHOCYTES # BLD AUTO: 1.1 K/UL (ref 1–4.8)
LYMPHOCYTES NFR BLD: 18.8 % (ref 18–48)
MAGNESIUM SERPL-MCNC: 2.2 MG/DL (ref 1.6–2.6)
MCH RBC QN AUTO: 32.6 PG (ref 27–31)
MCHC RBC AUTO-ENTMCNC: 33.3 G/DL (ref 32–36)
MCV RBC AUTO: 98 FL (ref 82–98)
MONOCYTES # BLD AUTO: 0.8 K/UL (ref 0.3–1)
MONOCYTES NFR BLD: 14.9 % (ref 4–15)
NEUTROPHILS # BLD AUTO: 3.4 K/UL (ref 1.8–7.7)
NEUTROPHILS NFR BLD: 61.4 % (ref 38–73)
NRBC BLD-RTO: 0 /100 WBC
PLATELET # BLD AUTO: 181 K/UL (ref 150–450)
PMV BLD AUTO: 9 FL (ref 9.2–12.9)
POTASSIUM SERPL-SCNC: 4.1 MMOL/L (ref 3.5–5.1)
PROT SERPL-MCNC: 7.4 G/DL (ref 6–8.4)
RBC # BLD AUTO: 3.53 M/UL (ref 4.6–6.2)
SODIUM SERPL-SCNC: 141 MMOL/L (ref 136–145)
WBC # BLD AUTO: 5.58 K/UL (ref 3.9–12.7)

## 2022-10-31 PROCEDURE — 84153 ASSAY OF PSA TOTAL: CPT | Performed by: INTERNAL MEDICINE

## 2022-10-31 PROCEDURE — 80053 COMPREHEN METABOLIC PANEL: CPT | Mod: PN | Performed by: INTERNAL MEDICINE

## 2022-10-31 PROCEDURE — 36415 COLL VENOUS BLD VENIPUNCTURE: CPT | Mod: PN | Performed by: INTERNAL MEDICINE

## 2022-10-31 PROCEDURE — 85025 COMPLETE CBC W/AUTO DIFF WBC: CPT | Mod: PN | Performed by: INTERNAL MEDICINE

## 2022-10-31 PROCEDURE — 83735 ASSAY OF MAGNESIUM: CPT | Mod: PN | Performed by: INTERNAL MEDICINE

## 2022-11-03 ENCOUNTER — OFFICE VISIT (OUTPATIENT)
Dept: HEMATOLOGY/ONCOLOGY | Facility: CLINIC | Age: 87
End: 2022-11-03
Payer: MEDICARE

## 2022-11-03 ENCOUNTER — TELEPHONE (OUTPATIENT)
Dept: HEMATOLOGY/ONCOLOGY | Facility: CLINIC | Age: 87
End: 2022-11-03
Payer: MEDICARE

## 2022-11-03 VITALS
OXYGEN SATURATION: 98 % | HEIGHT: 70 IN | SYSTOLIC BLOOD PRESSURE: 152 MMHG | BODY MASS INDEX: 22.13 KG/M2 | DIASTOLIC BLOOD PRESSURE: 74 MMHG | WEIGHT: 154.56 LBS | TEMPERATURE: 98 F | HEART RATE: 98 BPM | RESPIRATION RATE: 16 BRPM

## 2022-11-03 DIAGNOSIS — E03.9 HYPOTHYROIDISM, UNSPECIFIED TYPE: ICD-10-CM

## 2022-11-03 DIAGNOSIS — C61 PROSTATE CANCER: Primary | ICD-10-CM

## 2022-11-03 DIAGNOSIS — C77.1 METASTASIS TO MEDIASTINAL LYMPH NODE: ICD-10-CM

## 2022-11-03 DIAGNOSIS — D63.8 ANEMIA OF CHRONIC DISEASE: ICD-10-CM

## 2022-11-03 PROCEDURE — 3078F DIAST BP <80 MM HG: CPT | Mod: CPTII,S$GLB,, | Performed by: INTERNAL MEDICINE

## 2022-11-03 PROCEDURE — 3288F PR FALLS RISK ASSESSMENT DOCUMENTED: ICD-10-PCS | Mod: CPTII,S$GLB,, | Performed by: INTERNAL MEDICINE

## 2022-11-03 PROCEDURE — 99999 PR PBB SHADOW E&M-EST. PATIENT-LVL III: CPT | Mod: PBBFAC,,, | Performed by: INTERNAL MEDICINE

## 2022-11-03 PROCEDURE — 1101F PR PT FALLS ASSESS DOC 0-1 FALLS W/OUT INJ PAST YR: ICD-10-PCS | Mod: CPTII,S$GLB,, | Performed by: INTERNAL MEDICINE

## 2022-11-03 PROCEDURE — 1159F MED LIST DOCD IN RCRD: CPT | Mod: CPTII,S$GLB,, | Performed by: INTERNAL MEDICINE

## 2022-11-03 PROCEDURE — 1159F PR MEDICATION LIST DOCUMENTED IN MEDICAL RECORD: ICD-10-PCS | Mod: CPTII,S$GLB,, | Performed by: INTERNAL MEDICINE

## 2022-11-03 PROCEDURE — 3077F PR MOST RECENT SYSTOLIC BLOOD PRESSURE >= 140 MM HG: ICD-10-PCS | Mod: CPTII,S$GLB,, | Performed by: INTERNAL MEDICINE

## 2022-11-03 PROCEDURE — 1126F AMNT PAIN NOTED NONE PRSNT: CPT | Mod: CPTII,S$GLB,, | Performed by: INTERNAL MEDICINE

## 2022-11-03 PROCEDURE — 3077F SYST BP >= 140 MM HG: CPT | Mod: CPTII,S$GLB,, | Performed by: INTERNAL MEDICINE

## 2022-11-03 PROCEDURE — 3288F FALL RISK ASSESSMENT DOCD: CPT | Mod: CPTII,S$GLB,, | Performed by: INTERNAL MEDICINE

## 2022-11-03 PROCEDURE — 1157F PR ADVANCE CARE PLAN OR EQUIV PRESENT IN MEDICAL RECORD: ICD-10-PCS | Mod: CPTII,S$GLB,, | Performed by: INTERNAL MEDICINE

## 2022-11-03 PROCEDURE — 3078F PR MOST RECENT DIASTOLIC BLOOD PRESSURE < 80 MM HG: ICD-10-PCS | Mod: CPTII,S$GLB,, | Performed by: INTERNAL MEDICINE

## 2022-11-03 PROCEDURE — 1126F PR PAIN SEVERITY QUANTIFIED, NO PAIN PRESENT: ICD-10-PCS | Mod: CPTII,S$GLB,, | Performed by: INTERNAL MEDICINE

## 2022-11-03 PROCEDURE — 99999 PR PBB SHADOW E&M-EST. PATIENT-LVL III: ICD-10-PCS | Mod: PBBFAC,,, | Performed by: INTERNAL MEDICINE

## 2022-11-03 PROCEDURE — 1101F PT FALLS ASSESS-DOCD LE1/YR: CPT | Mod: CPTII,S$GLB,, | Performed by: INTERNAL MEDICINE

## 2022-11-03 PROCEDURE — 99215 OFFICE O/P EST HI 40 MIN: CPT | Mod: S$GLB,,, | Performed by: INTERNAL MEDICINE

## 2022-11-03 PROCEDURE — 1157F ADVNC CARE PLAN IN RCRD: CPT | Mod: CPTII,S$GLB,, | Performed by: INTERNAL MEDICINE

## 2022-11-03 PROCEDURE — 99215 PR OFFICE/OUTPT VISIT, EST, LEVL V, 40-54 MIN: ICD-10-PCS | Mod: S$GLB,,, | Performed by: INTERNAL MEDICINE

## 2022-11-03 RX ORDER — ONDANSETRON 2 MG/ML
8 INJECTION INTRAMUSCULAR; INTRAVENOUS
Status: CANCELLED | OUTPATIENT
Start: 2022-11-04

## 2022-11-03 RX ORDER — HEPARIN 100 UNIT/ML
500 SYRINGE INTRAVENOUS
Status: CANCELLED | OUTPATIENT
Start: 2022-11-04

## 2022-11-03 RX ORDER — SODIUM CHLORIDE 0.9 % (FLUSH) 0.9 %
10 SYRINGE (ML) INJECTION
Status: CANCELLED | OUTPATIENT
Start: 2022-11-04

## 2022-11-03 NOTE — TELEPHONE ENCOUNTER
----- Message from Cielo Martel sent at 11/3/2022  3:44 PM CDT -----  Contact: Spouse/ Shannon  Type: Needs Medical Advice    Who Called:  Spouse/ Polina  Symptoms (please be specific):  med change    Best Call Back Number: [phone]   Additional Information: The caller stated that she spoke with Shannon regarding a prescription change because it was conflicting with the pt  cancer medication. The caller stated that she hasn't heard back from the office regarding the results of the medication change. Please call caller back. Thanks.

## 2022-11-03 NOTE — PROGRESS NOTES
PATIENT: Amor Alcazar  MRN: [id number]  DATE: 11/3/2022      Diagnosis:   1. 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 Anemia of chronic disease    4. Hypothyroidism, unspecified type          Chief Complaint: Follow-up (Prostate Cancer)    Subjective:    Interval History: Mr. Alcazar is a 87 y.o. male with Arthritis, hypothyroidism, valvular heart disease, iron deficiency anemia who presents for follow up of prostate cancer.  Since the last clinic visit the patient states he feels well.  The patient denies CP, cough, SOB, abdominal pain, N/V, constipation, diarrhea.  The patient denies fever, chills, night sweats, weight loss, new lumps or bumps, easy bruising or bleeding.    Prior History:  The patient was diagnosed with prostate cancer [place] 9 (4+5) 6/03/14.  He had a rising PSA and underwent PSMA PET/CT 5/09/22 showing disease in the prostate and mediastinal LN's.  The patient has been receiving Lupron 45mg 6 months dosing with Dr Bellamy with last treatment on 4/20/22.  He has previously been on Casodex and Apalutamide with progression.  He was started on Mitoxantrone and prednisone 7/01/22.  Last Echo 8/26/22 showed normal EF.   The patient saw Dr Kasper in cardiology on 9/30/22 and recommended repeat Echo in 3 months.    Past Medical History:   Past Medical History:   Diagnosis Date    Arthritis     History of skin cancer     details unknown    Hypothyroidism, unspecified     Prostate cancer     injections q 6 months    Valvular heart disease     mild AS, MR and TR 9/19 ECHO       Past Surgical HIstory:   Past Surgical History:   Procedure Laterality Date    BONE MARROW BIOPSY Bilateral 6/15/2022    Procedure: Biopsy-bone marrow;  Surgeon: Hermann Jackson MD;  Location: [facility] OR;  Service: Oncology;  Laterality: Bilateral;    CATARACT EXTRACTION W/  INTRAOCULAR LENS IMPLANT Bilateral     ESOPHAGOGASTRODUODENOSCOPY      INSERTION OF TUNNELED CENTRAL VENOUS CATHETER (CVC) WITH  SUBCUTANEOUS PORT N/A 6/15/2022    Procedure: INSERTION, PORT-A-CATH;  Surgeon: Marques Rivero MD;  Location: [facility] OR;  Service: General;  Laterality: N/A;       Family History:   Family History   Problem Relation Age of Onset    Lung cancer Sister          of       Social History:  reports that he has never smoked. He has never used smokeless tobacco. He reports that he does not currently use alcohol. He reports that he does not use drugs.    Allergies:  Review of patient's allergies indicates:  No Known Allergies    Medications:  Current Outpatient Medications   Medication Sig Dispense Refill    folic acid (FOLVITE) 400 MCG tablet Take 400 mcg by mouth once daily.      LIDOcaine-prilocaine (EMLA) cream Apply topically as needed (prior to port access). 30 g 2    LUPRON DEPOT, 6 MONTH, 45 mg SyKt injection       multivit-min-FA-lycopen-lutein (CENTRUM SILVER MEN) 300-600-300 mcg Tab Take by mouth once daily.      predniSONE (DELTASONE) 5 MG tablet Take 2 tablets (10 mg total) by mouth once daily. 90 tablet 2    predniSONE (DELTASONE) 5 MG tablet Take 2 tablets (10 mg total) by mouth once daily. 90 tablet 1    predniSONE (DELTASONE) 5 MG tablet Take 2 tablets (10 mg total) by mouth once daily. 180 tablet 2    predniSONE (DELTASONE) 5 MG tablet Take 2 tablets (10 mg total) by mouth once daily. 90 tablet 2    predniSONE (DELTASONE) 5 MG tablet Take 2 tablets (10 mg total) by mouth once daily. 90 tablet 2    predniSONE (DELTASONE) 5 MG tablet Take 2 tablets (10 mg total) by mouth once daily. 90 tablet 2    duke's soln (benadryl 30 mL, mylanta 30 mL, LIDOcaine 30 mL, nystatin 30 mL) 120mL Take 10 mLs by mouth 4 (four) times daily. (Patient not taking: Reported on 2022) 120 mL 6    HYDROcodone-acetaminophen (NORCO) 5-325 mg per tablet Take 1 tablet by mouth every 6 (six) hours as needed for Pain. (Patient not taking: Reported on 10/14/2022) 20 tablet 0    levothyroxine (SYNTHROID) 75 MCG tablet Take 1 tablet  "(75 mcg total) by mouth before breakfast. 90 tablet 3    prochlorperazine (COMPAZINE) 10 MG tablet Take 1 tablet (10 mg total) by mouth every 6 (six) hours as needed (nausea and vomiting). (Patient not taking: Reported on 10/14/2022) 60 tablet 6    tretinoin (RETIN-A) 0.05 % cream apply TO right side of face AT BEDTIME       No current facility-administered medications for this visit.       Review of Systems   Constitutional:  Negative for chills, diaphoresis, fatigue, fever and unexpected weight change.   Respiratory:  Negative for cough and shortness of breath.    Cardiovascular:  Negative for chest pain and palpitations.   Gastrointestinal:  Negative for abdominal pain, constipation, diarrhea, nausea and vomiting.   Skin:  Negative for color change and rash.   Neurological:  Negative for headaches.   Hematological:  Negative for adenopathy. Does not bruise/bleed easily.     ECOG Performance Status: 1   Objective:      Vitals:   Vitals:    11/03/22 1327   BP: (!) 152/74   BP Location: Left arm   Patient Position: Sitting   BP Method: Medium (Automatic)   Pulse: 98   Resp: 16   Temp: 97.5 °F (36.4 °C)   TempSrc: Temporal   SpO2: 98%   Weight: 70.1 kg (154 lb 8.7 oz)   Height: 5' 10" (1.778 m)       Physical Exam  Constitutional:       General: He is not in acute distress.     Appearance: He is well-developed. He is not diaphoretic.   HENT:      Head: Normocephalic and atraumatic.   Cardiovascular:      Rate and Rhythm: Normal rate and regular rhythm.      Heart sounds: Normal heart sounds. No murmur heard.    No friction rub. No gallop.   Pulmonary:      Effort: Pulmonary effort is normal. No respiratory distress.      Breath sounds: Normal breath sounds. No wheezing or rales.   Chest:      Chest wall: No tenderness.   Abdominal:      General: Bowel sounds are normal. There is no distension.      Palpations: Abdomen is soft. There is no mass.      Tenderness: There is no abdominal tenderness. There is no rebound. "   Musculoskeletal:      Cervical back: Normal range of motion.      Comments: PORT in left chest   Lymphadenopathy:      Cervical: No cervical adenopathy.      Upper Body:      Right upper body: No supraclavicular or axillary adenopathy.      Left upper body: No supraclavicular or axillary adenopathy.   Skin:     General: Skin is warm and dry.      Findings: No erythema or rash.   Neurological:      Mental Status: He is alert and oriented to person, place, and time.   Psychiatric:         Behavior: Behavior normal.       Laboratory Data:  Lab Visit on 10/31/2022   Component Date Value Ref Range Status    WBC 10/31/2022 5.58  3.90 - 12.70 K/uL Final    RBC 10/31/2022 3.53 (L)  4.60 - 6.20 M/uL Final    Hemoglobin 10/31/2022 11.5 (L)  14.0 - 18.0 g/dL Final    Hematocrit 10/31/2022 34.5 (L)  40.0 - 54.0 % Final    MCV 10/31/2022 98  82 - 98 fL Final    MCH 10/31/2022 32.6 (H)  27.0 - 31.0 pg Final    MCHC 10/31/2022 33.3  32.0 - 36.0 g/dL Final    RDW 10/31/2022 14.6 (H)  11.5 - 14.5 % Final    Platelets 10/31/2022 181  150 - 450 K/uL Final    MPV 10/31/2022 9.0 (L)  9.2 - 12.9 fL Final    Immature Granulocytes 10/31/2022 3.6 (H)  0.0 - 0.5 % Final    Gran # (ANC) 10/31/2022 3.4  1.8 - 7.7 K/uL Final    Immature Grans (Abs) 10/31/2022 0.20 (H)  0.00 - 0.04 K/uL Final    Comment: Mild elevation in immature granulocytes is non specific and   can be seen in a variety of conditions including stress response,   acute inflammation, trauma and pregnancy. Correlation with other   laboratory and clinical findings is essential.      Lymph # 10/31/2022 1.1  1.0 - 4.8 K/uL Final    Mono # 10/31/2022 0.8  0.3 - 1.0 K/uL Final    Eos # 10/31/2022 0.0  0.0 - 0.5 K/uL Final    Baso # 10/31/2022 0.05  0.00 - 0.20 K/uL Final    nRBC 10/31/2022 0  0 /100 WBC Final    Gran % 10/31/2022 61.4  38.0 - 73.0 % Final    Lymph % 10/31/2022 18.8  18.0 - 48.0 % Final    Mono % 10/31/2022 14.9  4.0 - 15.0 % Final    Eosinophil % 10/31/2022 0.4   0.0 - 8.0 % Final    Basophil % 10/31/2022 0.9  0.0 - 1.9 % Final    Differential Method 10/31/2022 Automated   Final    Sodium 10/31/2022 141  136 - 145 mmol/L Final    Potassium 10/31/2022 4.1  3.5 - 5.1 mmol/L Final    Chloride 10/31/2022 105  95 - 110 mmol/L Final    CO2 10/31/2022 25  23 - 29 mmol/L Final    Glucose 10/31/2022 108  70 - 110 mg/dL Final    BUN 10/31/2022 16  8 - 23 mg/dL Final    Creatinine 10/31/2022 1.1  0.5 - 1.4 mg/dL Final    Calcium 10/31/2022 9.8  8.7 - 10.5 mg/dL Final    Total Protein 10/31/2022 7.4  6.0 - 8.4 g/dL Final    Albumin 10/31/2022 3.6  3.5 - 5.2 g/dL Final    Total Bilirubin 10/31/2022 0.3  0.1 - 1.0 mg/dL Final    Comment: For infants and newborns, interpretation of results should be based  on gestational age, weight and in agreement with clinical  observations.    Premature Infant recommended reference ranges:  Up to 24 hours.............<8.0 mg/dL  Up to 48 hours............<12.0 mg/dL  3-5 days..................<15.0 mg/dL  6-29 days.................<15.0 mg/dL      Alkaline Phosphatase 10/31/2022 22 (L)  55 - 135 U/L Final    AST 10/31/2022 20  10 - 40 U/L Final    ALT 10/31/2022 8 (L)  10 - 44 U/L Final    Anion Gap 10/31/2022 11  8 - 16 mmol/L Final    eGFR 10/31/2022 >60.0  >60 mL/min/1.73 m^2 Final    PSA Diagnostic 10/31/2022 4.6 (H)  0.00 - 4.00 ng/mL Final    Comment: The testing method is a chemiluminescent microparticle immunoassay   manufactured by [employer] and performed on the    or   [employer] system. Values obtained with different assay manufacturers   for   methods may be different and cannot be used interchangeably.  PSA Expected levels:  Hormonal Therapy: <0.05 ng/ml  Prostatectomy: <0.01 ng/ml  Radiation Therapy: <1.00 ng/ml      Magnesium 10/31/2022 2.2  1.6 - 2.6 mg/dL Final         Imaging: PSMA PET/CT 5/09/22    Mediastinal blood pool max SUV: 3.7     L3 vertebral body bone marrow max SUV: 2.1     Head/neck:     No significant  abnormal radiotracer accumulation is identified within the head and neck region.  No lymphadenopathy is present.     Chest:     There is an irregular focal zone of fibrosis in the right pulmonary apex which does not exhibit significantly increased radiotracer accumulation.  There is lymphadenopathy in the right paratracheal region of the mediastinum and in the subcarinal region of the mediastinum with abnormally increased radiotracer accumulation highly suspicious for metastatic disease.  A right paratracheal node on image 113 measures 2.0 x 1.3 cm with a max SUV of 4.2.  One of the larger subcarinal nodes on image 126 measures 1.8 x 1.6 cm with a max SUV of 3.8.     Abdomen/pelvis:     There is markedly increased radiotracer accumulation within the prostate gland with 2 distinct nodular masses occupying the majority of the gland.  The conglomerate size of these masses on image 275 is 3.8 x 3.0 cm with a max SUV of 11.2.  The findings are consistent with prostate cancer recurrence.  There are nonenlarged bilateral inguinal lymph nodes which do not exhibit significant increased radiotracer accumulation.     Skeleton:     No significant abnormal radiotracer accumulation is identified within the skeleton and there are no findings to suggest osseous metastatic disease.    Echo 8/26/22    The left ventricle is normal in size with concentric remodeling and normal systolic function.  The estimated ejection fraction is 60%.  Normal left ventricular diastolic function.  Normal right ventricular size with normal right ventricular systolic function.  There is mild aortic valve stenosis.  Aortic valve area is 1.72 cm2; peak velocity is 2.13 m/s; mean gradient is 10 mmHg.  Mild mitral regurgitation.  Mild tricuspid regurgitation.  Normal central venous pressure (3 mmHg).  The estimated PA systolic pressure is 33 mmHg.  The left ventricular global longitudinal strain is -18.1%.       Assessment:       1. Prostate cancer    2.  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 Anemia of chronic disease    4. Hypothyroidism, unspecified type             Plan:     Prostate Cancer - PT has metastatic prostate cancer with mediastinal LN involvement  -Pt with prior progression on Casodex and Apalutamide  -Pt currently on Mitoxantrone with cycle 7 to be given today  -PSA stable  -Last Echo 8/26/22  -Pt following with Dr Kasper with Cardio-Oncology given risk of cardiac toxicity with recommendation for repeat Echo 12/2022  -Lupron 45mg given 10/14/22  -Pt to return in 3 weeks for cycle 8 Mitoxantrone    Anemia of Chronic Disease - Hemoglobin was 11.5g/dL on 10/31/22  -hemoglobin stable  -Will monitor    Hypothyroidism - pt on synthroid  -management per PCP    Route Chart for Scheduling    Med Onc Chart Routing      Follow up with physician 6 weeks. the patient can proceed with treatment.  He will need a CBC, CMP and PSA on 11/23/22 with an appt with Bishnu Iverson 11/25/22 and treatment that day.  He will need a CBC, CMP and PSA on 12/15/22 with an appt with me and treatment on 12/16/22.   Follow up with AMINTA 3 weeks.   Infusion scheduling note    Injection scheduling note    Labs    Imaging    Pharmacy appointment    Other referrals        Treatment Plan Information   OP MITOXANTRONE PREDNISONE Q3W   Hermann Jackson MD   Upcoming Treatment Dates - OP MITOXANTRONE PREDNISONE Q3W    11/4/2022       Chemotherapy       mitoXANTRONE (NOVANTRONE) 12 mg/m2 = 22 mg in sodium chloride 0.9% 50 mL chemo infusion       Antiemetics       ondansetron injection 8 mg  11/25/2022       Chemotherapy       mitoXANTRONE (NOVANTRONE) 12 mg/m2 = 22 mg in sodium chloride 0.9% 50 mL chemo infusion       Antiemetics       ondansetron injection 8 mg  12/16/2022       Chemotherapy       mitoXANTRONE (NOVANTRONE) 12 mg/m2 = 22 mg in sodium chloride 0.9% 50 mL chemo infusion       Antiemetics       ondansetron injection 8 mg  1/6/2023       Chemotherapy       mitoXANTRONE  (NOVANTRONE) 12 mg/m2 = 22 mg in sodium chloride 0.9% 50 mL chemo infusion       Antiemetics       ondansetron injection 8 mg    Supportive Plan Information  OP PROSTATE LEUPROLIDE (LUPRON) 6 MONTH   Beka Alvarez MD   Upcoming Treatment Dates - OP PROSTATE LEUPROLIDE (LUPRON) 6 MONTH    10/14/2022       Chemotherapy       leuprolide acetate (6 month) injection 45 mg  3/31/2023       Chemotherapy       leuprolide acetate (6 month) injection 45 mg    Therapy Plan Information  VENOFER (IRON SUCROSE) QW  4. Pre-Medications  famotidine (PF) injection 20 mg  20 mg, Intravenous, Every visit  dexamethasone injection 4 mg  4 mg, Intravenous, Every visit  diphenhydrAMINE capsule 25 mg  25 mg, Oral, Every visit  Medications  iron sucrose (VENOFER) 200 mg in sodium chloride 0.9% 100 mL IVPB  200 mg, Intravenous, 1 time a week  Flushes  sodium chloride 0.9% 250 mL flush bag  Intravenous, 1 time a week  sodium chloride 0.9% flush 10 mL  10 mL, Intravenous, 1 time a week  heparin, porcine (PF) 100 unit/mL injection flush 500 Units  500 Units, Intravenous, 1 time a week  alteplase injection 2 mg  2 mg, Intra-Catheter, 1 time a week  PRN Medications  EPINEPHrine (EPIPEN) 0.3 mg/0.3 mL pen injection 0.3 mg  0.3 mg, Intramuscular, PRN  diphenhydrAMINE injection 50 mg  50 mg, Intravenous, PRN  methylPREDNISolone sodium succinate injection 125 mg  125 mg, Intravenous, PRN  sodium chloride 0.9% bolus 1,000 mL  1,000 mL, Intravenous, PRN    INF LEUPROLIDE (LUPRON) PROSTATE 6 MONTH  Medications  leuprolide acetate (6 month) injection 45 mg  45 mg, Intramuscular, Every 24 weeks      Beka Alvarez MD  [facility]  Hematology and Oncology  [facility]   [address]   [place] [address]   O: [phone]  F: [phone]

## 2022-11-04 ENCOUNTER — INFUSION (OUTPATIENT)
Dept: INFUSION THERAPY | Facility: HOSPITAL | Age: 87
End: 2022-11-04
Attending: INTERNAL MEDICINE
Payer: MEDICARE

## 2022-11-04 ENCOUNTER — TELEPHONE (OUTPATIENT)
Dept: FAMILY MEDICINE | Facility: CLINIC | Age: 87
End: 2022-11-04
Payer: MEDICARE

## 2022-11-04 ENCOUNTER — TELEPHONE (OUTPATIENT)
Dept: PSYCHIATRY | Facility: CLINIC | Age: 87
End: 2022-11-04
Payer: MEDICARE

## 2022-11-04 ENCOUNTER — DOCUMENTATION ONLY (OUTPATIENT)
Dept: INFUSION THERAPY | Facility: HOSPITAL | Age: 87
End: 2022-11-04

## 2022-11-04 VITALS
TEMPERATURE: 98 F | HEART RATE: 78 BPM | HEIGHT: 70 IN | DIASTOLIC BLOOD PRESSURE: 59 MMHG | BODY MASS INDEX: 22.13 KG/M2 | RESPIRATION RATE: 16 BRPM | WEIGHT: 154.56 LBS | SYSTOLIC BLOOD PRESSURE: 98 MMHG

## 2022-11-04 DIAGNOSIS — C61 PROSTATE CANCER: Primary | ICD-10-CM

## 2022-11-04 PROCEDURE — 96413 CHEMO IV INFUSION 1 HR: CPT | Mod: PN

## 2022-11-04 PROCEDURE — 25000003 PHARM REV CODE 250: Mod: PN | Performed by: INTERNAL MEDICINE

## 2022-11-04 PROCEDURE — 63600175 PHARM REV CODE 636 W HCPCS: Mod: JG,PN | Performed by: INTERNAL MEDICINE

## 2022-11-04 PROCEDURE — 96375 TX/PRO/DX INJ NEW DRUG ADDON: CPT | Mod: PN

## 2022-11-04 RX ORDER — ONDANSETRON 2 MG/ML
8 INJECTION INTRAMUSCULAR; INTRAVENOUS
Status: COMPLETED | OUTPATIENT
Start: 2022-11-04 | End: 2022-11-04

## 2022-11-04 RX ORDER — PREDNISONE 5 MG/1
10 TABLET ORAL DAILY
Qty: 90 TABLET | Refills: 2
Start: 2022-11-04 | End: 2022-12-15 | Stop reason: SDUPTHER

## 2022-11-04 RX ORDER — SODIUM CHLORIDE 0.9 % (FLUSH) 0.9 %
10 SYRINGE (ML) INJECTION
Status: DISCONTINUED | OUTPATIENT
Start: 2022-11-04 | End: 2022-11-04 | Stop reason: HOSPADM

## 2022-11-04 RX ADMIN — MITOXANTRONE 22 MG: 2 INJECTION, SOLUTION, CONCENTRATE INTRAVENOUS at 11:11

## 2022-11-04 RX ADMIN — SODIUM CHLORIDE: 0.9 INJECTION, SOLUTION INTRAVENOUS at 10:11

## 2022-11-04 RX ADMIN — ONDANSETRON 8 MG: 2 INJECTION INTRAMUSCULAR; INTRAVENOUS at 10:11

## 2022-11-04 NOTE — PLAN OF CARE
Problem: Adult Inpatient Plan of Care  Goal: Plan of Care Review  Outcome: Ongoing, Progressing  Flowsheets (Taken 11/4/2022 1108)  Plan of Care Reviewed With:   patient   spouse  Goal: Patient-Specific Goal (Individualized)  Outcome: Ongoing, Progressing  Flowsheets (Taken 11/4/2022 1108)  Anxieties, Fears or Concerns: None  Individualized Care Needs: Recliner, warm blanket, conversation, wife at chairside  Patient-Specific Goals (Include Timeframe): Free of S/S of reaction with infusion.    Patient to Infusion for Mitoxantrone, accompanied by his wife. Treatment plan reviewed with patient. VSS. Tolerated infusion. Provided with copy of upcoming appointment schedule. Escorted to the front lobby for discharge to home.

## 2022-11-04 NOTE — PROGRESS NOTES
ONCOLOGY NUTRITION   FOLLOW UP VISIT        Amor Alcazar is a 87 y.o. male.  DATE: 11/04/2022        Oncology Diagnosis: Prostate Cancer     REFERRAL FROM:   [] Integrative Oncology   [] Med/Heme Oncology  [] Radiation Oncology  [] Surgical Oncology   [] Infusion Nurse    [x] Routine Nutrition follow up    TREATMENT PLAN:   [] Full treatment plan pending  [] Chemotherapy  [x] Immunotherapy  [] Radiation  [] Concurrent  [] Surgery  [] Treatment complete/post-treatment    ANTHROPOMETRICS:  Wt Readings from Last 10 Encounters:   11/04/22 70.1 kg (154 lb 8.7 oz)   11/03/22 70.1 kg (154 lb 8.7 oz)   10/14/22 69.4 kg (153 lb)   10/14/22 69.4 kg (153 lb)   09/30/22 (P) 69.3 kg (152 lb 12.5 oz)   09/23/22 69.5 kg (153 lb 3.5 oz)   09/23/22 69.5 kg (153 lb 3.5 oz)   09/02/22 68.6 kg (151 lb 2 oz)   09/02/22 68.6 kg (151 lb 2 oz)   08/26/22 68.9 kg (152 lb)      Weight Changes: has been stable    PHYSICAL EXAM:  Muscle Wasting Observed:  [] No Deficit   [x] Mild Deficit   [x] Moderate   [] Severe    INTAKE:  [x] PO Intake [] TF Intake  Current Diet: regular diet  Dietary Patterns:  Eating meals/snacks as tolerated  [x] Oral nutritional supplements: Boost x2/daily    SYMPTOMS/COMPLAINTS:   [x] No nutritional concerns at current  [] Diarrhea                    [] Constipation           [] Nausea                 [] Vomiting                [] Indigestion                [] Reflux              [] Poor Appetite            [] Anorexia                 [] Early Satiety         [] Gas                       [] Bloating                     [] Dry Mouth    [] Mucositis                   [] Mouth Sores           [] Poor Dentition      [] Difficulty chewing  [] Difficulty Swallowing   [] Pain with swallowing [] Change in taste      [] Change in smell   [] Pain (general)       [] Fatigue                      [] Sleep issues    [] Weight loss  [] other, please specify-     Nutrition Re-Assessment Risk: Low risk    [x] Labs reviewed   [x]  Meds reviewed    Education Provided:   [x] No Education Needed at this time  [] Diarrhea                                              [] Constipation                          [] Nausea/Vomiting  [] Mucositis                [] Dry Mouth    [] Dealing with changes in Taste/Smell  [] Dealing with Poor Appetite   [] Soft/moist Diet      [] Weight Loss/Gain     [] Weight Maintenance                           [] Indigestion/GERD                 [] Gas/Bloating          [] Foods High/ Low in specific nutrients [] Increasing Calories/Protein   [] Milkshake/Smoothies Recipes   [] Nutrition Supplements                        [] Increasing Fluid Intakes         [] Foods that fight cancer    [] Evidence bases resources                 [] Fermented Foods/Probiotics  [] Mediterranean/Plant Based Diet     [] Other, specify                                   [] Handouts provided      [] Samples provided     RD NOTE:  RD met w/ pt and spouse at chairside during infusion tx. Pt reports healthy appetite. Pt denies any nutrition related side effects, difficulty chewing or any food intolerances. Pt drinking Boost x2/daily. Pt weight has been stable. Pt and spouse always a pleasure to speak with.     RD Goals:   [x] Weight stable                  [] Weight gain                      [] Weight Loss                               [x] Continue adequate Kcal/protein   [] Increase Kcal/protein      [] Adjust Tube-feeding Rx   [] Tolerate Tube Feedings             [] Increase tube feedings to goal     [] Tolerate Supplements     [] Symptom Improvement   [] Understand nutrition Education  [x] Offer supportive visits   [] other, please specify    RECOMMENDATIONS:  Continue Boost BID  Continue current calorie/protein intake to maintain body weight   Continue current fluid intake to maintain proper hydration     Follow up: Next infusion or PRN throughout tx    Margaret Knutson, LYNN, LDN  11/04/2022  2:20 PM

## 2022-11-04 NOTE — TELEPHONE ENCOUNTER
Wife sent message in her chart requesting Ditropan 5 mg bid for patient. Was not sure which med to pend as there were several to choose from.

## 2022-11-09 ENCOUNTER — PATIENT MESSAGE (OUTPATIENT)
Dept: PSYCHIATRY | Facility: CLINIC | Age: 87
End: 2022-11-09
Payer: MEDICARE

## 2022-11-11 ENCOUNTER — PATIENT MESSAGE (OUTPATIENT)
Dept: PSYCHIATRY | Facility: CLINIC | Age: 87
End: 2022-11-11
Payer: MEDICARE

## 2022-11-11 ENCOUNTER — OFFICE VISIT (OUTPATIENT)
Dept: PSYCHIATRY | Facility: CLINIC | Age: 87
End: 2022-11-11
Payer: MEDICARE

## 2022-11-11 ENCOUNTER — TELEPHONE (OUTPATIENT)
Dept: HEMATOLOGY/ONCOLOGY | Facility: CLINIC | Age: 87
End: 2022-11-11
Payer: MEDICARE

## 2022-11-11 ENCOUNTER — OFFICE VISIT (OUTPATIENT)
Dept: CARDIOLOGY | Facility: CLINIC | Age: 87
End: 2022-11-11
Payer: MEDICARE

## 2022-11-11 ENCOUNTER — CLINICAL SUPPORT (OUTPATIENT)
Dept: CARDIOLOGY | Facility: HOSPITAL | Age: 87
End: 2022-11-11
Attending: INTERNAL MEDICINE
Payer: MEDICARE

## 2022-11-11 ENCOUNTER — TELEPHONE (OUTPATIENT)
Dept: PSYCHIATRY | Facility: CLINIC | Age: 87
End: 2022-11-11
Payer: MEDICARE

## 2022-11-11 VITALS
RESPIRATION RATE: 18 BRPM | SYSTOLIC BLOOD PRESSURE: 160 MMHG | DIASTOLIC BLOOD PRESSURE: 84 MMHG | WEIGHT: 154.31 LBS | TEMPERATURE: 97 F | OXYGEN SATURATION: 95 % | HEIGHT: 70 IN | BODY MASS INDEX: 22.09 KG/M2 | HEART RATE: 110 BPM

## 2022-11-11 DIAGNOSIS — C61 PROSTATE CANCER: ICD-10-CM

## 2022-11-11 DIAGNOSIS — I35.0 NON-RHEUMATIC AORTIC STENOSIS: ICD-10-CM

## 2022-11-11 DIAGNOSIS — R00.9 ABNORMAL HEART RATE: ICD-10-CM

## 2022-11-11 DIAGNOSIS — R79.9 ABNORMAL FINDING OF BLOOD CHEMISTRY, UNSPECIFIED: ICD-10-CM

## 2022-11-11 DIAGNOSIS — E78.5 DYSLIPIDEMIA: Primary | ICD-10-CM

## 2022-11-11 DIAGNOSIS — F43.22 ADJUSTMENT DISORDER WITH ANXIETY: Primary | ICD-10-CM

## 2022-11-11 PROCEDURE — 93010 ELECTROCARDIOGRAM REPORT: CPT | Mod: ,,, | Performed by: INTERNAL MEDICINE

## 2022-11-11 PROCEDURE — 3288F FALL RISK ASSESSMENT DOCD: CPT | Mod: CPTII,S$GLB,, | Performed by: INTERNAL MEDICINE

## 2022-11-11 PROCEDURE — 1101F PT FALLS ASSESS-DOCD LE1/YR: CPT | Mod: CPTII,S$GLB,, | Performed by: INTERNAL MEDICINE

## 2022-11-11 PROCEDURE — 93005 ELECTROCARDIOGRAM TRACING: CPT | Mod: PO

## 2022-11-11 PROCEDURE — 99214 OFFICE O/P EST MOD 30 MIN: CPT | Mod: S$GLB,,, | Performed by: INTERNAL MEDICINE

## 2022-11-11 PROCEDURE — 93010 EKG 12-LEAD: ICD-10-PCS | Mod: ,,, | Performed by: INTERNAL MEDICINE

## 2022-11-11 PROCEDURE — 99214 PR OFFICE/OUTPT VISIT, EST, LEVL IV, 30-39 MIN: ICD-10-PCS | Mod: S$GLB,,, | Performed by: INTERNAL MEDICINE

## 2022-11-11 PROCEDURE — 1157F PR ADVANCE CARE PLAN OR EQUIV PRESENT IN MEDICAL RECORD: ICD-10-PCS | Mod: CPTII,S$GLB,, | Performed by: PSYCHOLOGIST

## 2022-11-11 PROCEDURE — 90834 PR PSYCHOTHERAPY W/PATIENT, 45 MIN: ICD-10-PCS | Mod: S$GLB,,, | Performed by: PSYCHOLOGIST

## 2022-11-11 PROCEDURE — 1101F PR PT FALLS ASSESS DOC 0-1 FALLS W/OUT INJ PAST YR: ICD-10-PCS | Mod: CPTII,S$GLB,, | Performed by: INTERNAL MEDICINE

## 2022-11-11 PROCEDURE — 1159F PR MEDICATION LIST DOCUMENTED IN MEDICAL RECORD: ICD-10-PCS | Mod: CPTII,S$GLB,, | Performed by: INTERNAL MEDICINE

## 2022-11-11 PROCEDURE — 3079F DIAST BP 80-89 MM HG: CPT | Mod: CPTII,S$GLB,, | Performed by: INTERNAL MEDICINE

## 2022-11-11 PROCEDURE — 3288F PR FALLS RISK ASSESSMENT DOCUMENTED: ICD-10-PCS | Mod: CPTII,S$GLB,, | Performed by: INTERNAL MEDICINE

## 2022-11-11 PROCEDURE — 99999 PR PBB SHADOW E&M-EST. PATIENT-LVL IV: ICD-10-PCS | Mod: PBBFAC,,, | Performed by: INTERNAL MEDICINE

## 2022-11-11 PROCEDURE — 3077F SYST BP >= 140 MM HG: CPT | Mod: CPTII,S$GLB,, | Performed by: INTERNAL MEDICINE

## 2022-11-11 PROCEDURE — 1159F MED LIST DOCD IN RCRD: CPT | Mod: CPTII,S$GLB,, | Performed by: INTERNAL MEDICINE

## 2022-11-11 PROCEDURE — 1157F ADVNC CARE PLAN IN RCRD: CPT | Mod: CPTII,S$GLB,, | Performed by: PSYCHOLOGIST

## 2022-11-11 PROCEDURE — 99999 PR PBB SHADOW E&M-EST. PATIENT-LVL IV: CPT | Mod: PBBFAC,,, | Performed by: INTERNAL MEDICINE

## 2022-11-11 PROCEDURE — 1126F AMNT PAIN NOTED NONE PRSNT: CPT | Mod: CPTII,S$GLB,, | Performed by: INTERNAL MEDICINE

## 2022-11-11 PROCEDURE — 1160F RVW MEDS BY RX/DR IN RCRD: CPT | Mod: CPTII,S$GLB,, | Performed by: INTERNAL MEDICINE

## 2022-11-11 PROCEDURE — 1157F ADVNC CARE PLAN IN RCRD: CPT | Mod: CPTII,S$GLB,, | Performed by: INTERNAL MEDICINE

## 2022-11-11 PROCEDURE — 90834 PSYTX W PT 45 MINUTES: CPT | Mod: S$GLB,,, | Performed by: PSYCHOLOGIST

## 2022-11-11 PROCEDURE — 1157F PR ADVANCE CARE PLAN OR EQUIV PRESENT IN MEDICAL RECORD: ICD-10-PCS | Mod: CPTII,S$GLB,, | Performed by: INTERNAL MEDICINE

## 2022-11-11 PROCEDURE — 3079F PR MOST RECENT DIASTOLIC BLOOD PRESSURE 80-89 MM HG: ICD-10-PCS | Mod: CPTII,S$GLB,, | Performed by: INTERNAL MEDICINE

## 2022-11-11 PROCEDURE — 3077F PR MOST RECENT SYSTOLIC BLOOD PRESSURE >= 140 MM HG: ICD-10-PCS | Mod: CPTII,S$GLB,, | Performed by: INTERNAL MEDICINE

## 2022-11-11 PROCEDURE — 1126F PR PAIN SEVERITY QUANTIFIED, NO PAIN PRESENT: ICD-10-PCS | Mod: CPTII,S$GLB,, | Performed by: INTERNAL MEDICINE

## 2022-11-11 PROCEDURE — 1160F PR REVIEW ALL MEDS BY PRESCRIBER/CLIN PHARMACIST DOCUMENTED: ICD-10-PCS | Mod: CPTII,S$GLB,, | Performed by: INTERNAL MEDICINE

## 2022-11-11 NOTE — PROGRESS NOTES
PSYCHO-ONCOLOGY NOTE/ Individual Psychotherapy     Date: 11/11/2022   Site:  [facility]      Therapeutic Intervention: Met with patient and spouse.  Outpatient - Supportive psychotherapy 45 min - CPT Code 62421    This includes face to face time and non-face to face time preparing to see the patient, obtaining and/or reviewing separately obtained history, documenting clinical information in the electronic or other health record, independently interpreting results and communicating results to the patient/family/caregiver, or care coordinator.      Patient was last seen by me on 9/23/2022    Problem list  Patient Active Problem List   Diagnosis    Malignant neoplasm of prostate    Arthritis    Hypertriglyceridemia    Pseudophakia of both eyes    History of skin cancer    Anemia    Chronic kidney disease, stage 3a    Iron deficiency anemia    Encounter for insertion of venous access port    Prostate cancer       Chief complaint/reason for encounter: adjustment to illness, anxiety        Met with patient to evaluate psychosocial adaptation to diagnosis/treatment of prostate cancer    Current Medications  Current Outpatient Medications   Medication    duke's soln (benadryl 30 mL, mylanta 30 mL, LIDOcaine 30 mL, nystatin 30 mL) 120mL    folic acid (FOLVITE) 400 MCG tablet    HYDROcodone-acetaminophen (NORCO) 5-325 mg per tablet    levothyroxine (SYNTHROID) 75 MCG tablet    LIDOcaine-prilocaine (EMLA) cream    LUPRON DEPOT, 6 MONTH, 45 mg SyKt injection    multivit-min-FA-lycopen-lutein (CENTRUM SILVER MEN) 300-600-300 mcg Tab    predniSONE (DELTASONE) 5 MG tablet    predniSONE (DELTASONE) 5 MG tablet    predniSONE (DELTASONE) 5 MG tablet    predniSONE (DELTASONE) 5 MG tablet    predniSONE (DELTASONE) 5 MG tablet    predniSONE (DELTASONE) 5 MG tablet    predniSONE (DELTASONE) 5 MG tablet    prochlorperazine (COMPAZINE) 10 MG tablet    tretinoin (RETIN-A) 0.05 % cream     No current facility-administered medications for  this visit.       ONCOLOGY HISTORY  Oncology History   Prostate cancer   6/27/2022 Initial Diagnosis    Prostate cancer     7/1/2022 -  Chemotherapy    Treatment Summary   Plan Name: OP MITOXANTRONE PREDNISONE Q3W  Treatment Goal: Palliative  Status: Active  Start Date: 7/1/2022  End Date: 1/6/2023 (Planned)  Provider: Hermann Jackson MD  Chemotherapy: mitoXANTRONE (NOVANTRONE) 12 mg/m2 = 22 mg in sodium chloride 0.9% 50 mL chemo infusion, 12 mg/m2 = 22 mg, Intravenous, Clinic/HOD 1 time, 7 of 10 cycles  Administration: 22 mg (7/1/2022), 22 mg (7/22/2022), 22 mg (11/4/2022), 22 mg (8/12/2022), 22 mg (9/2/2022), 22 mg (9/23/2022), 22 mg (10/14/2022)           Objective:  Amor Alcazar arrived promptly for the session. His wife, Sandy, was also present during the interview, with the consent of Amor Alcazar.   Mr. Alcazar was independently ambulatory at the time of session. The patient was fully cooperative throughout the session.  Appearance: age appropriate, casually  dressed, well groomed  Behavior/Cooperation: friendly and cooperative  Speech: normal in rate, volume, and tone and appropriate quality, quantity and organization of sentences  Mood: happy  Affect: euthymic and mood congruent  Thought Process: goal-directed, logical  Thought Content: normal,  No delusions or paranoia; did not appear to be responding to internal stimuli during the session  Orientation: grossly intact  Memory: grossly intact  Attention Span/Concentration: Attends to session without distraction; reports no difficulty  Fund of Knowledge: average  Estimate of Intelligence: average from verbal skills and history  Cognition: grossly intact  Insight: patient has awareness of illness; good insight into own behavior and behavior of others  Judgment: the patient's behavior is adequate to circumstances    NCCN Distress thermometer:   DISTRESS SCREENING 11/11/2022 11/3/2022 10/14/2022 9/2/2022 7/22/2022 6/30/2022 6/27/2022   Distress Score 0  - No Distress 0 - No Distress 0 - No Distress 0 - No Distress 0 - No Distress 0 - No Distress 0 - No Distress   Practical Problems None of these None of these None of these None of these None of these None of these None of these   Family Problems None of these None of these None of these None of these None of these None of these None of these   Emotional Problems None of these None of these None of these None of these None of these None of these None of these   Spiritual / Quaker Concerns No No No No No No No   Physical Problems None of these None of these None of these None of these None of these None of these None of these        Interval history and content of current session: Explored treatment and family's adaptation to disease and treatment status. Reports to be coping in an adequate manner, noting limited difficulties associated w/ tx and limited associated SE. Assessed cognitive response, paying particular attention to negative intrusive thoughts of a persistent and detrimental nature. Thoughts of this type are in evidence with mild distress and are associated w/ the loss of his younger brother in [place] ('s Day). Provided cognitive behavioral therapy to address negative cognitions. Provided additional psychotherapeutic support as indicated. Identified and evaluated psychosocial and environmental stressors secondary to diagnosis and treatment.  Examined proactive behaviors that may be implemented to minimize or ameliorate psychosocial stressors secondary to diagnosis and treatment.     Risk parameters:   Patient reports no suicidal ideation  Patient reports no homicidal ideation  Patient reports no self-injurious behavior  Patient reports no violent behavior   Safety needs:  None at this time      Verbal deficits: None     Patient's response to intervention:The patient's response to intervention is accepting, motivated.     Progress toward goals and other mental status changes:  The patient's  progress toward goals is excellent.      Progress to date:Progress as Expected      Goals from last visit: Met        Patient Strengths:  verbal, intelligent, successful, good social support, good insight, commitment to wellness, strong marjan, strong cultural traditions        Treatment Plan:individual psychotherapy  Target symptoms: anxiety , adjustment  Why chosen therapy is appropriate versus another modality: relevant to diagnosis, patient responds to this modality, evidence based practice  Outcome monitoring methods: self-report, observation, feedback from family  Therapeutic intervention type: insight oriented psychotherapy, behavior modifying psychotherapy, supportive psychotherapy  Prognosis: Good                            Behavioral goals:               Social engagement: continued              Therapy: adaptive coping, psychotherapeutic support    Return to clinic: 3 months     Length of Service (minutes direct face-to-face contact): 45    Diagnosis:     ICD-10-CM ICD-9-CM   1. Adjustment disorder with anxiety  F43.22 309.24   2. Prostate cancer  C61 185                [place] License #[id number]  [place] License #[id number]

## 2022-11-11 NOTE — PROGRESS NOTES
Subjective:    Patient ID:  Amor Alcazar is a 87 y.o. male who presents for follow-up.    HPI  He has a history of arthritis, hypothyroidism, aortic stenosis, iron deficiency anemia and metastatic prostate cancer, on Lupron and mitoxantrone.  He is here for follow up. He reports no new issues. He stays physically active and walks around his one acre land without major problems. No chest pain, palpitations, syncope, leg swelling.     Treatment Plan Information   OP MITOXANTRONE PREDNISONE Q3W   Hermann Jackson MD   Upcoming Treatment Dates - OP MITOXANTRONE PREDNISONE Q3W     11/4/2022       Chemotherapy       mitoXANTRONE (NOVANTRONE) 12 mg/m2 = 22 mg in sodium chloride 0.9% 50 mL chemo infusion       Antiemetics       ondansetron injection 8 mg  11/25/2022       Chemotherapy       mitoXANTRONE (NOVANTRONE) 12 mg/m2 = 22 mg in sodium chloride 0.9% 50 mL chemo infusion       Antiemetics       ondansetron injection 8 mg  12/16/2022       Chemotherapy       mitoXANTRONE (NOVANTRONE) 12 mg/m2 = 22 mg in sodium chloride 0.9% 50 mL chemo infusion       Antiemetics       ondansetron injection 8 mg  1/6/2023       Chemotherapy       mitoXANTRONE (NOVANTRONE) 12 mg/m2 = 22 mg in sodium chloride 0.9% 50 mL chemo infusion       Antiemetics       ondansetron injection 8 mg     Supportive Plan Information  OP PROSTATE LEUPROLIDE (LUPRON) 6 MONTH   Beka Alvarez MD   Upcoming Treatment Dates - OP PROSTATE LEUPROLIDE (LUPRON) 6 MONTH     10/14/2022       Chemotherapy       leuprolide acetate (6 month) injection 45 mg  3/31/2023       Chemotherapy       leuprolide acetate (6 month) injection 45 mg     Therapy Plan Information  VENOFER (IRON SUCROSE) QW  4. Pre-Medications  famotidine (PF) injection 20 mg  20 mg, Intravenous, Every visit  dexamethasone injection 4 mg  4 mg, Intravenous, Every visit  diphenhydrAMINE capsule 25 mg  25 mg, Oral, Every visit  Medications  iron sucrose (VENOFER) 200 mg in sodium chloride 0.9% 100  mL IVPB  200 mg, Intravenous, 1 time a week  Flushes  sodium chloride 0.9% 250 mL flush bag  Intravenous, 1 time a week  sodium chloride 0.9% flush 10 mL  10 mL, Intravenous, 1 time a week  heparin, porcine (PF) 100 unit/mL injection flush 500 Units  500 Units, Intravenous, 1 time a week  alteplase injection 2 mg  2 mg, Intra-Catheter, 1 time a week  PRN Medications  EPINEPHrine (EPIPEN) 0.3 mg/0.3 mL pen injection 0.3 mg  0.3 mg, Intramuscular, PRN  diphenhydrAMINE injection 50 mg  50 mg, Intravenous, PRN  methylPREDNISolone sodium succinate injection 125 mg  125 mg, Intravenous, PRN  sodium chloride 0.9% bolus 1,000 mL  1,000 mL, Intravenous, PRN     INF LEUPROLIDE (LUPRON) PROSTATE 6 MONTH  Medications  leuprolide acetate (6 month) injection 45 mg  45 mg, Intramuscular, Every 24 weeks     Review of Systems   Constitutional: Negative for chills and fever.   HENT:  Positive for hearing loss.    Eyes:  Negative for redness.   Cardiovascular:  Negative for chest pain, irregular heartbeat, leg swelling, palpitations and syncope.   Respiratory:  Negative for cough and shortness of breath.    Endocrine: Negative for cold intolerance.   Musculoskeletal:  Positive for joint pain.   Gastrointestinal:  Negative for vomiting.   Genitourinary:  Negative for hematuria.   Neurological:  Negative for focal weakness and seizures.   Psychiatric/Behavioral:  The patient is not nervous/anxious.    Allergic/Immunologic: Negative for hives.      Current Outpatient Medications   Medication Sig    folic acid (FOLVITE) 400 MCG tablet Take 400 mcg by mouth once daily.    LIDOcaine-prilocaine (EMLA) cream Apply topically as needed (prior to port access).    LUPRON DEPOT, 6 MONTH, 45 mg SyKt injection     multivit-min-FA-lycopen-lutein (CENTRUM SILVER MEN) 300-600-300 mcg Tab Take by mouth once daily.    predniSONE (DELTASONE) 5 MG tablet Take 2 tablets (10 mg total) by mouth once daily.    predniSONE (DELTASONE) 5 MG tablet Take 2 tablets  (10 mg total) by mouth once daily.    predniSONE (DELTASONE) 5 MG tablet Take 2 tablets (10 mg total) by mouth once daily.    predniSONE (DELTASONE) 5 MG tablet Take 2 tablets (10 mg total) by mouth once daily.    predniSONE (DELTASONE) 5 MG tablet Take 2 tablets (10 mg total) by mouth once daily.    predniSONE (DELTASONE) 5 MG tablet Take 2 tablets (10 mg total) by mouth once daily.    predniSONE (DELTASONE) 5 MG tablet Take 2 tablets (10 mg total) by mouth once daily.    tretinoin (RETIN-A) 0.05 % cream apply TO right side of face AT BEDTIME    duke's soln (benadryl 30 mL, mylanta 30 mL, LIDOcaine 30 mL, nystatin 30 mL) 120mL Take 10 mLs by mouth 4 (four) times daily. (Patient not taking: Reported on 11/11/2022)    HYDROcodone-acetaminophen (NORCO) 5-325 mg per tablet Take 1 tablet by mouth every 6 (six) hours as needed for Pain. (Patient not taking: Reported on 11/11/2022)    levothyroxine (SYNTHROID) 75 MCG tablet Take 1 tablet (75 mcg total) by mouth before breakfast.    prochlorperazine (COMPAZINE) 10 MG tablet Take 1 tablet (10 mg total) by mouth every 6 (six) hours as needed (nausea and vomiting). (Patient not taking: Reported on 10/14/2022)     No current facility-administered medications for this visit.       Objective:    Physical Exam  Vitals reviewed.   Constitutional:       General: He is not in acute distress.     Appearance: He is well-developed.   HENT:      Head: Normocephalic and atraumatic.   Neck:      Vascular: No JVD.   Cardiovascular:      Rate and Rhythm: Normal rate. Rhythm irregularly irregular.      Heart sounds: Murmur heard.   Systolic murmur is present with a grade of 1/6 at the upper right sternal border.   Pulmonary:      Effort: Pulmonary effort is normal.      Breath sounds: Normal breath sounds.   Abdominal:      Palpations: Abdomen is soft.      Tenderness: There is no abdominal tenderness.   Musculoskeletal:      Cervical back: Neck supple.   Skin:     General: Skin is warm and  dry.   Neurological:      Mental Status: He is alert and oriented to person, place, and time.     Vitals:    11/11/22 0831   BP: (!) 160/84   Pulse: 110   Resp: 18   Temp: 97.1 °F (36.2 °C)           Assessment:       1. Dyslipidemia    2. Abnormal finding of blood chemistry, unspecified    3. Prostate cancer    4. Non-rheumatic aortic stenosis    5. Abnormal heart rate         Plan:       I have reviewed the labs and ancillary data.    5/25/22 echo interpretation:  The left ventricle is normal in size with normal systolic function.  The estimated ejection fraction is 55-60%.  Normal left ventricular diastolic function.  Normal right ventricular size with normal right ventricular systolic function.  There is mild aortic valve stenosis.  Aortic valve area is 1.82 cm2; peak velocity is 1.48 m/s; mean gradient is 5 mmHg.  Normal central venous pressure (3 mmHg).  The estimated PA systolic pressure is 29 mmHg.  The left ventricular global longitudinal strain is -18.32%.    8/12/22 echo interpretation:  The left ventricle is normal in size with concentric remodeling and normal systolic function.  The estimated ejection fraction is 60%.  Normal left ventricular diastolic function.  Normal right ventricular size with normal right ventricular systolic function.  There is mild aortic valve stenosis.  Aortic valve area is 1.72 cm2; peak velocity is 2.13 m/s; mean gradient is 10 mmHg.  Mild mitral regurgitation.  Mild tricuspid regurgitation.  Normal central venous pressure (3 mmHg).  The estimated PA systolic pressure is 33 mmHg.  The left ventricular global longitudinal strain is -18.1%.    Impression and Plan:  1) prostate ca: followed by oncology; on ADT and mitoxantrone q3w; repeat echo this month as previously planned, continue 3 mo echo surveillance; check Lipids/HbA1c with next blood draw.  2) aortic stenosis: mild; no intervention needed at this time.  3) elevated BP reading: continue to monitor at this time.  4)  abnormal heart rate/rhythm: will check EKG today to r/o atrial tachyarrhythmia.

## 2022-11-11 NOTE — NURSING
Patient seen in Cardio-Onc by Dr. Kasper.  Patient to have EKG today, Echo in 1 month and follow up CV in 3 months.  Patient and wife verbalized understanding.

## 2022-11-11 NOTE — NURSING
Oncology Navigation   Intake  Date of Diagnosis: 06/11/14  Cancer Type:   Internal / External Referral: Internal  Referral Source: MD Josesito  Date of Referral: 05/10/22  Initial Nurse Navigator Contact: 05/11/22  Referral to Initial Contact Timeline (days): 1  Date Worked: 11/11/22  First Appointment Available: 05/20/22  Appointment Date: 05/20/22  First Available Date vs. Scheduled Date (days): 0  Reason if booked > 7 days after scheduling: Specific provider / access     Treatment  Current Status: Active       Medical Oncologist: Dr. Jackson  Consult Date: 06/27/22  Chemotherapy: Planned  Chemotherapy Regimen: mitaxantron/prednisone  Immunotherapy: N/A  Oral Therapy: N/A       Procedures: Biopsy; Port / PICC (bone marrow biopsy)  Biopsy Schedule Date: 06/15/22  Port / PICC Schedule Date: 06/15/22    General Referrals: Dietitian; Psychology; Social work  Dietitian Referral Date: 06/27/22  Social Work Referral Date: 06/27/22              Acuity  Systemic Treatment - predicted or initiated: More than one treatment modality concurrently (chemotherapy, radiation, etc.) (+2)  Treatment Tolerability: Minimal symptoms  Hospitalization Within the Past Month: 0   Needed: 0  Support: 0  Transportation: 0  History of noncompliance/frequent no shows and cancellations: 0  Verbalizes the need for more education: 0  Navigation Acuity: 3     Follow Up  No follow-ups on file.

## 2022-11-22 ENCOUNTER — LAB VISIT (OUTPATIENT)
Dept: LAB | Facility: HOSPITAL | Age: 87
End: 2022-11-22
Attending: INTERNAL MEDICINE
Payer: MEDICARE

## 2022-11-22 DIAGNOSIS — C61 PROSTATE CANCER: ICD-10-CM

## 2022-11-22 LAB
ALBUMIN SERPL BCP-MCNC: 3.7 G/DL (ref 3.5–5.2)
ALP SERPL-CCNC: 24 U/L (ref 55–135)
ALT SERPL W/O P-5'-P-CCNC: 7 U/L (ref 10–44)
ANION GAP SERPL CALC-SCNC: 10 MMOL/L (ref 8–16)
AST SERPL-CCNC: 22 U/L (ref 10–40)
BASOPHILS # BLD AUTO: 0.07 K/UL (ref 0–0.2)
BASOPHILS NFR BLD: 1 % (ref 0–1.9)
BILIRUB SERPL-MCNC: 0.3 MG/DL (ref 0.1–1)
BUN SERPL-MCNC: 19 MG/DL (ref 8–23)
CALCIUM SERPL-MCNC: 9.8 MG/DL (ref 8.7–10.5)
CHLORIDE SERPL-SCNC: 105 MMOL/L (ref 95–110)
CO2 SERPL-SCNC: 26 MMOL/L (ref 23–29)
COMPLEXED PSA SERPL-MCNC: 5.6 NG/ML (ref 0–4)
CREAT SERPL-MCNC: 1.2 MG/DL (ref 0.5–1.4)
DIFFERENTIAL METHOD: ABNORMAL
EOSINOPHIL # BLD AUTO: 0 K/UL (ref 0–0.5)
EOSINOPHIL NFR BLD: 0.1 % (ref 0–8)
ERYTHROCYTE [DISTWIDTH] IN BLOOD BY AUTOMATED COUNT: 15.1 % (ref 11.5–14.5)
EST. GFR  (NO RACE VARIABLE): 59.3 ML/MIN/1.73 M^2
GLUCOSE SERPL-MCNC: 103 MG/DL (ref 70–110)
HCT VFR BLD AUTO: 34.8 % (ref 40–54)
HGB BLD-MCNC: 11.7 G/DL (ref 14–18)
IMM GRANULOCYTES # BLD AUTO: 0.25 K/UL (ref 0–0.04)
IMM GRANULOCYTES NFR BLD AUTO: 3.6 % (ref 0–0.5)
LYMPHOCYTES # BLD AUTO: 1.4 K/UL (ref 1–4.8)
LYMPHOCYTES NFR BLD: 19.7 % (ref 18–48)
MCH RBC QN AUTO: 32.8 PG (ref 27–31)
MCHC RBC AUTO-ENTMCNC: 33.6 G/DL (ref 32–36)
MCV RBC AUTO: 98 FL (ref 82–98)
MONOCYTES # BLD AUTO: 1.2 K/UL (ref 0.3–1)
MONOCYTES NFR BLD: 17.1 % (ref 4–15)
NEUTROPHILS # BLD AUTO: 4.1 K/UL (ref 1.8–7.7)
NEUTROPHILS NFR BLD: 58.5 % (ref 38–73)
NRBC BLD-RTO: 0 /100 WBC
PLATELET # BLD AUTO: 188 K/UL (ref 150–450)
PMV BLD AUTO: 8.6 FL (ref 9.2–12.9)
POTASSIUM SERPL-SCNC: 4.4 MMOL/L (ref 3.5–5.1)
PROT SERPL-MCNC: 7.5 G/DL (ref 6–8.4)
RBC # BLD AUTO: 3.57 M/UL (ref 4.6–6.2)
SODIUM SERPL-SCNC: 141 MMOL/L (ref 136–145)
WBC # BLD AUTO: 6.95 K/UL (ref 3.9–12.7)

## 2022-11-22 PROCEDURE — 80053 COMPREHEN METABOLIC PANEL: CPT | Mod: PN | Performed by: INTERNAL MEDICINE

## 2022-11-22 PROCEDURE — 36415 COLL VENOUS BLD VENIPUNCTURE: CPT | Mod: PN | Performed by: INTERNAL MEDICINE

## 2022-11-22 PROCEDURE — 85025 COMPLETE CBC W/AUTO DIFF WBC: CPT | Mod: PN | Performed by: INTERNAL MEDICINE

## 2022-11-22 PROCEDURE — 84153 ASSAY OF PSA TOTAL: CPT | Performed by: INTERNAL MEDICINE

## 2022-11-25 ENCOUNTER — INFUSION (OUTPATIENT)
Dept: INFUSION THERAPY | Facility: HOSPITAL | Age: 87
End: 2022-11-25
Attending: INTERNAL MEDICINE
Payer: MEDICARE

## 2022-11-25 ENCOUNTER — OFFICE VISIT (OUTPATIENT)
Dept: HEMATOLOGY/ONCOLOGY | Facility: CLINIC | Age: 87
End: 2022-11-25
Payer: MEDICARE

## 2022-11-25 VITALS
TEMPERATURE: 97 F | BODY MASS INDEX: 22.09 KG/M2 | OXYGEN SATURATION: 97 % | DIASTOLIC BLOOD PRESSURE: 75 MMHG | WEIGHT: 154.31 LBS | HEIGHT: 70 IN | SYSTOLIC BLOOD PRESSURE: 109 MMHG | RESPIRATION RATE: 16 BRPM | HEART RATE: 71 BPM

## 2022-11-25 VITALS
SYSTOLIC BLOOD PRESSURE: 128 MMHG | OXYGEN SATURATION: 97 % | WEIGHT: 154.31 LBS | HEART RATE: 100 BPM | DIASTOLIC BLOOD PRESSURE: 70 MMHG | HEIGHT: 70 IN | TEMPERATURE: 97 F | BODY MASS INDEX: 22.09 KG/M2

## 2022-11-25 DIAGNOSIS — C61 PROSTATE CANCER: Primary | ICD-10-CM

## 2022-11-25 PROCEDURE — 1160F RVW MEDS BY RX/DR IN RCRD: CPT | Mod: CPTII,S$GLB,, | Performed by: NURSE PRACTITIONER

## 2022-11-25 PROCEDURE — 99999 PR PBB SHADOW E&M-EST. PATIENT-LVL IV: CPT | Mod: PBBFAC,,, | Performed by: NURSE PRACTITIONER

## 2022-11-25 PROCEDURE — 3074F PR MOST RECENT SYSTOLIC BLOOD PRESSURE < 130 MM HG: ICD-10-PCS | Mod: CPTII,S$GLB,, | Performed by: NURSE PRACTITIONER

## 2022-11-25 PROCEDURE — 3078F DIAST BP <80 MM HG: CPT | Mod: CPTII,S$GLB,, | Performed by: NURSE PRACTITIONER

## 2022-11-25 PROCEDURE — 96413 CHEMO IV INFUSION 1 HR: CPT | Mod: PN

## 2022-11-25 PROCEDURE — 63600175 PHARM REV CODE 636 W HCPCS: Mod: JG,PN | Performed by: NURSE PRACTITIONER

## 2022-11-25 PROCEDURE — 1101F PT FALLS ASSESS-DOCD LE1/YR: CPT | Mod: CPTII,S$GLB,, | Performed by: NURSE PRACTITIONER

## 2022-11-25 PROCEDURE — 99214 PR OFFICE/OUTPT VISIT, EST, LEVL IV, 30-39 MIN: ICD-10-PCS | Mod: S$GLB,,, | Performed by: NURSE PRACTITIONER

## 2022-11-25 PROCEDURE — 3288F FALL RISK ASSESSMENT DOCD: CPT | Mod: CPTII,S$GLB,, | Performed by: NURSE PRACTITIONER

## 2022-11-25 PROCEDURE — 1126F AMNT PAIN NOTED NONE PRSNT: CPT | Mod: CPTII,S$GLB,, | Performed by: NURSE PRACTITIONER

## 2022-11-25 PROCEDURE — 25000003 PHARM REV CODE 250: Mod: PN | Performed by: NURSE PRACTITIONER

## 2022-11-25 PROCEDURE — A4216 STERILE WATER/SALINE, 10 ML: HCPCS | Mod: PN | Performed by: NURSE PRACTITIONER

## 2022-11-25 PROCEDURE — 3074F SYST BP LT 130 MM HG: CPT | Mod: CPTII,S$GLB,, | Performed by: NURSE PRACTITIONER

## 2022-11-25 PROCEDURE — 99214 OFFICE O/P EST MOD 30 MIN: CPT | Mod: S$GLB,,, | Performed by: NURSE PRACTITIONER

## 2022-11-25 PROCEDURE — 1159F MED LIST DOCD IN RCRD: CPT | Mod: CPTII,S$GLB,, | Performed by: NURSE PRACTITIONER

## 2022-11-25 PROCEDURE — 99999 PR PBB SHADOW E&M-EST. PATIENT-LVL IV: ICD-10-PCS | Mod: PBBFAC,,, | Performed by: NURSE PRACTITIONER

## 2022-11-25 PROCEDURE — 1159F PR MEDICATION LIST DOCUMENTED IN MEDICAL RECORD: ICD-10-PCS | Mod: CPTII,S$GLB,, | Performed by: NURSE PRACTITIONER

## 2022-11-25 PROCEDURE — 1157F ADVNC CARE PLAN IN RCRD: CPT | Mod: CPTII,S$GLB,, | Performed by: NURSE PRACTITIONER

## 2022-11-25 PROCEDURE — 1101F PR PT FALLS ASSESS DOC 0-1 FALLS W/OUT INJ PAST YR: ICD-10-PCS | Mod: CPTII,S$GLB,, | Performed by: NURSE PRACTITIONER

## 2022-11-25 PROCEDURE — 96375 TX/PRO/DX INJ NEW DRUG ADDON: CPT | Mod: PN

## 2022-11-25 PROCEDURE — 1126F PR PAIN SEVERITY QUANTIFIED, NO PAIN PRESENT: ICD-10-PCS | Mod: CPTII,S$GLB,, | Performed by: NURSE PRACTITIONER

## 2022-11-25 PROCEDURE — 3078F PR MOST RECENT DIASTOLIC BLOOD PRESSURE < 80 MM HG: ICD-10-PCS | Mod: CPTII,S$GLB,, | Performed by: NURSE PRACTITIONER

## 2022-11-25 PROCEDURE — 3288F PR FALLS RISK ASSESSMENT DOCUMENTED: ICD-10-PCS | Mod: CPTII,S$GLB,, | Performed by: NURSE PRACTITIONER

## 2022-11-25 PROCEDURE — 1160F PR REVIEW ALL MEDS BY PRESCRIBER/CLIN PHARMACIST DOCUMENTED: ICD-10-PCS | Mod: CPTII,S$GLB,, | Performed by: NURSE PRACTITIONER

## 2022-11-25 PROCEDURE — 1157F PR ADVANCE CARE PLAN OR EQUIV PRESENT IN MEDICAL RECORD: ICD-10-PCS | Mod: CPTII,S$GLB,, | Performed by: NURSE PRACTITIONER

## 2022-11-25 RX ORDER — PREDNISONE 5 MG/1
10 TABLET ORAL DAILY
Qty: 90 TABLET | Refills: 2
Start: 2022-11-25 | End: 2023-01-09 | Stop reason: ALTCHOICE

## 2022-11-25 RX ORDER — ONDANSETRON 2 MG/ML
8 INJECTION INTRAMUSCULAR; INTRAVENOUS
Status: COMPLETED | OUTPATIENT
Start: 2022-11-25 | End: 2022-11-25

## 2022-11-25 RX ORDER — ONDANSETRON 2 MG/ML
8 INJECTION INTRAMUSCULAR; INTRAVENOUS
Status: CANCELLED | OUTPATIENT
Start: 2022-11-25

## 2022-11-25 RX ORDER — SODIUM CHLORIDE 0.9 % (FLUSH) 0.9 %
10 SYRINGE (ML) INJECTION
Status: DISCONTINUED | OUTPATIENT
Start: 2022-11-25 | End: 2022-11-25 | Stop reason: HOSPADM

## 2022-11-25 RX ORDER — SODIUM CHLORIDE 0.9 % (FLUSH) 0.9 %
10 SYRINGE (ML) INJECTION
Status: CANCELLED | OUTPATIENT
Start: 2022-11-25

## 2022-11-25 RX ORDER — HEPARIN 100 UNIT/ML
500 SYRINGE INTRAVENOUS
Status: CANCELLED | OUTPATIENT
Start: 2022-11-25

## 2022-11-25 RX ORDER — HEPARIN 100 UNIT/ML
500 SYRINGE INTRAVENOUS
Status: DISCONTINUED | OUTPATIENT
Start: 2022-11-25 | End: 2022-11-25 | Stop reason: HOSPADM

## 2022-11-25 RX ADMIN — SODIUM CHLORIDE: 0.9 INJECTION, SOLUTION INTRAVENOUS at 10:11

## 2022-11-25 RX ADMIN — MITOXANTRONE 22 MG: 2 INJECTION, SOLUTION, CONCENTRATE INTRAVENOUS at 11:11

## 2022-11-25 RX ADMIN — Medication 500 UNITS: at 12:11

## 2022-11-25 RX ADMIN — Medication 10 ML: at 12:11

## 2022-11-25 RX ADMIN — ONDANSETRON 8 MG: 2 INJECTION INTRAMUSCULAR; INTRAVENOUS at 11:11

## 2022-11-25 NOTE — PLAN OF CARE
Pt tolerated mitoxantrone well today; vitals remained stable.  AVS printed with f/u appointments listed and handed to pts wife; all questions answered.  Pt ambulated independently accompanied by wife for support.

## 2022-11-25 NOTE — PLAN OF CARE
Problem: Adult Inpatient Plan of Care  Goal: Patient-Specific Goal (Individualized)  11/25/2022 1221 by Celina Matta RN  Outcome: Ongoing, Progressing  Flowsheets (Taken 11/25/2022 1221)  Anxieties, Fears or Concerns: none  Individualized Care Needs: recliner, warm blanket, dim lights  Patient-Specific Goals (Include Timeframe): no s/sx of rx during tx  11/25/2022 1220 by Celina Matta RN  Outcome: Ongoing, Progressing     Problem: Fatigue  Goal: Improved Activity Tolerance  Intervention: Promote Improved Energy  Flowsheets (Taken 11/25/2022 1221)  Fatigue Management:   frequent rest breaks encouraged   paced activity encouraged  Sleep/Rest Enhancement:   natural light exposure provided   noise level reduced  Activity Management:   Ambulated -L4   Ambulated to bathroom - L4   Ambulated in [place] - L4   Up in chair - L3

## 2022-11-25 NOTE — PROGRESS NOTES
PATIENT: Amor Alcazar  MRN: [id number]  DATE: 11/25/2022    CC:  Clearance prior to C8 of treatment    Diagnosis:   1. Prostate cancer         Subjective:    Interval History: Mr. Alcazar is a 87 y.o. male with Arthritis, hypothyroidism, valvular heart disease, iron deficiency anemia who presents for follow up of prostate cancer with his wife in attend.    He remains active & well.  Good appetite.  He denies CP, cough, SOB, abdominal pain, N/V, constipation, diarrhea, fever, chills, night sweats, weight loss, new lumps or bumps, easy bruising or bleeding.    Prior History:  The patient was diagnosed with prostate cancer Tio 9 (4+5) 6/03/14.  He had a rising PSA and underwent PSMA PET/CT 5/09/22 showing disease in the prostate and mediastinal LN's.  The patient has been receiving Lupron 45mg 6 months dosing with Dr Bellamy with last treatment on 4/20/22.  He has previously been on Casodex and Apalutamide with progression.  He was started on Mitoxantrone and prednisone 7/01/22.  Last Echo 8/26/22 showed normal EF.   The patient saw Dr Kasper in cardiology on 11/11/22 and recommended repeat Echo - scheduled for 12/13/22    Past Medical History:   Past Medical History:   Diagnosis Date    Arthritis     History of skin cancer     details unknown    Hypothyroidism, unspecified     Prostate cancer     injections q 6 months    Valvular heart disease     mild AS, MR and TR 9/19 ECHO       Past Surgical HIstory:   Past Surgical History:   Procedure Laterality Date    BONE MARROW BIOPSY Bilateral 6/15/2022    Procedure: Biopsy-bone marrow;  Surgeon: Hermann Jackson MD;  Location: [facility] OR;  Service: Oncology;  Laterality: Bilateral;    CATARACT EXTRACTION W/  INTRAOCULAR LENS IMPLANT Bilateral     ESOPHAGOGASTRODUODENOSCOPY      INSERTION OF TUNNELED CENTRAL VENOUS CATHETER (CVC) WITH SUBCUTANEOUS PORT N/A 6/15/2022    Procedure: INSERTION, PORT-A-CATH;  Surgeon: Marques Rivero MD;  Location: [facility] OR;  Service: General;   Laterality: N/A;       Family History:   Family History   Problem Relation Age of Onset    Lung cancer Sister          of       Social History:  reports that he has never smoked. He has never used smokeless tobacco. He reports that he does not currently use alcohol. He reports that he does not use drugs.    Allergies:  Review of patient's allergies indicates:  No Known Allergies    Medications:  Current Outpatient Medications   Medication Sig Dispense Refill    duke's soln (benadryl 30 mL, mylanta 30 mL, LIDOcaine 30 mL, nystatin 30 mL) 120mL Take 10 mLs by mouth 4 (four) times daily. 120 mL 6    folic acid (FOLVITE) 400 MCG tablet Take 400 mcg by mouth once daily.      HYDROcodone-acetaminophen (NORCO) 5-325 mg per tablet Take 1 tablet by mouth every 6 (six) hours as needed for Pain. 20 tablet 0    LIDOcaine-prilocaine (EMLA) cream Apply topically as needed (prior to port access). 30 g 2    LUPRON DEPOT, 6 MONTH, 45 mg SyKt injection       multivit-min-FA-lycopen-lutein (CENTRUM SILVER MEN) 300-600-300 mcg Tab Take by mouth once daily.      predniSONE (DELTASONE) 5 MG tablet Take 2 tablets (10 mg total) by mouth once daily. 90 tablet 2    predniSONE (DELTASONE) 5 MG tablet Take 2 tablets (10 mg total) by mouth once daily. 90 tablet 1    predniSONE (DELTASONE) 5 MG tablet Take 2 tablets (10 mg total) by mouth once daily. 180 tablet 2    predniSONE (DELTASONE) 5 MG tablet Take 2 tablets (10 mg total) by mouth once daily. 90 tablet 2    predniSONE (DELTASONE) 5 MG tablet Take 2 tablets (10 mg total) by mouth once daily. 90 tablet 2    predniSONE (DELTASONE) 5 MG tablet Take 2 tablets (10 mg total) by mouth once daily. 90 tablet 2    predniSONE (DELTASONE) 5 MG tablet Take 2 tablets (10 mg total) by mouth once daily. 90 tablet 2    prochlorperazine (COMPAZINE) 10 MG tablet Take 1 tablet (10 mg total) by mouth every 6 (six) hours as needed (nausea and vomiting). 60 tablet 6    tretinoin (RETIN-A) 0.05 % cream apply  "TO right side of face AT BEDTIME      levothyroxine (SYNTHROID) 75 MCG tablet Take 1 tablet (75 mcg total) by mouth before breakfast. 90 tablet 3     No current facility-administered medications for this visit.       Review of Systems   Constitutional:  Negative for chills, diaphoresis, fatigue, fever and unexpected weight change.   Respiratory:  Negative for cough and shortness of breath.    Cardiovascular:  Negative for chest pain and palpitations.   Gastrointestinal:  Negative for abdominal pain, constipation, diarrhea, nausea and vomiting.   Skin:  Negative for color change and rash.   Neurological:  Negative for headaches.   Hematological:  Negative for adenopathy. Does not bruise/bleed easily.     ECOG Performance Status: 1   Objective:      Vitals: Weight:  stable  Vitals:    11/25/22 1003   BP: 128/70   BP Location: Left arm   Patient Position: Sitting   BP Method: Medium (Manual)   Pulse: 100   Temp: 97 °F (36.1 °C)   TempSrc: Temporal   SpO2: 97%   Weight: 70 kg (154 lb 5.2 oz)   Height: 5' 10" (1.778 m)       Physical Exam  Constitutional:       General: He is not in acute distress.     Appearance: He is well-developed. He is not diaphoretic.   HENT:      Head: Normocephalic and atraumatic.      Mouth/Throat:      Pharynx: Oropharynx is clear.   Cardiovascular:      Rate and Rhythm: Normal rate and regular rhythm.      Heart sounds: Murmur heard.     No friction rub. No gallop.   Pulmonary:      Effort: Pulmonary effort is normal. No respiratory distress.      Breath sounds: Normal breath sounds. No wheezing or rales.   Chest:      Chest wall: No tenderness.   Abdominal:      General: Bowel sounds are normal. There is no distension.      Palpations: Abdomen is soft. There is no mass.      Tenderness: There is no abdominal tenderness. There is no rebound.   Musculoskeletal:      Cervical back: Normal range of motion.      Right lower leg: No edema.      Left lower leg: No edema.      Comments: PORT in left " chest   Lymphadenopathy:      Cervical: No cervical adenopathy.      Upper Body:      Right upper body: No supraclavicular or axillary adenopathy.      Left upper body: No supraclavicular or axillary adenopathy.   Skin:     General: Skin is warm and dry.      Findings: No erythema or rash.   Neurological:      Mental Status: He is alert and oriented to person, place, and time.   Psychiatric:         Behavior: Behavior normal.       Laboratory Data:  Lab Visit on 11/22/2022   Component Date Value Ref Range Status    WBC 11/22/2022 6.95  3.90 - 12.70 K/uL Final    RBC 11/22/2022 3.57 (L)  4.60 - 6.20 M/uL Final    Hemoglobin 11/22/2022 11.7 (L)  14.0 - 18.0 g/dL Final    Hematocrit 11/22/2022 34.8 (L)  40.0 - 54.0 % Final    MCV 11/22/2022 98  82 - 98 fL Final    MCH 11/22/2022 32.8 (H)  27.0 - 31.0 pg Final    MCHC 11/22/2022 33.6  32.0 - 36.0 g/dL Final    RDW 11/22/2022 15.1 (H)  11.5 - 14.5 % Final    Platelets 11/22/2022 188  150 - 450 K/uL Final    MPV 11/22/2022 8.6 (L)  9.2 - 12.9 fL Final    Immature Granulocytes 11/22/2022 3.6 (H)  0.0 - 0.5 % Final    Gran # (ANC) 11/22/2022 4.1  1.8 - 7.7 K/uL Final    Immature Grans (Abs) 11/22/2022 0.25 (H)  0.00 - 0.04 K/uL Final    Comment: Mild elevation in immature granulocytes is non specific and   can be seen in a variety of conditions including stress response,   acute inflammation, trauma and pregnancy. Correlation with other   laboratory and clinical findings is essential.      Lymph # 11/22/2022 1.4  1.0 - 4.8 K/uL Final    Mono # 11/22/2022 1.2 (H)  0.3 - 1.0 K/uL Final    Eos # 11/22/2022 0.0  0.0 - 0.5 K/uL Final    Baso # 11/22/2022 0.07  0.00 - 0.20 K/uL Final    nRBC 11/22/2022 0  0 /100 WBC Final    Gran % 11/22/2022 58.5  38.0 - 73.0 % Final    Lymph % 11/22/2022 19.7  18.0 - 48.0 % Final    Mono % 11/22/2022 17.1 (H)  4.0 - 15.0 % Final    Eosinophil % 11/22/2022 0.1  0.0 - 8.0 % Final    Basophil % 11/22/2022 1.0  0.0 - 1.9 % Final    Differential  Method 11/22/2022 Automated   Final    Sodium 11/22/2022 141  136 - 145 mmol/L Final    Potassium 11/22/2022 4.4  3.5 - 5.1 mmol/L Final    Chloride 11/22/2022 105  95 - 110 mmol/L Final    CO2 11/22/2022 26  23 - 29 mmol/L Final    Glucose 11/22/2022 103  70 - 110 mg/dL Final    BUN 11/22/2022 19  8 - 23 mg/dL Final    Creatinine 11/22/2022 1.2  0.5 - 1.4 mg/dL Final    Calcium 11/22/2022 9.8  8.7 - 10.5 mg/dL Final    Total Protein 11/22/2022 7.5  6.0 - 8.4 g/dL Final    Albumin 11/22/2022 3.7  3.5 - 5.2 g/dL Final    Total Bilirubin 11/22/2022 0.3  0.1 - 1.0 mg/dL Final    Comment: For infants and newborns, interpretation of results should be based  on gestational age, weight and in agreement with clinical  observations.    Premature Infant recommended reference ranges:  Up to 24 hours.............<8.0 mg/dL  Up to 48 hours............<12.0 mg/dL  3-5 days..................<15.0 mg/dL  6-29 days.................<15.0 mg/dL      Alkaline Phosphatase 11/22/2022 24 (L)  55 - 135 U/L Final    AST 11/22/2022 22  10 - 40 U/L Final    ALT 11/22/2022 7 (L)  10 - 44 U/L Final    Anion Gap 11/22/2022 10  8 - 16 mmol/L Final    eGFR 11/22/2022 59.3 (A)  >60 mL/min/1.73 m^2 Final    PSA Diagnostic 11/22/2022 5.6 (H)  0.00 - 4.00 ng/mL Final    Comment: The testing method is a chemiluminescent microparticle immunoassay   manufactured by [employer] and performed on the [employer]   or   [employer] system. Values obtained with different assay manufacturers   for   methods may be different and cannot be used interchangeably.  PSA Expected levels:  Hormonal Therapy: <0.05 ng/ml  Prostatectomy: <0.01 ng/ml  Radiation Therapy: <1.00 ng/ml       Imaging: PSMA PET/CT 5/09/22    Mediastinal blood pool max SUV: 3.7     L3 vertebral body bone marrow max SUV: 2.1     Head/neck:     No significant abnormal radiotracer accumulation is identified within the head and neck region.  No lymphadenopathy is present.     Chest:     There  is an irregular focal zone of fibrosis in the right pulmonary apex which does not exhibit significantly increased radiotracer accumulation.  There is lymphadenopathy in the right paratracheal region of the mediastinum and in the subcarinal region of the mediastinum with abnormally increased radiotracer accumulation highly suspicious for metastatic disease.  A right paratracheal node on image 113 measures 2.0 x 1.3 cm with a max SUV of 4.2.  One of the larger subcarinal nodes on image 126 measures 1.8 x 1.6 cm with a max SUV of 3.8.     Abdomen/pelvis:     There is markedly increased radiotracer accumulation within the prostate gland with 2 distinct nodular masses occupying the majority of the gland.  The conglomerate size of these masses on image 275 is 3.8 x 3.0 cm with a max SUV of 11.2.  The findings are consistent with prostate cancer recurrence.  There are nonenlarged bilateral inguinal lymph nodes which do not exhibit significant increased radiotracer accumulation.     Skeleton:     No significant abnormal radiotracer accumulation is identified within the skeleton and there are no findings to suggest osseous metastatic disease.    Echo 8/26/22    The left ventricle is normal in size with concentric remodeling and normal systolic function.  The estimated ejection fraction is 60%.  Normal left ventricular diastolic function.  Normal right ventricular size with normal right ventricular systolic function.  There is mild aortic valve stenosis.  Aortic valve area is 1.72 cm2; peak velocity is 2.13 m/s; mean gradient is 10 mmHg.  Mild mitral regurgitation.  Mild tricuspid regurgitation.  Normal central venous pressure (3 mmHg).  The estimated PA systolic pressure is 33 mmHg.  The left ventricular global longitudinal strain is -18.1%.       Assessment:       1. 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 Alvarez in 3 weeks (12/15/22) with labs prior.    Anemia of Chronic Disease - Hemoglobin was 11.7g/dL   -hemoglobin stable  -Will monitor    Hypothyroidism - pt on synthroid  -management per PCP    Route Chart for Scheduling    Med Onc Chart Routing      Follow up with physician . F/ with Dr. Alvarez already for 12/15 with labs prior on 12/14 - already scheduled   Follow up with [facility]    Infusion scheduling note Proceed with Cycle 8 today   Injection scheduling note    Labs    Imaging    Pharmacy appointment    Other referrals        Treatment Plan Information   OP MITOXANTRONE PREDNISONE Q3W   Hermann Jackson MD   Upcoming Treatment Dates - OP MITOXANTRONE PREDNISONE Q3W    11/25/2022       Chemotherapy       mitoXANTRONE (NOVANTRONE) 12 mg/m2 = 22 mg in sodium chloride 0.9% 50 mL chemo infusion       Antiemetics       ondansetron injection 8 mg  12/16/2022       Chemotherapy       mitoXANTRONE (NOVANTRONE) 12 mg/m2 = 22 mg in sodium chloride 0.9% 50 mL chemo infusion       Antiemetics       ondansetron injection 8 mg  1/6/2023       Chemotherapy       mitoXANTRONE (NOVANTRONE) 12 mg/m2 = 22 mg in sodium chloride 0.9% 50 mL chemo infusion       Antiemetics       ondansetron injection 8 mg    Supportive Plan Information  OP PROSTATE LEUPROLIDE (LUPRON) 6 MONTH   Beka Alvarez MD   Upcoming Treatment Dates - OP PROSTATE LEUPROLIDE (LUPRON) 6 MONTH    3/31/2023       Chemotherapy       leuprolide acetate (6 month) injection 45 mg    Therapy Plan Information  4. Pre-Medications  famotidine (PF) injection 20 mg  20 mg, Intravenous, Every visit  dexamethasone injection 4 mg  4 mg,  Intravenous, Every visit  diphenhydrAMINE capsule 25 mg  25 mg, Oral, Every visit  Medications  iron sucrose (VENOFER) 200 mg in sodium chloride 0.9% 100 mL IVPB  200 mg, Intravenous, 1 time a week  Flushes  sodium chloride 0.9% 250 mL flush bag  Intravenous, 1 time a week  sodium chloride 0.9% flush 10 mL  10 mL, Intravenous, 1 time a week  heparin, porcine (PF) 100 unit/mL injection flush 500 Units  500 Units, Intravenous, 1 time a week  alteplase injection 2 mg  2 mg, Intra-Catheter, 1 time a week  PRN Medications  EPINEPHrine (EPIPEN) 0.3 mg/0.3 mL pen injection 0.3 mg  0.3 mg, Intramuscular, PRN  diphenhydrAMINE injection 50 mg  50 mg, Intravenous, PRN  methylPREDNISolone sodium succinate injection 125 mg  125 mg, Intravenous, PRN  sodium chloride 0.9% bolus 1,000 mL  1,000 mL, Intravenous, PRN

## 2022-12-12 ENCOUNTER — LAB VISIT (OUTPATIENT)
Dept: LAB | Facility: HOSPITAL | Age: 87
End: 2022-12-12
Attending: INTERNAL MEDICINE
Payer: MEDICARE

## 2022-12-12 DIAGNOSIS — C61 PROSTATE CANCER: ICD-10-CM

## 2022-12-12 LAB
ALBUMIN SERPL BCP-MCNC: 3.6 G/DL (ref 3.5–5.2)
ALP SERPL-CCNC: 19 U/L (ref 55–135)
ALT SERPL W/O P-5'-P-CCNC: 7 U/L (ref 10–44)
ANION GAP SERPL CALC-SCNC: 10 MMOL/L (ref 8–16)
AST SERPL-CCNC: 19 U/L (ref 10–40)
BASOPHILS # BLD AUTO: 0.06 K/UL (ref 0–0.2)
BASOPHILS NFR BLD: 1.1 % (ref 0–1.9)
BILIRUB SERPL-MCNC: 0.3 MG/DL (ref 0.1–1)
BUN SERPL-MCNC: 21 MG/DL (ref 8–23)
CALCIUM SERPL-MCNC: 9.6 MG/DL (ref 8.7–10.5)
CHLORIDE SERPL-SCNC: 107 MMOL/L (ref 95–110)
CO2 SERPL-SCNC: 25 MMOL/L (ref 23–29)
COMPLEXED PSA SERPL-MCNC: 4.6 NG/ML (ref 0–4)
CREAT SERPL-MCNC: 1.1 MG/DL (ref 0.5–1.4)
DIFFERENTIAL METHOD: ABNORMAL
EOSINOPHIL # BLD AUTO: 0 K/UL (ref 0–0.5)
EOSINOPHIL NFR BLD: 0.4 % (ref 0–8)
ERYTHROCYTE [DISTWIDTH] IN BLOOD BY AUTOMATED COUNT: 15.7 % (ref 11.5–14.5)
EST. GFR  (NO RACE VARIABLE): >60 ML/MIN/1.73 M^2
GLUCOSE SERPL-MCNC: 104 MG/DL (ref 70–110)
HCT VFR BLD AUTO: 34 % (ref 40–54)
HGB BLD-MCNC: 11.2 G/DL (ref 14–18)
IMM GRANULOCYTES # BLD AUTO: 0.15 K/UL (ref 0–0.04)
IMM GRANULOCYTES NFR BLD AUTO: 2.8 % (ref 0–0.5)
LYMPHOCYTES # BLD AUTO: 1.6 K/UL (ref 1–4.8)
LYMPHOCYTES NFR BLD: 30.7 % (ref 18–48)
MCH RBC QN AUTO: 32.9 PG (ref 27–31)
MCHC RBC AUTO-ENTMCNC: 32.9 G/DL (ref 32–36)
MCV RBC AUTO: 100 FL (ref 82–98)
MONOCYTES # BLD AUTO: 1 K/UL (ref 0.3–1)
MONOCYTES NFR BLD: 19.1 % (ref 4–15)
NEUTROPHILS # BLD AUTO: 2.4 K/UL (ref 1.8–7.7)
NEUTROPHILS NFR BLD: 45.9 % (ref 38–73)
NRBC BLD-RTO: 0 /100 WBC
PLATELET # BLD AUTO: 148 K/UL (ref 150–450)
PMV BLD AUTO: 8.8 FL (ref 9.2–12.9)
POTASSIUM SERPL-SCNC: 3.9 MMOL/L (ref 3.5–5.1)
PROT SERPL-MCNC: 7.2 G/DL (ref 6–8.4)
RBC # BLD AUTO: 3.4 M/UL (ref 4.6–6.2)
SODIUM SERPL-SCNC: 142 MMOL/L (ref 136–145)
WBC # BLD AUTO: 5.28 K/UL (ref 3.9–12.7)

## 2022-12-12 PROCEDURE — 85025 COMPLETE CBC W/AUTO DIFF WBC: CPT | Mod: PN | Performed by: INTERNAL MEDICINE

## 2022-12-12 PROCEDURE — 36415 COLL VENOUS BLD VENIPUNCTURE: CPT | Mod: PN | Performed by: INTERNAL MEDICINE

## 2022-12-12 PROCEDURE — 80053 COMPREHEN METABOLIC PANEL: CPT | Mod: PN | Performed by: INTERNAL MEDICINE

## 2022-12-12 PROCEDURE — 84153 ASSAY OF PSA TOTAL: CPT | Performed by: INTERNAL MEDICINE

## 2022-12-13 ENCOUNTER — CLINICAL SUPPORT (OUTPATIENT)
Dept: CARDIOLOGY | Facility: HOSPITAL | Age: 87
End: 2022-12-13
Attending: INTERNAL MEDICINE
Payer: MEDICARE

## 2022-12-13 VITALS — HEIGHT: 70 IN | BODY MASS INDEX: 22.05 KG/M2 | WEIGHT: 154 LBS

## 2022-12-13 LAB
AV INDEX (PROSTH): 0.41
AV MEAN GRADIENT: 10 MMHG
AV PEAK GRADIENT: 19 MMHG
AV VALVE AREA: 1.79 CM2
AV VELOCITY RATIO: 0.39
BSA FOR ECHO PROCEDURE: 1.86 M2
CV ECHO LV RWT: 0.54 CM
DOP CALC AO PEAK VEL: 2.18 M/S
DOP CALC AO VTI: 35.7 CM
DOP CALC LVOT AREA: 4.3 CM2
DOP CALC LVOT DIAMETER: 2.35 CM
DOP CALC LVOT PEAK VEL: 0.84 M/S
DOP CALC LVOT STROKE VOLUME: 63.73 CM3
DOP CALCLVOT PEAK VEL VTI: 14.7 CM
E WAVE DECELERATION TIME: 135.77 MSEC
E/A RATIO: 0.75
E/E' RATIO: 11.29 M/S
ECHO LV POSTERIOR WALL: 1.09 CM (ref 0.6–1.1)
EJECTION FRACTION: 65 %
FRACTIONAL SHORTENING: 31 % (ref 28–44)
INTERVENTRICULAR SEPTUM: 1.09 CM (ref 0.6–1.1)
LA MAJOR: 3.57 CM
LA MINOR: 4.13 CM
LA WIDTH: 4.1 CM
LEFT ATRIUM SIZE: 3.33 CM
LEFT ATRIUM VOLUME INDEX: 23.8 ML/M2
LEFT ATRIUM VOLUME: 44.44 CM3
LEFT INTERNAL DIMENSION IN SYSTOLE: 2.79 CM (ref 2.1–4)
LEFT VENTRICLE DIASTOLIC VOLUME INDEX: 37.9 ML/M2
LEFT VENTRICLE DIASTOLIC VOLUME: 70.88 ML
LEFT VENTRICLE MASS INDEX: 77 G/M2
LEFT VENTRICLE SYSTOLIC VOLUME INDEX: 15.6 ML/M2
LEFT VENTRICLE SYSTOLIC VOLUME: 29.21 ML
LEFT VENTRICULAR INTERNAL DIMENSION IN DIASTOLE: 4.02 CM (ref 3.5–6)
LEFT VENTRICULAR MASS: 144.84 G
LV LATERAL E/E' RATIO: 9.88 M/S
LV SEPTAL E/E' RATIO: 13.17 M/S
LVOT MG: 1.72 MMHG
LVOT MV: 0.64 CM/S
MV PEAK A VEL: 1.05 M/S
MV PEAK E VEL: 0.79 M/S
MV STENOSIS PRESSURE HALF TIME: 39.37 MS
MV VALVE AREA P 1/2 METHOD: 5.59 CM2
PISA TR MAX VEL: 2.33 M/S
RA MAJOR: 3.36 CM
RA PRESSURE: 3 MMHG
RIGHT VENTRICULAR END-DIASTOLIC DIMENSION: 3.03 CM
RIGHT VENTRICULAR LENGTH IN DIASTOLE (APICAL 4-CHAMBER VIEW): 5.86 CM
RV MID DIAMA: 20.38 CM
RV TISSUE DOPPLER FREE WALL SYSTOLIC VELOCITY 1 (APICAL 4 CHAMBER VIEW): 0.01 CM/S
TDI LATERAL: 0.08 M/S
TDI SEPTAL: 0.06 M/S
TDI: 0.07 M/S
TR MAX PG: 22 MMHG
TV REST PULMONARY ARTERY PRESSURE: 25 MMHG

## 2022-12-13 PROCEDURE — 93306 TTE W/DOPPLER COMPLETE: CPT | Mod: PO

## 2022-12-14 ENCOUNTER — TELEPHONE (OUTPATIENT)
Dept: HEMATOLOGY/ONCOLOGY | Facility: CLINIC | Age: 87
End: 2022-12-14
Payer: MEDICARE

## 2022-12-14 NOTE — TELEPHONE ENCOUNTER
----- Message from Jacqueline Kasper MD sent at 12/14/2022  8:57 AM CST -----  Please let the patient know the echo showed no significant change compared to previous

## 2022-12-14 NOTE — TELEPHONE ENCOUNTER
Called and spoke to pt's wife, Polina and informed her of Dr Kasper's Echo result note and confirmed tomorrows appt with Dr Alvarez.

## 2022-12-15 ENCOUNTER — OFFICE VISIT (OUTPATIENT)
Dept: HEMATOLOGY/ONCOLOGY | Facility: CLINIC | Age: 87
End: 2022-12-15
Payer: MEDICARE

## 2022-12-15 VITALS
BODY MASS INDEX: 22.21 KG/M2 | HEIGHT: 70 IN | DIASTOLIC BLOOD PRESSURE: 70 MMHG | SYSTOLIC BLOOD PRESSURE: 118 MMHG | TEMPERATURE: 98 F | WEIGHT: 155.13 LBS | OXYGEN SATURATION: 96 % | HEART RATE: 100 BPM

## 2022-12-15 DIAGNOSIS — E03.9 HYPOTHYROIDISM, UNSPECIFIED TYPE: ICD-10-CM

## 2022-12-15 DIAGNOSIS — D69.6 THROMBOCYTOPENIA: ICD-10-CM

## 2022-12-15 DIAGNOSIS — C77.1 METASTASIS TO MEDIASTINAL LYMPH NODE: ICD-10-CM

## 2022-12-15 DIAGNOSIS — C61 PROSTATE CANCER: Primary | ICD-10-CM

## 2022-12-15 DIAGNOSIS — D63.8 ANEMIA OF CHRONIC DISEASE: ICD-10-CM

## 2022-12-15 PROCEDURE — 99215 OFFICE O/P EST HI 40 MIN: CPT | Mod: S$GLB,,, | Performed by: INTERNAL MEDICINE

## 2022-12-15 PROCEDURE — 3288F FALL RISK ASSESSMENT DOCD: CPT | Mod: CPTII,S$GLB,, | Performed by: INTERNAL MEDICINE

## 2022-12-15 PROCEDURE — 3078F DIAST BP <80 MM HG: CPT | Mod: CPTII,S$GLB,, | Performed by: INTERNAL MEDICINE

## 2022-12-15 PROCEDURE — 1157F PR ADVANCE CARE PLAN OR EQUIV PRESENT IN MEDICAL RECORD: ICD-10-PCS | Mod: CPTII,S$GLB,, | Performed by: INTERNAL MEDICINE

## 2022-12-15 PROCEDURE — 3074F PR MOST RECENT SYSTOLIC BLOOD PRESSURE < 130 MM HG: ICD-10-PCS | Mod: CPTII,S$GLB,, | Performed by: INTERNAL MEDICINE

## 2022-12-15 PROCEDURE — 99999 PR PBB SHADOW E&M-EST. PATIENT-LVL III: ICD-10-PCS | Mod: PBBFAC,,, | Performed by: INTERNAL MEDICINE

## 2022-12-15 PROCEDURE — 1101F PT FALLS ASSESS-DOCD LE1/YR: CPT | Mod: CPTII,S$GLB,, | Performed by: INTERNAL MEDICINE

## 2022-12-15 PROCEDURE — 99215 PR OFFICE/OUTPT VISIT, EST, LEVL V, 40-54 MIN: ICD-10-PCS | Mod: S$GLB,,, | Performed by: INTERNAL MEDICINE

## 2022-12-15 PROCEDURE — 3078F PR MOST RECENT DIASTOLIC BLOOD PRESSURE < 80 MM HG: ICD-10-PCS | Mod: CPTII,S$GLB,, | Performed by: INTERNAL MEDICINE

## 2022-12-15 PROCEDURE — 1159F MED LIST DOCD IN RCRD: CPT | Mod: CPTII,S$GLB,, | Performed by: INTERNAL MEDICINE

## 2022-12-15 PROCEDURE — 1101F PR PT FALLS ASSESS DOC 0-1 FALLS W/OUT INJ PAST YR: ICD-10-PCS | Mod: CPTII,S$GLB,, | Performed by: INTERNAL MEDICINE

## 2022-12-15 PROCEDURE — 3288F PR FALLS RISK ASSESSMENT DOCUMENTED: ICD-10-PCS | Mod: CPTII,S$GLB,, | Performed by: INTERNAL MEDICINE

## 2022-12-15 PROCEDURE — 1159F PR MEDICATION LIST DOCUMENTED IN MEDICAL RECORD: ICD-10-PCS | Mod: CPTII,S$GLB,, | Performed by: INTERNAL MEDICINE

## 2022-12-15 PROCEDURE — 1126F AMNT PAIN NOTED NONE PRSNT: CPT | Mod: CPTII,S$GLB,, | Performed by: INTERNAL MEDICINE

## 2022-12-15 PROCEDURE — 1157F ADVNC CARE PLAN IN RCRD: CPT | Mod: CPTII,S$GLB,, | Performed by: INTERNAL MEDICINE

## 2022-12-15 PROCEDURE — 3074F SYST BP LT 130 MM HG: CPT | Mod: CPTII,S$GLB,, | Performed by: INTERNAL MEDICINE

## 2022-12-15 PROCEDURE — 1126F PR PAIN SEVERITY QUANTIFIED, NO PAIN PRESENT: ICD-10-PCS | Mod: CPTII,S$GLB,, | Performed by: INTERNAL MEDICINE

## 2022-12-15 PROCEDURE — 99999 PR PBB SHADOW E&M-EST. PATIENT-LVL III: CPT | Mod: PBBFAC,,, | Performed by: INTERNAL MEDICINE

## 2022-12-15 RX ORDER — HEPARIN 100 UNIT/ML
500 SYRINGE INTRAVENOUS
Status: CANCELLED | OUTPATIENT
Start: 2022-12-16

## 2022-12-15 RX ORDER — SODIUM CHLORIDE 0.9 % (FLUSH) 0.9 %
10 SYRINGE (ML) INJECTION
Status: CANCELLED | OUTPATIENT
Start: 2022-12-16

## 2022-12-15 RX ORDER — ONDANSETRON 2 MG/ML
8 INJECTION INTRAMUSCULAR; INTRAVENOUS
Status: CANCELLED | OUTPATIENT
Start: 2022-12-16

## 2022-12-15 NOTE — PROGRESS NOTES
PATIENT: Amor Alcazar  MRN: [id number]  DATE: 12/15/2022      Diagnosis:   1. 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 Anemia of chronic disease    4. Hypothyroidism, unspecified type    5. Thrombocytopenia            Chief Complaint: Prostate Cancer (1 month follow up )      Subjective:    Interval History: Mr. Alcazar is a 87 y.o. male with Arthritis, hypothyroidism, valvular heart disease, iron deficiency anemia who presents for follow up of prostate cancer.  Since the last clinic visit the patient underwent an Echo on 12/13/22 showing normal diastolic and systolic function.  The patient denies CP, cough, SOB, abdominal pain, N/V, constipation, diarrhea.  The patient denies fever, chills, night sweats, weight loss, new lumps or bumps, easy bruising or bleeding.    Prior History:  The patient was diagnosed with prostate cancer Tio 9 (4+5) 6/03/14.  He had a rising PSA and underwent PSMA PET/CT 5/09/22 showing disease in the prostate and mediastinal LN's.  The patient has been receiving Lupron 45mg 6 months dosing with Dr Bellamy with last treatment on 4/20/22.  He has previously been on Casodex and Apalutamide with progression.  He was started on Mitoxantrone and prednisone 7/01/22.  Last Echo 8/26/22 showed normal EF.   The patient saw Dr Kasper in cardiology on 9/30/22 and recommended repeat Echo in 3 months.    Past Medical History:   Past Medical History:   Diagnosis Date    Arthritis     History of skin cancer     details unknown    Hypothyroidism, unspecified     Prostate cancer     injections q 6 months    Valvular heart disease     mild AS, MR and TR 9/19 ECHO       Past Surgical HIstory:   Past Surgical History:   Procedure Laterality Date    BONE MARROW BIOPSY Bilateral 6/15/2022    Procedure: Biopsy-bone marrow;  Surgeon: Hermann Jackson MD;  Location: [facility] OR;  Service: Oncology;  Laterality: Bilateral;    CATARACT EXTRACTION W/  INTRAOCULAR LENS IMPLANT Bilateral      ESOPHAGOGASTRODUODENOSCOPY      INSERTION OF TUNNELED CENTRAL VENOUS CATHETER (CVC) WITH SUBCUTANEOUS PORT N/A 6/15/2022    Procedure: INSERTION, PORT-A-CATH;  Surgeon: Marques Rivero MD;  [facility];  Service: General;  Laterality: N/A;       Family History:   Family History   Problem Relation Age of Onset    Lung cancer Sister          of       Social History:  reports that he has never smoked. He has never used smokeless tobacco. He reports that he does not currently use alcohol. He reports that he does not use drugs.    Allergies:  Review of patient's allergies indicates:  No Known Allergies    Medications:  Current Outpatient Medications   Medication Sig Dispense Refill    duke's soln (benadryl 30 mL, mylanta 30 mL, LIDOcaine 30 mL, nystatin 30 mL) 120mL Take 10 mLs by mouth 4 (four) times daily. 120 mL 6    folic acid (FOLVITE) 400 MCG tablet Take 400 mcg by mouth once daily.      HYDROcodone-acetaminophen (NORCO) 5-325 mg per tablet Take 1 tablet by mouth every 6 (six) hours as needed for Pain. 20 tablet 0    LIDOcaine-prilocaine (EMLA) cream Apply topically as needed (prior to port access). 30 g 2    LUPRON DEPOT, 6 MONTH, 45 mg SyKt injection       multivit-min-FA-lycopen-lutein (CENTRUM SILVER MEN) 300-600-300 mcg Tab Take by mouth once daily.      predniSONE (DELTASONE) 5 MG tablet Take 2 tablets (10 mg total) by mouth once daily. 90 tablet 2    prochlorperazine (COMPAZINE) 10 MG tablet Take 1 tablet (10 mg total) by mouth every 6 (six) hours as needed (nausea and vomiting). 60 tablet 6    tretinoin (RETIN-A) 0.05 % cream apply TO right side of face AT BEDTIME      levothyroxine (SYNTHROID) 75 MCG tablet Take 1 tablet (75 mcg total) by mouth before breakfast. 90 tablet 3    predniSONE (DELTASONE) 5 MG tablet Take 2 tablets (10 mg total) by mouth once daily. (Patient not taking: Reported on 12/15/2022) 90 tablet 2     No current facility-administered medications for this visit.       Review of  "Systems   Constitutional:  Negative for chills, diaphoresis, fatigue, fever and unexpected weight change.   Respiratory:  Negative for cough and shortness of breath.    Cardiovascular:  Negative for chest pain and palpitations.   Gastrointestinal:  Negative for abdominal pain, constipation, diarrhea, nausea and vomiting.   Skin:  Negative for color change and rash.   Neurological:  Negative for headaches.   Hematological:  Negative for adenopathy. Does not bruise/bleed easily.     ECOG Performance Status: 1   Objective:      Vitals:   Vitals:    12/15/22 1113   BP: 118/70   BP Location: Left arm   Patient Position: Sitting   BP Method: Medium (Manual)   Pulse: 100   Temp: 97.7 °F (36.5 °C)   TempSrc: Temporal   SpO2: 96%   Weight: 70.4 kg (155 lb 1.5 oz)   Height: 5' 10" (1.778 m)       Physical Exam  Constitutional:       General: He is not in acute distress.     Appearance: He is well-developed. He is not diaphoretic.   HENT:      Head: Normocephalic and atraumatic.   Cardiovascular:      Rate and Rhythm: Normal rate and regular rhythm.      Heart sounds: Normal heart sounds. No murmur heard.    No friction rub. No gallop.   Pulmonary:      Effort: Pulmonary effort is normal. No respiratory distress.      Breath sounds: Normal breath sounds. No wheezing or rales.   Chest:      Chest wall: No tenderness.   Abdominal:      General: Bowel sounds are normal. There is no distension.      Palpations: Abdomen is soft. There is no mass.      Tenderness: There is no abdominal tenderness. There is no rebound.   Musculoskeletal:      Cervical back: Normal range of motion.      Comments: PORT in left chest   Lymphadenopathy:      Cervical: No cervical adenopathy.      Upper Body:      Right upper body: No supraclavicular or axillary adenopathy.      Left upper body: No supraclavicular or axillary adenopathy.   Skin:     General: Skin is warm and dry.      Findings: No erythema or rash.   Neurological:      Mental Status: He " is alert and oriented to person, place, and time.   Psychiatric:         Behavior: Behavior normal.       Laboratory Data:  Lab Visit on 12/12/2022   Component Date Value Ref Range Status    WBC 12/12/2022 5.28  3.90 - 12.70 K/uL Final    RBC 12/12/2022 3.40 (L)  4.60 - 6.20 M/uL Final    Hemoglobin 12/12/2022 11.2 (L)  14.0 - 18.0 g/dL Final    Hematocrit 12/12/2022 34.0 (L)  40.0 - 54.0 % Final    MCV 12/12/2022 100 (H)  82 - 98 fL Final    MCH 12/12/2022 32.9 (H)  27.0 - 31.0 pg Final    MCHC 12/12/2022 32.9  32.0 - 36.0 g/dL Final    RDW 12/12/2022 15.7 (H)  11.5 - 14.5 % Final    Platelets 12/12/2022 148 (L)  150 - 450 K/uL Final    MPV 12/12/2022 8.8 (L)  9.2 - 12.9 fL Final    Immature Granulocytes 12/12/2022 2.8 (H)  0.0 - 0.5 % Final    Gran # (ANC) 12/12/2022 2.4  1.8 - 7.7 K/uL Final    Immature Grans (Abs) 12/12/2022 0.15 (H)  0.00 - 0.04 K/uL Final    Comment: Mild elevation in immature granulocytes is non specific and   can be seen in a variety of conditions including stress response,   acute inflammation, trauma and pregnancy. Correlation with other   laboratory and clinical findings is essential.      Lymph # 12/12/2022 1.6  1.0 - 4.8 K/uL Final    Mono # 12/12/2022 1.0  0.3 - 1.0 K/uL Final    Eos # 12/12/2022 0.0  0.0 - 0.5 K/uL Final    Baso # 12/12/2022 0.06  0.00 - 0.20 K/uL Final    nRBC 12/12/2022 0  0 /100 WBC Final    Gran % 12/12/2022 45.9  38.0 - 73.0 % Final    Lymph % 12/12/2022 30.7  18.0 - 48.0 % Final    Mono % 12/12/2022 19.1 (H)  4.0 - 15.0 % Final    Eosinophil % 12/12/2022 0.4  0.0 - 8.0 % Final    Basophil % 12/12/2022 1.1  0.0 - 1.9 % Final    Differential Method 12/12/2022 Automated   Final    Sodium 12/12/2022 142  136 - 145 mmol/L Final    Potassium 12/12/2022 3.9  3.5 - 5.1 mmol/L Final    Chloride 12/12/2022 107  95 - 110 mmol/L Final    CO2 12/12/2022 25  23 - 29 mmol/L Final    Glucose 12/12/2022 104  70 - 110 mg/dL Final    BUN 12/12/2022 21  8 - 23 mg/dL Final     Creatinine 12/12/2022 1.1  0.5 - 1.4 mg/dL Final    Calcium 12/12/2022 9.6  8.7 - 10.5 mg/dL Final    Total Protein 12/12/2022 7.2  6.0 - 8.4 g/dL Final    Albumin 12/12/2022 3.6  3.5 - 5.2 g/dL Final    Total Bilirubin 12/12/2022 0.3  0.1 - 1.0 mg/dL Final    Comment: For infants and newborns, interpretation of results should be based  on gestational age, weight and in agreement with clinical  observations.    Premature Infant recommended reference ranges:  Up to 24 hours.............<8.0 mg/dL  Up to 48 hours............<12.0 mg/dL  3-5 days..................<15.0 mg/dL  6-29 days.................<15.0 mg/dL      Alkaline Phosphatase 12/12/2022 19 (L)  55 - 135 U/L Final    AST 12/12/2022 19  10 - 40 U/L Final    ALT 12/12/2022 7 (L)  10 - 44 U/L Final    Anion Gap 12/12/2022 10  8 - 16 mmol/L Final    eGFR 12/12/2022 >60.0  >60 mL/min/1.73 m^2 Final    PSA Diagnostic 12/12/2022 4.6 (H)  0.00 - 4.00 ng/mL Final    Comment: The testing method is a chemiluminescent microparticle immunoassay   manufactured by [employer] and performed on the [employer]   or   [employer] system. Values obtained with different assay manufacturers   for   methods may be different and cannot be used interchangeably.  PSA Expected levels:  Hormonal Therapy: <0.05 ng/ml  Prostatectomy: <0.01 ng/ml  Radiation Therapy: <1.00 ng/ml           Imaging:     Echo 12/13/22    The left ventricle is normal in size with concentric remodeling and normal systolic function.  The estimated ejection fraction is 65%.  Normal left ventricular diastolic function.  Normal right ventricular size with normal right ventricular systolic function.  There is mild aortic valve stenosis.  Aortic valve area is 1.79 cm2; peak velocity is 2.18 m/s; mean gradient is 10 mmHg.  IVC not well visualized.  The estimated PA systolic pressure is at [id number] mmHg.  Echocardiographic images too poor to obtain accurate strain measurement.       Assessment:       1. 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 Anemia of chronic disease    4. Hypothyroidism, unspecified type    5. Thrombocytopenia               Plan:     Prostate Cancer - PT has metastatic prostate cancer with mediastinal LN involvement  -Pt with prior progression on Casodex and Apalutamide  -Pt currently on Mitoxantrone with cycle 9 to be given today  -PSA stable at 4.6 12/12/22  -Last Echo 12/13/22 showed normal systolic and diastolic function  -Lupron 45mg given 10/14/22 with next dose due 4/14/23  -Pt to return in 3 weeks for cycle 10 Mitoxantrone  -After cycle 10 I will monitor on ADT alone    Anemia of Chronic Disease - Hemoglobin was 11.2g/dL on 12/12/22  -hemoglobin stable  -Will monitor    Hypothyroidism - pt on synthroid  -management per PCP    Thrombocytopenia  - platelets are 148  -Likely due to chemo will monitor    Route Chart for Scheduling    Med Onc Chart Routing      Follow up with physician 3 weeks. Pt can proceed with treatment.  He will need a CBC, CMP and PSA on 1/04/23 with an appt with me and treatment on 1/06/23.  Pt can be double booked at 10AM.   Follow up with [facility]    Infusion scheduling note    Injection scheduling note    Labs    Imaging    Pharmacy appointment    Other referrals        Treatment Plan Information   OP MITOXANTRONE PREDNISONE Q3W   Hermann Jackson MD   Upcoming Treatment Dates - OP MITOXANTRONE PREDNISONE Q3W    12/16/2022       Chemotherapy       mitoXANTRONE (NOVANTRONE) 12 mg/m2 = 22 mg in sodium chloride 0.9% 50 mL chemo infusion       Antiemetics       ondansetron injection 8 mg  1/6/2023       Chemotherapy       mitoXANTRONE (NOVANTRONE) 12 mg/m2 = 22 mg in sodium chloride 0.9% 50 mL chemo infusion       Antiemetics       ondansetron injection 8 mg    Supportive Plan Information  OP PROSTATE LEUPROLIDE (LUPRON) 6 MONTH   Beka Alvarez MD   Upcoming Treatment Dates - OP PROSTATE LEUPROLIDE (LUPRON) 6 MONTH    3/31/2023       Chemotherapy        leuprolide acetate (6 month) injection 45 mg    Therapy Plan Information  4. Pre-Medications  famotidine (PF) injection 20 mg  20 mg, Intravenous, Every visit  dexamethasone injection 4 mg  4 mg, Intravenous, Every visit  diphenhydrAMINE capsule 25 mg  25 mg, Oral, Every visit  Medications  iron sucrose (VENOFER) 200 mg in sodium chloride 0.9% 100 mL IVPB  200 mg, Intravenous, 1 time a week  Flushes  sodium chloride 0.9% 250 mL flush bag  Intravenous, 1 time a week  sodium chloride 0.9% flush 10 mL  10 mL, Intravenous, 1 time a week  heparin, porcine (PF) 100 unit/mL injection flush 500 Units  500 Units, Intravenous, 1 time a week  alteplase injection 2 mg  2 mg, Intra-Catheter, 1 time a week  PRN Medications  EPINEPHrine (EPIPEN) 0.3 mg/0.3 mL pen injection 0.3 mg  0.3 mg, Intramuscular, PRN  diphenhydrAMINE injection 50 mg  50 mg, Intravenous, PRN  methylPREDNISolone sodium succinate injection 125 mg  125 mg, Intravenous, PRN  sodium chloride 0.9% bolus 1,000 mL  1,000 mL, Intravenous, PRN        Beka Alvarez MD  [facility]  Hematology and Oncology  [facility]   [address]   [place] [address]   O: [phone]  F: [phone]

## 2022-12-16 ENCOUNTER — INFUSION (OUTPATIENT)
Dept: INFUSION THERAPY | Facility: HOSPITAL | Age: 87
End: 2022-12-16
Attending: INTERNAL MEDICINE
Payer: MEDICARE

## 2022-12-16 VITALS
OXYGEN SATURATION: 97 % | WEIGHT: 155.13 LBS | HEART RATE: 78 BPM | HEIGHT: 70 IN | DIASTOLIC BLOOD PRESSURE: 64 MMHG | BODY MASS INDEX: 22.21 KG/M2 | SYSTOLIC BLOOD PRESSURE: 110 MMHG | RESPIRATION RATE: 18 BRPM | TEMPERATURE: 98 F

## 2022-12-16 DIAGNOSIS — C61 PROSTATE CANCER: Primary | ICD-10-CM

## 2022-12-16 PROCEDURE — 96413 CHEMO IV INFUSION 1 HR: CPT | Mod: PN

## 2022-12-16 PROCEDURE — 63600175 PHARM REV CODE 636 W HCPCS: Mod: PN | Performed by: INTERNAL MEDICINE

## 2022-12-16 PROCEDURE — 25000003 PHARM REV CODE 250: Mod: PN | Performed by: INTERNAL MEDICINE

## 2022-12-16 PROCEDURE — 96375 TX/PRO/DX INJ NEW DRUG ADDON: CPT | Mod: PN

## 2022-12-16 RX ORDER — ONDANSETRON 2 MG/ML
8 INJECTION INTRAMUSCULAR; INTRAVENOUS
Status: COMPLETED | OUTPATIENT
Start: 2022-12-16 | End: 2022-12-16

## 2022-12-16 RX ORDER — SODIUM CHLORIDE 0.9 % (FLUSH) 0.9 %
10 SYRINGE (ML) INJECTION
Status: DISCONTINUED | OUTPATIENT
Start: 2022-12-16 | End: 2022-12-16 | Stop reason: HOSPADM

## 2022-12-16 RX ORDER — HEPARIN 100 UNIT/ML
500 SYRINGE INTRAVENOUS
Status: DISCONTINUED | OUTPATIENT
Start: 2022-12-16 | End: 2022-12-16 | Stop reason: HOSPADM

## 2022-12-16 RX ADMIN — MITOXANTRONE 22 MG: 2 INJECTION, SOLUTION, CONCENTRATE INTRAVENOUS at 10:12

## 2022-12-16 RX ADMIN — Medication 500 UNITS: at 10:12

## 2022-12-16 RX ADMIN — SODIUM CHLORIDE: 9 INJECTION, SOLUTION INTRAVENOUS at 09:12

## 2022-12-16 RX ADMIN — ONDANSETRON 8 MG: 2 INJECTION INTRAMUSCULAR; INTRAVENOUS at 09:12

## 2022-12-16 NOTE — PLAN OF CARE
Problem: Adult Inpatient Plan of Care  Goal: Patient-Specific Goal (Individualized)  Outcome: Ongoing, Progressing  Flowsheets (Taken 12/16/2022 0923)  Anxieties, Fears or Concerns: none  Individualized Care Needs: recliner, warm blanket, dim lights  Patient-Specific Goals (Include Timeframe): no s/sx of rx during tx     Problem: Fatigue  Goal: Improved Activity Tolerance  Intervention: Promote Improved Energy  Flowsheets (Taken 12/16/2022 0923)  Fatigue Management:   frequent rest breaks encouraged   paced activity encouraged  Sleep/Rest Enhancement:   natural light exposure provided   noise level reduced  Activity Management:   Ambulated -L4   Ambulated to bathroom - L4   Ambulated in [place] - L4   Up in chair - L3

## 2023-01-04 ENCOUNTER — LAB VISIT (OUTPATIENT)
Dept: LAB | Facility: HOSPITAL | Age: 88
End: 2023-01-04
Attending: INTERNAL MEDICINE
Payer: MEDICARE

## 2023-01-04 DIAGNOSIS — C61 PROSTATE CANCER: Primary | ICD-10-CM

## 2023-01-04 DIAGNOSIS — C61 PROSTATE CANCER: ICD-10-CM

## 2023-01-04 LAB
ALBUMIN SERPL BCP-MCNC: 3.6 G/DL (ref 3.5–5.2)
ALP SERPL-CCNC: 21 U/L (ref 55–135)
ALT SERPL W/O P-5'-P-CCNC: 8 U/L (ref 10–44)
ANION GAP SERPL CALC-SCNC: 13 MMOL/L (ref 8–16)
AST SERPL-CCNC: 24 U/L (ref 10–40)
BILIRUB SERPL-MCNC: 0.3 MG/DL (ref 0.1–1)
BUN SERPL-MCNC: 22 MG/DL (ref 8–23)
CALCIUM SERPL-MCNC: 9.7 MG/DL (ref 8.7–10.5)
CHLORIDE SERPL-SCNC: 107 MMOL/L (ref 95–110)
CO2 SERPL-SCNC: 20 MMOL/L (ref 23–29)
COMPLEXED PSA SERPL-MCNC: 4 NG/ML (ref 0–4)
CREAT SERPL-MCNC: 1.2 MG/DL (ref 0.5–1.4)
EST. GFR  (NO RACE VARIABLE): 59.3 ML/MIN/1.73 M^2
GLUCOSE SERPL-MCNC: 108 MG/DL (ref 70–110)
POTASSIUM SERPL-SCNC: 4.3 MMOL/L (ref 3.5–5.1)
PROT SERPL-MCNC: 7.6 G/DL (ref 6–8.4)
SODIUM SERPL-SCNC: 140 MMOL/L (ref 136–145)

## 2023-01-04 PROCEDURE — 84153 ASSAY OF PSA TOTAL: CPT | Mod: HCNC | Performed by: INTERNAL MEDICINE

## 2023-01-04 PROCEDURE — 36415 COLL VENOUS BLD VENIPUNCTURE: CPT | Mod: HCNC,PN | Performed by: INTERNAL MEDICINE

## 2023-01-04 PROCEDURE — 80053 COMPREHEN METABOLIC PANEL: CPT | Mod: HCNC,PN | Performed by: INTERNAL MEDICINE

## 2023-01-05 NOTE — PROGRESS NOTES
PATIENT: Amor Alcazar  MRN: [id number]  DATE: 1/6/2023      Diagnosis:   1. 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 Anemia of chronic disease    4. Hypothyroidism, unspecified type    5. Thrombocytopenia              Chief Complaint: Prostate Cancer (3 week follow up )      Subjective:    Interval History: Mr. Alcazar is a 87 y.o. male with Arthritis, hypothyroidism, valvular heart disease, iron deficiency anemia who presents for follow up of prostate cancer.  Since the last clinic visit the patient state he has felt well.  The patient denies CP, cough, SOB, abdominal pain, N/V, constipation, diarrhea.  The patient denies fever, chills, night sweats, weight loss, new lumps or bumps, easy bruising or bleeding.    Prior History:  The patient was diagnosed with prostate cancer Tio 9 (4+5) 6/03/14.  He had a rising PSA and underwent PSMA PET/CT 5/09/22 showing disease in the prostate and mediastinal LN's.  The patient has been receiving Lupron 45mg 6 months dosing with Dr Bellamy with last treatment on 4/20/22.  He has previously been on Casodex and Apalutamide with progression.  He was started on Mitoxantrone and prednisone 7/01/22.  Last Echo 8/26/22 showed normal EF.   The patient saw Dr Kasper in cardiology on 9/30/22 and recommended repeat Echo in 3 months.   The patient underwent an Echo on 12/13/22 showing normal diastolic and systolic function.    Past Medical History:   Past Medical History:   Diagnosis Date    Arthritis     History of skin cancer     details unknown    Hypothyroidism, unspecified     Prostate cancer     injections q 6 months    Valvular heart disease     mild AS, MR and TR 9/19 ECHO       Past Surgical HIstory:   Past Surgical History:   Procedure Laterality Date    BONE MARROW BIOPSY Bilateral 6/15/2022    Procedure: Biopsy-bone marrow;  Surgeon: Hermann Jackson MD;  Location: [facility] OR;  Service: Oncology;  Laterality: Bilateral;    CATARACT EXTRACTION W/   INTRAOCULAR LENS IMPLANT Bilateral     ESOPHAGOGASTRODUODENOSCOPY      INSERTION OF TUNNELED CENTRAL VENOUS CATHETER (CVC) WITH SUBCUTANEOUS PORT N/A 6/15/2022    Procedure: INSERTION, PORT-A-CATH;  Surgeon: Marques Rivero MD;  [facility];  Service: General;  Laterality: N/A;       Family History:   Family History   Problem Relation Age of Onset    Lung cancer Sister          of       Social History:  reports that he has never smoked. He has never used smokeless tobacco. He reports that he does not currently use alcohol. He reports that he does not use drugs.    Allergies:  Review of patient's allergies indicates:  No Known Allergies    Medications:  Current Outpatient Medications   Medication Sig Dispense Refill    duke's soln (benadryl 30 mL, mylanta 30 mL, LIDOcaine 30 mL, nystatin 30 mL) 120mL Take 10 mLs by mouth 4 (four) times daily. 120 mL 6    folic acid (FOLVITE) 400 MCG tablet Take 400 mcg by mouth once daily.      HYDROcodone-acetaminophen (NORCO) 5-325 mg per tablet Take 1 tablet by mouth every 6 (six) hours as needed for Pain. 20 tablet 0    LIDOcaine-prilocaine (EMLA) cream Apply topically as needed (prior to port access). 30 g 2    LUPRON DEPOT, 6 MONTH, 45 mg SyKt injection       multivit-min-FA-lycopen-lutein (CENTRUM SILVER MEN) 300-600-300 mcg Tab Take by mouth once daily.      predniSONE (DELTASONE) 5 MG tablet Take 2 tablets (10 mg total) by mouth once daily. 90 tablet 2    prochlorperazine (COMPAZINE) 10 MG tablet Take 1 tablet (10 mg total) by mouth every 6 (six) hours as needed (nausea and vomiting). 60 tablet 6    tretinoin (RETIN-A) 0.05 % cream apply TO right side of face AT BEDTIME      levothyroxine (SYNTHROID) 75 MCG tablet Take 1 tablet (75 mcg total) by mouth before breakfast. 90 tablet 3    predniSONE (DELTASONE) 5 MG tablet Take 2 tablets (10 mg total) by mouth once daily. 90 tablet 2     No current facility-administered medications for this visit.  "    Facility-Administered Medications Ordered in Other Visits   Medication Dose Route Frequency Provider Last Rate Last Admin    heparin, porcine (PF) 100 unit/mL injection flush 500 Units  500 Units Intravenous PRN Beka Alvarez MD   500 Units at 01/06/23 1235    sodium chloride 0.9% flush 10 mL  10 mL Intravenous PRN Beka Alvarez MD           Review of Systems   Constitutional:  Negative for chills, diaphoresis, fatigue, fever and unexpected weight change.   Respiratory:  Negative for cough and shortness of breath.    Cardiovascular:  Negative for chest pain and palpitations.   Gastrointestinal:  Negative for abdominal pain, constipation, diarrhea, nausea and vomiting.   Skin:  Negative for color change and rash.   Neurological:  Negative for headaches.   Hematological:  Negative for adenopathy. Does not bruise/bleed easily.     ECOG Performance Status: 1   Objective:      Vitals:   Vitals:    01/06/23 0930   BP: 110/70   BP Location: Left arm   Patient Position: Sitting   BP Method: Medium (Manual)   Pulse: 73   Temp: 97.5 °F (36.4 °C)   TempSrc: Temporal   SpO2: 99%   Weight: 70.1 kg (154 lb 8.7 oz)   Height: 5' 10" (1.778 m)         Physical Exam  Constitutional:       General: He is not in acute distress.     Appearance: He is well-developed. He is not diaphoretic.   HENT:      Head: Normocephalic and atraumatic.   Cardiovascular:      Rate and Rhythm: Normal rate and regular rhythm.      Heart sounds: Normal heart sounds. No murmur heard.    No friction rub. No gallop.   Pulmonary:      Effort: Pulmonary effort is normal. No respiratory distress.      Breath sounds: Normal breath sounds. No wheezing or rales.   Chest:      Chest wall: No tenderness.   Abdominal:      General: Bowel sounds are normal. There is no distension.      Palpations: Abdomen is soft. There is no mass.      Tenderness: There is no abdominal tenderness. There is no rebound.   Musculoskeletal:      Cervical back: Normal range of " motion.      Comments: PORT in left chest   Lymphadenopathy:      Cervical: No cervical adenopathy.      Upper Body:      Right upper body: No supraclavicular or axillary adenopathy.      Left upper body: No supraclavicular or axillary adenopathy.   Skin:     General: Skin is warm and dry.      Findings: No erythema or rash.   Neurological:      Mental Status: He is alert and oriented to person, place, and time.   Psychiatric:         Behavior: Behavior normal.       Laboratory Data:  Lab Visit on 01/06/2023   Component Date Value Ref Range Status    WBC 01/06/2023 8.78  3.90 - 12.70 K/uL Final    RBC 01/06/2023 3.65 (L)  4.60 - 6.20 M/uL Final    Hemoglobin 01/06/2023 12.0 (L)  14.0 - 18.0 g/dL Final    Hematocrit 01/06/2023 36.0 (L)  40.0 - 54.0 % Final    MCV 01/06/2023 99 (H)  82 - 98 fL Final    MCH 01/06/2023 32.9 (H)  27.0 - 31.0 pg Final    MCHC 01/06/2023 33.3  32.0 - 36.0 g/dL Final    RDW 01/06/2023 15.2 (H)  11.5 - 14.5 % Final    Platelets 01/06/2023 168  150 - 450 K/uL Final    MPV 01/06/2023 8.7 (L)  9.2 - 12.9 fL Final    Immature Granulocytes 01/06/2023 2.2 (H)  0.0 - 0.5 % Final    Gran # (ANC) 01/06/2023 6.2  1.8 - 7.7 K/uL Final    Immature Grans (Abs) 01/06/2023 0.19 (H)  0.00 - 0.04 K/uL Final    Comment: Mild elevation in immature granulocytes is non specific and   can be seen in a variety of conditions including stress response,   acute inflammation, trauma and pregnancy. Correlation with other   laboratory and clinical findings is essential.      Lymph # 01/06/2023 1.2  1.0 - 4.8 K/uL Final    Mono # 01/06/2023 1.1 (H)  0.3 - 1.0 K/uL Final    Eos # 01/06/2023 0.0  0.0 - 0.5 K/uL Final    Baso # 01/06/2023 0.06  0.00 - 0.20 K/uL Final    nRBC 01/06/2023 0  0 /100 WBC Final    Gran % 01/06/2023 70.7  38.0 - 73.0 % Final    Lymph % 01/06/2023 14.0 (L)  18.0 - 48.0 % Final    Mono % 01/06/2023 12.3  4.0 - 15.0 % Final    Eosinophil % 01/06/2023 0.1  0.0 - 8.0 % Final    Basophil % 01/06/2023  0.7  0.0 - 1.9 % Final    Differential Method 01/06/2023 Automated   Final   Lab Visit on 01/04/2023   Component Date Value Ref Range Status    Sodium 01/04/2023 140  136 - 145 mmol/L Final    Potassium 01/04/2023 4.3  3.5 - 5.1 mmol/L Final    Chloride 01/04/2023 107  95 - 110 mmol/L Final    CO2 01/04/2023 20 (L)  23 - 29 mmol/L Final    Glucose 01/04/2023 108  70 - 110 mg/dL Final    BUN 01/04/2023 22  8 - 23 mg/dL Final    Creatinine 01/04/2023 1.2  0.5 - 1.4 mg/dL Final    Calcium 01/04/2023 9.7  8.7 - 10.5 mg/dL Final    Total Protein 01/04/2023 7.6  6.0 - 8.4 g/dL Final    Albumin 01/04/2023 3.6  3.5 - 5.2 g/dL Final    Total Bilirubin 01/04/2023 0.3  0.1 - 1.0 mg/dL Final    Comment: For infants and newborns, interpretation of results should be based  on gestational age, weight and in agreement with clinical  observations.    Premature Infant recommended reference ranges:  Up to 24 hours.............<8.0 mg/dL  Up to 48 hours............<12.0 mg/dL  3-5 days..................<15.0 mg/dL  6-29 days.................<15.0 mg/dL      Alkaline Phosphatase 01/04/2023 21 (L)  55 - 135 U/L Final    AST 01/04/2023 24  10 - 40 U/L Final    ALT 01/04/2023 8 (L)  10 - 44 U/L Final    Anion Gap 01/04/2023 13  8 - 16 mmol/L Final    eGFR 01/04/2023 59.3 (A)  >60 mL/min/1.73 m^2 Final    PSA Diagnostic 01/04/2023 4.0  0.00 - 4.00 ng/mL Final    Comment: The testing method is a chemiluminescent microparticle immunoassay   manufactured by [employer] and performed on the    or   [employer] system. Values obtained with different assay manufacturers   for   methods may be different and cannot be used interchangeably.  PSA Expected levels:  Hormonal Therapy: <0.05 ng/ml  Prostatectomy: <0.01 ng/ml  Radiation Therapy: <1.00 ng/ml           Imaging:     Echo 12/13/22    The left ventricle is normal in size with concentric remodeling and normal systolic function.  The estimated ejection fraction is 65%.  Normal  left ventricular diastolic function.  Normal right ventricular size with normal right ventricular systolic function.  There is mild aortic valve stenosis.  Aortic valve area is 1.79 cm2; peak velocity is 2.18 m/s; mean gradient is 10 mmHg.  IVC not well visualized.  The estimated PA systolic pressure is at [id number] mmHg.  Echocardiographic images too poor to obtain accurate strain measurement.       Assessment:       1. 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 Anemia of chronic disease    4. Hypothyroidism, unspecified type    5. Thrombocytopenia                 Plan:     Prostate Cancer - PT has metastatic prostate cancer with mediastinal LN involvement  -Pt with prior progression on Casodex and Apalutamide  -Pt currently on Mitoxantrone with cycle 9 to be given today  -PSA stable at 4.6 12/12/22  -Last Echo 12/13/22 showed normal systolic and diastolic function  -Lupron 45mg given 10/14/22 with next dose due 4/14/23  -Pt to complete 10th cycle of Mitoxantrone  -Pt not to receive additional doses  -Will monitor PSA in 3 months  -Pt to continue on ADT    Anemia of Chronic Disease - Hemoglobin was 12g/dL on 1/06/23  -hemoglobin stable  -Will monitor    Hypothyroidism - pt on synthroid  -management per PCP    Thrombocytopenia  - platelets are improved to 168k  -Likely due to chemo will monitor    Route Chart for Scheduling    Med Onc Chart Routing      Follow up with physician 3 months. The patient can proceed with treatment.  He will need a CBC, CMP and PSA in the week of 4/10/23 with an appt with me on 4/14/23.  Labs need to be at least a day prior to the appt.   Follow up with [facility]    Infusion scheduling note    Injection scheduling note    Labs    Imaging    Pharmacy appointment    Other referrals        Treatment Plan Information   OP MITOXANTRONE PREDNISONE Q3W   Hermann Jackson MD   Upcoming Treatment Dates - OP MITOXANTRONE PREDNISONE Q3W    No upcoming days in selected  categories.    Supportive Plan Information  OP PROSTATE LEUPROLIDE (LUPRON) 6 MONTH   Beka Alvarez MD   Upcoming Treatment Dates - OP PROSTATE LEUPROLIDE (LUPRON) 6 MONTH    3/31/2023       Chemotherapy       leuprolide acetate (6 month) injection 45 mg    Therapy Plan Information  4. Pre-Medications  famotidine (PF) injection 20 mg  20 mg, Intravenous, Every visit  dexamethasone injection 4 mg  4 mg, Intravenous, Every visit  diphenhydrAMINE capsule 25 mg  25 mg, Oral, Every visit  Medications  iron sucrose (VENOFER) 200 mg in sodium chloride 0.9% 100 mL IVPB  200 mg, Intravenous, 1 time a week  Flushes  sodium chloride 0.9% 250 mL flush bag  Intravenous, 1 time a week  sodium chloride 0.9% flush 10 mL  10 mL, Intravenous, 1 time a week  heparin, porcine (PF) 100 unit/mL injection flush 500 Units  500 Units, Intravenous, 1 time a week  alteplase injection 2 mg  2 mg, Intra-Catheter, 1 time a week  PRN Medications  EPINEPHrine (EPIPEN) 0.3 mg/0.3 mL pen injection 0.3 mg  0.3 mg, Intramuscular, PRN  diphenhydrAMINE injection 50 mg  50 mg, Intravenous, PRN  methylPREDNISolone sodium succinate injection 125 mg  125 mg, Intravenous, PRN  sodium chloride 0.9% bolus 1,000 mL  1,000 mL, Intravenous, PRN        Beka Alvarez MD  [facility]  Hematology and Oncology  [facility]   [address] [place] [address]   O: [phone]  F: [phone]

## 2023-01-06 ENCOUNTER — DOCUMENTATION ONLY (OUTPATIENT)
Dept: INFUSION THERAPY | Facility: HOSPITAL | Age: 88
End: 2023-01-06

## 2023-01-06 ENCOUNTER — INFUSION (OUTPATIENT)
Dept: INFUSION THERAPY | Facility: HOSPITAL | Age: 88
End: 2023-01-06
Attending: INTERNAL MEDICINE
Payer: MEDICARE

## 2023-01-06 ENCOUNTER — PATIENT MESSAGE (OUTPATIENT)
Dept: HEMATOLOGY/ONCOLOGY | Facility: CLINIC | Age: 88
End: 2023-01-06

## 2023-01-06 ENCOUNTER — OFFICE VISIT (OUTPATIENT)
Dept: HEMATOLOGY/ONCOLOGY | Facility: CLINIC | Age: 88
End: 2023-01-06
Payer: MEDICARE

## 2023-01-06 VITALS
HEIGHT: 70 IN | DIASTOLIC BLOOD PRESSURE: 70 MMHG | WEIGHT: 154.56 LBS | SYSTOLIC BLOOD PRESSURE: 110 MMHG | OXYGEN SATURATION: 99 % | BODY MASS INDEX: 22.13 KG/M2 | TEMPERATURE: 98 F | HEART RATE: 73 BPM

## 2023-01-06 VITALS
BODY MASS INDEX: 22.13 KG/M2 | DIASTOLIC BLOOD PRESSURE: 62 MMHG | TEMPERATURE: 98 F | SYSTOLIC BLOOD PRESSURE: 106 MMHG | RESPIRATION RATE: 16 BRPM | HEART RATE: 96 BPM | HEIGHT: 70 IN | WEIGHT: 154.56 LBS | OXYGEN SATURATION: 99 %

## 2023-01-06 DIAGNOSIS — C61 PROSTATE CANCER: Primary | ICD-10-CM

## 2023-01-06 DIAGNOSIS — C77.1 METASTASIS TO MEDIASTINAL LYMPH NODE: ICD-10-CM

## 2023-01-06 DIAGNOSIS — D69.6 THROMBOCYTOPENIA: ICD-10-CM

## 2023-01-06 DIAGNOSIS — D63.8 ANEMIA OF CHRONIC DISEASE: ICD-10-CM

## 2023-01-06 DIAGNOSIS — E03.9 HYPOTHYROIDISM, UNSPECIFIED TYPE: ICD-10-CM

## 2023-01-06 PROCEDURE — 1157F PR ADVANCE CARE PLAN OR EQUIV PRESENT IN MEDICAL RECORD: ICD-10-PCS | Mod: HCNC,CPTII,S$GLB, | Performed by: INTERNAL MEDICINE

## 2023-01-06 PROCEDURE — 1157F ADVNC CARE PLAN IN RCRD: CPT | Mod: HCNC,CPTII,S$GLB, | Performed by: INTERNAL MEDICINE

## 2023-01-06 PROCEDURE — 25000003 PHARM REV CODE 250: Mod: HCNC,PN | Performed by: INTERNAL MEDICINE

## 2023-01-06 PROCEDURE — 1101F PT FALLS ASSESS-DOCD LE1/YR: CPT | Mod: HCNC,CPTII,S$GLB, | Performed by: INTERNAL MEDICINE

## 2023-01-06 PROCEDURE — 99999 PR PBB SHADOW E&M-EST. PATIENT-LVL III: CPT | Mod: PBBFAC,HCNC,, | Performed by: INTERNAL MEDICINE

## 2023-01-06 PROCEDURE — 99215 OFFICE O/P EST HI 40 MIN: CPT | Mod: HCNC,S$GLB,, | Performed by: INTERNAL MEDICINE

## 2023-01-06 PROCEDURE — 96375 TX/PRO/DX INJ NEW DRUG ADDON: CPT | Mod: HCNC,PN

## 2023-01-06 PROCEDURE — 3074F PR MOST RECENT SYSTOLIC BLOOD PRESSURE < 130 MM HG: ICD-10-PCS | Mod: HCNC,CPTII,S$GLB, | Performed by: INTERNAL MEDICINE

## 2023-01-06 PROCEDURE — 3078F DIAST BP <80 MM HG: CPT | Mod: HCNC,CPTII,S$GLB, | Performed by: INTERNAL MEDICINE

## 2023-01-06 PROCEDURE — 1126F AMNT PAIN NOTED NONE PRSNT: CPT | Mod: HCNC,CPTII,S$GLB, | Performed by: INTERNAL MEDICINE

## 2023-01-06 PROCEDURE — 63600175 PHARM REV CODE 636 W HCPCS: Mod: HCNC,PN | Performed by: INTERNAL MEDICINE

## 2023-01-06 PROCEDURE — 3288F FALL RISK ASSESSMENT DOCD: CPT | Mod: HCNC,CPTII,S$GLB, | Performed by: INTERNAL MEDICINE

## 2023-01-06 PROCEDURE — 3078F PR MOST RECENT DIASTOLIC BLOOD PRESSURE < 80 MM HG: ICD-10-PCS | Mod: HCNC,CPTII,S$GLB, | Performed by: INTERNAL MEDICINE

## 2023-01-06 PROCEDURE — 1126F PR PAIN SEVERITY QUANTIFIED, NO PAIN PRESENT: ICD-10-PCS | Mod: HCNC,CPTII,S$GLB, | Performed by: INTERNAL MEDICINE

## 2023-01-06 PROCEDURE — 1101F PR PT FALLS ASSESS DOC 0-1 FALLS W/OUT INJ PAST YR: ICD-10-PCS | Mod: HCNC,CPTII,S$GLB, | Performed by: INTERNAL MEDICINE

## 2023-01-06 PROCEDURE — 3288F PR FALLS RISK ASSESSMENT DOCUMENTED: ICD-10-PCS | Mod: HCNC,CPTII,S$GLB, | Performed by: INTERNAL MEDICINE

## 2023-01-06 PROCEDURE — 96413 CHEMO IV INFUSION 1 HR: CPT | Mod: HCNC,PN

## 2023-01-06 PROCEDURE — 99999 PR PBB SHADOW E&M-EST. PATIENT-LVL III: ICD-10-PCS | Mod: PBBFAC,HCNC,, | Performed by: INTERNAL MEDICINE

## 2023-01-06 PROCEDURE — 3074F SYST BP LT 130 MM HG: CPT | Mod: HCNC,CPTII,S$GLB, | Performed by: INTERNAL MEDICINE

## 2023-01-06 PROCEDURE — 99215 PR OFFICE/OUTPT VISIT, EST, LEVL V, 40-54 MIN: ICD-10-PCS | Mod: HCNC,S$GLB,, | Performed by: INTERNAL MEDICINE

## 2023-01-06 RX ORDER — ONDANSETRON 2 MG/ML
8 INJECTION INTRAMUSCULAR; INTRAVENOUS
Status: COMPLETED | OUTPATIENT
Start: 2023-01-06 | End: 2023-01-06

## 2023-01-06 RX ORDER — PREDNISONE 5 MG/1
10 TABLET ORAL DAILY
Qty: 90 TABLET | Refills: 2
Start: 2023-01-06 | End: 2023-05-24 | Stop reason: ALTCHOICE

## 2023-01-06 RX ORDER — HEPARIN 100 UNIT/ML
500 SYRINGE INTRAVENOUS
Status: DISCONTINUED | OUTPATIENT
Start: 2023-01-06 | End: 2023-01-06 | Stop reason: HOSPADM

## 2023-01-06 RX ORDER — ONDANSETRON 2 MG/ML
8 INJECTION INTRAMUSCULAR; INTRAVENOUS
Status: CANCELLED | OUTPATIENT
Start: 2023-01-06

## 2023-01-06 RX ORDER — SODIUM CHLORIDE 0.9 % (FLUSH) 0.9 %
10 SYRINGE (ML) INJECTION
Status: DISCONTINUED | OUTPATIENT
Start: 2023-01-06 | End: 2023-01-06 | Stop reason: HOSPADM

## 2023-01-06 RX ORDER — HEPARIN 100 UNIT/ML
500 SYRINGE INTRAVENOUS
Status: CANCELLED | OUTPATIENT
Start: 2023-01-06

## 2023-01-06 RX ORDER — SODIUM CHLORIDE 0.9 % (FLUSH) 0.9 %
10 SYRINGE (ML) INJECTION
Status: CANCELLED | OUTPATIENT
Start: 2023-01-06

## 2023-01-06 RX ADMIN — ONDANSETRON 8 MG: 2 INJECTION INTRAMUSCULAR; INTRAVENOUS at 10:01

## 2023-01-06 RX ADMIN — MITOXANTRONE 22 MG: 2 INJECTION, SOLUTION, CONCENTRATE INTRAVENOUS at 11:01

## 2023-01-06 RX ADMIN — Medication 500 UNITS: at 12:01

## 2023-01-06 RX ADMIN — SODIUM CHLORIDE: 9 INJECTION, SOLUTION INTRAVENOUS at 10:01

## 2023-01-06 NOTE — PROGRESS NOTES
Oncology Nutrition       Weight Check   Chart reviewed. Weight check completed on pt.       Wt Readings from Last 10 Encounters:   01/06/23 70.1 kg (154 lb 8.7 oz)   01/06/23 70.1 kg (154 lb 8.7 oz)   12/16/22 70.4 kg (155 lb 1.5 oz)   12/15/22 70.4 kg (155 lb 1.5 oz)   12/13/22 69.9 kg (154 lb)   11/25/22 70 kg (154 lb 5.2 oz)   11/25/22 70 kg (154 lb 5.2 oz)   11/11/22 70 kg (154 lb 5.2 oz)   11/04/22 70.1 kg (154 lb 8.7 oz)   11/03/22 70.1 kg (154 lb 8.7 oz)          RD plan of care: Weight is currently 154#. No significant change at this time. Per nursing nutrition risk report, pt denies any nutritional concerns or challenges at this time. RD to continue to monitor prn; no nutritional interventions are needed at this time.     Margaret Knutson, FUENTESN, LDN  01/06/2023  10:59 AM

## 2023-01-06 NOTE — PLAN OF CARE
Problem: Adult Inpatient Plan of Care  Goal: Plan of Care Review  Outcome: Ongoing, Progressing  Flowsheets (Taken 1/6/2023 1109)  Plan of Care Reviewed With:   patient   spouse  Goal: Patient-Specific Goal (Individualized)  Outcome: Ongoing, Progressing  Flowsheets (Taken 1/6/2023 1109)  Anxieties, Fears or Concerns: Worried about daughter driving alone from [place]  Individualized Care Needs: Recliner, warm blanket, dimmed lights    Patient to Infusion for Mitoxantrone following appointment with Dr. Alvarez. Accompanied by his wife. Treatment plan reviewed with patient. VSS. Tolerated treatment. Provided with copy of upcoming appointment schedule. Escorted to the front lobby TO RING THE BELL and then discharge to home.

## 2023-01-06 NOTE — PLAN OF CARE
Problem: Adult Inpatient Plan of Care  Goal: Plan of Care Review  Outcome: Ongoing, Progressing  Flowsheets (Taken 1/6/2023 1109)  Plan of Care Reviewed With:   patient   spouse  Goal: Patient-Specific Goal (Individualized)  Outcome: Ongoing, Progressing  Flowsheets (Taken 1/6/2023 1109)  Anxieties, Fears or Concerns: Worried about daughter driving alone from [place]  Individualized Care Needs: Recliner, warm blanket, dimmed lights   Patient to Infusion for Mitoxantrone following appointment with Dr. Lujan. Accompanied by his wife and daughter. Treatment plan reviewed with patient. VSS. Tolerated treatment. Provided with copy of upcoming appointment schedule. Escorted to the front lobby for discharge to home.

## 2023-01-08 ENCOUNTER — PATIENT MESSAGE (OUTPATIENT)
Dept: HEMATOLOGY/ONCOLOGY | Facility: CLINIC | Age: 88
End: 2023-01-08
Payer: MEDICARE

## 2023-01-09 ENCOUNTER — PATIENT MESSAGE (OUTPATIENT)
Dept: HEMATOLOGY/ONCOLOGY | Facility: CLINIC | Age: 88
End: 2023-01-09
Payer: MEDICARE

## 2023-01-09 NOTE — TELEPHONE ENCOUNTER
Please see attached patient message in regards to if the pt should take his prednisone now that he is not doing the chemo treatment.

## 2023-01-22 ENCOUNTER — PATIENT MESSAGE (OUTPATIENT)
Dept: HEMATOLOGY/ONCOLOGY | Facility: CLINIC | Age: 88
End: 2023-01-22
Payer: MEDICARE

## 2023-01-28 ENCOUNTER — PATIENT MESSAGE (OUTPATIENT)
Dept: PSYCHIATRY | Facility: CLINIC | Age: 88
End: 2023-01-28
Payer: MEDICARE

## 2023-02-03 ENCOUNTER — PATIENT MESSAGE (OUTPATIENT)
Dept: CARDIOLOGY | Facility: CLINIC | Age: 88
End: 2023-02-03
Payer: MEDICARE

## 2023-02-03 ENCOUNTER — PATIENT MESSAGE (OUTPATIENT)
Dept: PSYCHIATRY | Facility: CLINIC | Age: 88
End: 2023-02-03
Payer: MEDICARE

## 2023-02-03 NOTE — NURSING
Returned call to pt's wife.  She stated she is having knee surgery and will have to arnav the pt's cardio appt for 2 weeks out after.   Resched with Dr Kasper, Cardio for 3/17.  Confirmed appt date and time.

## 2023-02-07 DIAGNOSIS — Z00.00 ENCOUNTER FOR MEDICARE ANNUAL WELLNESS EXAM: ICD-10-CM

## 2023-02-09 DIAGNOSIS — Z00.00 ENCOUNTER FOR MEDICARE ANNUAL WELLNESS EXAM: ICD-10-CM

## 2023-02-23 ENCOUNTER — OFFICE VISIT (OUTPATIENT)
Dept: FAMILY MEDICINE | Facility: CLINIC | Age: 88
End: 2023-02-23
Payer: MEDICARE

## 2023-02-23 VITALS
HEART RATE: 68 BPM | RESPIRATION RATE: 16 BRPM | HEIGHT: 70 IN | WEIGHT: 150.88 LBS | BODY MASS INDEX: 21.6 KG/M2 | SYSTOLIC BLOOD PRESSURE: 112 MMHG | OXYGEN SATURATION: 99 % | DIASTOLIC BLOOD PRESSURE: 60 MMHG | TEMPERATURE: 98 F

## 2023-02-23 DIAGNOSIS — N18.31 CHRONIC KIDNEY DISEASE, STAGE 3A: ICD-10-CM

## 2023-02-23 DIAGNOSIS — R79.9 ABNORMAL FINDING OF BLOOD CHEMISTRY, UNSPECIFIED: ICD-10-CM

## 2023-02-23 DIAGNOSIS — I48.91 ATRIAL FIBRILLATION BY ELECTROCARDIOGRAM: ICD-10-CM

## 2023-02-23 DIAGNOSIS — C61 PROSTATE CANCER: Primary | ICD-10-CM

## 2023-02-23 DIAGNOSIS — E03.4 HYPOTHYROIDISM DUE TO ACQUIRED ATROPHY OF THYROID: ICD-10-CM

## 2023-02-23 DIAGNOSIS — I49.9 IRREGULAR HEART RHYTHM: ICD-10-CM

## 2023-02-23 PROCEDURE — 85025 COMPLETE CBC W/AUTO DIFF WBC: CPT | Mod: HCNC | Performed by: FAMILY MEDICINE

## 2023-02-23 PROCEDURE — 1160F RVW MEDS BY RX/DR IN RCRD: CPT | Mod: CPTII,S$GLB,, | Performed by: FAMILY MEDICINE

## 2023-02-23 PROCEDURE — 3074F SYST BP LT 130 MM HG: CPT | Mod: CPTII,S$GLB,, | Performed by: FAMILY MEDICINE

## 2023-02-23 PROCEDURE — 93005 ELECTROCARDIOGRAM TRACING: CPT | Mod: HCNC,S$GLB,, | Performed by: FAMILY MEDICINE

## 2023-02-23 PROCEDURE — 1101F PR PT FALLS ASSESS DOC 0-1 FALLS W/OUT INJ PAST YR: ICD-10-PCS | Mod: CPTII,S$GLB,, | Performed by: FAMILY MEDICINE

## 2023-02-23 PROCEDURE — 84443 ASSAY THYROID STIM HORMONE: CPT | Mod: HCNC | Performed by: FAMILY MEDICINE

## 2023-02-23 PROCEDURE — 1101F PT FALLS ASSESS-DOCD LE1/YR: CPT | Mod: CPTII,S$GLB,, | Performed by: FAMILY MEDICINE

## 2023-02-23 PROCEDURE — 93010 ELECTROCARDIOGRAM REPORT: CPT | Mod: HCNC,S$GLB,, | Performed by: INTERNAL MEDICINE

## 2023-02-23 PROCEDURE — 1157F PR ADVANCE CARE PLAN OR EQUIV PRESENT IN MEDICAL RECORD: ICD-10-PCS | Mod: CPTII,S$GLB,, | Performed by: FAMILY MEDICINE

## 2023-02-23 PROCEDURE — 83036 HEMOGLOBIN GLYCOSYLATED A1C: CPT | Mod: HCNC | Performed by: FAMILY MEDICINE

## 2023-02-23 PROCEDURE — 3288F FALL RISK ASSESSMENT DOCD: CPT | Mod: CPTII,S$GLB,, | Performed by: FAMILY MEDICINE

## 2023-02-23 PROCEDURE — 80061 LIPID PANEL: CPT | Mod: HCNC | Performed by: FAMILY MEDICINE

## 2023-02-23 PROCEDURE — 3078F PR MOST RECENT DIASTOLIC BLOOD PRESSURE < 80 MM HG: ICD-10-PCS | Mod: CPTII,S$GLB,, | Performed by: FAMILY MEDICINE

## 2023-02-23 PROCEDURE — 1160F PR REVIEW ALL MEDS BY PRESCRIBER/CLIN PHARMACIST DOCUMENTED: ICD-10-PCS | Mod: CPTII,S$GLB,, | Performed by: FAMILY MEDICINE

## 2023-02-23 PROCEDURE — 93010 EKG 12-LEAD: ICD-10-PCS | Mod: HCNC,S$GLB,, | Performed by: INTERNAL MEDICINE

## 2023-02-23 PROCEDURE — 1157F ADVNC CARE PLAN IN RCRD: CPT | Mod: CPTII,S$GLB,, | Performed by: FAMILY MEDICINE

## 2023-02-23 PROCEDURE — 3078F DIAST BP <80 MM HG: CPT | Mod: CPTII,S$GLB,, | Performed by: FAMILY MEDICINE

## 2023-02-23 PROCEDURE — 99215 PR OFFICE/OUTPT VISIT, EST, LEVL V, 40-54 MIN: ICD-10-PCS | Mod: S$GLB,,, | Performed by: FAMILY MEDICINE

## 2023-02-23 PROCEDURE — 80053 COMPREHEN METABOLIC PANEL: CPT | Mod: HCNC | Performed by: FAMILY MEDICINE

## 2023-02-23 PROCEDURE — 1159F PR MEDICATION LIST DOCUMENTED IN MEDICAL RECORD: ICD-10-PCS | Mod: CPTII,S$GLB,, | Performed by: FAMILY MEDICINE

## 2023-02-23 PROCEDURE — 93005 EKG 12-LEAD: ICD-10-PCS | Mod: HCNC,S$GLB,, | Performed by: FAMILY MEDICINE

## 2023-02-23 PROCEDURE — 1126F PR PAIN SEVERITY QUANTIFIED, NO PAIN PRESENT: ICD-10-PCS | Mod: CPTII,S$GLB,, | Performed by: FAMILY MEDICINE

## 2023-02-23 PROCEDURE — 1159F MED LIST DOCD IN RCRD: CPT | Mod: CPTII,S$GLB,, | Performed by: FAMILY MEDICINE

## 2023-02-23 PROCEDURE — 36415 COLL VENOUS BLD VENIPUNCTURE: CPT | Mod: S$GLB,,, | Performed by: FAMILY MEDICINE

## 2023-02-23 PROCEDURE — 84439 ASSAY OF FREE THYROXINE: CPT | Mod: HCNC | Performed by: FAMILY MEDICINE

## 2023-02-23 PROCEDURE — 3074F PR MOST RECENT SYSTOLIC BLOOD PRESSURE < 130 MM HG: ICD-10-PCS | Mod: CPTII,S$GLB,, | Performed by: FAMILY MEDICINE

## 2023-02-23 PROCEDURE — 36415 PR COLLECTION VENOUS BLOOD,VENIPUNCTURE: ICD-10-PCS | Mod: S$GLB,,, | Performed by: FAMILY MEDICINE

## 2023-02-23 PROCEDURE — 3288F PR FALLS RISK ASSESSMENT DOCUMENTED: ICD-10-PCS | Mod: CPTII,S$GLB,, | Performed by: FAMILY MEDICINE

## 2023-02-23 PROCEDURE — 99215 OFFICE O/P EST HI 40 MIN: CPT | Mod: S$GLB,,, | Performed by: FAMILY MEDICINE

## 2023-02-23 PROCEDURE — 1126F AMNT PAIN NOTED NONE PRSNT: CPT | Mod: CPTII,S$GLB,, | Performed by: FAMILY MEDICINE

## 2023-02-23 RX ORDER — METOPROLOL SUCCINATE 25 MG/1
25 TABLET, EXTENDED RELEASE ORAL DAILY
Qty: 30 TABLET | Refills: 1 | Status: SHIPPED | OUTPATIENT
Start: 2023-02-23 | End: 2023-03-19 | Stop reason: SDUPTHER

## 2023-02-23 NOTE — PROGRESS NOTES
Venipuncture performed with  gauge butterfly, x's 1 attempt,  to L Cephalic vein.  Specimens collected per orders.      Pressure dressing applied to site, instructed patient to remove dressing in 10-15 minutes, OK to re-adjust dressing if pressure causing any discomfort, to observe closely for numbness and/or discoloration to hand or fingers, and to notify provider if bleeding persists after applying constant pressure lasting 30 minutes.

## 2023-02-23 NOTE — PROGRESS NOTES
Subjective:       Patient ID: Amor Alcazar is a 87 y.o. male.    Chief Complaint: Follow-up    HPI  The patient is an 87-year-old metastatic prostate cancer who is here today for follow-up with me.  Overall, he is doing well.  He is staying active at home and walks his property which is an acre.  He is eating well.  He usually eats 1 decent meal a day and snacks throughout the rest of the day and will use boost as needed.    Regarding his prostate cancer, he did complete his chemotherapy.  He is now continuing Lupron injections.  He does follow closely with the oncologist and has his next appointment in April.    Regarding his hypothyroidism, he does continue with Synthroid.  He would be due for a thyroid level to be checked soon.    Regarding his valvular heart disease, he has no valvular symptoms.  He denies any chest pain, shortness of breath, dyspnea on exertion, orthopnea or lower extremity edema.  He is due to see his cardiologist in March    Review of Systems   Constitutional:  Negative for appetite change, chills, diaphoresis, fatigue, fever and unexpected weight change.   HENT:  Negative for congestion, ear pain, postnasal drip, rhinorrhea, sinus pressure, sneezing, sore throat and trouble swallowing.    Eyes:  Negative for pain, discharge and visual disturbance.   Respiratory:  Negative for cough, chest tightness, shortness of breath and wheezing.    Cardiovascular:  Negative for chest pain, palpitations and leg swelling.   Gastrointestinal:  Negative for abdominal distention, abdominal pain, blood in stool, constipation, diarrhea, nausea and vomiting.   Skin:  Negative for rash.       Objective:      Physical Exam  Constitutional:       General: He is not in acute distress.     Appearance: Normal appearance. He is well-developed.   HENT:      Head: Normocephalic and atraumatic.      Right Ear: Hearing, tympanic membrane, ear canal and external ear normal.      Left Ear: Hearing, tympanic membrane, ear  "canal and external ear normal.      Nose: Nose normal.      Mouth/Throat:      Mouth: No oral lesions.      Pharynx: No oropharyngeal exudate or posterior oropharyngeal erythema.   Eyes:      General: Lids are normal. No scleral icterus.     Extraocular Movements: Extraocular movements intact.      Conjunctiva/sclera: Conjunctivae normal.      Pupils: Pupils are equal, round, and reactive to light.   Neck:      Thyroid: No thyroid mass or thyromegaly.      Vascular: No carotid bruit.   Cardiovascular:      Rate and Rhythm: Tachycardia present. Rhythm regularly irregular. No extrasystoles are present.     Chest Wall: PMI is not displaced.      Heart sounds: Normal heart sounds. No murmur heard.    No gallop.   Pulmonary:      Effort: Pulmonary effort is normal. No accessory muscle usage or respiratory distress.      Breath sounds: Normal breath sounds.   Abdominal:      General: Bowel sounds are normal. There is no abdominal bruit.      Palpations: Abdomen is soft.      Tenderness: There is no abdominal tenderness. There is no rebound.   Musculoskeletal:      Cervical back: Normal range of motion and neck supple.   Lymphadenopathy:      Head:      Right side of head: No submental or submandibular adenopathy.      Left side of head: No submental or submandibular adenopathy.      Cervical:      Right cervical: No superficial, deep or posterior cervical adenopathy.     Left cervical: No superficial, deep or posterior cervical adenopathy.      Upper Body:      Right upper body: No supraclavicular adenopathy.      Left upper body: No supraclavicular adenopathy.   Skin:     General: Skin is warm and dry.   Neurological:      Mental Status: He is alert and oriented to person, place, and time.     Blood pressure 112/60, pulse 68, temperature 98.2 °F (36.8 °C), temperature source Temporal, resp. rate 16, height 5' 10" (1.778 m), weight 68.5 kg (150 lb 14.5 oz), SpO2 99 %.Body mass index is 21.65 kg/m².        EKG reveals AFib " with RVR at 115      A/P:  1) metastatic prostate cancer.  Well controlled.  Follow-up with Oncology continue with Lupron under their direction  2)  Hypothyroidism.  Previously well controlled.  We will check a TSH today.  Continue current dose of Synthroid  3) valvular heart disease.  Asymptomatic.  Follow up with Cardiology as planned in March  4)  CKD 3 a.  Asymptomatic.  We will continue to monitor this      5) AFib with RVR.  New.  Asymptomatic.  I am going to contact his cardiologist to see if he would like to see him sooner than planned.  We are going to start  Eliquis and Toprol.  If he develops any symptoms of AFib or hypotension, they will let me know or go to the ER over the weekend if needed.  We will determine follow-up once I review his case with the cardiologist    I spent 43 minutes on this encounter.  This time includes face-to-face time and non-face to face time including previsit chart review, obtaining and/or reviewing separately obtained history, documenting clinical information in the electronic or other health record, independently interpreting results and communicating results to the patient/family/caregiver, or care coordinator.

## 2023-02-24 LAB
ALBUMIN SERPL BCP-MCNC: 3.9 G/DL (ref 3.5–5.2)
ALP SERPL-CCNC: 23 U/L (ref 55–135)
ALT SERPL W/O P-5'-P-CCNC: 11 U/L (ref 10–44)
ANION GAP SERPL CALC-SCNC: 9 MMOL/L (ref 8–16)
AST SERPL-CCNC: 23 U/L (ref 10–40)
BASOPHILS # BLD AUTO: 0.04 K/UL (ref 0–0.2)
BASOPHILS NFR BLD: 0.4 % (ref 0–1.9)
BILIRUB SERPL-MCNC: 0.3 MG/DL (ref 0.1–1)
BUN SERPL-MCNC: 28 MG/DL (ref 8–23)
CALCIUM SERPL-MCNC: 10 MG/DL (ref 8.7–10.5)
CHLORIDE SERPL-SCNC: 104 MMOL/L (ref 95–110)
CHOLEST SERPL-MCNC: 238 MG/DL (ref 120–199)
CHOLEST/HDLC SERPL: 4.7 {RATIO} (ref 2–5)
CO2 SERPL-SCNC: 26 MMOL/L (ref 23–29)
CREAT SERPL-MCNC: 1.1 MG/DL (ref 0.5–1.4)
DIFFERENTIAL METHOD: ABNORMAL
EOSINOPHIL # BLD AUTO: 0.1 K/UL (ref 0–0.5)
EOSINOPHIL NFR BLD: 0.8 % (ref 0–8)
ERYTHROCYTE [DISTWIDTH] IN BLOOD BY AUTOMATED COUNT: 14.6 % (ref 11.5–14.5)
EST. GFR  (NO RACE VARIABLE): >60 ML/MIN/1.73 M^2
ESTIMATED AVG GLUCOSE: 100 MG/DL (ref 68–131)
GLUCOSE SERPL-MCNC: 113 MG/DL (ref 70–110)
HBA1C MFR BLD: 5.1 % (ref 4–5.6)
HCT VFR BLD AUTO: 36.1 % (ref 40–54)
HDLC SERPL-MCNC: 51 MG/DL (ref 40–75)
HDLC SERPL: 21.4 % (ref 20–50)
HGB BLD-MCNC: 11.4 G/DL (ref 14–18)
IMM GRANULOCYTES # BLD AUTO: 0.17 K/UL (ref 0–0.04)
IMM GRANULOCYTES NFR BLD AUTO: 1.6 % (ref 0–0.5)
LDLC SERPL CALC-MCNC: 144 MG/DL (ref 63–159)
LYMPHOCYTES # BLD AUTO: 1.1 K/UL (ref 1–4.8)
LYMPHOCYTES NFR BLD: 10.5 % (ref 18–48)
MCH RBC QN AUTO: 33.7 PG (ref 27–31)
MCHC RBC AUTO-ENTMCNC: 31.6 G/DL (ref 32–36)
MCV RBC AUTO: 107 FL (ref 82–98)
MONOCYTES # BLD AUTO: 0.6 K/UL (ref 0.3–1)
MONOCYTES NFR BLD: 6 % (ref 4–15)
NEUTROPHILS # BLD AUTO: 8.3 K/UL (ref 1.8–7.7)
NEUTROPHILS NFR BLD: 80.7 % (ref 38–73)
NONHDLC SERPL-MCNC: 187 MG/DL
NRBC BLD-RTO: 0 /100 WBC
PLATELET # BLD AUTO: 188 K/UL (ref 150–450)
PMV BLD AUTO: 10.3 FL (ref 9.2–12.9)
POTASSIUM SERPL-SCNC: 4.4 MMOL/L (ref 3.5–5.1)
PROT SERPL-MCNC: 7 G/DL (ref 6–8.4)
RBC # BLD AUTO: 3.38 M/UL (ref 4.6–6.2)
SODIUM SERPL-SCNC: 139 MMOL/L (ref 136–145)
T4 FREE SERPL-MCNC: 1.18 NG/DL (ref 0.71–1.51)
TRIGL SERPL-MCNC: 215 MG/DL (ref 30–150)
TSH SERPL DL<=0.005 MIU/L-ACNC: 0.17 UIU/ML (ref 0.4–4)
WBC # BLD AUTO: 10.34 K/UL (ref 3.9–12.7)

## 2023-03-03 ENCOUNTER — OFFICE VISIT (OUTPATIENT)
Dept: CARDIOLOGY | Facility: CLINIC | Age: 88
End: 2023-03-03
Payer: MEDICARE

## 2023-03-03 ENCOUNTER — OFFICE VISIT (OUTPATIENT)
Dept: PSYCHIATRY | Facility: CLINIC | Age: 88
End: 2023-03-03
Payer: MEDICARE

## 2023-03-03 VITALS
OXYGEN SATURATION: 98 % | BODY MASS INDEX: 22 KG/M2 | TEMPERATURE: 97 F | RESPIRATION RATE: 16 BRPM | SYSTOLIC BLOOD PRESSURE: 129 MMHG | WEIGHT: 153.69 LBS | DIASTOLIC BLOOD PRESSURE: 70 MMHG | HEIGHT: 70 IN | HEART RATE: 68 BPM

## 2023-03-03 DIAGNOSIS — F43.22 ADJUSTMENT DISORDER WITH ANXIETY: Primary | ICD-10-CM

## 2023-03-03 DIAGNOSIS — I47.19 PAT (PAROXYSMAL ATRIAL TACHYCARDIA): ICD-10-CM

## 2023-03-03 DIAGNOSIS — E78.5 DYSLIPIDEMIA: ICD-10-CM

## 2023-03-03 DIAGNOSIS — C61 PROSTATE CANCER: ICD-10-CM

## 2023-03-03 DIAGNOSIS — I35.0 NON-RHEUMATIC AORTIC STENOSIS: ICD-10-CM

## 2023-03-03 DIAGNOSIS — C61 PROSTATE CANCER: Primary | ICD-10-CM

## 2023-03-03 PROCEDURE — 3288F PR FALLS RISK ASSESSMENT DOCUMENTED: ICD-10-PCS | Mod: HCNC,CPTII,S$GLB, | Performed by: INTERNAL MEDICINE

## 2023-03-03 PROCEDURE — 99214 OFFICE O/P EST MOD 30 MIN: CPT | Mod: HCNC,S$GLB,, | Performed by: INTERNAL MEDICINE

## 2023-03-03 PROCEDURE — 1126F PR PAIN SEVERITY QUANTIFIED, NO PAIN PRESENT: ICD-10-PCS | Mod: HCNC,CPTII,S$GLB, | Performed by: INTERNAL MEDICINE

## 2023-03-03 PROCEDURE — 1159F MED LIST DOCD IN RCRD: CPT | Mod: HCNC,CPTII,S$GLB, | Performed by: INTERNAL MEDICINE

## 2023-03-03 PROCEDURE — 3288F FALL RISK ASSESSMENT DOCD: CPT | Mod: HCNC,CPTII,S$GLB, | Performed by: INTERNAL MEDICINE

## 2023-03-03 PROCEDURE — 1157F ADVNC CARE PLAN IN RCRD: CPT | Mod: HCNC,CPTII,S$GLB, | Performed by: PSYCHOLOGIST

## 2023-03-03 PROCEDURE — 3078F DIAST BP <80 MM HG: CPT | Mod: HCNC,CPTII,S$GLB, | Performed by: INTERNAL MEDICINE

## 2023-03-03 PROCEDURE — 99999 PR PBB SHADOW E&M-EST. PATIENT-LVL III: ICD-10-PCS | Mod: PBBFAC,HCNC,, | Performed by: INTERNAL MEDICINE

## 2023-03-03 PROCEDURE — 1101F PR PT FALLS ASSESS DOC 0-1 FALLS W/OUT INJ PAST YR: ICD-10-PCS | Mod: HCNC,CPTII,S$GLB, | Performed by: INTERNAL MEDICINE

## 2023-03-03 PROCEDURE — 90834 PSYTX W PT 45 MINUTES: CPT | Mod: HCNC,S$GLB,, | Performed by: PSYCHOLOGIST

## 2023-03-03 PROCEDURE — 99999 PR PBB SHADOW E&M-EST. PATIENT-LVL III: CPT | Mod: PBBFAC,HCNC,, | Performed by: INTERNAL MEDICINE

## 2023-03-03 PROCEDURE — 1160F PR REVIEW ALL MEDS BY PRESCRIBER/CLIN PHARMACIST DOCUMENTED: ICD-10-PCS | Mod: HCNC,CPTII,S$GLB, | Performed by: INTERNAL MEDICINE

## 2023-03-03 PROCEDURE — 1126F AMNT PAIN NOTED NONE PRSNT: CPT | Mod: HCNC,CPTII,S$GLB, | Performed by: INTERNAL MEDICINE

## 2023-03-03 PROCEDURE — 99214 PR OFFICE/OUTPT VISIT, EST, LEVL IV, 30-39 MIN: ICD-10-PCS | Mod: HCNC,S$GLB,, | Performed by: INTERNAL MEDICINE

## 2023-03-03 PROCEDURE — 1157F PR ADVANCE CARE PLAN OR EQUIV PRESENT IN MEDICAL RECORD: ICD-10-PCS | Mod: HCNC,CPTII,S$GLB, | Performed by: INTERNAL MEDICINE

## 2023-03-03 PROCEDURE — 3074F SYST BP LT 130 MM HG: CPT | Mod: HCNC,CPTII,S$GLB, | Performed by: INTERNAL MEDICINE

## 2023-03-03 PROCEDURE — 3074F PR MOST RECENT SYSTOLIC BLOOD PRESSURE < 130 MM HG: ICD-10-PCS | Mod: HCNC,CPTII,S$GLB, | Performed by: INTERNAL MEDICINE

## 2023-03-03 PROCEDURE — 90834 PR PSYCHOTHERAPY W/PATIENT, 45 MIN: ICD-10-PCS | Mod: HCNC,S$GLB,, | Performed by: PSYCHOLOGIST

## 2023-03-03 PROCEDURE — 3078F PR MOST RECENT DIASTOLIC BLOOD PRESSURE < 80 MM HG: ICD-10-PCS | Mod: HCNC,CPTII,S$GLB, | Performed by: INTERNAL MEDICINE

## 2023-03-03 PROCEDURE — 1157F PR ADVANCE CARE PLAN OR EQUIV PRESENT IN MEDICAL RECORD: ICD-10-PCS | Mod: HCNC,CPTII,S$GLB, | Performed by: PSYCHOLOGIST

## 2023-03-03 PROCEDURE — 1160F RVW MEDS BY RX/DR IN RCRD: CPT | Mod: HCNC,CPTII,S$GLB, | Performed by: INTERNAL MEDICINE

## 2023-03-03 PROCEDURE — 1101F PT FALLS ASSESS-DOCD LE1/YR: CPT | Mod: HCNC,CPTII,S$GLB, | Performed by: INTERNAL MEDICINE

## 2023-03-03 PROCEDURE — 1157F ADVNC CARE PLAN IN RCRD: CPT | Mod: HCNC,CPTII,S$GLB, | Performed by: INTERNAL MEDICINE

## 2023-03-03 PROCEDURE — 1159F PR MEDICATION LIST DOCUMENTED IN MEDICAL RECORD: ICD-10-PCS | Mod: HCNC,CPTII,S$GLB, | Performed by: INTERNAL MEDICINE

## 2023-03-03 RX ORDER — ATORVASTATIN CALCIUM 10 MG/1
10 TABLET, FILM COATED ORAL DAILY
Qty: 30 TABLET | Refills: 2 | Status: SHIPPED | OUTPATIENT
Start: 2023-03-03 | End: 2023-03-20 | Stop reason: SDUPTHER

## 2023-03-03 NOTE — PROGRESS NOTES
PSYCHO-ONCOLOGY NOTE/ Individual Psychotherapy     Date: 3/3/2023   Site:  [facility]      Therapeutic Intervention: Met with patient, daughter, and spouse.  Outpatient - Supportive psychotherapy 45 min - CPT Code 35022    This includes face to face time and non-face to face time preparing to see the patient, obtaining and/or reviewing separately obtained history, documenting clinical information in the electronic or other health record, independently interpreting results and communicating results to the patient/family/caregiver, or care coordinator.      Patient was last seen by me on 11/11/2022    Problem list  Patient Active Problem List   Diagnosis    Malignant neoplasm of prostate    Arthritis    Hypertriglyceridemia    Pseudophakia of both eyes    History of skin cancer    Anemia    Chronic kidney disease, stage 3a    Iron deficiency anemia    Encounter for insertion of venous access port    Prostate cancer       Chief complaint/reason for encounter: adjustment to illness, anxiety        Met with patient to evaluate psychosocial adaptation to diagnosis/treatment of prostate cancer    Current Medications  Current Outpatient Medications   Medication    atorvastatin (LIPITOR) 10 MG tablet    duke's soln (benadryl 30 mL, mylanta 30 mL, LIDOcaine 30 mL, nystatin 30 mL) 120mL    folic acid (FOLVITE) 400 MCG tablet    levothyroxine (SYNTHROID) 75 MCG tablet    LUPRON DEPOT, 6 MONTH, 45 mg SyKt injection    metoprolol succinate (TOPROL-XL) 25 MG 24 hr tablet    multivit-min-FA-lycopen-lutein (CENTRUM SILVER MEN) 300-600-300 mcg Tab    predniSONE (DELTASONE) 5 MG tablet    tretinoin (RETIN-A) 0.05 % cream     No current facility-administered medications for this visit.       ONCOLOGY HISTORY  Oncology History   Prostate cancer   6/27/2022 Initial Diagnosis    Prostate cancer     7/1/2022 -  Chemotherapy    Treatment Summary   Plan Name: OP MITOXANTRONE PREDNISONE Q3W  Treatment Goal: Palliative  Status:  Active  Start Date: 7/1/2022  End Date: 1/6/2023  Provider: eBka Alvarez MD  Chemotherapy: mitoXANTRONE (NOVANTRONE) 12 mg/m2 = 22 mg in sodium chloride 0.9% 50 mL chemo infusion, 12 mg/m2 = 22 mg, Intravenous, Clinic/HOD 1 time, 10 of 10 cycles  Administration: 22 mg (7/1/2022), 22 mg (7/22/2022), 22 mg (11/4/2022), 22 mg (11/25/2022), 22 mg (12/16/2022), 22 mg (1/6/2023), 22 mg (8/12/2022), 22 mg (9/2/2022), 22 mg (9/23/2022), 22 mg (10/14/2022)           Objective:  Amor Alcazar arrived promptly for the session. Sandy (wife) and Jennie (daughter) were also present during the interview, with the consent of Amor Alcazar.   Mr. Alcazar was independently ambulatory at the time of session. The patient was fully cooperative throughout the session.  Appearance: age appropriate, casually  dressed, well groomed  Behavior/Cooperation: friendly and cooperative  Speech: normal in rate, volume, and tone and appropriate quality, quantity and organization of sentences  Mood: steady, happy  Affect: mood congruent and appropriate  Thought Process: goal-directed, logical  Thought Content: normal,  No delusions or paranoia; did not appear to be responding to internal stimuli during the session  Orientation: grossly intact  Memory: grossly intact  Attention Span/Concentration: Attends to session without distraction; reports no difficulty  Fund of Knowledge: average  Estimate of Intelligence: average from verbal skills and history  Cognition: grossly intact  Insight: patient has awareness of illness; good insight into own behavior and behavior of others  Judgment: the patient's behavior is adequate to circumstances    NCCN Distress thermometer:   DISTRESS SCREENING 11/11/2022 11/3/2022 10/14/2022 9/2/2022 7/22/2022 6/30/2022 6/27/2022   Distress Score 0 - No Distress 0 - No Distress 0 - No Distress 0 - No Distress 0 - No Distress 0 - No Distress 0 - No Distress   Practical Problems None of these None of these None of these  None of these None of these None of these None of these   Family Problems None of these None of these None of these None of these None of these None of these None of these   Emotional Problems None of these None of these None of these None of these None of these None of these None of these   Spiritual / Restoration Concerns No No No No No No No   Physical Problems None of these None of these None of these None of these None of these None of these None of these        Interval history and content of current session: Discussed adaptation to tx plan and end to chemotherapy. Reports to be coping in an adequate manner, though described some anxiety associated w/ end to chemotherapy. Evaluated cognitive response, paying particular attention to negative intrusive thoughts of a persistent and detrimental nature. Thoughts of this type are in evidence with mild distress. Provided cognitive behavioral therapy to address negative cognitions. Provided additional psychotherapeutic support as indicated. Identified and evaluated psychosocial and environmental stressors secondary to tx, encouraging pacing of coping strategies (especially when outdoors).  Examined proactive behaviors that may be implemented to minimize or ameliorate psychosocial stressors secondary to diagnosis and treatment.     Risk parameters:   Patient reports no suicidal ideation  Patient reports no homicidal ideation  Patient reports no self-injurious behavior  Patient reports no violent behavior   Safety needs:  None at this time      Verbal deficits: None     Patient's response to intervention:The patient's response to intervention is accepting, motivated.     Progress toward goals and other mental status changes:  The patient's progress toward goals is excellent.      Progress to date:Progress as Expected      Goals from last visit: Met        Patient Strengths:  verbal, intelligent, successful, good social support, good insight, commitment to wellness, strong  marjan, strong cultural traditions        Treatment Plan:individual psychotherapy  Target symptoms: anxiety , adjustment  Why chosen therapy is appropriate versus another modality: relevant to diagnosis, patient responds to this modality, evidence based practice  Outcome monitoring methods: self-report, observation, feedback from family  Therapeutic intervention type: insight oriented psychotherapy, behavior modifying psychotherapy, supportive psychotherapy  Prognosis: Good                            Behavioral goals:               Social engagement: continued              Therapy: adaptive coping, psychotherapeutic support    Return to clinic: 4-6 months     Length of Service (minutes direct face-to-face contact): 45    Diagnosis:     ICD-10-CM ICD-9-CM   1. Adjustment disorder with anxiety  F43.22 309.24   2. Prostate cancer  C61 185                [place] License #[id number]  [place] License #[id number]

## 2023-03-03 NOTE — PROGRESS NOTES
Subjective:    Patient ID:  Amor Alcazar is a 87 y.o. male who presents for follow-up.    HPI  He has a history of arthritis, hypothyroidism, aortic stenosis, iron deficiency anemia and metastatic prostate cancer, on Lupron and mitoxantrone.  He is here for follow up. Since last visit, he was found to have atrial tachyarrhythmia with rapid ventricular rate (felt to represent afib) during his PCP visit and was started on anticoagulation and metoprolol. He reports no new issues. He stays physically active and walks around his [place] without major problems. No chest pain, palpitations, syncope, leg swelling.     Treatment Plan Information   OP MITOXANTRONE PREDNISONE Q3W   Hermann Jackson MD   Upcoming Treatment Dates - OP MITOXANTRONE PREDNISONE Q3W     No upcoming days in selected categories.     Supportive Plan Information  OP PROSTATE LEUPROLIDE (LUPRON) 6 MONTH   Beka Alvarez MD   Upcoming Treatment Dates - OP PROSTATE LEUPROLIDE (LUPRON) 6 MONTH     3/31/2023       Chemotherapy       leuprolide acetate (6 month) injection 45 mg     Review of Systems   Constitutional: Negative for chills and fever.   HENT:  Positive for hearing loss.    Eyes:  Negative for redness.   Cardiovascular:  Negative for chest pain, irregular heartbeat, leg swelling, palpitations and syncope.   Respiratory:  Negative for cough and shortness of breath.    Endocrine: Negative for cold intolerance.   Musculoskeletal:  Positive for joint pain.   Gastrointestinal:  Negative for vomiting.   Genitourinary:  Negative for hematuria.   Neurological:  Negative for focal weakness and seizures.   Psychiatric/Behavioral:  The patient is not nervous/anxious.    Allergic/Immunologic: Negative for hives.      Current Outpatient Medications   Medication Sig    atorvastatin (LIPITOR) 10 MG tablet Take 1 tablet (10 mg total) by mouth once daily.    duke's soln (benadryl 30 mL, mylanta 30 mL, LIDOcaine 30 mL, nystatin 30 mL) 120mL Take 10 mLs  by mouth 4 (four) times daily. (Patient not taking: Reported on 2/23/2023)    folic acid (FOLVITE) 400 MCG tablet Take 400 mcg by mouth once daily.    levothyroxine (SYNTHROID) 75 MCG tablet Take 1 tablet (75 mcg total) by mouth before breakfast.    LUPRON DEPOT, 6 MONTH, 45 mg SyKt injection Inject into the muscle every 6 (six) months.    metoprolol succinate (TOPROL-XL) 25 MG 24 hr tablet Take 1 tablet (25 mg total) by mouth once daily.    multivit-min-FA-lycopen-lutein (CENTRUM SILVER MEN) 300-600-300 mcg Tab Take by mouth once daily.    predniSONE (DELTASONE) 5 MG tablet Take 2 tablets (10 mg total) by mouth once daily.    tretinoin (RETIN-A) 0.05 % cream apply TO right side of face AT BEDTIME     No current facility-administered medications for this visit.       Objective:    Physical Exam  Vitals reviewed.   Constitutional:       General: He is not in acute distress.     Appearance: He is well-developed.   HENT:      Head: Normocephalic and atraumatic.   Neck:      Vascular: No JVD.   Cardiovascular:      Rate and Rhythm: Normal rate and regular rhythm.      Heart sounds: Murmur heard.   Systolic murmur is present with a grade of 1/6 at the upper right sternal border.   Pulmonary:      Effort: Pulmonary effort is normal.      Breath sounds: Normal breath sounds.   Abdominal:      Palpations: Abdomen is soft.      Tenderness: There is no abdominal tenderness.   Musculoskeletal:      Cervical back: Neck supple.   Skin:     General: Skin is warm and dry.   Neurological:      Mental Status: He is alert and oriented to person, place, and time.     Vitals:    03/03/23 0926   BP: 129/70   Pulse: 68   Resp: 16   Temp: 96.9 °F (36.1 °C)           Assessment:       1. Prostate cancer    2. Dyslipidemia    3. Non-rheumatic aortic stenosis    4. PAT (paroxysmal atrial tachycardia)           Plan:       I have reviewed the labs and ancillary data.    5/25/22 echo interpretation:  The left ventricle is normal in size with  normal systolic function.  The estimated ejection fraction is 55-60%.  Normal left ventricular diastolic function.  Normal right ventricular size with normal right ventricular systolic function.  There is mild aortic valve stenosis.  Aortic valve area is 1.82 cm2; peak velocity is 1.48 m/s; mean gradient is 5 mmHg.  Normal central venous pressure (3 mmHg).  The estimated PA systolic pressure is 29 mmHg.  The left ventricular global longitudinal strain is -18.32%.    8/12/22 echo interpretation:  The left ventricle is normal in size with concentric remodeling and normal systolic function.  The estimated ejection fraction is 60%.  Normal left ventricular diastolic function.  Normal right ventricular size with normal right ventricular systolic function.  There is mild aortic valve stenosis.  Aortic valve area is 1.72 cm2; peak velocity is 2.13 m/s; mean gradient is 10 mmHg.  Mild mitral regurgitation.  Mild tricuspid regurgitation.  Normal central venous pressure (3 mmHg).  The estimated PA systolic pressure is 33 mmHg.  The left ventricular global longitudinal strain is -18.1%.    12/13/22 echo interpretation:  The left ventricle is normal in size with concentric remodeling and normal systolic function.  The estimated ejection fraction is 65%.  Normal left ventricular diastolic function.  Normal right ventricular size with normal right ventricular systolic function.  There is mild aortic valve stenosis.  Aortic valve area is 1.79 cm2; peak velocity is 2.18 m/s; mean gradient is 10 mmHg.  IVC not well visualized.  The estimated PA systolic pressure is at [id number] mmHg.  Echocardiographic images too poor to obtain accurate strain measurement.          Impression and Plan:  1) prostate ca: followed by oncology; on ADT and mitoxantrone q3w; due for echo this month. continue 3 mo echo surveillance;   2) aortic stenosis: mild; no intervention needed at this time.  3) dyslipidemia: low dose statin.  4) abnormal heart  rate/rhythm: this was thought to represent afib during his pcp visit; personally reviewed EKG which shows sinus rhythm/PAT; continue metoprolol at this time, and discontinue Eliquis. Schedule 72h Holter monitor.

## 2023-03-05 ENCOUNTER — PATIENT MESSAGE (OUTPATIENT)
Dept: FAMILY MEDICINE | Facility: CLINIC | Age: 88
End: 2023-03-05
Payer: MEDICARE

## 2023-03-05 DIAGNOSIS — E03.4 HYPOTHYROIDISM DUE TO ACQUIRED ATROPHY OF THYROID: Primary | ICD-10-CM

## 2023-03-05 RX ORDER — LEVOTHYROXINE SODIUM 50 UG/1
50 TABLET ORAL
Qty: 30 TABLET | Refills: 1 | Status: SHIPPED | OUTPATIENT
Start: 2023-03-05 | End: 2023-04-22 | Stop reason: SDUPTHER

## 2023-03-13 ENCOUNTER — PATIENT MESSAGE (OUTPATIENT)
Dept: FAMILY MEDICINE | Facility: CLINIC | Age: 88
End: 2023-03-13
Payer: MEDICARE

## 2023-03-14 ENCOUNTER — CLINICAL SUPPORT (OUTPATIENT)
Dept: CARDIOLOGY | Facility: HOSPITAL | Age: 88
End: 2023-03-14
Attending: INTERNAL MEDICINE
Payer: MEDICARE

## 2023-03-14 VITALS
HEIGHT: 70 IN | WEIGHT: 153 LBS | BODY MASS INDEX: 21.9 KG/M2 | DIASTOLIC BLOOD PRESSURE: 70 MMHG | SYSTOLIC BLOOD PRESSURE: 129 MMHG

## 2023-03-14 DIAGNOSIS — I47.19 PAT (PAROXYSMAL ATRIAL TACHYCARDIA): ICD-10-CM

## 2023-03-14 DIAGNOSIS — C61 PROSTATE CANCER: ICD-10-CM

## 2023-03-14 LAB
ASCENDING AORTA: 3.58 CM
AV INDEX (PROSTH): 0.48
AV MEAN GRADIENT: 10 MMHG
AV PEAK GRADIENT: 17 MMHG
AV VALVE AREA: 1.84 CM2
AV VELOCITY RATIO: 0.45
BSA FOR ECHO PROCEDURE: 1.85 M2
CV ECHO LV RWT: 0.42 CM
DOP CALC AO PEAK VEL: 2.09 M/S
DOP CALC AO VTI: 42.5 CM
DOP CALC LVOT AREA: 3.8 CM2
DOP CALC LVOT DIAMETER: 2.2 CM
DOP CALC LVOT PEAK VEL: 0.94 M/S
DOP CALC LVOT STROKE VOLUME: 78.27 CM3
DOP CALCLVOT PEAK VEL VTI: 20.6 CM
E WAVE DECELERATION TIME: 128.16 MSEC
E/A RATIO: 0.78
E/E' RATIO: 11.57 M/S
ECHO LV POSTERIOR WALL: 0.78 CM (ref 0.6–1.1)
EJECTION FRACTION: 60 %
FRACTIONAL SHORTENING: 43 % (ref 28–44)
INTERVENTRICULAR SEPTUM: 0.82 CM (ref 0.6–1.1)
LA MAJOR: 3.37 CM
LA MINOR: 4.53 CM
LA WIDTH: 3.7 CM
LEFT ATRIUM SIZE: 2.19 CM
LEFT ATRIUM VOLUME INDEX: 14.3 ML/M2
LEFT ATRIUM VOLUME: 26.62 CM3
LEFT INTERNAL DIMENSION IN SYSTOLE: 2.11 CM (ref 2.1–4)
LEFT VENTRICLE DIASTOLIC VOLUME INDEX: 31.26 ML/M2
LEFT VENTRICLE DIASTOLIC VOLUME: 58.15 ML
LEFT VENTRICLE MASS INDEX: 44 G/M2
LEFT VENTRICLE SYSTOLIC VOLUME INDEX: 7.8 ML/M2
LEFT VENTRICLE SYSTOLIC VOLUME: 14.55 ML
LEFT VENTRICULAR INTERNAL DIMENSION IN DIASTOLE: 3.7 CM (ref 3.5–6)
LEFT VENTRICULAR MASS: 82.32 G
LV LATERAL E/E' RATIO: 10.13 M/S
LV SEPTAL E/E' RATIO: 13.5 M/S
LVOT MG: 2.03 MMHG
LVOT MV: 0.7 CM/S
MV PEAK A VEL: 1.04 M/S
MV PEAK E VEL: 0.81 M/S
MV STENOSIS PRESSURE HALF TIME: 37.17 MS
MV VALVE AREA P 1/2 METHOD: 5.92 CM2
PISA MRMAX VEL: 5.56 M/S
PISA TR MAX VEL: 2.34 M/S
RA MAJOR: 2.95 CM
RA PRESSURE: 3 MMHG
RIGHT VENTRICULAR END-DIASTOLIC DIMENSION: 3.55 CM
RIGHT VENTRICULAR LENGTH IN DIASTOLE (APICAL 4-CHAMBER VIEW): 6.3 CM
RV MID DIAMA: 28.75 CM
RV TISSUE DOPPLER FREE WALL SYSTOLIC VELOCITY 1 (APICAL 4 CHAMBER VIEW): 0.01 CM/S
SINUS: 4.01 CM
STJ: 3.68 CM
TDI LATERAL: 0.08 M/S
TDI SEPTAL: 0.06 M/S
TDI: 0.07 M/S
TR MAX PG: 22 MMHG
TRICUSPID ANNULAR PLANE SYSTOLIC EXCURSION: 2.24 CM
TV REST PULMONARY ARTERY PRESSURE: 25 MMHG

## 2023-03-14 PROCEDURE — 93356 MYOCRD STRAIN IMG SPCKL TRCK: CPT | Mod: HCNC,PO

## 2023-03-14 PROCEDURE — 93306 TTE W/DOPPLER COMPLETE: CPT | Mod: 26,HCNC,, | Performed by: INTERNAL MEDICINE

## 2023-03-14 PROCEDURE — 93244 HOLTER MONITOR - 72 HOUR: ICD-10-PCS | Mod: HCNC,,, | Performed by: INTERNAL MEDICINE

## 2023-03-14 PROCEDURE — 93306 ECHO (CUPID ONLY): ICD-10-PCS | Mod: 26,HCNC,, | Performed by: INTERNAL MEDICINE

## 2023-03-14 PROCEDURE — 93356 ECHO (CUPID ONLY): ICD-10-PCS | Mod: HCNC,,, | Performed by: INTERNAL MEDICINE

## 2023-03-14 PROCEDURE — 93242 EXT ECG>48HR<7D RECORDING: CPT | Mod: HCNC,PO

## 2023-03-14 PROCEDURE — 93356 MYOCRD STRAIN IMG SPCKL TRCK: CPT | Mod: HCNC,,, | Performed by: INTERNAL MEDICINE

## 2023-03-14 PROCEDURE — 93244 EXT ECG>48HR<7D REV&INTERPJ: CPT | Mod: HCNC,,, | Performed by: INTERNAL MEDICINE

## 2023-03-15 RX ORDER — HEPARIN 100 UNIT/ML
500 SYRINGE INTRAVENOUS
Status: CANCELLED | OUTPATIENT
Start: 2023-03-15

## 2023-03-15 RX ORDER — SODIUM CHLORIDE 0.9 % (FLUSH) 0.9 %
10 SYRINGE (ML) INJECTION
Status: CANCELLED | OUTPATIENT
Start: 2023-03-15

## 2023-03-16 ENCOUNTER — PATIENT MESSAGE (OUTPATIENT)
Dept: FAMILY MEDICINE | Facility: CLINIC | Age: 88
End: 2023-03-16
Payer: MEDICARE

## 2023-03-20 ENCOUNTER — TELEPHONE (OUTPATIENT)
Dept: HEMATOLOGY/ONCOLOGY | Facility: CLINIC | Age: 88
End: 2023-03-20
Payer: MEDICARE

## 2023-03-20 NOTE — TELEPHONE ENCOUNTER
----- Message from Jacqueline Kasper MD sent at 3/17/2023  2:26 PM CDT -----  Please let the patient know the echo did not show any significant change compared to previous

## 2023-03-20 NOTE — TELEPHONE ENCOUNTER
Called and spoke to wife, Polina, care giver.   Informed of Echo result per Dr Kasper: the echo did not show any significant change compared to previous.   She verbalized understanding.

## 2023-03-23 LAB
OHS CV EVENT MONITOR DAY: 2
OHS CV HOLTER LENGTH DECIMAL HOURS: 71
OHS CV HOLTER LENGTH HOURS: 23
OHS CV HOLTER LENGTH MINUTES: 0
OHS CV HOLTER SINUS AVERAGE HR: 72
OHS CV HOLTER SINUS MAX HR: 111
OHS CV HOLTER SINUS MIN HR: 55

## 2023-03-28 ENCOUNTER — TELEPHONE (OUTPATIENT)
Dept: HEMATOLOGY/ONCOLOGY | Facility: CLINIC | Age: 88
End: 2023-03-28
Payer: MEDICARE

## 2023-03-28 NOTE — TELEPHONE ENCOUNTER
----- Message from Jacqueline Kasper MD sent at 3/27/2023 11:50 AM CDT -----  Please let the patient know Holter showed frequent extra beats, however no episodes of atrial fibrillation.  He is currently on metoprolol of 25 mg in the morning, he can take an additional half a pill (12.5 mg) in the afternoon every day

## 2023-03-28 NOTE — TELEPHONE ENCOUNTER
Called and spoke to wife Polina, care giver of pt and informed her of Dr Kasper's result note from holter monitor.   She verbalized understanding and of med directions.

## 2023-03-30 RX ORDER — LEVOTHYROXINE SODIUM 75 UG/1
TABLET ORAL
Qty: 90 TABLET | Refills: 3 | OUTPATIENT
Start: 2023-03-30

## 2023-03-30 NOTE — TELEPHONE ENCOUNTER
No new care gaps identified.  [facility] Embedded Care Gaps. Reference number: [id number]. 3/30/2023   12:10:06 PM CDT

## 2023-03-30 NOTE — TELEPHONE ENCOUNTER
Quick DC. Inappropriate Request    Refill Authorization Note   Amor Alcazar  is requesting a refill authorization.  Brief Assessment and Rationale for Refill:  Quick Discontinue  Medication Therapy Plan:  Dose adjustment 3/5/23    Medication Reconciliation Completed:  No      Comments:     Note composed:3:19 PM 03/30/2023

## 2023-04-10 ENCOUNTER — LAB VISIT (OUTPATIENT)
Dept: LAB | Facility: HOSPITAL | Age: 88
End: 2023-04-10
Attending: INTERNAL MEDICINE
Payer: MEDICARE

## 2023-04-10 DIAGNOSIS — C61 PROSTATE CANCER: ICD-10-CM

## 2023-04-10 LAB
ALBUMIN SERPL BCP-MCNC: 3.5 G/DL (ref 3.5–5.2)
ALP SERPL-CCNC: 17 U/L (ref 55–135)
ALT SERPL W/O P-5'-P-CCNC: 14 U/L (ref 10–44)
ANION GAP SERPL CALC-SCNC: 10 MMOL/L (ref 8–16)
AST SERPL-CCNC: 24 U/L (ref 10–40)
BASOPHILS # BLD AUTO: 0.06 K/UL (ref 0–0.2)
BASOPHILS NFR BLD: 0.7 % (ref 0–1.9)
BILIRUB SERPL-MCNC: 0.3 MG/DL (ref 0.1–1)
BUN SERPL-MCNC: 24 MG/DL (ref 8–23)
CALCIUM SERPL-MCNC: 9.4 MG/DL (ref 8.7–10.5)
CHLORIDE SERPL-SCNC: 106 MMOL/L (ref 95–110)
CO2 SERPL-SCNC: 25 MMOL/L (ref 23–29)
COMPLEXED PSA SERPL-MCNC: 2.8 NG/ML (ref 0–4)
CREAT SERPL-MCNC: 1.1 MG/DL (ref 0.5–1.4)
DIFFERENTIAL METHOD: ABNORMAL
EOSINOPHIL # BLD AUTO: 0.2 K/UL (ref 0–0.5)
EOSINOPHIL NFR BLD: 1.6 % (ref 0–8)
ERYTHROCYTE [DISTWIDTH] IN BLOOD BY AUTOMATED COUNT: 13.7 % (ref 11.5–14.5)
EST. GFR  (NO RACE VARIABLE): >60 ML/MIN/1.73 M^2
GLUCOSE SERPL-MCNC: 100 MG/DL (ref 70–110)
HCT VFR BLD AUTO: 35.2 % (ref 40–54)
HGB BLD-MCNC: 11.5 G/DL (ref 14–18)
IMM GRANULOCYTES # BLD AUTO: 0.2 K/UL (ref 0–0.04)
IMM GRANULOCYTES NFR BLD AUTO: 2.2 % (ref 0–0.5)
LYMPHOCYTES # BLD AUTO: 1.8 K/UL (ref 1–4.8)
LYMPHOCYTES NFR BLD: 19.2 % (ref 18–48)
MCH RBC QN AUTO: 33 PG (ref 27–31)
MCHC RBC AUTO-ENTMCNC: 32.7 G/DL (ref 32–36)
MCV RBC AUTO: 101 FL (ref 82–98)
MONOCYTES # BLD AUTO: 0.9 K/UL (ref 0.3–1)
MONOCYTES NFR BLD: 9.3 % (ref 4–15)
NEUTROPHILS # BLD AUTO: 6.2 K/UL (ref 1.8–7.7)
NEUTROPHILS NFR BLD: 67 % (ref 38–73)
NRBC BLD-RTO: 0 /100 WBC
PLATELET # BLD AUTO: 148 K/UL (ref 150–450)
PMV BLD AUTO: 8.8 FL (ref 9.2–12.9)
POTASSIUM SERPL-SCNC: 3.8 MMOL/L (ref 3.5–5.1)
PROT SERPL-MCNC: 7.1 G/DL (ref 6–8.4)
RBC # BLD AUTO: 3.48 M/UL (ref 4.6–6.2)
SODIUM SERPL-SCNC: 141 MMOL/L (ref 136–145)
WBC # BLD AUTO: 9.2 K/UL (ref 3.9–12.7)

## 2023-04-10 PROCEDURE — 84153 ASSAY OF PSA TOTAL: CPT | Mod: HCNC | Performed by: INTERNAL MEDICINE

## 2023-04-10 PROCEDURE — 36415 COLL VENOUS BLD VENIPUNCTURE: CPT | Mod: HCNC,PN | Performed by: INTERNAL MEDICINE

## 2023-04-10 PROCEDURE — 80053 COMPREHEN METABOLIC PANEL: CPT | Mod: HCNC,PN | Performed by: INTERNAL MEDICINE

## 2023-04-10 PROCEDURE — 85025 COMPLETE CBC W/AUTO DIFF WBC: CPT | Mod: HCNC,PN | Performed by: INTERNAL MEDICINE

## 2023-04-13 NOTE — PROGRESS NOTES
PATIENT: Amor Alcazar  MRN: [id number]  DATE: 4/14/2023      Diagnosis:   1. 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 Anemia of chronic disease    4. Hypothyroidism, unspecified type    5. Thrombocytopenia    6. Skin lesion                Chief Complaint: Prostate Cancer (3 month follow up )      Subjective:    Interval History: Mr. Alcazar is a 88 y.o. male with Arthritis, hypothyroidism, valvular heart disease, iron deficiency anemia who presents for follow up of prostate cancer.  Since the last clinic visit the patient state he has felt well.  The patient denies CP, cough, SOB, abdominal pain, N/V, constipation, diarrhea.  The patient denies fever, chills, night sweats, weight loss, new lumps or bumps, easy bruising or bleeding.    Prior History:  The patient was diagnosed with prostate cancer Tio 9 (4+5) 6/03/14.  He had a rising PSA and underwent PSMA PET/CT 5/09/22 showing disease in the prostate and mediastinal LN's.  The patient has been receiving Lupron 45mg 6 months dosing with Dr Bellamy with last treatment on 4/20/22.  He has previously been on Casodex and Apalutamide with progression.  He was started on Mitoxantrone and prednisone 7/01/22.  Last Echo 8/26/22 showed normal EF.   The patient saw Dr Kasper in cardiology on 9/30/22 and recommended repeat Echo in 3 months.   The patient underwent an Echo on 12/13/22 showing normal diastolic and systolic function.    Past Medical History:   Past Medical History:   Diagnosis Date    Arthritis     History of skin cancer     details unknown    Hypothyroidism, unspecified     Prostate cancer     injections q 6 months    Valvular heart disease     mild AS, MR and TR 9/19 ECHO       Past Surgical HIstory:   Past Surgical History:   Procedure Laterality Date    BONE MARROW BIOPSY Bilateral 6/15/2022    Procedure: Biopsy-bone marrow;  Surgeon: Hermann Jackson MD;  Location: [facility] OR;  Service: Oncology;  Laterality: Bilateral;    CATARACT  EXTRACTION W/  INTRAOCULAR LENS IMPLANT Bilateral     ESOPHAGOGASTRODUODENOSCOPY      INSERTION OF TUNNELED CENTRAL VENOUS CATHETER (CVC) WITH SUBCUTANEOUS PORT N/A 6/15/2022    Procedure: INSERTION, PORT-A-CATH;  Surgeon: Marques Rivero MD;  Location: [facility];  Service: General;  Laterality: N/A;       Family History:   Family History   Problem Relation Age of Onset    Lung cancer Sister          of       Social History:  reports that he has never smoked. He has never used smokeless tobacco. He reports that he does not currently use alcohol. He reports that he does not use drugs.    Allergies:  Review of patient's allergies indicates:  No Known Allergies    Medications:  Current Outpatient Medications   Medication Sig Dispense Refill    atorvastatin (LIPITOR) 10 MG tablet Take 1 tablet (10 mg total) by mouth once daily. 90 tablet 1    duke's soln (benadryl 30 mL, mylanta 30 mL, LIDOcaine 30 mL, nystatin 30 mL) 120mL Take 10 mLs by mouth 4 (four) times daily. 120 mL 6    folic acid (FOLVITE) 400 MCG tablet Take 400 mcg by mouth once daily.      levothyroxine (SYNTHROID) 50 MCG tablet Take 1 tablet (50 mcg total) by mouth before breakfast. 30 tablet 1    LUPRON DEPOT, 6 MONTH, 45 mg SyKt injection Inject into the muscle every 6 (six) months.      metoprolol succinate (TOPROL-XL) 25 MG 24 hr tablet Take 1 tablet (25 mg total) by mouth once daily. 90 tablet 1    multivit-min-FA-lycopen-lutein (CENTRUM SILVER MEN) 300-600-300 mcg Tab Take by mouth once daily.      predniSONE (DELTASONE) 5 MG tablet Take 2 tablets (10 mg total) by mouth once daily. 90 tablet 2    tretinoin (RETIN-A) 0.05 % cream apply TO right side of face AT BEDTIME       No current facility-administered medications for this visit.       Review of Systems   Constitutional:  Negative for chills, diaphoresis, fatigue, fever and unexpected weight change.   Respiratory:  Negative for cough and shortness of breath.    Cardiovascular:  Negative for  "chest pain and palpitations.   Gastrointestinal:  Negative for abdominal pain, constipation, diarrhea, nausea and vomiting.   Skin:  Negative for color change and rash.   Neurological:  Negative for headaches.   Hematological:  Negative for adenopathy. Does not bruise/bleed easily.     ECOG Performance Status: 1   Objective:      Vitals:   Vitals:    04/14/23 0938   BP: 110/78   BP Location: Right arm   Patient Position: Sitting   BP Method: Medium (Manual)   Pulse: 65   Temp: 97.6 °F (36.4 °C)   TempSrc: Temporal   SpO2: 96%   Weight: 72.1 kg (158 lb 15.2 oz)   Height: 5' 10" (1.778 m)           Physical Exam  Constitutional:       General: He is not in acute distress.     Appearance: He is well-developed. He is not diaphoretic.   HENT:      Head: Normocephalic and atraumatic.   Cardiovascular:      Rate and Rhythm: Normal rate and regular rhythm.      Heart sounds: Normal heart sounds. No murmur heard.    No friction rub. No gallop.   Pulmonary:      Effort: Pulmonary effort is normal. No respiratory distress.      Breath sounds: Normal breath sounds. No wheezing or rales.   Chest:      Chest wall: No tenderness.   Abdominal:      General: Bowel sounds are normal. There is no distension.      Palpations: Abdomen is soft. There is no mass.      Tenderness: There is no abdominal tenderness. There is no rebound.   Musculoskeletal:      Cervical back: Normal range of motion.      Comments: PORT in left chest   Lymphadenopathy:      Cervical: No cervical adenopathy.      Upper Body:      Right upper body: No supraclavicular or axillary adenopathy.      Left upper body: No supraclavicular or axillary adenopathy.   Skin:     General: Skin is warm and dry.      Findings: Lesion (ulcerated lesion on right cheek) present. No erythema or rash.   Neurological:      Mental Status: He is alert and oriented to person, place, and time.   Psychiatric:         Behavior: Behavior normal.       Laboratory Data:  Lab Visit on " 04/10/2023   Component Date Value Ref Range Status    WBC 04/10/2023 9.20  3.90 - 12.70 K/uL Final    RBC 04/10/2023 3.48 (L)  4.60 - 6.20 M/uL Final    Hemoglobin 04/10/2023 11.5 (L)  14.0 - 18.0 g/dL Final    Hematocrit 04/10/2023 35.2 (L)  40.0 - 54.0 % Final    MCV 04/10/2023 101 (H)  82 - 98 fL Final    MCH 04/10/2023 33.0 (H)  27.0 - 31.0 pg Final    MCHC 04/10/2023 32.7  32.0 - 36.0 g/dL Final    RDW 04/10/2023 13.7  11.5 - 14.5 % Final    Platelets 04/10/2023 148 (L)  150 - 450 K/uL Final    MPV 04/10/2023 8.8 (L)  9.2 - 12.9 fL Final    Immature Granulocytes 04/10/2023 2.2 (H)  0.0 - 0.5 % Final    Gran # (ANC) 04/10/2023 6.2  1.8 - 7.7 K/uL Final    Immature Grans (Abs) 04/10/2023 0.20 (H)  0.00 - 0.04 K/uL Final    Comment: Mild elevation in immature granulocytes is non specific and   can be seen in a variety of conditions including stress response,   acute inflammation, trauma and pregnancy. Correlation with other   laboratory and clinical findings is essential.      Lymph # 04/10/2023 1.8  1.0 - 4.8 K/uL Final    Mono # 04/10/2023 0.9  0.3 - 1.0 K/uL Final    Eos # 04/10/2023 0.2  0.0 - 0.5 K/uL Final    Baso # 04/10/2023 0.06  0.00 - 0.20 K/uL Final    nRBC 04/10/2023 0  0 /100 WBC Final    Gran % 04/10/2023 67.0  38.0 - 73.0 % Final    Lymph % 04/10/2023 19.2  18.0 - 48.0 % Final    Mono % 04/10/2023 9.3  4.0 - 15.0 % Final    Eosinophil % 04/10/2023 1.6  0.0 - 8.0 % Final    Basophil % 04/10/2023 0.7  0.0 - 1.9 % Final    Differential Method 04/10/2023 Automated   Final    Sodium 04/10/2023 141  136 - 145 mmol/L Final    Potassium 04/10/2023 3.8  3.5 - 5.1 mmol/L Final    Chloride 04/10/2023 106  95 - 110 mmol/L Final    CO2 04/10/2023 25  23 - 29 mmol/L Final    Glucose 04/10/2023 100  70 - 110 mg/dL Final    BUN 04/10/2023 24 (H)  8 - 23 mg/dL Final    Creatinine 04/10/2023 1.1  0.5 - 1.4 mg/dL Final    Calcium 04/10/2023 9.4  8.7 - 10.5 mg/dL Final    Total Protein 04/10/2023 7.1  6.0 - 8.4 g/dL  Final    Albumin 04/10/2023 3.5  3.5 - 5.2 g/dL Final    Total Bilirubin 04/10/2023 0.3  0.1 - 1.0 mg/dL Final    Comment: For infants and newborns, interpretation of results should be based  on gestational age, weight and in agreement with clinical  observations.    Premature Infant recommended reference ranges:  Up to 24 hours.............<8.0 mg/dL  Up to 48 hours............<12.0 mg/dL  3-5 days..................<15.0 mg/dL  6-29 days.................<15.0 mg/dL      Alkaline Phosphatase 04/10/2023 17 (L)  55 - 135 U/L Final    AST 04/10/2023 24  10 - 40 U/L Final    ALT 04/10/2023 14  10 - 44 U/L Final    Anion Gap 04/10/2023 10  8 - 16 mmol/L Final    eGFR 04/10/2023 >60.0  >60 mL/min/1.73 m^2 Final    PSA Diagnostic 04/10/2023 2.8  0.00 - 4.00 ng/mL Final    Comment: The testing method is a chemiluminescent microparticle immunoassay   manufactured by [employer] and performed on the [employer]   or   [employer] system. Values obtained with different assay manufacturers   for   methods may be different and cannot be used interchangeably.  PSA Expected levels:  Hormonal Therapy: <0.05 ng/ml  Prostatectomy: <0.01 ng/ml  Radiation Therapy: <1.00 ng/ml           Imaging:     Echo 12/13/22    The left ventricle is normal in size with concentric remodeling and normal systolic function.  The estimated ejection fraction is 65%.  Normal left ventricular diastolic function.  Normal right ventricular size with normal right ventricular systolic function.  There is mild aortic valve stenosis.  Aortic valve area is 1.79 cm2; peak velocity is 2.18 m/s; mean gradient is 10 mmHg.  IVC not well visualized.  The estimated PA systolic pressure is at [id number] mmHg.  Echocardiographic images too poor to obtain accurate strain measurement.       Assessment:       1. 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 Anemia of chronic disease    4. Hypothyroidism, unspecified type    5. Thrombocytopenia    6.  Skin lesion                   Plan:     Prostate Cancer - PT has metastatic prostate cancer with mediastinal LN involvement  -Pt with prior progression on Casodex and Apalutamide  -Pt currently on Mitoxantrone with cycle 9 to be given today  -PSA stable at 4.6 12/12/22  -Last Echo 12/13/22 showed normal systolic and diastolic function  -Lupron 45mg given 10/14/22 with next dose due 4/14/23  -Pt completed 10th cycle of Mitoxantrone 1/06/23  -Pt not to receive additional doses  -PSA on 4/10/23 was 2.8  -Will continue ADT with 6 month dose of Lupron today  -Pt to undergo genetic testing with [facility]    Anemia of Chronic Disease - Hemoglobin was 11.5g/dL on 4/10/23  -hemoglobin stable  -Will monitor    Hypothyroidism - pt on synthroid  -management per PCP    Thrombocytopenia  - platelets 148k 4/10/23  -Will monitor    Skin Lesion - on right cheek  -Pt to follow up with his dermatologist Dr Stovall    Route Chart for Scheduling    Med Onc Chart Routing      Follow up with physician 3 months. Pt can proceed with lupron today.  He needs a flush of his PORT with flush every 8 weeks.  He needs an appt [facility] his dermatologist ASAP Dr Stovall [phone].  Pt needs Genetic testing today with [facility].  He needs a CBC, CMP and PSa in 3 months with an appt with me a week after the labs.   Follow up with [facility]    Infusion scheduling note    Injection scheduling note    Labs    Imaging    Pharmacy appointment    Other referrals           Treatment Plan Information   OP MITOXANTRONE PREDNISONE Q3W   Beka Alvarez MD   Upcoming Treatment Dates - OP MITOXANTRONE PREDNISONE Q3W    No upcoming days in selected categories.    Supportive Plan Information  OP PROSTATE LEUPROLIDE (LUPRON) 6 MONTH   Beka Alvarez MD   Upcoming Treatment Dates - OP PROSTATE LEUPROLIDE (LUPRON) 6 MONTH    4/14/2023       Chemotherapy       leuprolide acetate (6 month) injection 45 mg    Therapy Plan Information  Flushes  heparin, porcine (PF) 100 unit/mL  injection flush 500 Units  500 Units, Intravenous, Every visit  sodium chloride 0.9% flush 10 mL  10 mL, Intravenous, Every visit        Beka Alvarez MD  [facility]  Hematology and Oncology  [facility]   [address] [place] [address]   O: [phone]  F: [phone]

## 2023-04-14 ENCOUNTER — OFFICE VISIT (OUTPATIENT)
Dept: HEMATOLOGY/ONCOLOGY | Facility: CLINIC | Age: 88
End: 2023-04-14
Payer: MEDICARE

## 2023-04-14 ENCOUNTER — INFUSION (OUTPATIENT)
Dept: INFUSION THERAPY | Facility: HOSPITAL | Age: 88
End: 2023-04-14
Attending: INTERNAL MEDICINE
Payer: MEDICARE

## 2023-04-14 VITALS
OXYGEN SATURATION: 96 % | TEMPERATURE: 98 F | HEIGHT: 70 IN | SYSTOLIC BLOOD PRESSURE: 110 MMHG | DIASTOLIC BLOOD PRESSURE: 78 MMHG | HEART RATE: 65 BPM | BODY MASS INDEX: 22.75 KG/M2 | RESPIRATION RATE: 18 BRPM | WEIGHT: 158.94 LBS

## 2023-04-14 VITALS
HEART RATE: 65 BPM | WEIGHT: 158.94 LBS | TEMPERATURE: 98 F | SYSTOLIC BLOOD PRESSURE: 110 MMHG | OXYGEN SATURATION: 96 % | HEIGHT: 70 IN | DIASTOLIC BLOOD PRESSURE: 78 MMHG | BODY MASS INDEX: 22.75 KG/M2

## 2023-04-14 DIAGNOSIS — C77.1 METASTASIS TO MEDIASTINAL LYMPH NODE: ICD-10-CM

## 2023-04-14 DIAGNOSIS — Z45.2 ENCOUNTER FOR INSERTION OF VENOUS ACCESS PORT: Primary | ICD-10-CM

## 2023-04-14 DIAGNOSIS — C61 PROSTATE CANCER: Primary | ICD-10-CM

## 2023-04-14 DIAGNOSIS — D69.6 THROMBOCYTOPENIA: ICD-10-CM

## 2023-04-14 DIAGNOSIS — D63.8 ANEMIA OF CHRONIC DISEASE: ICD-10-CM

## 2023-04-14 DIAGNOSIS — D50.8 OTHER IRON DEFICIENCY ANEMIA: ICD-10-CM

## 2023-04-14 DIAGNOSIS — L98.9 SKIN LESION: ICD-10-CM

## 2023-04-14 DIAGNOSIS — E03.9 HYPOTHYROIDISM, UNSPECIFIED TYPE: ICD-10-CM

## 2023-04-14 DIAGNOSIS — C61 PROSTATE CANCER: ICD-10-CM

## 2023-04-14 DIAGNOSIS — C61 MALIGNANT NEOPLASM OF PROSTATE: ICD-10-CM

## 2023-04-14 PROCEDURE — 99499 RISK ADDL DX/OHS AUDIT: ICD-10-PCS | Mod: HCNC,S$GLB,, | Performed by: INTERNAL MEDICINE

## 2023-04-14 PROCEDURE — 1126F PR PAIN SEVERITY QUANTIFIED, NO PAIN PRESENT: ICD-10-PCS | Mod: HCNC,CPTII,S$GLB, | Performed by: INTERNAL MEDICINE

## 2023-04-14 PROCEDURE — 96402 CHEMO HORMON ANTINEOPL SQ/IM: CPT | Mod: HCNC,PN

## 2023-04-14 PROCEDURE — 1126F AMNT PAIN NOTED NONE PRSNT: CPT | Mod: HCNC,CPTII,S$GLB, | Performed by: INTERNAL MEDICINE

## 2023-04-14 PROCEDURE — 99999 PR PBB SHADOW E&M-EST. PATIENT-LVL III: CPT | Mod: PBBFAC,HCNC,, | Performed by: INTERNAL MEDICINE

## 2023-04-14 PROCEDURE — 3288F PR FALLS RISK ASSESSMENT DOCUMENTED: ICD-10-PCS | Mod: HCNC,CPTII,S$GLB, | Performed by: INTERNAL MEDICINE

## 2023-04-14 PROCEDURE — 99999 PR PBB SHADOW E&M-EST. PATIENT-LVL III: ICD-10-PCS | Mod: PBBFAC,HCNC,, | Performed by: INTERNAL MEDICINE

## 2023-04-14 PROCEDURE — 1101F PR PT FALLS ASSESS DOC 0-1 FALLS W/OUT INJ PAST YR: ICD-10-PCS | Mod: HCNC,CPTII,S$GLB, | Performed by: INTERNAL MEDICINE

## 2023-04-14 PROCEDURE — 99215 PR OFFICE/OUTPT VISIT, EST, LEVL V, 40-54 MIN: ICD-10-PCS | Mod: HCNC,S$GLB,, | Performed by: INTERNAL MEDICINE

## 2023-04-14 PROCEDURE — 1159F PR MEDICATION LIST DOCUMENTED IN MEDICAL RECORD: ICD-10-PCS | Mod: HCNC,CPTII,S$GLB, | Performed by: INTERNAL MEDICINE

## 2023-04-14 PROCEDURE — 1157F PR ADVANCE CARE PLAN OR EQUIV PRESENT IN MEDICAL RECORD: ICD-10-PCS | Mod: HCNC,CPTII,S$GLB, | Performed by: INTERNAL MEDICINE

## 2023-04-14 PROCEDURE — 99499 UNLISTED E&M SERVICE: CPT | Mod: HCNC,S$GLB,, | Performed by: INTERNAL MEDICINE

## 2023-04-14 PROCEDURE — 1101F PT FALLS ASSESS-DOCD LE1/YR: CPT | Mod: HCNC,CPTII,S$GLB, | Performed by: INTERNAL MEDICINE

## 2023-04-14 PROCEDURE — 1159F MED LIST DOCD IN RCRD: CPT | Mod: HCNC,CPTII,S$GLB, | Performed by: INTERNAL MEDICINE

## 2023-04-14 PROCEDURE — 99215 OFFICE O/P EST HI 40 MIN: CPT | Mod: HCNC,S$GLB,, | Performed by: INTERNAL MEDICINE

## 2023-04-14 PROCEDURE — 1157F ADVNC CARE PLAN IN RCRD: CPT | Mod: HCNC,CPTII,S$GLB, | Performed by: INTERNAL MEDICINE

## 2023-04-14 PROCEDURE — 3288F FALL RISK ASSESSMENT DOCD: CPT | Mod: HCNC,CPTII,S$GLB, | Performed by: INTERNAL MEDICINE

## 2023-04-14 PROCEDURE — 63600175 PHARM REV CODE 636 W HCPCS: Mod: JZ,JG,HCNC,PN | Performed by: INTERNAL MEDICINE

## 2023-04-14 RX ORDER — SODIUM CHLORIDE 0.9 % (FLUSH) 0.9 %
10 SYRINGE (ML) INJECTION
Status: DISCONTINUED | OUTPATIENT
Start: 2023-04-14 | End: 2023-04-14 | Stop reason: HOSPADM

## 2023-04-14 RX ORDER — SODIUM CHLORIDE 0.9 % (FLUSH) 0.9 %
10 SYRINGE (ML) INJECTION
Status: CANCELLED | OUTPATIENT
Start: 2023-04-14

## 2023-04-14 RX ORDER — HEPARIN 100 UNIT/ML
500 SYRINGE INTRAVENOUS
Status: CANCELLED | OUTPATIENT
Start: 2023-04-14

## 2023-04-14 RX ORDER — HEPARIN 100 UNIT/ML
500 SYRINGE INTRAVENOUS
Status: DISCONTINUED | OUTPATIENT
Start: 2023-04-14 | End: 2023-04-14 | Stop reason: HOSPADM

## 2023-04-14 RX ADMIN — LEUPROLIDE ACETATE 45 MG: KIT at 11:04

## 2023-04-14 NOTE — PLAN OF CARE
Problem: Adult Inpatient Plan of Care  Goal: Plan of Care Review  Outcome: Ongoing, Progressing  Goal: Patient-Specific Goal (Individualized)  Outcome: Ongoing, Progressing     Problem: Fatigue  Goal: Improved Activity Tolerance  Outcome: Ongoing, Progressing   Pt tolerated labs from port and lupron injection well.   No adverse reaction noted.  PAC flushed with Heparin and de-accessed per protocol.   Pt left clinic in no acute distress.

## 2023-04-18 ENCOUNTER — LAB VISIT (OUTPATIENT)
Dept: LAB | Facility: HOSPITAL | Age: 88
End: 2023-04-18
Attending: FAMILY MEDICINE
Payer: MEDICARE

## 2023-04-18 DIAGNOSIS — E03.4 HYPOTHYROIDISM DUE TO ACQUIRED ATROPHY OF THYROID: ICD-10-CM

## 2023-04-18 LAB — TSH SERPL DL<=0.005 MIU/L-ACNC: 0.93 UIU/ML (ref 0.4–4)

## 2023-04-18 PROCEDURE — 84443 ASSAY THYROID STIM HORMONE: CPT | Mod: HCNC | Performed by: FAMILY MEDICINE

## 2023-04-18 PROCEDURE — 36415 COLL VENOUS BLD VENIPUNCTURE: CPT | Mod: HCNC,PO | Performed by: FAMILY MEDICINE

## 2023-04-22 ENCOUNTER — PATIENT MESSAGE (OUTPATIENT)
Dept: FAMILY MEDICINE | Facility: CLINIC | Age: 88
End: 2023-04-22
Payer: MEDICARE

## 2023-04-22 RX ORDER — LEVOTHYROXINE SODIUM 50 UG/1
50 TABLET ORAL
Qty: 90 TABLET | Refills: 1 | Status: SHIPPED | OUTPATIENT
Start: 2023-04-22 | End: 2023-08-17 | Stop reason: SDUPTHER

## 2023-05-04 ENCOUNTER — DOCUMENTATION ONLY (OUTPATIENT)
Dept: HEMATOLOGY/ONCOLOGY | Facility: CLINIC | Age: 88
End: 2023-05-04
Payer: MEDICARE

## 2023-05-23 NOTE — NURSING
"Returned phone call to wife.  States she needs to get updated medication list.    Med list reviewed.    Pt taking 10 mg of Prednisone Q day.  States he is not taking any other medication for prostate cancer besides getting "the injection" every 6 months.    Instructed this RN will notify Dr. Alvarez re:  above conversation.  Pt verbally acknowledges understanding.  No further questions or concerns currently    Sheeba Parisi RN      "

## 2023-05-24 ENCOUNTER — TELEPHONE (OUTPATIENT)
Dept: HEMATOLOGY/ONCOLOGY | Facility: CLINIC | Age: 88
End: 2023-05-24
Payer: MEDICARE

## 2023-05-24 RX ORDER — METOPROLOL SUCCINATE 25 MG/1
TABLET, EXTENDED RELEASE ORAL
Qty: 135 TABLET | Refills: 1 | Status: SHIPPED | OUTPATIENT
Start: 2023-05-24 | End: 2023-08-22 | Stop reason: SDUPTHER

## 2023-05-24 NOTE — TELEPHONE ENCOUNTER
"Reviewed medications w/ wife.  Pt is taking prednisone 10 mg (5 mg x 2 tabs) Q day.  This RN asked the reason for the prednisone--"He takes it for the prostate cancer."    Instructed this RN will notify Dr. Alvarez re:  above conversation.   Pt verbally acknowledges understanding.  No further questions or concerns currently    Sheeba Parisi RN   "

## 2023-05-24 NOTE — TELEPHONE ENCOUNTER
----- Message from Tarik Steven sent at 5/23/2023  3:54 PM CDT -----  Contact: pt spouse  Type: Needs Medical Advice  Who Called:  pt spouse     Best Call Back Number: [phone]    Additional Information: pt spouse would like a list of all current medications pt is taking. Please advise.

## 2023-05-24 NOTE — TELEPHONE ENCOUNTER
Called pt and informed him Dr Kasper will not be in the office on June 9th and offered pt to come in this Friday 5/26.    Pt rs'd for this Friday and confirmed the new appt date time and location.

## 2023-05-24 NOTE — TELEPHONE ENCOUNTER
No care due was identified.  [facility] Embedded Care Due Messages. Reference number: [id number].   5/24/2023 10:01:06 AM CDT

## 2023-05-24 NOTE — TELEPHONE ENCOUNTER
I called the patient and instructed him he could stop taking prednisone.  The patient expressed understanding.  All questions were answered to his satisfaction.    Beka Alvarez MD  [facility]  Hematology and Oncology  [facility]   [address]   [place] [address]   O: [phone]  F: [phone]

## 2023-05-24 NOTE — TELEPHONE ENCOUNTER
Spoke to patient's wife.  Pt was advised by Dr. Kasper to increase his metoprolol to 25 mg in the am and 123.5 mg in the pm after review of holter.  They will be out of metoprolol soon.  Please advise.

## 2023-05-24 NOTE — TELEPHONE ENCOUNTER
----- Message from Tarik Steven sent at 5/23/2023  3:54 PM CDT -----  Contact: pt spouse  Type: Needs Medical Advice  Who Called:  pt spouse     Best Call Back Number: [phone]    Additional Information: pt spouse would like a list of all current medications pt is taking. Please advise.

## 2023-05-26 ENCOUNTER — OFFICE VISIT (OUTPATIENT)
Dept: CARDIOLOGY | Facility: CLINIC | Age: 88
End: 2023-05-26
Payer: MEDICARE

## 2023-05-26 VITALS
HEART RATE: 73 BPM | DIASTOLIC BLOOD PRESSURE: 80 MMHG | HEIGHT: 71 IN | SYSTOLIC BLOOD PRESSURE: 149 MMHG | OXYGEN SATURATION: 97 % | WEIGHT: 162.94 LBS | TEMPERATURE: 97 F | RESPIRATION RATE: 16 BRPM | BODY MASS INDEX: 22.81 KG/M2

## 2023-05-26 DIAGNOSIS — I35.0 NON-RHEUMATIC AORTIC STENOSIS: ICD-10-CM

## 2023-05-26 DIAGNOSIS — E78.5 DYSLIPIDEMIA: ICD-10-CM

## 2023-05-26 DIAGNOSIS — I47.19 PAT (PAROXYSMAL ATRIAL TACHYCARDIA): ICD-10-CM

## 2023-05-26 DIAGNOSIS — C61 PROSTATE CANCER: Primary | ICD-10-CM

## 2023-05-26 PROCEDURE — 99214 OFFICE O/P EST MOD 30 MIN: CPT | Mod: S$GLB,,, | Performed by: INTERNAL MEDICINE

## 2023-05-26 PROCEDURE — 3288F FALL RISK ASSESSMENT DOCD: CPT | Mod: CPTII,S$GLB,, | Performed by: INTERNAL MEDICINE

## 2023-05-26 PROCEDURE — 99999 PR PBB SHADOW E&M-EST. PATIENT-LVL III: ICD-10-PCS | Mod: PBBFAC,,, | Performed by: INTERNAL MEDICINE

## 2023-05-26 PROCEDURE — 1160F RVW MEDS BY RX/DR IN RCRD: CPT | Mod: CPTII,S$GLB,, | Performed by: INTERNAL MEDICINE

## 2023-05-26 PROCEDURE — 99214 PR OFFICE/OUTPT VISIT, EST, LEVL IV, 30-39 MIN: ICD-10-PCS | Mod: S$GLB,,, | Performed by: INTERNAL MEDICINE

## 2023-05-26 PROCEDURE — 1126F PR PAIN SEVERITY QUANTIFIED, NO PAIN PRESENT: ICD-10-PCS | Mod: CPTII,S$GLB,, | Performed by: INTERNAL MEDICINE

## 2023-05-26 PROCEDURE — 3288F PR FALLS RISK ASSESSMENT DOCUMENTED: ICD-10-PCS | Mod: CPTII,S$GLB,, | Performed by: INTERNAL MEDICINE

## 2023-05-26 PROCEDURE — 99999 PR PBB SHADOW E&M-EST. PATIENT-LVL III: CPT | Mod: PBBFAC,,, | Performed by: INTERNAL MEDICINE

## 2023-05-26 PROCEDURE — 1157F ADVNC CARE PLAN IN RCRD: CPT | Mod: CPTII,S$GLB,, | Performed by: INTERNAL MEDICINE

## 2023-05-26 PROCEDURE — 1159F PR MEDICATION LIST DOCUMENTED IN MEDICAL RECORD: ICD-10-PCS | Mod: CPTII,S$GLB,, | Performed by: INTERNAL MEDICINE

## 2023-05-26 PROCEDURE — 1157F PR ADVANCE CARE PLAN OR EQUIV PRESENT IN MEDICAL RECORD: ICD-10-PCS | Mod: CPTII,S$GLB,, | Performed by: INTERNAL MEDICINE

## 2023-05-26 PROCEDURE — 1159F MED LIST DOCD IN RCRD: CPT | Mod: CPTII,S$GLB,, | Performed by: INTERNAL MEDICINE

## 2023-05-26 PROCEDURE — 1101F PR PT FALLS ASSESS DOC 0-1 FALLS W/OUT INJ PAST YR: ICD-10-PCS | Mod: CPTII,S$GLB,, | Performed by: INTERNAL MEDICINE

## 2023-05-26 PROCEDURE — 1101F PT FALLS ASSESS-DOCD LE1/YR: CPT | Mod: CPTII,S$GLB,, | Performed by: INTERNAL MEDICINE

## 2023-05-26 PROCEDURE — 1160F PR REVIEW ALL MEDS BY PRESCRIBER/CLIN PHARMACIST DOCUMENTED: ICD-10-PCS | Mod: CPTII,S$GLB,, | Performed by: INTERNAL MEDICINE

## 2023-05-26 PROCEDURE — 1126F AMNT PAIN NOTED NONE PRSNT: CPT | Mod: CPTII,S$GLB,, | Performed by: INTERNAL MEDICINE

## 2023-05-26 NOTE — PROGRESS NOTES
Subjective:    Patient ID:  Amor Alcazar is a 88 y.o. male who presents for follow-up.    HPI  He has a history of arthritis, hypothyroidism, aortic stenosis, iron deficiency anemia and metastatic prostate cancer, on Lupron and mitoxantrone.  He is here for follow up. Since last visit, he reports no new issues. No chest pain, SOB or palpitations. He stays physically active.  Treatment Plan Information   OP MITOXANTRONE PREDNISONE Q3W   Beka Alvarez MD   Upcoming Treatment Dates - OP MITOXANTRONE PREDNISONE Q3W     No upcoming days in selected categories.     Supportive Plan Information  OP PROSTATE LEUPROLIDE (LUPRON) 6 MONTH   Beka Alvarez MD   Upcoming Treatment Dates - OP PROSTATE LEUPROLIDE (LUPRON) 6 MONTH     4/14/2023       Chemotherapy       leuprolide acetate (6 month) injection 45 mg     Review of Systems   Constitutional: Negative for chills and fever.   HENT:  Positive for hearing loss.    Eyes:  Negative for redness.   Cardiovascular:  Negative for chest pain, irregular heartbeat, leg swelling, palpitations and syncope.   Respiratory:  Negative for cough and shortness of breath.    Endocrine: Negative for cold intolerance.   Musculoskeletal:  Positive for joint pain.   Gastrointestinal:  Negative for vomiting.   Genitourinary:  Negative for hematuria.   Neurological:  Negative for focal weakness and seizures.   Psychiatric/Behavioral:  The patient is not nervous/anxious.    Allergic/Immunologic: Negative for hives.      Current Outpatient Medications   Medication Sig    atorvastatin (LIPITOR) 10 MG tablet Take 1 tablet (10 mg total) by mouth once daily.    folic acid (FOLVITE) 400 MCG tablet Take 400 mcg by mouth once daily.    levothyroxine (SYNTHROID) 50 MCG tablet Take 1 tablet (50 mcg total) by mouth before breakfast.    LUPRON DEPOT, 6 MONTH, 45 mg SyKt injection Inject into the muscle every 6 (six) months.    metoprolol succinate (TOPROL-XL) 25 MG 24 hr tablet Take 1 tablet (25 mg  total) by mouth daily with breakfast AND 0.5 tablets (12.5 mg total) before evening meal.    multivit-min-FA-lycopen-lutein (CENTRUM SILVER MEN) 300-600-300 mcg Tab Take by mouth once daily.    duke's soln (benadryl 30 mL, mylanta 30 mL, LIDOcaine 30 mL, nystatin 30 mL) 120mL Take 10 mLs by mouth 4 (four) times daily. (Patient not taking: Reported on 5/26/2023)     No current facility-administered medications for this visit.       Objective:    Physical Exam  Vitals reviewed.   Constitutional:       General: He is not in acute distress.     Appearance: He is well-developed.   HENT:      Head: Normocephalic and atraumatic.   Neck:      Vascular: No JVD.   Cardiovascular:      Rate and Rhythm: Normal rate and regular rhythm.      Heart sounds: Murmur heard.   Systolic murmur is present with a grade of 1/6 at the upper right sternal border and apex.   Pulmonary:      Effort: Pulmonary effort is normal.      Breath sounds: Normal breath sounds.   Abdominal:      Palpations: Abdomen is soft.      Tenderness: There is no abdominal tenderness.   Musculoskeletal:      Cervical back: Neck supple.   Skin:     General: Skin is warm and dry.   Neurological:      Mental Status: He is alert and oriented to person, place, and time.     Vitals:    05/26/23 0922   BP: (!) 149/80   Pulse: 73   Resp: 16   Temp: 97.3 °F (36.3 °C)           Assessment:       1. Prostate cancer    2. Non-rheumatic aortic stenosis    3. Dyslipidemia    4. PAT (paroxysmal atrial tachycardia)           Plan:       I have reviewed the labs and ancillary data.    5/25/22 echo interpretation:  The left ventricle is normal in size with normal systolic function.  The estimated ejection fraction is 55-60%.  Normal left ventricular diastolic function.  Normal right ventricular size with normal right ventricular systolic function.  There is mild aortic valve stenosis.  Aortic valve area is 1.82 cm2; peak velocity is 1.48 m/s; mean gradient is 5 mmHg.  Normal  central venous pressure (3 mmHg).  The estimated PA systolic pressure is 29 mmHg.  The left ventricular global longitudinal strain is -18.32%.    8/12/22 echo interpretation:  The left ventricle is normal in size with concentric remodeling and normal systolic function.  The estimated ejection fraction is 60%.  Normal left ventricular diastolic function.  Normal right ventricular size with normal right ventricular systolic function.  There is mild aortic valve stenosis.  Aortic valve area is 1.72 cm2; peak velocity is 2.13 m/s; mean gradient is 10 mmHg.  Mild mitral regurgitation.  Mild tricuspid regurgitation.  Normal central venous pressure (3 mmHg).  The estimated PA systolic pressure is 33 mmHg.  The left ventricular global longitudinal strain is -18.1%.    12/13/22 echo interpretation:  The left ventricle is normal in size with concentric remodeling and normal systolic function.  The estimated ejection fraction is 65%.  Normal left ventricular diastolic function.  Normal right ventricular size with normal right ventricular systolic function.  There is mild aortic valve stenosis.  Aortic valve area is 1.79 cm2; peak velocity is 2.18 m/s; mean gradient is 10 mmHg.  IVC not well visualized.  The estimated PA systolic pressure is at [id number] mmHg.  Echocardiographic images too poor to obtain accurate strain measurement.    3/14/23 Holter:  The patient monitored for 2 days and 23 hours.  The rhythm is sinus with mean heart rate of 72 beats per minute, lowest heart rate 55 beats per minute maximum heart rate 111 beats per minute.  Very frequent isolated PACs burden of 8.69%  Frequent short runs of PSVT the longest is 2 minutes and 12 seconds at 10:39 a.m.  Occasional isolated PVCs burden of 0.35% and 1 ventricular triplet  No atrial fibrillation.  No heart block.    3/14/23 echo:  The left ventricle is normal in size with normal systolic function.  The estimated ejection fraction is 60%.  Normal left ventricular  diastolic function.  Normal right ventricular size with normal right ventricular systolic function.  There is mild aortic valve stenosis.  Aortic valve area is 1.84 cm2; peak velocity is 2.09 m/s; mean gradient is 10 mmHg.  Mild-to-moderate mitral regurgitation.  Mild tricuspid regurgitation.  Normal central venous pressure (3 mmHg).  The estimated PA systolic pressure is 25 mmHg.  The left ventricular global longitudinal strain is -18.6%.      Impression and Plan:  1) prostate ca: followed by oncology; on ADT and mitoxantrone q3w; due for echo in few weeks. continue 3 mo echo surveillance;   2) aortic stenosis: mild; no intervention needed at this time.  3) dyslipidemia: low dose statin. Check lipids.  4) PAT: continue b-blocker. No symptoms.

## 2023-06-06 ENCOUNTER — PATIENT MESSAGE (OUTPATIENT)
Dept: DERMATOLOGY | Facility: CLINIC | Age: 88
End: 2023-06-06
Payer: MEDICARE

## 2023-06-09 ENCOUNTER — INFUSION (OUTPATIENT)
Dept: INFUSION THERAPY | Facility: HOSPITAL | Age: 88
End: 2023-06-09
Attending: INTERNAL MEDICINE
Payer: MEDICARE

## 2023-06-09 VITALS — DIASTOLIC BLOOD PRESSURE: 71 MMHG | SYSTOLIC BLOOD PRESSURE: 135 MMHG | HEART RATE: 95 BPM

## 2023-06-09 DIAGNOSIS — Z45.2 ENCOUNTER FOR INSERTION OF VENOUS ACCESS PORT: Primary | ICD-10-CM

## 2023-06-09 DIAGNOSIS — D50.8 OTHER IRON DEFICIENCY ANEMIA: ICD-10-CM

## 2023-06-09 DIAGNOSIS — C61 MALIGNANT NEOPLASM OF PROSTATE: ICD-10-CM

## 2023-06-09 PROCEDURE — A4216 STERILE WATER/SALINE, 10 ML: HCPCS | Mod: PN | Performed by: INTERNAL MEDICINE

## 2023-06-09 PROCEDURE — 96523 IRRIG DRUG DELIVERY DEVICE: CPT | Mod: PN

## 2023-06-09 PROCEDURE — 25000003 PHARM REV CODE 250: Mod: PN | Performed by: INTERNAL MEDICINE

## 2023-06-09 PROCEDURE — 63600175 PHARM REV CODE 636 W HCPCS: Mod: PN

## 2023-06-09 RX ORDER — HEPARIN 100 UNIT/ML
SYRINGE INTRAVENOUS
Status: COMPLETED
Start: 2023-06-09 | End: 2023-06-09

## 2023-06-09 RX ORDER — SODIUM CHLORIDE 0.9 % (FLUSH) 0.9 %
10 SYRINGE (ML) INJECTION
Status: CANCELLED | OUTPATIENT
Start: 2023-06-09

## 2023-06-09 RX ORDER — HEPARIN 100 UNIT/ML
500 SYRINGE INTRAVENOUS
Status: DISCONTINUED | OUTPATIENT
Start: 2023-06-09 | End: 2023-06-09 | Stop reason: HOSPADM

## 2023-06-09 RX ORDER — HEPARIN 100 UNIT/ML
500 SYRINGE INTRAVENOUS
Status: CANCELLED | OUTPATIENT
Start: 2023-06-09

## 2023-06-09 RX ORDER — SODIUM CHLORIDE 0.9 % (FLUSH) 0.9 %
10 SYRINGE (ML) INJECTION
Status: DISCONTINUED | OUTPATIENT
Start: 2023-06-09 | End: 2023-06-09 | Stop reason: HOSPADM

## 2023-06-09 RX ADMIN — Medication 500 UNITS: at 10:06

## 2023-06-09 RX ADMIN — HEPARIN 500 UNITS: 100 SYRINGE at 10:06

## 2023-06-09 RX ADMIN — Medication 10 ML: at 10:06

## 2023-06-09 NOTE — PLAN OF CARE
Pt tolerated port flush well today. NAD/no concerns voiced. Reviewed follow-up appointments. All questions were answered, ambulated independently at d/c with wife.

## 2023-06-15 ENCOUNTER — CLINICAL SUPPORT (OUTPATIENT)
Dept: CARDIOLOGY | Facility: HOSPITAL | Age: 88
End: 2023-06-15
Attending: INTERNAL MEDICINE
Payer: MEDICARE

## 2023-06-15 VITALS — HEIGHT: 71 IN | BODY MASS INDEX: 22.68 KG/M2 | WEIGHT: 162 LBS

## 2023-06-15 DIAGNOSIS — C61 PROSTATE CANCER: ICD-10-CM

## 2023-06-15 DIAGNOSIS — I35.0 NON-RHEUMATIC AORTIC STENOSIS: ICD-10-CM

## 2023-06-15 LAB
ASCENDING AORTA: 3.55 CM
AV MEAN GRADIENT: 12 MMHG
AV PEAK GRADIENT: 20 MMHG
AV REGURGITATION PRESSURE HALF TIME: 864.26 MS
BSA FOR ECHO PROCEDURE: 1.92 M2
CV ECHO LV RWT: 0.4 CM
DOP CALC AO PEAK VEL: 2.23 M/S
DOP CALC AO VTI: 51 CM
DOP CALC LVOT AREA: 3.9 CM2
DOP CALC LVOT DIAMETER: 2.23 CM
E WAVE DECELERATION TIME: 185.73 MSEC
E/A RATIO: 0.94
E/E' RATIO: 11.63 M/S
ECHO LV POSTERIOR WALL: 0.93 CM (ref 0.6–1.1)
EJECTION FRACTION: 60 %
FRACTIONAL SHORTENING: 32 % (ref 28–44)
INTERVENTRICULAR SEPTUM: 0.95 CM (ref 0.6–1.1)
IVRT: 102.76 MSEC
LA MAJOR: 4.58 CM
LA MINOR: 5.22 CM
LA WIDTH: 4.5 CM
LEFT ATRIUM SIZE: 2.83 CM
LEFT ATRIUM VOLUME INDEX: 27.4 ML/M2
LEFT ATRIUM VOLUME: 52.82 CM3
LEFT INTERNAL DIMENSION IN SYSTOLE: 3.12 CM (ref 2.1–4)
LEFT VENTRICLE DIASTOLIC VOLUME INDEX: 50.32 ML/M2
LEFT VENTRICLE DIASTOLIC VOLUME: 97.11 ML
LEFT VENTRICLE MASS INDEX: 76 G/M2
LEFT VENTRICLE SYSTOLIC VOLUME INDEX: 20 ML/M2
LEFT VENTRICLE SYSTOLIC VOLUME: 38.65 ML
LEFT VENTRICULAR INTERNAL DIMENSION IN DIASTOLE: 4.6 CM (ref 3.5–6)
LEFT VENTRICULAR MASS: 146 G
LV LATERAL E/E' RATIO: 10.33 M/S
LV SEPTAL E/E' RATIO: 13.29 M/S
MV PEAK A VEL: 0.99 M/S
MV PEAK E VEL: 0.93 M/S
MV STENOSIS PRESSURE HALF TIME: 53.86 MS
MV VALVE AREA P 1/2 METHOD: 4.08 CM2
PISA AR MAX VEL: 2.63 M/S
PISA MRMAX VEL: 5.65 M/S
PISA TR MAX VEL: 2.76 M/S
PULM VEIN S/D RATIO: 1.5
PV PEAK D VEL: 0.38 M/S
PV PEAK S VEL: 0.57 M/S
RA MAJOR: 3.95 CM
RA PRESSURE: 3 MMHG
RA WIDTH: 3.7 CM
RIGHT VENTRICULAR END-DIASTOLIC DIMENSION: 3.67 CM
RIGHT VENTRICULAR LENGTH IN DIASTOLE (APICAL 4-CHAMBER VIEW): 5.28 CM
RV MID DIAMA: 2.86 CM
RV TISSUE DOPPLER FREE WALL SYSTOLIC VELOCITY 1 (APICAL 4 CHAMBER VIEW): 8.34 CM/S
SINUS: 3.54 CM
STJ: 2.64 CM
TDI LATERAL: 0.09 M/S
TDI SEPTAL: 0.07 M/S
TDI: 0.08 M/S
TR MAX PG: 30 MMHG
TRICUSPID ANNULAR PLANE SYSTOLIC EXCURSION: 1.35 CM
TV REST PULMONARY ARTERY PRESSURE: 33 MMHG

## 2023-06-15 PROCEDURE — 93306 ECHO (CUPID ONLY): ICD-10-PCS | Mod: 26,,, | Performed by: INTERNAL MEDICINE

## 2023-06-15 PROCEDURE — 93306 TTE W/DOPPLER COMPLETE: CPT | Mod: PO

## 2023-06-15 PROCEDURE — 93306 TTE W/DOPPLER COMPLETE: CPT | Mod: 26,,, | Performed by: INTERNAL MEDICINE

## 2023-06-24 ENCOUNTER — PATIENT MESSAGE (OUTPATIENT)
Dept: CARDIOLOGY | Facility: CLINIC | Age: 88
End: 2023-06-24
Payer: MEDICARE

## 2023-06-28 ENCOUNTER — PATIENT MESSAGE (OUTPATIENT)
Dept: HEMATOLOGY/ONCOLOGY | Facility: CLINIC | Age: 88
End: 2023-06-28
Payer: MEDICARE

## 2023-06-29 NOTE — PROGRESS NOTES
PATIENT: Amor Alcazar  MRN: [id number]  DATE: 6/30/2023      Diagnosis:   1. 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 Genetic defect    4. Anemia of chronic disease    5. Hypothyroidism, unspecified type    6. Thrombocytopenia    7. Hypotension, unspecified hypotension type                  Chief Complaint: Prostate Cancer (Follow Up)      Subjective:    Interval History: Mr. Alcazar is a 88 y.o. male with Arthritis, hypothyroidism, valvular heart disease, iron deficiency anemia who presents for follow up of prostate cancer.  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  The patient denies CP, cough, SOB, abdominal pain, nausea, vomiting, constipation, diarrhea.  The patient denies fever, chills, night sweats, weight loss, new lumps or bumps, easy bruising or bleeding.    Prior History:  The patient was diagnosed with prostate cancer [place] 9 (4+5) 6/03/14.  He had a rising PSA and underwent PSMA PET/CT 5/09/22 showing disease in the prostate and mediastinal LN's.  The patient has been receiving Lupron 45mg 6 months dosing with Dr Bellamy with last treatment on 4/20/22.  He has previously been on Casodex and Apalutamide with progression.  He was started on Mitoxantrone and prednisone 7/01/22.  Last Echo 8/26/22 showed normal EF.   The patient saw Dr Kasper in cardiology on 9/30/22 and recommended repeat Echo in 3 months.   The patient underwent an Echo on 12/13/22 showing normal diastolic and systolic function.    Past Medical History:   Past Medical History:   Diagnosis Date    Arthritis     History of skin cancer     details unknown    Hypothyroidism, unspecified     Prostate cancer     injections q 6 months    Valvular heart disease     mild AS, MR and TR 9/19 ECHO       Past Surgical HIstory:   Past Surgical History:   Procedure Laterality Date     BONE MARROW BIOPSY Bilateral 6/15/2022    Procedure: Biopsy-bone marrow;  Surgeon: Hermann Jackson MD;  Location: [facility] OR;  Service: Oncology;  Laterality: Bilateral;    CATARACT EXTRACTION W/  INTRAOCULAR LENS IMPLANT Bilateral     ESOPHAGOGASTRODUODENOSCOPY      INSERTION OF TUNNELED CENTRAL VENOUS CATHETER (CVC) WITH SUBCUTANEOUS PORT N/A 6/15/2022    Procedure: INSERTION, PORT-A-CATH;  Surgeon: Marques Rivero MD;  Location: [facility] OR;  Service: General;  Laterality: N/A;       Family History:   Family History   Problem Relation Age of Onset    Lung cancer Sister          of       Social History:  reports that he has never smoked. He has never used smokeless tobacco. He reports that he does not currently use alcohol. He reports that he does not use drugs.    Allergies:  Review of patient's allergies indicates:  No Known Allergies    Medications:  Current Outpatient Medications   Medication Sig Dispense Refill    atorvastatin (LIPITOR) 10 MG tablet Take 1 tablet (10 mg total) by mouth once daily. 90 tablet 1    folic acid (FOLVITE) 400 MCG tablet Take 400 mcg by mouth once daily.      levothyroxine (SYNTHROID) 50 MCG tablet Take 1 tablet (50 mcg total) by mouth before breakfast. 90 tablet 1    LUPRON DEPOT, 6 MONTH, 45 mg SyKt injection Inject into the muscle every 6 (six) months.      metoprolol succinate (TOPROL-XL) 25 MG 24 hr tablet Take 1 tablet (25 mg total) by mouth daily with breakfast AND 0.5 tablets (12.5 mg total) before evening meal. 135 tablet 1    multivit-min-FA-lycopen-lutein (CENTRUM SILVER MEN) 300-600-300 mcg Tab Take by mouth once daily.      duke's soln (benadryl 30 mL, mylanta 30 mL, LIDOcaine 30 mL, nystatin 30 mL) 120mL Take 10 mLs by mouth 4 (four) times daily. (Patient not taking: Reported on 2023) 120 mL 6     No current facility-administered medications for this visit.       Review of Systems   Constitutional:  Negative for chills, diaphoresis, fatigue, fever and unexpected  "weight change.   Respiratory:  Negative for cough and shortness of breath.    Cardiovascular:  Negative for chest pain and palpitations.   Gastrointestinal:  Negative for abdominal pain, constipation, diarrhea, nausea and vomiting.   Skin:  Negative for color change and rash.   Neurological:  Negative for headaches.   Hematological:  Negative for adenopathy. Does not bruise/bleed easily.     ECOG Performance Status: 1   Objective:      Vitals:   Vitals:    06/30/23 1421   BP: (!) 102/58   BP Location: Left arm   Patient Position: Sitting   BP Method: Medium (Manual)   Pulse: 78   Resp: 16   Temp: 97 °F (36.1 °C)   TempSrc: Temporal   SpO2: 97%   Weight: 69 kg (152 lb 1.9 oz)   Height: 5' 8.5" (1.74 m)             Physical Exam  Constitutional:       General: He is not in acute distress.     Appearance: He is well-developed. He is not diaphoretic.   HENT:      Head: Normocephalic and atraumatic.   Cardiovascular:      Rate and Rhythm: Normal rate and regular rhythm.      Heart sounds: Normal heart sounds. No murmur heard.    No friction rub. No gallop.   Pulmonary:      Effort: Pulmonary effort is normal. No respiratory distress.      Breath sounds: Normal breath sounds. No wheezing or rales.   Chest:      Chest wall: No tenderness.   Abdominal:      General: Bowel sounds are normal. There is no distension.      Palpations: Abdomen is soft. There is no mass.      Tenderness: There is no abdominal tenderness. There is no rebound.   Musculoskeletal:      Cervical back: Normal range of motion.      Comments: PORT in left chest   Lymphadenopathy:      Cervical: No cervical adenopathy.      Upper Body:      Right upper body: No supraclavicular or axillary adenopathy.      Left upper body: No supraclavicular or axillary adenopathy.   Skin:     General: Skin is warm and dry.      Findings: No erythema, lesion or rash.   Neurological:      Mental Status: He is alert and oriented to person, place, and time.   Psychiatric:    "      Behavior: Behavior normal.       Laboratory Data:  No visits with results within 1 Week(s) from this visit.   Latest known visit with results is:   Clinical Support on 06/15/2023   Component Date Value Ref Range Status    LA WIDTH 06/15/2023 4.50  cm Final    RA Width 06/15/2023 3.70  cm Final    BSA 06/15/2023 1.92  m2 Final    TDI SEPTAL 06/15/2023 0.07  m/s Final    LV LATERAL E/E' RATIO 06/15/2023 10.33  m/s Final    LV SEPTAL E/E' RATIO 06/15/2023 13.29  m/s Final    RV mid diameter 06/15/2023 2.86  cm Final    TDI LATERAL 06/15/2023 0.09  m/s Final    LVIDd 06/15/2023 4.60  3.5 - 6.0 cm Final    IVS 06/15/2023 0.95  0.6 - 1.1 cm Final    Posterior Wall 06/15/2023 0.93  0.6 - 1.1 cm Final    LVIDs 06/15/2023 3.12  2.1 - 4.0 cm Final    FS 06/15/2023 32  28 - 44 % Final    LA volume 06/15/2023 52.82  cm3 Final    Sinus 06/15/2023 3.54  cm Final    STJ 06/15/2023 2.64  cm Final    Ascending aorta 06/15/2023 3.55  cm Final    LV mass 06/15/2023 146.00  g Final    LA size 06/15/2023 2.83  cm Final    RVDD 06/15/2023 3.67  cm Final    TAPSE 06/15/2023 1.35  cm Final    Right ventricular length in diasto* 06/15/2023 5.28  cm Final    RV S' 06/15/2023 8.34  cm/s Final    Left Ventricle Relative Wall Thick* 06/15/2023 0.40  cm Final    AV regurgitation pressure 1/2 time 06/15/2023 864.57201002152705  ms Final    AV mean gradient 06/15/2023 12  mmHg Final    MV valve area p 1/2 method 06/15/2023 4.08  cm2 Final    E/A ratio 06/15/2023 0.94   Final    Mean e' 06/15/2023 0.08  m/s Final    E wave deceleration time 06/15/2023 185.73  msec Final    IVRT 06/15/2023 102.76  msec Final    Pulm vein S/D ratio 06/15/2023 1.50   Final    LVOT diameter 06/15/2023 2.23  cm Final    LVOT area 06/15/2023 3.9  cm2 Final    Ao peak abiodun 06/15/2023 2.23  m/s Final    Ao VTI 06/15/2023 51.0  cm Final    Mr max abiodun 06/15/2023 5.65  m/s Final    AV peak gradient 06/15/2023 20  mmHg Final    E/E' ratio 06/15/2023 11.63  m/s Final    MV  Peak E Jama 06/15/2023 0.93  m/s Final    AR Max Jama 06/15/2023 2.63  m/s Final    TR Max Jama 06/15/2023 2.76  m/s Final    MV stenosis pressure 1/2 time 06/15/2023 53.86  ms Final    MV Peak A Jama 06/15/2023 0.99  m/s Final    PV Peak S Jama 06/15/2023 0.57  m/s Final    PV Peak D Jama 06/15/2023 0.38  m/s Final    LV Systolic Volume 06/15/2023 38.65  mL Final    LV Systolic Volume Index 06/15/2023 20.0  mL/m2 Final    LV Diastolic Volume 06/15/2023 97.11  mL Final    LV Diastolic Volume Index 06/15/2023 50.32  mL/m2 Final    LA Volume Index 06/15/2023 27.4  mL/m2 Final    LV Mass Index 06/15/2023 76  g/m2 Final    RA Major Axis 06/15/2023 3.95  cm Final    Left Atrium Minor Axis 06/15/2023 5.22  cm Final    Left Atrium Major Axis 06/15/2023 4.58  cm Final    Triscuspid Valve Regurgitation Pea* 06/15/2023 30  mmHg Final    Right Atrial Pressure (from IVC) 06/15/2023 3  mmHg Final    EF 06/15/2023 60  % Final    TV rest pulmonary artery pressure 06/15/2023 33  mmHg Final         Imaging:     Echo 12/13/22    The left ventricle is normal in size with concentric remodeling and normal systolic function.  The estimated ejection fraction is 65%.  Normal left ventricular diastolic function.  Normal right ventricular size with normal right ventricular systolic function.  There is mild aortic valve stenosis.  Aortic valve area is 1.79 cm2; peak velocity is 2.18 m/s; mean gradient is 10 mmHg.  IVC not well visualized.  The estimated PA systolic pressure is at [id number] mmHg.  Echocardiographic images too poor to obtain accurate strain measurement.       Assessment:       1. 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 Genetic defect    4. Anemia of chronic disease    5. Hypothyroidism, unspecified type    6. Thrombocytopenia    7. 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  May predispose pt any other family member to increased risk of cancer  -PT to see genetic counselor  -Will repeat PSA in the next week with return visit    CHECK 2 mutation - see above    Anemia of Chronic Disease - Hemoglobin was 11.5g/dL on 4/10/23  -hemoglobin stable  -Will monitor    Hypothyroidism - pt on synthroid  -management per PCP    Thrombocytopenia  - platelets 148k 4/10/23  -Will monitor    Route Chart for Scheduling    Med Onc Chart Routing      Follow up with physician 2 weeks. Pt needs an appt with oncology genetecist.  he needs an appt with endocrinology.  He already has an appt with me in 2 weeks.   Follow up with [facility]    Infusion scheduling note    Injection scheduling note    Labs    Imaging    Pharmacy appointment    Other referrals            Treatment Plan Information   OP MITOXANTRONE PREDNISONE Q3W   Beka Alvarez MD   Upcoming Treatment Dates - OP MITOXANTRONE PREDNISONE Q3W    No upcoming days in selected categories.    Supportive Plan Information  OP PROSTATE LEUPROLIDE (LUPRON) 6 MONTH   Beka Alvarez MD   Upcoming Treatment Dates - OP PROSTATE LEUPROLIDE (LUPRON) 6 MONTH    No upcoming days in selected categories.    Therapy Plan Information  Flushes  heparin, porcine (PF) 100 unit/mL injection flush 500 Units  500 Units, Intravenous, Every visit  sodium chloride 0.9% flush 10 mL  10 mL, Intravenous, Every visit        Beka Alvarez MD  [facility]  Hematology and Oncology  [facility] [address]    [place] [address]   O: [phone]  F: [phone]

## 2023-06-30 ENCOUNTER — TELEPHONE (OUTPATIENT)
Dept: ENDOCRINOLOGY | Facility: CLINIC | Age: 88
End: 2023-06-30
Payer: MEDICARE

## 2023-06-30 ENCOUNTER — OFFICE VISIT (OUTPATIENT)
Dept: HEMATOLOGY/ONCOLOGY | Facility: CLINIC | Age: 88
End: 2023-06-30
Payer: MEDICARE

## 2023-06-30 ENCOUNTER — TELEPHONE (OUTPATIENT)
Dept: HEMATOLOGY/ONCOLOGY | Facility: CLINIC | Age: 88
End: 2023-06-30
Payer: MEDICARE

## 2023-06-30 VITALS
OXYGEN SATURATION: 97 % | WEIGHT: 152.13 LBS | TEMPERATURE: 97 F | HEART RATE: 78 BPM | SYSTOLIC BLOOD PRESSURE: 102 MMHG | RESPIRATION RATE: 16 BRPM | BODY MASS INDEX: 22.53 KG/M2 | DIASTOLIC BLOOD PRESSURE: 58 MMHG | HEIGHT: 69 IN

## 2023-06-30 DIAGNOSIS — D63.8 ANEMIA OF CHRONIC DISEASE: ICD-10-CM

## 2023-06-30 DIAGNOSIS — E03.9 HYPOTHYROIDISM, UNSPECIFIED TYPE: ICD-10-CM

## 2023-06-30 DIAGNOSIS — D69.6 THROMBOCYTOPENIA: ICD-10-CM

## 2023-06-30 DIAGNOSIS — I95.9 HYPOTENSION, UNSPECIFIED HYPOTENSION TYPE: ICD-10-CM

## 2023-06-30 DIAGNOSIS — Q99.9 GENETIC DEFECT: ICD-10-CM

## 2023-06-30 DIAGNOSIS — C61 PROSTATE CANCER: Primary | ICD-10-CM

## 2023-06-30 DIAGNOSIS — C77.1 METASTASIS TO MEDIASTINAL LYMPH NODE: ICD-10-CM

## 2023-06-30 PROCEDURE — 1126F AMNT PAIN NOTED NONE PRSNT: CPT | Mod: HCNC,CPTII,S$GLB, | Performed by: INTERNAL MEDICINE

## 2023-06-30 PROCEDURE — 99999 PR PBB SHADOW E&M-EST. PATIENT-LVL IV: ICD-10-PCS | Mod: PBBFAC,HCNC,, | Performed by: INTERNAL MEDICINE

## 2023-06-30 PROCEDURE — 3288F PR FALLS RISK ASSESSMENT DOCUMENTED: ICD-10-PCS | Mod: HCNC,CPTII,S$GLB, | Performed by: INTERNAL MEDICINE

## 2023-06-30 PROCEDURE — 1159F MED LIST DOCD IN RCRD: CPT | Mod: HCNC,CPTII,S$GLB, | Performed by: INTERNAL MEDICINE

## 2023-06-30 PROCEDURE — 1101F PR PT FALLS ASSESS DOC 0-1 FALLS W/OUT INJ PAST YR: ICD-10-PCS | Mod: HCNC,CPTII,S$GLB, | Performed by: INTERNAL MEDICINE

## 2023-06-30 PROCEDURE — 1101F PT FALLS ASSESS-DOCD LE1/YR: CPT | Mod: HCNC,CPTII,S$GLB, | Performed by: INTERNAL MEDICINE

## 2023-06-30 PROCEDURE — 1157F ADVNC CARE PLAN IN RCRD: CPT | Mod: HCNC,CPTII,S$GLB, | Performed by: INTERNAL MEDICINE

## 2023-06-30 PROCEDURE — 1159F PR MEDICATION LIST DOCUMENTED IN MEDICAL RECORD: ICD-10-PCS | Mod: HCNC,CPTII,S$GLB, | Performed by: INTERNAL MEDICINE

## 2023-06-30 PROCEDURE — 99214 PR OFFICE/OUTPT VISIT, EST, LEVL IV, 30-39 MIN: ICD-10-PCS | Mod: HCNC,S$GLB,, | Performed by: INTERNAL MEDICINE

## 2023-06-30 PROCEDURE — 99999 PR PBB SHADOW E&M-EST. PATIENT-LVL IV: CPT | Mod: PBBFAC,HCNC,, | Performed by: INTERNAL MEDICINE

## 2023-06-30 PROCEDURE — 1126F PR PAIN SEVERITY QUANTIFIED, NO PAIN PRESENT: ICD-10-PCS | Mod: HCNC,CPTII,S$GLB, | Performed by: INTERNAL MEDICINE

## 2023-06-30 PROCEDURE — 3288F FALL RISK ASSESSMENT DOCD: CPT | Mod: HCNC,CPTII,S$GLB, | Performed by: INTERNAL MEDICINE

## 2023-06-30 PROCEDURE — 1157F PR ADVANCE CARE PLAN OR EQUIV PRESENT IN MEDICAL RECORD: ICD-10-PCS | Mod: HCNC,CPTII,S$GLB, | Performed by: INTERNAL MEDICINE

## 2023-06-30 PROCEDURE — 99214 OFFICE O/P EST MOD 30 MIN: CPT | Mod: HCNC,S$GLB,, | Performed by: INTERNAL MEDICINE

## 2023-06-30 NOTE — TELEPHONE ENCOUNTER
----- Message from Irene Fowler, Patient Care Assistant sent at 6/30/2023  3:02 PM CDT -----  Regarding: appointment  Contact: Yael with [facility]  Type:  Sooner Appointment Request    Caller is requesting a sooner appointment.  Caller declined first available appointment listed below.  Caller will not accept being placed on the waitlist and is requesting a message be sent to doctor.    Name of Caller:  Yael with [facility]    When is the first available appointment?  2023    Symptoms:  new pt referral     Best Call Back Number:  [phone] (home) [phone] (work)    Additional Information: please call pt to advise. Thanks!

## 2023-07-03 ENCOUNTER — PES CALL (OUTPATIENT)
Dept: ADMINISTRATIVE | Facility: CLINIC | Age: 88
End: 2023-07-03
Payer: MEDICARE

## 2023-07-03 ENCOUNTER — TELEPHONE (OUTPATIENT)
Dept: HEMATOLOGY/ONCOLOGY | Facility: CLINIC | Age: 88
End: 2023-07-03
Payer: MEDICARE

## 2023-07-03 NOTE — TELEPHONE ENCOUNTER
----- Message from Jacqueline Kasper MD sent at 6/27/2023 12:40 PM CDT -----  Please let the patient know the echo is essentially unchanged compared to previous      LVM for pt to call back.   Sending pt portal msg to inform of Echo result and to mention Genetics referral.  Pt next cardio appt is

## 2023-07-14 ENCOUNTER — LAB VISIT (OUTPATIENT)
Dept: LAB | Facility: HOSPITAL | Age: 88
End: 2023-07-14
Attending: INTERNAL MEDICINE
Payer: MEDICARE

## 2023-07-14 DIAGNOSIS — C61 PROSTATE CANCER: ICD-10-CM

## 2023-07-14 LAB
ALBUMIN SERPL BCP-MCNC: 3.7 G/DL (ref 3.5–5.2)
ALP SERPL-CCNC: 22 U/L (ref 55–135)
ALT SERPL W/O P-5'-P-CCNC: 11 U/L (ref 10–44)
ANION GAP SERPL CALC-SCNC: 10 MMOL/L (ref 8–16)
AST SERPL-CCNC: 23 U/L (ref 10–40)
BASOPHILS # BLD AUTO: 0.07 K/UL (ref 0–0.2)
BASOPHILS NFR BLD: 0.7 % (ref 0–1.9)
BILIRUB SERPL-MCNC: 0.3 MG/DL (ref 0.1–1)
BUN SERPL-MCNC: 25 MG/DL (ref 8–23)
CALCIUM SERPL-MCNC: 9.5 MG/DL (ref 8.7–10.5)
CHLORIDE SERPL-SCNC: 104 MMOL/L (ref 95–110)
CO2 SERPL-SCNC: 25 MMOL/L (ref 23–29)
COMPLEXED PSA SERPL-MCNC: 4.1 NG/ML (ref 0–4)
CREAT SERPL-MCNC: 1.2 MG/DL (ref 0.5–1.4)
DIFFERENTIAL METHOD: ABNORMAL
EOSINOPHIL # BLD AUTO: 0.4 K/UL (ref 0–0.5)
EOSINOPHIL NFR BLD: 3.8 % (ref 0–8)
ERYTHROCYTE [DISTWIDTH] IN BLOOD BY AUTOMATED COUNT: 14.1 % (ref 11.5–14.5)
EST. GFR  (NO RACE VARIABLE): 58.9 ML/MIN/1.73 M^2
GLUCOSE SERPL-MCNC: 109 MG/DL (ref 70–110)
HCT VFR BLD AUTO: 32.3 % (ref 40–54)
HGB BLD-MCNC: 10.8 G/DL (ref 14–18)
IMM GRANULOCYTES # BLD AUTO: 0.09 K/UL (ref 0–0.04)
IMM GRANULOCYTES NFR BLD AUTO: 0.9 % (ref 0–0.5)
LYMPHOCYTES # BLD AUTO: 2.8 K/UL (ref 1–4.8)
LYMPHOCYTES NFR BLD: 29.1 % (ref 18–48)
MCH RBC QN AUTO: 32.1 PG (ref 27–31)
MCHC RBC AUTO-ENTMCNC: 33.4 G/DL (ref 32–36)
MCV RBC AUTO: 96 FL (ref 82–98)
MONOCYTES # BLD AUTO: 1.1 K/UL (ref 0.3–1)
MONOCYTES NFR BLD: 11.7 % (ref 4–15)
NEUTROPHILS # BLD AUTO: 5.2 K/UL (ref 1.8–7.7)
NEUTROPHILS NFR BLD: 53.8 % (ref 38–73)
NRBC BLD-RTO: 0 /100 WBC
PLATELET # BLD AUTO: 186 K/UL (ref 150–450)
PMV BLD AUTO: 9.3 FL (ref 9.2–12.9)
POTASSIUM SERPL-SCNC: 4.6 MMOL/L (ref 3.5–5.1)
PROT SERPL-MCNC: 7.4 G/DL (ref 6–8.4)
RBC # BLD AUTO: 3.36 M/UL (ref 4.6–6.2)
SODIUM SERPL-SCNC: 139 MMOL/L (ref 136–145)
WBC # BLD AUTO: 9.69 K/UL (ref 3.9–12.7)

## 2023-07-14 PROCEDURE — 36415 COLL VENOUS BLD VENIPUNCTURE: CPT | Mod: HCNC,PN | Performed by: INTERNAL MEDICINE

## 2023-07-14 PROCEDURE — 84153 ASSAY OF PSA TOTAL: CPT | Mod: HCNC | Performed by: INTERNAL MEDICINE

## 2023-07-14 PROCEDURE — 80053 COMPREHEN METABOLIC PANEL: CPT | Mod: HCNC,PN | Performed by: INTERNAL MEDICINE

## 2023-07-14 PROCEDURE — 85025 COMPLETE CBC W/AUTO DIFF WBC: CPT | Mod: HCNC,PN | Performed by: INTERNAL MEDICINE

## 2023-07-17 ENCOUNTER — TELEPHONE (OUTPATIENT)
Dept: HEMATOLOGY/ONCOLOGY | Facility: CLINIC | Age: 88
End: 2023-07-17
Payer: MEDICARE

## 2023-07-17 NOTE — TELEPHONE ENCOUNTER
Assisted patient in rescheduling to Thursday at 2pm ----- Message from Estelle Escalera sent at 7/17/2023  1:43 PM CDT -----  Type: Needs Medical Advice  Who Called:  Polina (spouse)  Symptoms (please be specific):    How long has patient had these symptoms:    Pharmacy name and phone #:    Best Call Back Number: [phone]  Additional Information: Patient is requesting a call back from the nurse regarding her .

## 2023-07-20 ENCOUNTER — OFFICE VISIT (OUTPATIENT)
Dept: HEMATOLOGY/ONCOLOGY | Facility: CLINIC | Age: 88
End: 2023-07-20
Payer: MEDICARE

## 2023-07-20 VITALS
HEART RATE: 76 BPM | TEMPERATURE: 96 F | OXYGEN SATURATION: 98 % | SYSTOLIC BLOOD PRESSURE: 110 MMHG | BODY MASS INDEX: 22.86 KG/M2 | WEIGHT: 150.81 LBS | HEIGHT: 68 IN | DIASTOLIC BLOOD PRESSURE: 70 MMHG

## 2023-07-20 DIAGNOSIS — Q99.9 GENETIC DEFECT: ICD-10-CM

## 2023-07-20 DIAGNOSIS — C77.1 METASTASIS TO MEDIASTINAL LYMPH NODE: ICD-10-CM

## 2023-07-20 DIAGNOSIS — E03.9 HYPOTHYROIDISM, UNSPECIFIED TYPE: ICD-10-CM

## 2023-07-20 DIAGNOSIS — C61 PROSTATE CANCER: Primary | ICD-10-CM

## 2023-07-20 DIAGNOSIS — D63.8 ANEMIA OF CHRONIC DISEASE: ICD-10-CM

## 2023-07-20 PROCEDURE — 3288F FALL RISK ASSESSMENT DOCD: CPT | Mod: HCNC,CPTII,S$GLB, | Performed by: INTERNAL MEDICINE

## 2023-07-20 PROCEDURE — 1157F PR ADVANCE CARE PLAN OR EQUIV PRESENT IN MEDICAL RECORD: ICD-10-PCS | Mod: HCNC,CPTII,S$GLB, | Performed by: INTERNAL MEDICINE

## 2023-07-20 PROCEDURE — 99213 PR OFFICE/OUTPT VISIT, EST, LEVL III, 20-29 MIN: ICD-10-PCS | Mod: HCNC,S$GLB,, | Performed by: INTERNAL MEDICINE

## 2023-07-20 PROCEDURE — 99999 PR PBB SHADOW E&M-EST. PATIENT-LVL III: ICD-10-PCS | Mod: PBBFAC,HCNC,, | Performed by: INTERNAL MEDICINE

## 2023-07-20 PROCEDURE — 1157F ADVNC CARE PLAN IN RCRD: CPT | Mod: HCNC,CPTII,S$GLB, | Performed by: INTERNAL MEDICINE

## 2023-07-20 PROCEDURE — 1159F PR MEDICATION LIST DOCUMENTED IN MEDICAL RECORD: ICD-10-PCS | Mod: HCNC,CPTII,S$GLB, | Performed by: INTERNAL MEDICINE

## 2023-07-20 PROCEDURE — 1159F MED LIST DOCD IN RCRD: CPT | Mod: HCNC,CPTII,S$GLB, | Performed by: INTERNAL MEDICINE

## 2023-07-20 PROCEDURE — 99213 OFFICE O/P EST LOW 20 MIN: CPT | Mod: HCNC,S$GLB,, | Performed by: INTERNAL MEDICINE

## 2023-07-20 PROCEDURE — 1126F PR PAIN SEVERITY QUANTIFIED, NO PAIN PRESENT: ICD-10-PCS | Mod: HCNC,CPTII,S$GLB, | Performed by: INTERNAL MEDICINE

## 2023-07-20 PROCEDURE — 1101F PT FALLS ASSESS-DOCD LE1/YR: CPT | Mod: HCNC,CPTII,S$GLB, | Performed by: INTERNAL MEDICINE

## 2023-07-20 PROCEDURE — 1126F AMNT PAIN NOTED NONE PRSNT: CPT | Mod: HCNC,CPTII,S$GLB, | Performed by: INTERNAL MEDICINE

## 2023-07-20 PROCEDURE — 3288F PR FALLS RISK ASSESSMENT DOCUMENTED: ICD-10-PCS | Mod: HCNC,CPTII,S$GLB, | Performed by: INTERNAL MEDICINE

## 2023-07-20 PROCEDURE — 1101F PR PT FALLS ASSESS DOC 0-1 FALLS W/OUT INJ PAST YR: ICD-10-PCS | Mod: HCNC,CPTII,S$GLB, | Performed by: INTERNAL MEDICINE

## 2023-07-20 PROCEDURE — 99999 PR PBB SHADOW E&M-EST. PATIENT-LVL III: CPT | Mod: PBBFAC,HCNC,, | Performed by: INTERNAL MEDICINE

## 2023-07-20 NOTE — PROGRESS NOTES
PATIENT: Amor Alcazar  MRN: [id number]  DATE: 7/20/2023      Diagnosis:   1. 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 Genetic defect    4. Anemia of chronic disease    5. Hypothyroidism, unspecified type                    Chief Complaint: Prostate Cancer      Subjective:    Interval History: Mr. Alcazar is a 88 y.o. male with Arthritis, hypothyroidism, valvular heart disease, iron deficiency anemia who presents for follow up of prostate cancer.  Since the last clinic visit the patient underwent PSA on 7/14/23 showing increase to 4.1.  The patient denies CP, cough, SOB, abdominal pain, nausea, vomiting, constipation, diarrhea.  The patient denies fever, chills, night sweats, weight loss, new lumps or bumps, easy bruising or bleeding.    Prior History:  The patient was diagnosed with prostate cancer Tio 9 (4+5) 6/03/14.  He had a rising PSA and underwent PSMA PET/CT 5/09/22 showing disease in the prostate and mediastinal LN's.  The patient has been receiving Lupron 45mg 6 months dosing with Dr Bellamy with last treatment on 4/20/22.  He has previously been on Casodex and Apalutamide with progression.  He was started on Mitoxantrone and prednisone 7/01/22.  Last Echo 8/26/22 showed normal EF.   The patient saw Dr Kasper in cardiology on 9/30/22 and recommended repeat Echo in 3 months.   The patient underwent an Echo on 12/13/22 showing normal diastolic and systolic function.   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    Past Medical History:   Past Medical History:   Diagnosis Date    Arthritis     History of skin cancer     details unknown    Hypothyroidism, unspecified     Prostate cancer     injections q 6 months    Valvular heart disease     mild AS, MR and TR 9/19 ECHO       Past Surgical HIstory:   Past Surgical History:   Procedure Laterality Date    BONE MARROW  BIOPSY Bilateral 6/15/2022    Procedure: Biopsy-bone marrow;  Surgeon: Hermann Jackson MD;  Location: [facility] OR;  Service: Oncology;  Laterality: Bilateral;    CATARACT EXTRACTION W/  INTRAOCULAR LENS IMPLANT Bilateral     ESOPHAGOGASTRODUODENOSCOPY      INSERTION OF TUNNELED CENTRAL VENOUS CATHETER (CVC) WITH SUBCUTANEOUS PORT N/A 6/15/2022    Procedure: INSERTION, PORT-A-CATH;  Surgeon: Marques Rivero MD;  Location: [facility] OR;  Service: General;  Laterality: N/A;       Family History:   Family History   Problem Relation Age of Onset    Lung cancer Sister          of       Social History:  reports that he has never smoked. He has never used smokeless tobacco. He reports that he does not currently use alcohol. He reports that he does not use drugs.    Allergies:  Review of patient's allergies indicates:  No Known Allergies    Medications:  Current Outpatient Medications   Medication Sig Dispense Refill    atorvastatin (LIPITOR) 10 MG tablet Take 1 tablet (10 mg total) by mouth once daily. 90 tablet 1    duke's soln (benadryl 30 mL, mylanta 30 mL, LIDOcaine 30 mL, nystatin 30 mL) 120mL Take 10 mLs by mouth 4 (four) times daily. 120 mL 6    folic acid (FOLVITE) 400 MCG tablet Take 400 mcg by mouth once daily.      levothyroxine (SYNTHROID) 50 MCG tablet Take 1 tablet (50 mcg total) by mouth before breakfast. 90 tablet 1    LUPRON DEPOT, 6 MONTH, 45 mg SyKt injection Inject into the muscle every 6 (six) months.      metoprolol succinate (TOPROL-XL) 25 MG 24 hr tablet Take 1 tablet (25 mg total) by mouth daily with breakfast AND 0.5 tablets (12.5 mg total) before evening meal. 135 tablet 1    multivit-min-FA-lycopen-lutein (CENTRUM SILVER MEN) 300-600-300 mcg Tab Take by mouth once daily.       No current facility-administered medications for this visit.       Review of Systems   Constitutional:  Negative for chills, diaphoresis, fatigue, fever and unexpected weight change.   Respiratory:  Negative for cough and  "shortness of breath.    Cardiovascular:  Negative for chest pain and palpitations.   Gastrointestinal:  Negative for abdominal pain, constipation, diarrhea, nausea and vomiting.   Skin:  Negative for color change and rash.   Neurological:  Negative for headaches.   Hematological:  Negative for adenopathy. Does not bruise/bleed easily.     ECOG Performance Status: 1   Objective:      Vitals:   Vitals:    07/20/23 1407   BP: 110/70   BP Location: Right arm   Patient Position: Sitting   BP Method: Medium (Manual)   Pulse: 76   Temp: 96.4 °F (35.8 °C)   TempSrc: Temporal   SpO2: 98%   Weight: 68.4 kg (150 lb 12.7 oz)   Height: 5' 7.5" (1.715 m)               Physical Exam  Constitutional:       General: He is not in acute distress.     Appearance: He is well-developed. He is not diaphoretic.   HENT:      Head: Normocephalic and atraumatic.   Cardiovascular:      Rate and Rhythm: Normal rate and regular rhythm.      Heart sounds: Normal heart sounds. No murmur heard.    No friction rub. No gallop.   Pulmonary:      Effort: Pulmonary effort is normal. No respiratory distress.      Breath sounds: Normal breath sounds. No wheezing or rales.   Chest:      Chest wall: No tenderness.   Abdominal:      General: Bowel sounds are normal. There is no distension.      Palpations: Abdomen is soft. There is no mass.      Tenderness: There is no abdominal tenderness. There is no rebound.   Musculoskeletal:      Cervical back: Normal range of motion.      Comments: PORT in left chest   Lymphadenopathy:      Cervical: No cervical adenopathy.      Upper Body:      Right upper body: No supraclavicular or axillary adenopathy.      Left upper body: No supraclavicular or axillary adenopathy.   Skin:     General: Skin is warm and dry.      Findings: No erythema, lesion or rash.   Neurological:      Mental Status: He is alert and oriented to person, place, and time.   Psychiatric:         Behavior: Behavior normal.       Laboratory Data:  Lab " Visit on 07/14/2023   Component Date Value Ref Range Status    WBC 07/14/2023 9.69  3.90 - 12.70 K/uL Final    RBC 07/14/2023 3.36 (L)  4.60 - 6.20 M/uL Final    Hemoglobin 07/14/2023 10.8 (L)  14.0 - 18.0 g/dL Final    Hematocrit 07/14/2023 32.3 (L)  40.0 - 54.0 % Final    MCV 07/14/2023 96  82 - 98 fL Final    MCH 07/14/2023 32.1 (H)  27.0 - 31.0 pg Final    MCHC 07/14/2023 33.4  32.0 - 36.0 g/dL Final    RDW 07/14/2023 14.1  11.5 - 14.5 % Final    Platelets 07/14/2023 186  150 - 450 K/uL Final    MPV 07/14/2023 9.3  9.2 - 12.9 fL Final    Immature Granulocytes 07/14/2023 0.9 (H)  0.0 - 0.5 % Final    Gran # (ANC) 07/14/2023 5.2  1.8 - 7.7 K/uL Final    Immature Grans (Abs) 07/14/2023 0.09 (H)  0.00 - 0.04 K/uL Final    Comment: Mild elevation in immature granulocytes is non specific and   can be seen in a variety of conditions including stress response,   acute inflammation, trauma and pregnancy. Correlation with other   laboratory and clinical findings is essential.      Lymph # 07/14/2023 2.8  1.0 - 4.8 K/uL Final    Mono # 07/14/2023 1.1 (H)  0.3 - 1.0 K/uL Final    Eos # 07/14/2023 0.4  0.0 - 0.5 K/uL Final    Baso # 07/14/2023 0.07  0.00 - 0.20 K/uL Final    nRBC 07/14/2023 0  0 /100 WBC Final    Gran % 07/14/2023 53.8  38.0 - 73.0 % Final    Lymph % 07/14/2023 29.1  18.0 - 48.0 % Final    Mono % 07/14/2023 11.7  4.0 - 15.0 % Final    Eosinophil % 07/14/2023 3.8  0.0 - 8.0 % Final    Basophil % 07/14/2023 0.7  0.0 - 1.9 % Final    Differential Method 07/14/2023 Automated   Final    Sodium 07/14/2023 139  136 - 145 mmol/L Final    Potassium 07/14/2023 4.6  3.5 - 5.1 mmol/L Final    Chloride 07/14/2023 104  95 - 110 mmol/L Final    CO2 07/14/2023 25  23 - 29 mmol/L Final    Glucose 07/14/2023 109  70 - 110 mg/dL Final    BUN 07/14/2023 25 (H)  8 - 23 mg/dL Final    Creatinine 07/14/2023 1.2  0.5 - 1.4 mg/dL Final    Calcium 07/14/2023 9.5  8.7 - 10.5 mg/dL Final    Total Protein 07/14/2023 7.4  6.0 - 8.4 g/dL  Final    Albumin 07/14/2023 3.7  3.5 - 5.2 g/dL Final    Total Bilirubin 07/14/2023 0.3  0.1 - 1.0 mg/dL Final    Comment: For infants and newborns, interpretation of results should be based  on gestational age, weight and in agreement with clinical  observations.    Premature Infant recommended reference ranges:  Up to 24 hours.............<8.0 mg/dL  Up to 48 hours............<12.0 mg/dL  3-5 days..................<15.0 mg/dL  6-29 days.................<15.0 mg/dL      Alkaline Phosphatase 07/14/2023 22 (L)  55 - 135 U/L Final    AST 07/14/2023 23  10 - 40 U/L Final    ALT 07/14/2023 11  10 - 44 U/L Final    eGFR 07/14/2023 58.9 (A)  >60 mL/min/1.73 m^2 Final    Anion Gap 07/14/2023 10  8 - 16 mmol/L Final    PSA Diagnostic 07/14/2023 4.1 (H)  0.00 - 4.00 ng/mL Final    Comment: The testing method is a chemiluminescent microparticle immunoassay   manufactured by [employer] and performed on the Jiberish   or   SKURA system. Values obtained with different assay manufacturers   for   methods may be different and cannot be used interchangeably.  PSA Expected levels:  Hormonal Therapy: <0.05 ng/ml  Prostatectomy: <0.01 ng/ml  Radiation Therapy: <1.00 ng/ml           Imaging:     Echo 12/13/22    The left ventricle is normal in size with concentric remodeling and normal systolic function.  The estimated ejection fraction is 65%.  Normal left ventricular diastolic function.  Normal right ventricular size with normal right ventricular systolic function.  There is mild aortic valve stenosis.  Aortic valve area is 1.79 cm2; peak velocity is 2.18 m/s; mean gradient is 10 mmHg.  IVC not well visualized.  The estimated PA systolic pressure is at [id number] mmHg.  Echocardiographic images too poor to obtain accurate strain measurement.       Assessment:       1. 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 Genetic defect    4. Anemia of chronic disease    5. 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  May predispose pt any other family member to increased risk of cancer  -PT to see genetic counselor  -PSA increased to 4.1 on 7/14/23  -Pt to undergo repeat PSMA PET/CT    CHECK 2 mutation - see above    Anemia of Chronic Disease - Hemoglobin was 10.8g/dL on 7/14/23  -hemoglobin stable  -Will monitor    Hypothyroidism - pt on synthroid  -management per PCP    Route Chart for Scheduling    Med Onc Chart Routing      Follow up with physician Other. Pt needs a [facility] PET/CT with return appt with me once it is completed.   Follow up with [facility]    Infusion scheduling note    Injection scheduling note    Labs    Imaging    Pharmacy appointment    Other referrals          Treatment Plan Information   OP MITOXANTRONE PREDNISONE Q3W   Beka Alvarez MD   Upcoming Treatment Dates - OP MITOXANTRONE PREDNISONE Q3W    No upcoming days in selected categories.    Supportive Plan Information  OP PROSTATE LEUPROLIDE (LUPRON) 6 MONTH   Beka Alvarez MD   Upcoming Treatment Dates - OP PROSTATE LEUPROLIDE (LUPRON) 6 MONTH    No upcoming days in selected categories.    Therapy Plan Information  Flushes  heparin, porcine (PF) 100 unit/mL injection flush 500 Units  500 Units, Intravenous, Every visit  sodium chloride 0.9% flush 10 mL  10 mL, Intravenous, Every visit        Beka Alvarez MD  [facility]  Hematology and Oncology  [facility]  [facility] [address] [facility] [facility] [place] [address]   O: [phone]  F: [phone]

## 2023-07-21 ENCOUNTER — DOCUMENTATION ONLY (OUTPATIENT)
Dept: INFUSION THERAPY | Facility: HOSPITAL | Age: 88
End: 2023-07-21
Payer: MEDICARE

## 2023-07-21 NOTE — PROGRESS NOTES
3:51 PM    This  received a staff message from Dr. Alvarez regarding this pt's concern to see if [employer] testing was covered by his insurance company.    This SW called this pt's insurance and confirmed that this pt would be responsible for a zero co-pay and 100% of this testing would be covered for the procedure code 07340    This SW called this pt to inform of the above mentioned information.

## 2023-07-24 ENCOUNTER — DOCUMENTATION ONLY (OUTPATIENT)
Dept: INFUSION THERAPY | Facility: HOSPITAL | Age: 88
End: 2023-07-24
Payer: MEDICARE

## 2023-07-24 ENCOUNTER — TELEPHONE (OUTPATIENT)
Dept: HEMATOLOGY/ONCOLOGY | Facility: CLINIC | Age: 88
End: 2023-07-24
Payer: MEDICARE

## 2023-07-24 RX ORDER — LEVOTHYROXINE SODIUM 50 UG/1
TABLET ORAL
Qty: 90 TABLET | Refills: 0 | OUTPATIENT
Start: 2023-07-24

## 2023-07-24 RX ORDER — METOPROLOL SUCCINATE 25 MG/1
TABLET, EXTENDED RELEASE ORAL
Qty: 135 TABLET | Refills: 0 | OUTPATIENT
Start: 2023-07-24

## 2023-07-24 NOTE — TELEPHONE ENCOUNTER
Called and s/w 2 [employer] reps.  The only auth on file was for a PET that has auth completed. Reps unable to locate [employer] auth request.     ----- Message from Kristin Cochran sent at 7/24/2023  1:43 PM CDT -----  Regarding: advice  Contact: Renata with [facility]  Type: Needs Medical Advice  Who Called:  Renata with [facility]  Symptoms (please be specific):    How long has patient had these symptoms:    Pharmacy name and phone #:    Best Call Back Number: [phone]  Additional Information: Renata states the [employer] testing needs authorization prior to the patient receiving it.  I tried to get in touch with Geetha our  and she wasn't available.  Please call Renata to advise.  Thanks!

## 2023-07-24 NOTE — PROGRESS NOTES
12:14 PM    This LCSW called this pt to inform that I did speak to [facility] to see if the [employer] testing was covered and they said it was zero co-pay and 100% covered.  The pt asked if I could speak to his wife, Polina who handles the insurance for them. She said she plans to call [facility] to make sure bc she received a letter from [facility] stating it was not covered.    The pt's wife thanked me for the work and the phone call.

## 2023-07-24 NOTE — TELEPHONE ENCOUNTER
Refill Decision Note   Amor Alcazar  is requesting a refill authorization.  Brief Assessment and Rationale for Refill:  Quick Discontinue     Medication Therapy Plan:    Pharmacy is requesting new scripts for the following medications without required information, (sig/ frequency/qty/etc)      Medication Reconciliation Completed: No     Comments: Pharmacies have been requesting medications for patients without required information, (sig, frequency, qty, etc.). In addition, requests are sent for medication(s) pt. are currently not taking, and medications patients have never taken.    We have spoken to the pharmacies about these request types and advised their teams previously that we are unable to assess these New Script requests and require all details for these requests. This is a known issue and has been reported.     Note composed:5:17 PM 07/24/2023

## 2023-07-26 ENCOUNTER — TELEPHONE (OUTPATIENT)
Dept: HEMATOLOGY/ONCOLOGY | Facility: CLINIC | Age: 88
End: 2023-07-26
Payer: MEDICARE

## 2023-07-26 NOTE — TELEPHONE ENCOUNTER
Called pt phone and wife, Polina answered.   She scheduled the Genetics appt for the pt and she said one of her children will help them sign in to the virtual genetic visit.  She confirmed the virtual appt date and time.

## 2023-08-03 ENCOUNTER — OFFICE VISIT (OUTPATIENT)
Dept: GENETICS | Facility: CLINIC | Age: 88
End: 2023-08-03
Payer: MEDICARE

## 2023-08-03 ENCOUNTER — TELEPHONE (OUTPATIENT)
Dept: HEMATOLOGY/ONCOLOGY | Facility: CLINIC | Age: 88
End: 2023-08-03
Payer: MEDICARE

## 2023-08-03 DIAGNOSIS — Q99.9 GENETIC DEFECT: ICD-10-CM

## 2023-08-03 PROCEDURE — 99499 NO LOS: ICD-10-PCS | Mod: HCNC,95,, | Performed by: INTERNAL MEDICINE

## 2023-08-03 PROCEDURE — 99499 UNLISTED E&M SERVICE: CPT | Mod: HCNC,95,, | Performed by: INTERNAL MEDICINE

## 2023-08-03 NOTE — TELEPHONE ENCOUNTER
Advised wife ok to take regular meds with water only prior to PET scan. Wife verbalized understanding.    ----- Message from Maribel Alan sent at 8/3/2023  9:35 AM CDT -----  Contact: Wife Polina  Patients wife Polina is req a call back to see if he will be allowed to take his vitiamins tomorrow before pet scan. Call back at [phone]  and thank you.      Isotretinoin Pregnancy And Lactation Text: This medication is Pregnancy Category X and is considered extremely dangerous during pregnancy. It is unknown if it is excreted in breast milk.

## 2023-08-04 ENCOUNTER — HOSPITAL ENCOUNTER (OUTPATIENT)
Dept: RADIOLOGY | Facility: HOSPITAL | Age: 88
Discharge: HOME | End: 2023-08-04
Attending: INTERNAL MEDICINE
Payer: MEDICARE

## 2023-08-04 ENCOUNTER — INFUSION (OUTPATIENT)
Dept: INFUSION THERAPY | Facility: HOSPITAL | Age: 88
End: 2023-08-04
Attending: INTERNAL MEDICINE
Payer: MEDICARE

## 2023-08-04 DIAGNOSIS — Z45.2 ENCOUNTER FOR INSERTION OF VENOUS ACCESS PORT: Primary | ICD-10-CM

## 2023-08-04 DIAGNOSIS — D50.8 OTHER IRON DEFICIENCY ANEMIA: ICD-10-CM

## 2023-08-04 DIAGNOSIS — C61 PROSTATE CANCER: ICD-10-CM

## 2023-08-04 DIAGNOSIS — C61 MALIGNANT NEOPLASM OF PROSTATE: ICD-10-CM

## 2023-08-04 PROCEDURE — A9595 NM PET CT F 18 PYL PSMA, MIDTHIGH TO VERTEX: HCPCS | Mod: TB,HCNC,PN

## 2023-08-04 PROCEDURE — 63600175 PHARM REV CODE 636 W HCPCS: Mod: HCNC,PN | Performed by: INTERNAL MEDICINE

## 2023-08-04 PROCEDURE — 96523 IRRIG DRUG DELIVERY DEVICE: CPT | Mod: HCNC,PN

## 2023-08-04 PROCEDURE — 78815 NM PET CT F 18 PYL PSMA, MIDTHIGH TO VERTEX: ICD-10-PCS | Mod: 26,PS,HCNC, | Performed by: RADIOLOGY

## 2023-08-04 PROCEDURE — 78815 PET IMAGE W/CT SKULL-THIGH: CPT | Mod: TC,HCNC,PN

## 2023-08-04 PROCEDURE — 78815 PET IMAGE W/CT SKULL-THIGH: CPT | Mod: 26,PS,HCNC, | Performed by: RADIOLOGY

## 2023-08-04 PROCEDURE — A4216 STERILE WATER/SALINE, 10 ML: HCPCS | Mod: HCNC,PN | Performed by: INTERNAL MEDICINE

## 2023-08-04 PROCEDURE — 25000003 PHARM REV CODE 250: Mod: HCNC,PN | Performed by: INTERNAL MEDICINE

## 2023-08-04 RX ORDER — SODIUM CHLORIDE 0.9 % (FLUSH) 0.9 %
10 SYRINGE (ML) INJECTION
Status: CANCELLED | OUTPATIENT
Start: 2023-08-04

## 2023-08-04 RX ORDER — HEPARIN 100 UNIT/ML
500 SYRINGE INTRAVENOUS
Status: DISCONTINUED | OUTPATIENT
Start: 2023-08-04 | End: 2023-08-04 | Stop reason: HOSPADM

## 2023-08-04 RX ORDER — HEPARIN 100 UNIT/ML
500 SYRINGE INTRAVENOUS
Status: CANCELLED | OUTPATIENT
Start: 2023-08-04

## 2023-08-04 RX ORDER — SODIUM CHLORIDE 0.9 % (FLUSH) 0.9 %
10 SYRINGE (ML) INJECTION
Status: DISCONTINUED | OUTPATIENT
Start: 2023-08-04 | End: 2023-08-04 | Stop reason: HOSPADM

## 2023-08-04 RX ADMIN — Medication 500 UNITS: at 10:08

## 2023-08-04 RX ADMIN — Medication 10 ML: at 10:08

## 2023-08-04 NOTE — PROGRESS NOTES
PET Imaging Questionnaire    Are you a Diabetic? Recent Blood Sugar level? No    Are you anemic? Bone Marrow Stimulation Meds? No    Have you had a CT Scan, if so when & where was your last one? Yes -     Have you had a PET Scan, if so when & where was your last one? Yes -     Chemotherapy or currently on Chemotherapy? Yes    Radiation therapy? No    Surgical History:   Past Surgical History:   Procedure Laterality Date    BONE MARROW BIOPSY Bilateral 6/15/2022    Procedure: Biopsy-bone marrow;  Surgeon: Hermann Jackson MD;  Location: [facility] OR;  Service: Oncology;  Laterality: Bilateral;    CATARACT EXTRACTION W/  INTRAOCULAR LENS IMPLANT Bilateral     ESOPHAGOGASTRODUODENOSCOPY      INSERTION OF TUNNELED CENTRAL VENOUS CATHETER (CVC) WITH SUBCUTANEOUS PORT N/A 6/15/2022    Procedure: INSERTION, PORT-A-CATH;  Surgeon: Marques Rivero MD;  Location: [facility] OR;  Service: General;  Laterality: N/A;        Have you been fasting for at least 6 hours? Yes    Is there any chance you may be pregnant or breastfeeding? No    Assay: 10.61 MCi@:11:05   Injection Site:RT AC    Residual: 2.1 mCi@: 11:07   Technologist: Kulwinder Weldon Injected:8:51 mCi

## 2023-08-06 NOTE — PROGRESS NOTES
PATIENT: Amor Alcazar  MRN: [id number]  DATE: 8/7/2023      Diagnosis:   1. 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 Genetic defect    4. Anemia of chronic disease    5. Hypothyroidism, unspecified type                      Chief Complaint: Follow-up (Prostate cancer)      Subjective:    Interval History: Mr. Alcazar is a 88 y.o. male with Arthritis, hypothyroidism, valvular heart disease, iron deficiency anemia who presents for follow up of prostate cancer.  Since the last clinic visit the patient underwent PSMA PET-CT on 08/04/2023 showing shrinkage of mediastinal lymph nodes with decreased FDG uptake with paratracheal lymph node measuring 1.4 x 1 cm; subcarinal lymph node measuring 1.2 x 1.3 cm; increased radiotracer accumulation within the prostate gland.    The patient denies CP, cough, SOB, abdominal pain, nausea, vomiting, constipation, diarrhea.  The patient denies fever, chills, night sweats, weight loss, new lumps or bumps, easy bruising or bleeding.    Prior History:  The patient was diagnosed with prostate cancer Tio 9 (4+5) 6/03/14.  He had a rising PSA and underwent PSMA PET/CT 5/09/22 showing disease in the prostate and mediastinal LN's.  The patient has been receiving Lupron 45mg 6 months dosing with Dr Bellamy with last treatment on 4/20/22.  He has previously been on Casodex and Apalutamide with progression.  He was started on Mitoxantrone and prednisone 7/01/22.  Last Echo 8/26/22 showed normal EF.   The patient saw Dr Kasper in cardiology on 9/30/22 and recommended repeat Echo in 3 months.   The patient underwent an Echo on 12/13/22 showing normal diastolic and systolic function.   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   The patient underwent PSA on 7/14/23 showing increase to 4.1    Past Medical History:   Past Medical History:   Diagnosis Date     Arthritis     History of skin cancer     details unknown    Hypothyroidism, unspecified     Prostate cancer     injections q 6 months    Valvular heart disease     mild AS, MR and TR  ECHO       Past Surgical HIstory:   Past Surgical History:   Procedure Laterality Date    BONE MARROW BIOPSY Bilateral 6/15/2022    Procedure: Biopsy-bone marrow;  Surgeon: Hermann Jackson MD;  Location: [facility] OR;  Service: Oncology;  Laterality: Bilateral;    CATARACT EXTRACTION W/  INTRAOCULAR LENS IMPLANT Bilateral     ESOPHAGOGASTRODUODENOSCOPY      INSERTION OF TUNNELED CENTRAL VENOUS CATHETER (CVC) WITH SUBCUTANEOUS PORT N/A 6/15/2022    Procedure: INSERTION, PORT-A-CATH;  Surgeon: Marques Rivero MD;  Location: [facility] OR;  Service: General;  Laterality: N/A;       Family History:   Family History   Problem Relation Age of Onset    Lung cancer Sister          of       Social History:  reports that he has never smoked. He has never used smokeless tobacco. He reports that he does not currently use alcohol. He reports that he does not use drugs.    Allergies:  Review of patient's allergies indicates:  No Known Allergies    Medications:  Current Outpatient Medications   Medication Sig Dispense Refill    atorvastatin (LIPITOR) 10 MG tablet Take 1 tablet (10 mg total) by mouth once daily. 90 tablet 1    folic acid (FOLVITE) 400 MCG tablet Take 400 mcg by mouth once daily.      levothyroxine (SYNTHROID) 50 MCG tablet Take 1 tablet (50 mcg total) by mouth before breakfast. 90 tablet 1    LUPRON DEPOT, 6 MONTH, 45 mg SyKt injection Inject into the muscle every 6 (six) months.      metoprolol succinate (TOPROL-XL) 25 MG 24 hr tablet Take 1 tablet (25 mg total) by mouth daily with breakfast AND 0.5 tablets (12.5 mg total) before evening meal. 135 tablet 1    multivit-min-FA-lycopen-lutein (CENTRUM SILVER MEN) 300-600-300 mcg Tab Take by mouth once daily.      abiraterone (ZYTIGA) 500 mg Tab Take 1,000 mg by mouth once daily. 60  "tablet 6    duke's soln (benadryl 30 mL, mylanta 30 mL, LIDOcaine 30 mL, nystatin 30 mL) 120mL Take 10 mLs by mouth 4 (four) times daily. 120 mL 6    predniSONE (DELTASONE) 5 MG tablet Take 1 tablet (5 mg total) by mouth once daily. 60 tablet 6     No current facility-administered medications for this visit.       Review of Systems   Constitutional:  Negative for chills, diaphoresis, fatigue, fever and unexpected weight change.   Respiratory:  Negative for cough and shortness of breath.    Cardiovascular:  Negative for chest pain and palpitations.   Gastrointestinal:  Negative for abdominal pain, constipation, diarrhea, nausea and vomiting.   Skin:  Negative for color change and rash.   Neurological:  Negative for headaches.   Hematological:  Negative for adenopathy. Does not bruise/bleed easily.       ECOG Performance Status: 1   Objective:      Vitals:   Vitals:    08/07/23 1033   BP: 113/66   BP Location: Right arm   Patient Position: Sitting   BP Method: Medium (Automatic)   Pulse: 62   Resp: 18   Temp: 97.5 °F (36.4 °C)   TempSrc: Temporal   SpO2: 96%   Weight: 67 kg (147 lb 11.3 oz)   Height: 5' 11" (1.803 m)                 Physical Exam  Constitutional:       General: He is not in acute distress.     Appearance: He is well-developed. He is not diaphoretic.   HENT:      Head: Normocephalic and atraumatic.   Cardiovascular:      Rate and Rhythm: Normal rate and regular rhythm.      Heart sounds: Normal heart sounds. No murmur heard.     No friction rub. No gallop.   Pulmonary:      Effort: Pulmonary effort is normal. No respiratory distress.      Breath sounds: Normal breath sounds. No wheezing or rales.   Chest:      Chest wall: No tenderness.   Abdominal:      General: Bowel sounds are normal. There is no distension.      Palpations: Abdomen is soft. There is no mass.      Tenderness: There is no abdominal tenderness. There is no rebound.   Musculoskeletal:      Cervical back: Normal range of motion.      " Comments: PORT in left chest   Lymphadenopathy:      Cervical: No cervical adenopathy.      Upper Body:      Right upper body: No supraclavicular or axillary adenopathy.      Left upper body: No supraclavicular or axillary adenopathy.   Skin:     General: Skin is warm and dry.      Findings: No erythema, lesion or rash.   Neurological:      Mental Status: He is alert and oriented to person, place, and time.   Psychiatric:         Behavior: Behavior normal.         Laboratory Data:  No visits with results within 1 Week(s) from this visit.   Latest known visit with results is:   Lab Visit on 07/14/2023   Component Date Value Ref Range Status    WBC 07/14/2023 9.69  3.90 - 12.70 K/uL Final    RBC 07/14/2023 3.36 (L)  4.60 - 6.20 M/uL Final    Hemoglobin 07/14/2023 10.8 (L)  14.0 - 18.0 g/dL Final    Hematocrit 07/14/2023 32.3 (L)  40.0 - 54.0 % Final    MCV 07/14/2023 96  82 - 98 fL Final    MCH 07/14/2023 32.1 (H)  27.0 - 31.0 pg Final    MCHC 07/14/2023 33.4  32.0 - 36.0 g/dL Final    RDW 07/14/2023 14.1  11.5 - 14.5 % Final    Platelets 07/14/2023 186  150 - 450 K/uL Final    MPV 07/14/2023 9.3  9.2 - 12.9 fL Final    Immature Granulocytes 07/14/2023 0.9 (H)  0.0 - 0.5 % Final    Gran # (ANC) 07/14/2023 5.2  1.8 - 7.7 K/uL Final    Immature Grans (Abs) 07/14/2023 0.09 (H)  0.00 - 0.04 K/uL Final    Comment: Mild elevation in immature granulocytes is non specific and   can be seen in a variety of conditions including stress response,   acute inflammation, trauma and pregnancy. Correlation with other   laboratory and clinical findings is essential.      Lymph # 07/14/2023 2.8  1.0 - 4.8 K/uL Final    Mono # 07/14/2023 1.1 (H)  0.3 - 1.0 K/uL Final    Eos # 07/14/2023 0.4  0.0 - 0.5 K/uL Final    Baso # 07/14/2023 0.07  0.00 - 0.20 K/uL Final    nRBC 07/14/2023 0  0 /100 WBC Final    Gran % 07/14/2023 53.8  38.0 - 73.0 % Final    Lymph % 07/14/2023 29.1  18.0 - 48.0 % Final    Mono % 07/14/2023 11.7  4.0 - 15.0 % Final     Eosinophil % 07/14/2023 3.8  0.0 - 8.0 % Final    Basophil % 07/14/2023 0.7  0.0 - 1.9 % Final    Differential Method 07/14/2023 Automated   Final    Sodium 07/14/2023 139  136 - 145 mmol/L Final    Potassium 07/14/2023 4.6  3.5 - 5.1 mmol/L Final    Chloride 07/14/2023 104  95 - 110 mmol/L Final    CO2 07/14/2023 25  23 - 29 mmol/L Final    Glucose 07/14/2023 109  70 - 110 mg/dL Final    BUN 07/14/2023 25 (H)  8 - 23 mg/dL Final    Creatinine 07/14/2023 1.2  0.5 - 1.4 mg/dL Final    Calcium 07/14/2023 9.5  8.7 - 10.5 mg/dL Final    Total Protein 07/14/2023 7.4  6.0 - 8.4 g/dL Final    Albumin 07/14/2023 3.7  3.5 - 5.2 g/dL Final    Total Bilirubin 07/14/2023 0.3  0.1 - 1.0 mg/dL Final    Comment: For infants and newborns, interpretation of results should be based  on gestational age, weight and in agreement with clinical  observations.    Premature Infant recommended reference ranges:  Up to 24 hours.............<8.0 mg/dL  Up to 48 hours............<12.0 mg/dL  3-5 days..................<15.0 mg/dL  6-29 days.................<15.0 mg/dL      Alkaline Phosphatase 07/14/2023 22 (L)  55 - 135 U/L Final    AST 07/14/2023 23  10 - 40 U/L Final    ALT 07/14/2023 11  10 - 44 U/L Final    eGFR 07/14/2023 58.9 (A)  >60 mL/min/1.73 m^2 Final    Anion Gap 07/14/2023 10  8 - 16 mmol/L Final    PSA Diagnostic 07/14/2023 4.1 (H)  0.00 - 4.00 ng/mL Final    Comment: The testing method is a chemiluminescent microparticle immunoassay   manufactured by [employer] and performed on the    or   Contorion system. Values obtained with different assay manufacturers   for   methods may be different and cannot be used interchangeably.  PSA Expected levels:  Hormonal Therapy: <0.05 ng/ml  Prostatectomy: <0.01 ng/ml  Radiation Therapy: <1.00 ng/ml           Imaging:     PSMA PET/CT 8/04/23    Head/neck:     No significant abnormal foci of increased radiotracer accumulation are present in the head and neck region.      Chest:     Since the previous study, there has been interval shrinkage of mediastinal lymph nodes which now do not exhibit significant increased radiotracer accumulation.  On image 106 a right paratracheal node now measures 1.4 x 1.0 cm compared to 2.0 x 1.3 cm previously and has a max SUV of 1.8 compared to 4.2 previously.  On image 119 a subcarinal node now measures 1.3 x 1.2 cm compared to 1.8 x 1.6 cm previously and has a max SUV of 1.9 compared to 3.8 previously.     Abdomen/pelvis:     There continues to be increased radiotracer accumulation within the prostate gland.  On image 269 the max SUV is 29.7 compared to 11.2 previously.  No lymphadenopathy is present in the pelvis or retroperitoneum.     Skeleton:     No significant foci of abnormally increased radiotracer accumulation are present within the skeleton.  There are no findings to suggest osseous metastatic disease.       Assessment:       1. 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 Genetic defect    4. Anemia of chronic disease    5. 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  May predispose pt any other family member to increased risk of cancer  -PT to see genetic counselor  -PSA increased to 4.1 on 7/14/23  -PSMA PET/CT shows decrease in size and avidity of paratracheal LN and subcarinal LN but increased uptake in the prostate  -Pt to see radiation oncology for consideration of radiation  -Will start the patient on Abiraterone 1000mg daily and prednisone 5mg  BID    CHECK 2 mutation - Pt saw René Stovall on 8/03/23    Anemia of Chronic Disease - Hemoglobin was 10.8g/dL on 7/14/23  -hemoglobin stable  -Will monitor    Hypothyroidism - pt on synthroid  -management per PCP    Route Chart for Scheduling    Med Onc Chart Routing      Follow up with physician Other. The patient needs approval for abiraterone.  He needs an appt with radiation oncology in the next week.  He needs a return appt with me after he has been on abiraterone for 2 weeks.   Follow up with AMINTA    Infusion scheduling note    Injection scheduling note    Labs    Imaging    Pharmacy appointment    Other referrals          Treatment Plan Information   OP MITOXANTRONE PREDNISONE Q3W   Beka Alvarez MD   Upcoming Treatment Dates - OP MITOXANTRONE PREDNISONE Q3W    No upcoming days in selected categories.    Supportive Plan Information  OP PROSTATE LEUPROLIDE (LUPRON) 6 MONTH   Beka Alvarez MD   Upcoming Treatment Dates - OP PROSTATE LEUPROLIDE (LUPRON) 6 MONTH    No upcoming days in selected categories.    Therapy Plan Information  Flushes  heparin, porcine (PF) 100 unit/mL injection flush 500 Units  500 Units, Intravenous, Every visit  sodium chloride 0.9% flush 10 mL  10 mL, Intravenous, Every visit        Beka Alvarez MD  [facility]  Hematology and Oncology  [facility]   [address]   [place] [address]   O: [phone]  F: [phone]

## 2023-08-07 ENCOUNTER — OFFICE VISIT (OUTPATIENT)
Dept: HEMATOLOGY/ONCOLOGY | Facility: CLINIC | Age: 88
End: 2023-08-07
Payer: MEDICARE

## 2023-08-07 ENCOUNTER — TELEPHONE (OUTPATIENT)
Dept: PHARMACY | Facility: CLINIC | Age: 88
End: 2023-08-07
Payer: MEDICARE

## 2023-08-07 ENCOUNTER — OFFICE VISIT (OUTPATIENT)
Dept: PSYCHIATRY | Facility: CLINIC | Age: 88
End: 2023-08-07
Payer: MEDICARE

## 2023-08-07 VITALS
TEMPERATURE: 98 F | DIASTOLIC BLOOD PRESSURE: 66 MMHG | HEIGHT: 71 IN | RESPIRATION RATE: 18 BRPM | BODY MASS INDEX: 20.68 KG/M2 | WEIGHT: 147.69 LBS | SYSTOLIC BLOOD PRESSURE: 113 MMHG | HEART RATE: 62 BPM | OXYGEN SATURATION: 96 %

## 2023-08-07 DIAGNOSIS — C77.1 METASTASIS TO MEDIASTINAL LYMPH NODE: ICD-10-CM

## 2023-08-07 DIAGNOSIS — C61 PROSTATE CANCER: Primary | ICD-10-CM

## 2023-08-07 DIAGNOSIS — C61 PROSTATE CANCER: ICD-10-CM

## 2023-08-07 DIAGNOSIS — D63.8 ANEMIA OF CHRONIC DISEASE: ICD-10-CM

## 2023-08-07 DIAGNOSIS — E03.9 HYPOTHYROIDISM, UNSPECIFIED TYPE: ICD-10-CM

## 2023-08-07 DIAGNOSIS — F43.22 ADJUSTMENT DISORDER WITH ANXIETY: Primary | ICD-10-CM

## 2023-08-07 DIAGNOSIS — Q99.9 GENETIC DEFECT: ICD-10-CM

## 2023-08-07 PROCEDURE — 1126F AMNT PAIN NOTED NONE PRSNT: CPT | Mod: HCNC,CPTII,S$GLB, | Performed by: INTERNAL MEDICINE

## 2023-08-07 PROCEDURE — 1157F PR ADVANCE CARE PLAN OR EQUIV PRESENT IN MEDICAL RECORD: ICD-10-PCS | Mod: HCNC,CPTII,S$GLB, | Performed by: PSYCHOLOGIST

## 2023-08-07 PROCEDURE — 1157F ADVNC CARE PLAN IN RCRD: CPT | Mod: HCNC,CPTII,S$GLB, | Performed by: PSYCHOLOGIST

## 2023-08-07 PROCEDURE — 1101F PT FALLS ASSESS-DOCD LE1/YR: CPT | Mod: HCNC,CPTII,S$GLB, | Performed by: INTERNAL MEDICINE

## 2023-08-07 PROCEDURE — 99999 PR PBB SHADOW E&M-EST. PATIENT-LVL IV: CPT | Mod: PBBFAC,HCNC,, | Performed by: INTERNAL MEDICINE

## 2023-08-07 PROCEDURE — 1157F ADVNC CARE PLAN IN RCRD: CPT | Mod: HCNC,CPTII,S$GLB, | Performed by: INTERNAL MEDICINE

## 2023-08-07 PROCEDURE — 99999 PR PBB SHADOW E&M-EST. PATIENT-LVL IV: ICD-10-PCS | Mod: PBBFAC,HCNC,, | Performed by: INTERNAL MEDICINE

## 2023-08-07 PROCEDURE — 99215 PR OFFICE/OUTPT VISIT, EST, LEVL V, 40-54 MIN: ICD-10-PCS | Mod: HCNC,S$GLB,, | Performed by: INTERNAL MEDICINE

## 2023-08-07 PROCEDURE — 90832 PR PSYCHOTHERAPY W/PATIENT, 30 MIN: ICD-10-PCS | Mod: HCNC,S$GLB,, | Performed by: PSYCHOLOGIST

## 2023-08-07 PROCEDURE — 99999 PR PBB SHADOW E&M-EST. PATIENT-LVL I: ICD-10-PCS | Mod: PBBFAC,HCNC,, | Performed by: PSYCHOLOGIST

## 2023-08-07 PROCEDURE — 1126F PR PAIN SEVERITY QUANTIFIED, NO PAIN PRESENT: ICD-10-PCS | Mod: HCNC,CPTII,S$GLB, | Performed by: INTERNAL MEDICINE

## 2023-08-07 PROCEDURE — 1159F PR MEDICATION LIST DOCUMENTED IN MEDICAL RECORD: ICD-10-PCS | Mod: HCNC,CPTII,S$GLB, | Performed by: INTERNAL MEDICINE

## 2023-08-07 PROCEDURE — 1101F PR PT FALLS ASSESS DOC 0-1 FALLS W/OUT INJ PAST YR: ICD-10-PCS | Mod: HCNC,CPTII,S$GLB, | Performed by: INTERNAL MEDICINE

## 2023-08-07 PROCEDURE — 3288F PR FALLS RISK ASSESSMENT DOCUMENTED: ICD-10-PCS | Mod: HCNC,CPTII,S$GLB, | Performed by: INTERNAL MEDICINE

## 2023-08-07 PROCEDURE — 99215 OFFICE O/P EST HI 40 MIN: CPT | Mod: HCNC,S$GLB,, | Performed by: INTERNAL MEDICINE

## 2023-08-07 PROCEDURE — 3288F FALL RISK ASSESSMENT DOCD: CPT | Mod: HCNC,CPTII,S$GLB, | Performed by: INTERNAL MEDICINE

## 2023-08-07 PROCEDURE — 1157F PR ADVANCE CARE PLAN OR EQUIV PRESENT IN MEDICAL RECORD: ICD-10-PCS | Mod: HCNC,CPTII,S$GLB, | Performed by: INTERNAL MEDICINE

## 2023-08-07 PROCEDURE — 1159F MED LIST DOCD IN RCRD: CPT | Mod: HCNC,CPTII,S$GLB, | Performed by: INTERNAL MEDICINE

## 2023-08-07 PROCEDURE — 90832 PSYTX W PT 30 MINUTES: CPT | Mod: HCNC,S$GLB,, | Performed by: PSYCHOLOGIST

## 2023-08-07 PROCEDURE — 99999 PR PBB SHADOW E&M-EST. PATIENT-LVL I: CPT | Mod: PBBFAC,HCNC,, | Performed by: PSYCHOLOGIST

## 2023-08-07 RX ORDER — ABIRATERONE 500 MG/1
1000 TABLET ORAL DAILY
Qty: 60 TABLET | Refills: 6 | Status: ACTIVE | OUTPATIENT
Start: 2023-08-07 | End: 2023-08-10 | Stop reason: SDUPTHER

## 2023-08-07 RX ORDER — PREDNISONE 5 MG/1
5 TABLET ORAL DAILY
Qty: 60 TABLET | Refills: 6 | Status: SHIPPED | OUTPATIENT
Start: 2023-08-07 | End: 2023-08-10 | Stop reason: SDUPTHER

## 2023-08-07 NOTE — PROGRESS NOTES
PSYCHO-ONCOLOGY NOTE/ Individual Psychotherapy     Date: 8/7/2023   Site:  [place]      Therapeutic Intervention: Met with patient and spouse.  Outpatient - Insight oriented psychotherapy 30 min - CPT code 40771 and Outpatient - Supportive psychotherapy 30 min - CPT Code 58503    This includes face to face time and non-face to face time preparing to see the patient, obtaining and/or reviewing separately obtained history, documenting clinical information in the electronic or other health record, independently interpreting results and communicating results to the patient/family/caregiver, or care coordinator.      Patient was last seen by me on 3/3/2023    Problem list  Patient Active Problem List   Diagnosis    Malignant neoplasm of prostate    Arthritis    Hypertriglyceridemia    Pseudophakia of both eyes    History of skin cancer    Anemia    Chronic kidney disease, stage 3a    Iron deficiency anemia    Encounter for insertion of venous access port    Prostate cancer       Chief complaint/reason for encounter: adjustment to illness, anxiety        Met with patient to evaluate psychosocial adaptation to diagnosis/treatment of prostate cancer    Current Medications  Current Outpatient Medications   Medication    atorvastatin (LIPITOR) 10 MG tablet    duke's soln (benadryl 30 mL, mylanta 30 mL, LIDOcaine 30 mL, nystatin 30 mL) 120mL    folic acid (FOLVITE) 400 MCG tablet    levothyroxine (SYNTHROID) 50 MCG tablet    LUPRON DEPOT, 6 MONTH, 45 mg SyKt injection    metoprolol succinate (TOPROL-XL) 25 MG 24 hr tablet    multivit-min-FA-lycopen-lutein (CENTRUM SILVER MEN) 300-600-300 mcg Tab     No current facility-administered medications for this visit.       ONCOLOGY HISTORY  Oncology History   Prostate cancer   6/27/2022 Initial Diagnosis    Prostate cancer     7/1/2022 -  Chemotherapy    Treatment Summary   Plan Name: OP MITOXANTRONE PREDNISONE Q3W  Treatment Goal: Palliative  Status: Active  Start Date:  7/1/2022  End Date: 1/6/2023  Provider: Beka Alvarez MD  Chemotherapy: mitoXANTRONE (NOVANTRONE) 12 mg/m2 = 22 mg in sodium chloride 0.9% 50 mL chemo infusion, 12 mg/m2 = 22 mg, Intravenous, Clinic/HOD 1 time, 10 of 10 cycles  Administration: 22 mg (7/1/2022), 22 mg (7/22/2022), 22 mg (11/4/2022), 22 mg (11/25/2022), 22 mg (12/16/2022), 22 mg (1/6/2023), 22 mg (8/12/2022), 22 mg (9/2/2022), 22 mg (9/23/2022), 22 mg (10/14/2022)     7/20/2023 Cancer Staged    Staging form: Prostate, AJCC 8th Edition  - Clinical: Stage IVB (cTX, cNX, cM1)         Objective:  Amor Alcazar arrived promptly for the session. Sandy was also present during the interview, with the consent of Amor Alcazar.   Mr. Alcazar was independently ambulatory at the time of session. The patient was fully cooperative throughout the session.  Appearance: age appropriate, casually  dressed, well groomed  Behavior/Cooperation: friendly and cooperative  Speech: normal in rate, volume, and tone and appropriate quality, quantity and organization of sentences  Mood: steady, happy  Affect: euthymic and mood congruent  Thought Process: goal-directed, logical  Thought Content: normal,  No delusions or paranoia; did not appear to be responding to internal stimuli during the session  Orientation: grossly intact  Memory: grossly intact  Attention Span/Concentration: Attends to session without distraction; reports no difficulty  Fund of Knowledge: average  Estimate of Intelligence: average from verbal skills and history  Cognition: grossly intact  Insight: patient has awareness of illness; good insight into own behavior and behavior of others  Judgment: the patient's behavior is adequate to circumstances    NCCN Distress thermometer:   DISTRESS SCREENING 11/11/2022 11/3/2022 10/14/2022 9/2/2022 7/22/2022 6/30/2022 6/27/2022   Distress Score 0 - No Distress 0 - No Distress 0 - No Distress 0 - No Distress 0 - No Distress 0 - No Distress 0 - No Distress    Practical Problems None of these None of these None of these None of these None of these None of these None of these   Family Problems None of these None of these None of these None of these None of these None of these None of these   Emotional Problems None of these None of these None of these None of these None of these None of these None of these   Spiritual / Nondenominational Concerns No No No No No No No   Physical Problems None of these None of these None of these None of these None of these None of these None of these        Interval history and content of current session: Discussed current adaptation to disease and treatment status. Reports to be coping in an adequate manner, however, notes limitations on interests secondary to summer heat wave. Evaluated cognitive response, paying particular attention to negative intrusive thoughts of a persistent and detrimental nature. Thoughts of this type are in evidence with mild distress. Provided cognitive behavioral therapy to address negative cognitions, discussing efficacy to current efforts to adaptively cope. Provided additional psychotherapeutic support as indicated. Identified and evaluated psychosocial and environmental stressors secondary to tx, encouraging pacing of coping strategies (especially when outdoors).  Examined proactive behaviors that may be implemented to minimize or ameliorate psychosocial stressors secondary to diagnosis and treatment.     Risk parameters:   Patient reports no suicidal ideation  Patient reports no homicidal ideation  Patient reports no self-injurious behavior  Patient reports no violent behavior   Safety needs:  None at this time      Verbal deficits: None     Patient's response to intervention:The patient's response to intervention is accepting, motivated.     Progress toward goals and other mental status changes:  The patient's progress toward goals is good.      Progress to date:Progress as Expected      Goals from last visit:  Met        Patient Strengths:  verbal, intelligent, successful, good social support, good insight, commitment to wellness, strong marjan, strong cultural traditions        Treatment Plan:individual psychotherapy  Target symptoms: anxiety , adjustment  Why chosen therapy is appropriate versus another modality: relevant to diagnosis, patient responds to this modality, evidence based practice  Outcome monitoring methods: self-report, observation, feedback from family  Therapeutic intervention type: insight oriented psychotherapy, behavior modifying psychotherapy, supportive psychotherapy  Prognosis: Good                            Behavioral goals:               Social engagement: continued              Therapy: adaptive coping, psychotherapeutic support    Return to clinic: Pt referred to [facility] Psychiatry fellows for PRN follow up upon departure of this provider     Length of Service (minutes direct face-to-face contact): 30    Diagnosis:     ICD-10-CM ICD-9-CM   1. Adjustment disorder with anxiety  F43.22 309.24   2. Prostate cancer  C61 185                [place] License #[id number]  [place] License #[id number]

## 2023-08-07 NOTE — TELEPHONE ENCOUNTER
Garrison, this is Genaro Floyd, clinical pharmacist with [facility] that is part of your care team.  We have begun working on your prescription that your doctor has sent us. Our next steps include:     Working with your insurance company to obtain approval for your medication  Working with you to ensure your medication is affordable     We will be calling you along the way with updates on your medication but if you have any concerns or receive information that you would like to discuss please reach us at [phone].    Welcome call outcome: Patient/caregiver reached

## 2023-08-10 ENCOUNTER — TELEPHONE (OUTPATIENT)
Dept: HEMATOLOGY/ONCOLOGY | Facility: CLINIC | Age: 88
End: 2023-08-10
Payer: MEDICARE

## 2023-08-10 ENCOUNTER — PATIENT MESSAGE (OUTPATIENT)
Dept: HEMATOLOGY/ONCOLOGY | Facility: CLINIC | Age: 88
End: 2023-08-10
Payer: MEDICARE

## 2023-08-10 DIAGNOSIS — C61 PROSTATE CANCER: ICD-10-CM

## 2023-08-10 RX ORDER — PREDNISONE 5 MG/1
5 TABLET ORAL DAILY
Qty: 60 TABLET | Refills: 6 | Status: SHIPPED | OUTPATIENT
Start: 2023-08-10 | End: 2023-11-11 | Stop reason: SDUPTHER

## 2023-08-10 RX ORDER — ABIRATERONE 500 MG/1
1000 TABLET ORAL DAILY
Qty: 60 TABLET | Refills: 6 | Status: SHIPPED | OUTPATIENT
Start: 2023-08-10 | End: 2024-03-21 | Stop reason: SDUPTHER

## 2023-08-10 NOTE — TELEPHONE ENCOUNTER
----- Message from Genaro Floyd PharmD sent at 8/10/2023  2:47 PM CDT -----  Regarding: FW: Zytiga  Good afternoon,     I would like to follow up on the plan of care for the patient. I have spoke with the family and the $2,000 copay is not affordable for them.    Thank you.    ----- Message -----  From: Genaro Floyd PharmKIA  Sent: 8/8/2023   2:45 PM CDT  To: Beka Alvarez MD; Antonio MUÑOZ Staff  Subject: Zytiga                                           Additionally, Patient's copay for abiraterone is >$2,000. Currently, there are no financial assistance options for this medication. Here is my recommendations if you would like to stick with an oral option.    1. Patient could receive a 250 mg dose of abiraterone with a low fat meal. This dose would cost $33/month from [email or web address] ([facility]). This dose maintained efficacy in PSA reeducation as seen by this phase II study (DOI: 10.1200/JCO.2017.76.4381 Journal of Clinical Oncology 36, no. 14 (May 10, 2018) 4279-3257.)    2. Switch to Xtandi (enzalutamide) as there are financial assistance option through the manufacture    Please advise, Thank you.    ----- Message -----  From: Genaro Floyd PharmD  Sent: 8/8/2023   8:50 AM CDT  To: Beka Alvarez MD; Antonio MUÑOZ Staff  Subject: Prednisone                                       Good morning,    Patient is to take prednisone 5 mg twice daily. The directions are written for patient to take once daily, do we have permission to adjust the direction on the the script?    Thank you,  Genaro Floyd, PharmD  [facility]  [phone]

## 2023-08-16 ENCOUNTER — TELEPHONE (OUTPATIENT)
Dept: GENETICS | Facility: CLINIC | Age: 88
End: 2023-08-16
Payer: MEDICARE

## 2023-08-16 ENCOUNTER — DOCUMENTATION ONLY (OUTPATIENT)
Dept: INFUSION THERAPY | Facility: HOSPITAL | Age: 88
End: 2023-08-16
Payer: MEDICARE

## 2023-08-17 DIAGNOSIS — E03.4 HYPOTHYROIDISM DUE TO ACQUIRED ATROPHY OF THYROID: Primary | ICD-10-CM

## 2023-08-17 RX ORDER — LEVOTHYROXINE SODIUM 50 UG/1
50 TABLET ORAL
Qty: 90 TABLET | Refills: 2 | Status: SHIPPED | OUTPATIENT
Start: 2023-08-17 | End: 2024-02-25 | Stop reason: SDUPTHER

## 2023-08-17 RX ORDER — LEVOTHYROXINE SODIUM 75 UG/1
75 TABLET ORAL
Qty: 90 TABLET | Refills: 3 | OUTPATIENT
Start: 2023-08-17

## 2023-08-17 RX ORDER — LEVOTHYROXINE SODIUM 50 UG/1
50 TABLET ORAL
Qty: 90 TABLET | Refills: 1 | Status: CANCELLED | OUTPATIENT
Start: 2023-08-17 | End: 2024-08-16

## 2023-08-17 NOTE — TELEPHONE ENCOUNTER
No care due was identified.  Health [facility] Embedded Care Due Messages. Reference number: [id number].   8/17/2023 5:09:26 PM CDT

## 2023-08-17 NOTE — TELEPHONE ENCOUNTER
----- Message from Kristin Cochran sent at 8/17/2023  4:29 PM CDT -----  Regarding: refill  Contact: Polina spouse  Type:  RX Refill Request    Who Called:  Polina spouse  Refill or New Rx:  new rx  RX Name and Strength:  levothyroxine (SYNTHROID) 50 MCG tablet      How is the patient currently taking it? (ex. 1XDay):  as directed  Is this a 30 day or 90 day RX:  90  Preferred Pharmacy with phone number:    [facility] - [place], [place] - [address]  [address] [address]  Phone: [phone] Fax: [phone]    Local or Mail Order:  mail order  Ordering Provider:  Dr. Gracy Rebollar Call Back Number:  [phone] (home) [phone] (work)    Additional Information:  Please call spouse to advise.  Patient has about a weeks worth left.   Thanks!

## 2023-08-17 NOTE — TELEPHONE ENCOUNTER
No care due was identified.  Health [facility] Embedded Care Due Messages. Reference number: [id number].   8/17/2023 9:50:18 AM CDT

## 2023-08-17 NOTE — TELEPHONE ENCOUNTER
Refill Decision Note   Amor Alcazar  is requesting a refill authorization.  Brief Assessment and Rationale for Refill:  Quick Discontinue     Medication Therapy Plan:  The original prescription was discontinued on 3/5/2023 by Dipti Dubose MD.      Comments:     Note composed:11:05 AM 08/17/2023             Appointments     Last Visit   2/23/2023 Dipti Dubose MD   Next Visit   Visit date not found Dipti Dubose MD           Appointments     Last Visit   2/23/2023 Dipti Dubose MD   Next Visit   Visit date not found Dipti Dubose MD

## 2023-08-18 ENCOUNTER — PATIENT MESSAGE (OUTPATIENT)
Dept: FAMILY MEDICINE | Facility: CLINIC | Age: 88
End: 2023-08-18
Payer: MEDICARE

## 2023-08-18 ENCOUNTER — OFFICE VISIT (OUTPATIENT)
Dept: RADIATION ONCOLOGY | Facility: CLINIC | Age: 88
End: 2023-08-18
Payer: MEDICARE

## 2023-08-18 VITALS
WEIGHT: 143.75 LBS | RESPIRATION RATE: 18 BRPM | DIASTOLIC BLOOD PRESSURE: 66 MMHG | BODY MASS INDEX: 20.05 KG/M2 | TEMPERATURE: 98 F | OXYGEN SATURATION: 99 % | SYSTOLIC BLOOD PRESSURE: 115 MMHG | HEART RATE: 74 BPM

## 2023-08-18 DIAGNOSIS — C61 PROSTATE CANCER: ICD-10-CM

## 2023-08-18 PROCEDURE — 1157F PR ADVANCE CARE PLAN OR EQUIV PRESENT IN MEDICAL RECORD: ICD-10-PCS | Mod: HCNC,CPTII,S$GLB, | Performed by: RADIOLOGY

## 2023-08-18 PROCEDURE — 1157F ADVNC CARE PLAN IN RCRD: CPT | Mod: HCNC,CPTII,S$GLB, | Performed by: RADIOLOGY

## 2023-08-18 PROCEDURE — 1126F AMNT PAIN NOTED NONE PRSNT: CPT | Mod: HCNC,CPTII,S$GLB, | Performed by: RADIOLOGY

## 2023-08-18 PROCEDURE — 99205 PR OFFICE/OUTPT VISIT, NEW, LEVL V, 60-74 MIN: ICD-10-PCS | Mod: HCNC,S$GLB,, | Performed by: RADIOLOGY

## 2023-08-18 PROCEDURE — 99999 PR PBB SHADOW E&M-EST. PATIENT-LVL III: CPT | Mod: PBBFAC,HCNC,, | Performed by: RADIOLOGY

## 2023-08-18 PROCEDURE — 1126F PR PAIN SEVERITY QUANTIFIED, NO PAIN PRESENT: ICD-10-PCS | Mod: HCNC,CPTII,S$GLB, | Performed by: RADIOLOGY

## 2023-08-18 PROCEDURE — 1159F MED LIST DOCD IN RCRD: CPT | Mod: HCNC,CPTII,S$GLB, | Performed by: RADIOLOGY

## 2023-08-18 PROCEDURE — 1159F PR MEDICATION LIST DOCUMENTED IN MEDICAL RECORD: ICD-10-PCS | Mod: HCNC,CPTII,S$GLB, | Performed by: RADIOLOGY

## 2023-08-18 PROCEDURE — 99205 OFFICE O/P NEW HI 60 MIN: CPT | Mod: HCNC,S$GLB,, | Performed by: RADIOLOGY

## 2023-08-18 PROCEDURE — 99999 PR PBB SHADOW E&M-EST. PATIENT-LVL III: ICD-10-PCS | Mod: PBBFAC,HCNC,, | Performed by: RADIOLOGY

## 2023-08-18 NOTE — PROGRESS NOTES
08/18/2023    [facility]   Radiation Oncology Consultation    Assessment   This is a 88 y.o. y/o male with Stage IV adenocarcinoma of the prostate.  He presents today to discuss treatment of the primary with radiation therapy.        Plan     Treatment options were discussed with the patient including radiation therapy to the prostate.  We discussed the goals of treatment to be palliative.  The risks, benefits, scheduling, alternatives to and rationale of prostate-directed radiation therapy in the setting of metastatic prostate cancerwere explained in detail.    Indication, course, and potential toxicities of pelvic radiation therapy reviewed in detail, including but not limited to fatigue, increased frequency, urgency, or pain with urination, increased frequency or urgency of bowel movements, diarrhea, pelvic bone fragility, erectile dysfunction, decreased volume of ejaculate, remote risk of secondary malignancy.   After this discussion, he elected to proceed with prostate directed radiation therapy.    A CT simulation will be performed /on 9/11/23 to begin the planning process for the patient's radiation therapy.     He was given our contact information, and he was told that he could call our clinic at any time if he has any questions or concerns.      Radiation Treatment Details:   We plan to treat the prostate and SVs to a dose of 55 Gy in 20 fractions at 2.75 Gy per fraction delivered daily  We will utilize a(n) IMRT technique.   IMRT is medically necessary to treat complex dose painted target volumes in the prostate region with concave and convex isodose lines with steep isodose gradients to spare multiple adjacent organs at risk including the rectum, bladder, penile bulb   We will utilize daily CT or orthogonal image guidance due to the need for accurate daily patient alignment to treat the target volumes accurately and avoid radiation overdose to multiple regional organs at risk since  we are treating the patient with complex target volumes with multiple steep isodose gradients.          Chief Complaint   Patient presents with    Prostate Cancer         Oncology History   Prostate cancer   6/27/2022 Initial Diagnosis    Prostate cancer     7/1/2022 -  Chemotherapy    Treatment Summary   Plan Name: OP MITOXANTRONE PREDNISONE Q3W  Treatment Goal: Palliative  Status: Active  Start Date: 7/1/2022  End Date: 1/6/2023  Provider: Beka Alvarez MD  Chemotherapy: mitoXANTRONE (NOVANTRONE) 12 mg/m2 = 22 mg in sodium chloride 0.9% 50 mL chemo infusion, 12 mg/m2 = 22 mg, Intravenous, Clinic/HOD 1 time, 10 of 10 cycles  Administration: 22 mg (7/1/2022), 22 mg (7/22/2022), 22 mg (11/4/2022), 22 mg (11/25/2022), 22 mg (12/16/2022), 22 mg (1/6/2023), 22 mg (8/12/2022), 22 mg (9/2/2022), 22 mg (9/23/2022), 22 mg (10/14/2022)     7/20/2023 Cancer Staged    Staging form: Prostate, AJCC 8th Edition  - Clinical: Stage IVB (cTX, cNX, cM1)         HPI: The patient is an 88 year old male with multiple comorbidities (valvular heart disease, hypothyroid, CKD) and a diagnosis of Stage IV, 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  He was treated with casodex and apalutamiide, with POD, and transitioned to Lupron, followed by mitoxantrone and prednisone. Most recent PSA increased from 2.8 to 4.1 (71 at time of diagnosis).    Latest Reference Range & Units Most Recent 11/22/22 09:00 12/12/22 08:43 01/04/23 09:29 04/10/23 08:18 07/14/23 11:52   PSA Diagnostic 0.00 - 4.00 ng/mL 4.1 (H)  7/14/23 11:52 5.6 (H) 4.6 (H) 4.0 2.8 4.1 (H)   (H): Data is abnormally high    Interval PSMA 8/4/23 showed shrinkage of mediastinal lymph nodes with decreased FDG avidity, but increased accumulation within the prostate gland.  He was referred for discussion of prostate-directed radiation therapy in the setting of oligometastatic disease. Plan to initiate abiraterone when insurance authorization received.      IPSS is 1, with no frequency, no urgency, and nocturia x 1.      History of prior irradiation: No  History of prior systemic anti-cancer therapy: Yes - see above  History of collagen vascular disease: No  Implanted electronic device (pacer/defib/nerve stimulator): No    Past Medical History:   Diagnosis Date    Arthritis     History of skin cancer     details unknown    Hypothyroidism, unspecified     Prostate cancer     injections q 6 months    Valvular heart disease     mild AS, MR and TR 9/19 ECHO       Past Surgical History:   Procedure Laterality Date    BONE MARROW BIOPSY Bilateral 6/15/2022    Procedure: Biopsy-bone marrow;  Surgeon: Hermann Jackson MD;  Location: [facility] OR;  Service: Oncology;  Laterality: Bilateral;    CATARACT EXTRACTION W/  INTRAOCULAR LENS IMPLANT Bilateral     ESOPHAGOGASTRODUODENOSCOPY      INSERTION OF TUNNELED CENTRAL VENOUS CATHETER (CVC) WITH SUBCUTANEOUS PORT N/A 6/15/2022    Procedure: INSERTION, PORT-A-CATH;  Surgeon: Marques Rivero MD;  Location: [facility] OR;  Service: General;  Laterality: N/A;       Social History     Tobacco Use    Smoking status: Never    Smokeless tobacco: Never   Substance Use Topics    Alcohol use: Not Currently     Comment: few beers daily    Drug use: No       Cancer-related family history includes Lung cancer in his sister.    Current Outpatient Medications on File Prior to Visit   Medication Sig Dispense Refill    abiraterone (ZYTIGA) 500 mg Tab Take 1,000 mg by mouth once daily. 60 tablet 6    atorvastatin (LIPITOR) 10 MG tablet Take 1 tablet (10 mg total) by mouth once daily. 90 tablet 1    duke's soln (benadryl 30 mL, mylanta 30 mL, LIDOcaine 30 mL, nystatin 30 mL) 120mL Take 10 mLs by mouth 4 (four) times daily. 120 mL 6    folic acid (FOLVITE) 400 MCG tablet Take 400 mcg by mouth once daily.      levothyroxine (SYNTHROID) 50 MCG tablet Take 1 tablet (50 mcg total) by mouth before breakfast. 90 tablet 2    LUPRON DEPOT, 6 MONTH, 45 mg SyKt  injection Inject into the muscle every 6 (six) months.      metoprolol succinate (TOPROL-XL) 25 MG 24 hr tablet Take 1 tablet (25 mg total) by mouth daily with breakfast AND 0.5 tablets (12.5 mg total) before evening meal. 135 tablet 1    multivit-min-FA-lycopen-lutein (CENTRUM SILVER MEN) 300-600-300 mcg Tab Take by mouth once daily.      predniSONE (DELTASONE) 5 MG tablet Take 1 tablet (5 mg total) by mouth once daily. 60 tablet 6     No current facility-administered medications on file prior to visit.       Review of patient's allergies indicates:  No Known Allergies    Review of Systems   Constitutional:  Positive for malaise/fatigue. Negative for chills and fever.   Eyes:  Positive for blurred vision. Negative for double vision.   Gastrointestinal:  Negative for diarrhea.   Musculoskeletal:  Negative for myalgias.   Neurological:  Negative for sensory change and focal weakness.        Vital Signs: /66   Pulse 74   Temp 98.1 °F (36.7 °C)   Resp 18   Wt 65.2 kg (143 lb 11.8 oz)   SpO2 99%   BMI 20.05 kg/m²     ECOG Performance Status: 1 - Ambulates, capable of light work    Physical Exam  Vitals reviewed. Exam conducted with a chaperone present.   Constitutional:       General: He is not in acute distress.     Appearance: He is not ill-appearing.      Comments: Appears stated age   HENT:      Head: Normocephalic and atraumatic.   Eyes:      Pupils: Pupils are equal, round, and reactive to light.   Pulmonary:      Effort: Pulmonary effort is normal.   Neurological:      General: No focal deficit present.      Mental Status: He is alert and oriented to person, place, and time.            Imaging: I have personally reviewed the patient's available images and reports and summarized pertinent findings above in HPI.     Pathology: I have personally reviewed the patient's available pathology and summarized pertinent findings above in HPI.     This case was discussed with Dr. Alvarez.      - Thank you for  allowing me to participate in the care of your patient.    Annmarie Grace MD

## 2023-08-21 ENCOUNTER — PATIENT MESSAGE (OUTPATIENT)
Dept: FAMILY MEDICINE | Facility: CLINIC | Age: 88
End: 2023-08-21
Payer: MEDICARE

## 2023-08-21 ENCOUNTER — PATIENT MESSAGE (OUTPATIENT)
Dept: HEMATOLOGY/ONCOLOGY | Facility: CLINIC | Age: 88
End: 2023-08-21
Payer: MEDICARE

## 2023-08-22 ENCOUNTER — PATIENT MESSAGE (OUTPATIENT)
Dept: HEMATOLOGY/ONCOLOGY | Facility: CLINIC | Age: 88
End: 2023-08-22
Payer: MEDICARE

## 2023-08-22 ENCOUNTER — TELEPHONE (OUTPATIENT)
Dept: HEMATOLOGY/ONCOLOGY | Facility: CLINIC | Age: 88
End: 2023-08-22
Payer: MEDICARE

## 2023-08-22 NOTE — TELEPHONE ENCOUNTER
Wife states she set up account for the Sen hern[employer] cost plus, I advised th eRx has already been sent in [facility] should call them now to set up shipment. Patient verbalzed understanding of plan.  ----- Message from Ania Villalobos sent at 8/22/2023 12:49 PM CDT -----  Type: Need Medical Advice   Who Called: wife of patient  Best callback number: [phone]  Additional Information: wife of patient called stating she is having trouble ordering the medication   abiraterone (ZYTIGA) 500 mg Tab  Please call to further assist, Thanks.

## 2023-08-22 NOTE — TELEPHONE ENCOUNTER
No care due was identified.  [facility] Embedded Care Due Messages. Reference number: [id number].   8/22/2023 10:54:18 AM CDT

## 2023-08-23 ENCOUNTER — TELEPHONE (OUTPATIENT)
Dept: HEMATOLOGY/ONCOLOGY | Facility: CLINIC | Age: 88
End: 2023-08-23
Payer: MEDICARE

## 2023-08-23 NOTE — TELEPHONE ENCOUNTER
Patient wife having difficulty getting account set up for  [employer] plus, she is just not comfortable with the computer stuff and will wait for her daughter to come back in town on Monday to help her get it taken care. Of.  ----- Message from Georgina Tolentino sent at 8/23/2023 11:00 AM CDT -----  Regarding: advice  Contact: patient  Type: Needs Medical Advice  Who Called:  wife, Polina Alcazar  Symptoms (please be specific):    How long has patient had these symptoms:    Pharmacy name and phone #:    Best Call Back Number: [phone] (home)   Additional Information: Wife would like to speak to Chante regarding his medication. Please call patient to advise.Thanks!

## 2023-08-25 RX ORDER — METOPROLOL SUCCINATE 25 MG/1
TABLET, EXTENDED RELEASE ORAL
Qty: 135 TABLET | Refills: 0 | Status: SHIPPED | OUTPATIENT
Start: 2023-08-25 | End: 2024-02-15 | Stop reason: SDUPTHER

## 2023-08-25 RX ORDER — ATORVASTATIN CALCIUM 10 MG/1
10 TABLET, FILM COATED ORAL DAILY
Qty: 90 TABLET | Refills: 0 | Status: SHIPPED | OUTPATIENT
Start: 2023-08-25 | End: 2023-12-19 | Stop reason: SDUPTHER

## 2023-08-26 ENCOUNTER — PATIENT MESSAGE (OUTPATIENT)
Dept: HEMATOLOGY/ONCOLOGY | Facility: CLINIC | Age: 88
End: 2023-08-26
Payer: MEDICARE

## 2023-08-28 ENCOUNTER — PATIENT MESSAGE (OUTPATIENT)
Dept: HEMATOLOGY/ONCOLOGY | Facility: CLINIC | Age: 88
End: 2023-08-28
Payer: MEDICARE

## 2023-08-28 ENCOUNTER — TELEPHONE (OUTPATIENT)
Dept: RADIATION ONCOLOGY | Facility: CLINIC | Age: 88
End: 2023-08-28
Payer: MEDICARE

## 2023-08-28 NOTE — TELEPHONE ENCOUNTER
Spoke with patient's wife. They are awaiting Abiraterone and pt is having more fatigue.  They would like to hold off on consideration of prostate-directed radiation therapy for now.  Will cancel simulation on 9/11, ans schedule for follow up in a few weeks to re-evaluate after patient has been on Melissa for a few weeks.  They will re-consider radiation therapy at that time.   Private car

## 2023-09-01 ENCOUNTER — OFFICE VISIT (OUTPATIENT)
Dept: CARDIOLOGY | Facility: CLINIC | Age: 88
End: 2023-09-01
Payer: MEDICARE

## 2023-09-01 VITALS
RESPIRATION RATE: 16 BRPM | DIASTOLIC BLOOD PRESSURE: 80 MMHG | TEMPERATURE: 97 F | HEART RATE: 103 BPM | WEIGHT: 141.13 LBS | BODY MASS INDEX: 22.15 KG/M2 | SYSTOLIC BLOOD PRESSURE: 120 MMHG | HEIGHT: 67 IN | OXYGEN SATURATION: 98 %

## 2023-09-01 DIAGNOSIS — I35.0 NON-RHEUMATIC AORTIC STENOSIS: ICD-10-CM

## 2023-09-01 DIAGNOSIS — E78.5 DYSLIPIDEMIA: ICD-10-CM

## 2023-09-01 DIAGNOSIS — C61 PROSTATE CANCER: Primary | ICD-10-CM

## 2023-09-01 DIAGNOSIS — I47.19 PAT (PAROXYSMAL ATRIAL TACHYCARDIA): ICD-10-CM

## 2023-09-01 PROCEDURE — 1159F PR MEDICATION LIST DOCUMENTED IN MEDICAL RECORD: ICD-10-PCS | Mod: HCNC,CPTII,S$GLB, | Performed by: INTERNAL MEDICINE

## 2023-09-01 PROCEDURE — 1101F PR PT FALLS ASSESS DOC 0-1 FALLS W/OUT INJ PAST YR: ICD-10-PCS | Mod: HCNC,CPTII,S$GLB, | Performed by: INTERNAL MEDICINE

## 2023-09-01 PROCEDURE — 1160F PR REVIEW ALL MEDS BY PRESCRIBER/CLIN PHARMACIST DOCUMENTED: ICD-10-PCS | Mod: HCNC,CPTII,S$GLB, | Performed by: INTERNAL MEDICINE

## 2023-09-01 PROCEDURE — 1160F RVW MEDS BY RX/DR IN RCRD: CPT | Mod: HCNC,CPTII,S$GLB, | Performed by: INTERNAL MEDICINE

## 2023-09-01 PROCEDURE — 1159F MED LIST DOCD IN RCRD: CPT | Mod: HCNC,CPTII,S$GLB, | Performed by: INTERNAL MEDICINE

## 2023-09-01 PROCEDURE — 1101F PT FALLS ASSESS-DOCD LE1/YR: CPT | Mod: HCNC,CPTII,S$GLB, | Performed by: INTERNAL MEDICINE

## 2023-09-01 PROCEDURE — 1157F ADVNC CARE PLAN IN RCRD: CPT | Mod: HCNC,CPTII,S$GLB, | Performed by: INTERNAL MEDICINE

## 2023-09-01 PROCEDURE — 99999 PR PBB SHADOW E&M-EST. PATIENT-LVL III: ICD-10-PCS | Mod: PBBFAC,HCNC,, | Performed by: INTERNAL MEDICINE

## 2023-09-01 PROCEDURE — 99999 PR PBB SHADOW E&M-EST. PATIENT-LVL III: CPT | Mod: PBBFAC,HCNC,, | Performed by: INTERNAL MEDICINE

## 2023-09-01 PROCEDURE — 1126F AMNT PAIN NOTED NONE PRSNT: CPT | Mod: HCNC,CPTII,S$GLB, | Performed by: INTERNAL MEDICINE

## 2023-09-01 PROCEDURE — 99214 PR OFFICE/OUTPT VISIT, EST, LEVL IV, 30-39 MIN: ICD-10-PCS | Mod: HCNC,S$GLB,, | Performed by: INTERNAL MEDICINE

## 2023-09-01 PROCEDURE — 3288F FALL RISK ASSESSMENT DOCD: CPT | Mod: HCNC,CPTII,S$GLB, | Performed by: INTERNAL MEDICINE

## 2023-09-01 PROCEDURE — 1126F PR PAIN SEVERITY QUANTIFIED, NO PAIN PRESENT: ICD-10-PCS | Mod: HCNC,CPTII,S$GLB, | Performed by: INTERNAL MEDICINE

## 2023-09-01 PROCEDURE — 3288F PR FALLS RISK ASSESSMENT DOCUMENTED: ICD-10-PCS | Mod: HCNC,CPTII,S$GLB, | Performed by: INTERNAL MEDICINE

## 2023-09-01 PROCEDURE — 1157F PR ADVANCE CARE PLAN OR EQUIV PRESENT IN MEDICAL RECORD: ICD-10-PCS | Mod: HCNC,CPTII,S$GLB, | Performed by: INTERNAL MEDICINE

## 2023-09-01 PROCEDURE — 99214 OFFICE O/P EST MOD 30 MIN: CPT | Mod: HCNC,S$GLB,, | Performed by: INTERNAL MEDICINE

## 2023-09-01 NOTE — PROGRESS NOTES
Subjective:    Patient ID:  Amor Alcazar is a 88 y.o. male who presents for follow-up.    HPI  He has a history of arthritis, hypothyroidism, aortic stenosis, iron deficiency anemia and metastatic prostate cancer, has been on Lupron and mitoxantrone.  He is here for follow up. Since last visit, he reports no chest pain or palpitations. He is being considered for XRT.  Treatment Plan Information   OP MITOXANTRONE PREDNISONE Q3W   Beka Alvarez MD   Upcoming Treatment Dates - OP MITOXANTRONE PREDNISONE Q3W     No upcoming days in selected categories.     Supportive Plan Information  OP PROSTATE LEUPROLIDE (LUPRON) 6 MONTH   Beka Alvarez MD   Upcoming Treatment Dates - OP PROSTATE LEUPROLIDE (LUPRON) 6 MONTH       Review of Systems   Constitutional: Negative for chills and fever.   HENT:  Positive for hearing loss.    Eyes:  Negative for redness.   Cardiovascular:  Negative for chest pain, irregular heartbeat, leg swelling, palpitations and syncope.   Respiratory:  Negative for cough and shortness of breath.    Endocrine: Negative for cold intolerance.   Musculoskeletal:  Positive for joint pain.   Gastrointestinal:  Negative for vomiting.   Genitourinary:  Negative for hematuria.   Neurological:  Negative for focal weakness and seizures.   Psychiatric/Behavioral:  The patient is not nervous/anxious.    Allergic/Immunologic: Negative for hives.        Current Outpatient Medications   Medication Sig    abiraterone (ZYTIGA) 500 mg Tab Take 1,000 mg by mouth once daily.    atorvastatin (LIPITOR) 10 MG tablet Take 1 tablet (10 mg total) by mouth once daily.    duke's soln (benadryl 30 mL, mylanta 30 mL, LIDOcaine 30 mL, nystatin 30 mL) 120mL Take 10 mLs by mouth 4 (four) times daily.    folic acid (FOLVITE) 400 MCG tablet Take 400 mcg by mouth once daily.    levothyroxine (SYNTHROID) 50 MCG tablet Take 1 tablet (50 mcg total) by mouth before breakfast.    LUPRON DEPOT, 6 MONTH, 45 mg SyKt injection Inject into  the muscle every 6 (six) months.    metoprolol succinate (TOPROL-XL) 25 MG 24 hr tablet Take 1 tablet (25 mg total) by mouth daily with breakfast AND 0.5 tablets (12.5 mg total) before evening meal.    multivit-min-FA-lycopen-lutein (CENTRUM SILVER MEN) 300-600-300 mcg Tab Take by mouth once daily.    predniSONE (DELTASONE) 5 MG tablet Take 1 tablet (5 mg total) by mouth once daily.     No current facility-administered medications for this visit.       Objective:    Physical Exam  Vitals reviewed.   Constitutional:       General: He is not in acute distress.     Appearance: He is well-developed. He is ill-appearing.   HENT:      Head: Normocephalic and atraumatic.   Neck:      Vascular: No JVD.   Cardiovascular:      Rate and Rhythm: Regular rhythm. Tachycardia present.      Heart sounds: Murmur heard.      Systolic murmur is present with a grade of 1/6 at the upper right sternal border and apex.   Pulmonary:      Effort: Pulmonary effort is normal.      Breath sounds: Normal breath sounds.   Abdominal:      Palpations: Abdomen is soft.   Musculoskeletal:      Cervical back: Neck supple.   Skin:     General: Skin is warm and dry.      Coloration: Skin is pale.   Neurological:      Mental Status: He is alert and oriented to person, place, and time.       Vitals:    09/01/23 0919   BP: 120/80   Pulse: 103   Resp: 16   Temp: 97.3 °F (36.3 °C)           Assessment:       1. Prostate cancer    2. Non-rheumatic aortic stenosis    3. Dyslipidemia    4. PAT (paroxysmal atrial tachycardia)           Plan:       I have reviewed the labs and ancillary data.    5/25/22 echo interpretation:  The left ventricle is normal in size with normal systolic function.  The estimated ejection fraction is 55-60%.  Normal left ventricular diastolic function.  Normal right ventricular size with normal right ventricular systolic function.  There is mild aortic valve stenosis.  Aortic valve area is 1.82 cm2; peak velocity is 1.48 m/s; mean  gradient is 5 mmHg.  Normal central venous pressure (3 mmHg).  The estimated PA systolic pressure is 29 mmHg.  The left ventricular global longitudinal strain is -18.32%.    8/12/22 echo interpretation:  The left ventricle is normal in size with concentric remodeling and normal systolic function.  The estimated ejection fraction is 60%.  Normal left ventricular diastolic function.  Normal right ventricular size with normal right ventricular systolic function.  There is mild aortic valve stenosis.  Aortic valve area is 1.72 cm2; peak velocity is 2.13 m/s; mean gradient is 10 mmHg.  Mild mitral regurgitation.  Mild tricuspid regurgitation.  Normal central venous pressure (3 mmHg).  The estimated PA systolic pressure is 33 mmHg.  The left ventricular global longitudinal strain is -18.1%.    12/13/22 echo interpretation:  The left ventricle is normal in size with concentric remodeling and normal systolic function.  The estimated ejection fraction is 65%.  Normal left ventricular diastolic function.  Normal right ventricular size with normal right ventricular systolic function.  There is mild aortic valve stenosis.  Aortic valve area is 1.79 cm2; peak velocity is 2.18 m/s; mean gradient is 10 mmHg.  IVC not well visualized.  The estimated PA systolic pressure is at [id number] mmHg.  Echocardiographic images too poor to obtain accurate strain measurement.    3/14/23 Holter:  The patient monitored for 2 days and 23 hours.  The rhythm is sinus with mean heart rate of 72 beats per minute, lowest heart rate 55 beats per minute maximum heart rate 111 beats per minute.  Very frequent isolated PACs burden of 8.69%  Frequent short runs of PSVT the longest is 2 minutes and 12 seconds at 10:39 a.m.  Occasional isolated PVCs burden of 0.35% and 1 ventricular triplet  No atrial fibrillation.  No heart block.    3/14/23 echo:  The left ventricle is normal in size with normal systolic function.  The estimated ejection fraction is  60%.  Normal left ventricular diastolic function.  Normal right ventricular size with normal right ventricular systolic function.  There is mild aortic valve stenosis.  Aortic valve area is 1.84 cm2; peak velocity is 2.09 m/s; mean gradient is 10 mmHg.  Mild-to-moderate mitral regurgitation.  Mild tricuspid regurgitation.  Normal central venous pressure (3 mmHg).  The estimated PA systolic pressure is 25 mmHg.  The left ventricular global longitudinal strain is -18.6%.      6/15/23 echo:  The left ventricle is normal in size with normal systolic function.  The estimated ejection fraction is 60%.  Normal left ventricular diastolic function.  Normal right ventricular size with normal right ventricular systolic function.  There is mild aortic valve stenosis.  Aortic valve peak velocity is 2.23 m/s; mean gradient is 12 mmHg.  Mild mitral regurgitation.  Mild tricuspid regurgitation.  Normal central venous pressure (3 mmHg).  The estimated PA systolic pressure is 33 mmHg.  Image quality not high enough to accurately calculate cardiac strain, so none will be reported.    Impression and Plan:  1) prostate ca: followed by oncology; been on ADT and mitoxantrone q3w; now XRT being considered. due for echo later this month.  2) aortic stenosis: mild; no intervention needed at this time.  3) dyslipidemia: continue low dose statin.   4) PAT: continue b-blocker. No symptoms.

## 2023-09-02 ENCOUNTER — PATIENT MESSAGE (OUTPATIENT)
Dept: HEMATOLOGY/ONCOLOGY | Facility: CLINIC | Age: 88
End: 2023-09-02
Payer: MEDICARE

## 2023-09-11 ENCOUNTER — PATIENT MESSAGE (OUTPATIENT)
Dept: HEMATOLOGY/ONCOLOGY | Facility: CLINIC | Age: 88
End: 2023-09-11

## 2023-09-11 ENCOUNTER — LAB VISIT (OUTPATIENT)
Dept: LAB | Facility: HOSPITAL | Age: 88
End: 2023-09-11
Attending: INTERNAL MEDICINE
Payer: MEDICARE

## 2023-09-11 ENCOUNTER — OFFICE VISIT (OUTPATIENT)
Dept: HEMATOLOGY/ONCOLOGY | Facility: CLINIC | Age: 88
End: 2023-09-11
Payer: MEDICARE

## 2023-09-11 VITALS
HEIGHT: 67 IN | BODY MASS INDEX: 22.18 KG/M2 | OXYGEN SATURATION: 99 % | WEIGHT: 141.31 LBS | RESPIRATION RATE: 16 BRPM | SYSTOLIC BLOOD PRESSURE: 119 MMHG | HEART RATE: 82 BPM | DIASTOLIC BLOOD PRESSURE: 69 MMHG | TEMPERATURE: 98 F

## 2023-09-11 DIAGNOSIS — D64.9 ANEMIA, UNSPECIFIED TYPE: ICD-10-CM

## 2023-09-11 DIAGNOSIS — E43 UNSPECIFIED SEVERE PROTEIN-CALORIE MALNUTRITION: ICD-10-CM

## 2023-09-11 DIAGNOSIS — C61 PROSTATE CANCER: ICD-10-CM

## 2023-09-11 DIAGNOSIS — N18.31 CHRONIC KIDNEY DISEASE, STAGE 3A: ICD-10-CM

## 2023-09-11 DIAGNOSIS — C61 PROSTATE CANCER: Primary | ICD-10-CM

## 2023-09-11 LAB
ALBUMIN SERPL BCP-MCNC: 3.8 G/DL (ref 3.5–5.2)
ALP SERPL-CCNC: 24 U/L (ref 55–135)
ALT SERPL W/O P-5'-P-CCNC: 13 U/L (ref 10–44)
ANION GAP SERPL CALC-SCNC: 11 MMOL/L (ref 8–16)
AST SERPL-CCNC: 31 U/L (ref 10–40)
BASOPHILS # BLD AUTO: 0.05 K/UL (ref 0–0.2)
BASOPHILS NFR BLD: 0.5 % (ref 0–1.9)
BILIRUB SERPL-MCNC: 0.5 MG/DL (ref 0.1–1)
BUN SERPL-MCNC: 22 MG/DL (ref 8–23)
CALCIUM SERPL-MCNC: 9.5 MG/DL (ref 8.7–10.5)
CHLORIDE SERPL-SCNC: 105 MMOL/L (ref 95–110)
CO2 SERPL-SCNC: 23 MMOL/L (ref 23–29)
CREAT SERPL-MCNC: 1.3 MG/DL (ref 0.5–1.4)
DIFFERENTIAL METHOD: ABNORMAL
EOSINOPHIL # BLD AUTO: 0.1 K/UL (ref 0–0.5)
EOSINOPHIL NFR BLD: 0.9 % (ref 0–8)
ERYTHROCYTE [DISTWIDTH] IN BLOOD BY AUTOMATED COUNT: 15 % (ref 11.5–14.5)
EST. GFR  (NO RACE VARIABLE): 53.5 ML/MIN/1.73 M^2
FERRITIN SERPL-MCNC: 1524 NG/ML (ref 20–300)
GLUCOSE SERPL-MCNC: 123 MG/DL (ref 70–110)
HCT VFR BLD AUTO: 35.9 % (ref 40–54)
HGB BLD-MCNC: 11.2 G/DL (ref 14–18)
IMM GRANULOCYTES # BLD AUTO: 0.09 K/UL (ref 0–0.04)
IMM GRANULOCYTES NFR BLD AUTO: 0.9 % (ref 0–0.5)
IRON SERPL-MCNC: 104 UG/DL (ref 45–160)
LYMPHOCYTES # BLD AUTO: 2 K/UL (ref 1–4.8)
LYMPHOCYTES NFR BLD: 20.7 % (ref 18–48)
MCH RBC QN AUTO: 30.9 PG (ref 27–31)
MCHC RBC AUTO-ENTMCNC: 31.2 G/DL (ref 32–36)
MCV RBC AUTO: 99 FL (ref 82–98)
MONOCYTES # BLD AUTO: 0.6 K/UL (ref 0.3–1)
MONOCYTES NFR BLD: 5.9 % (ref 4–15)
NEUTROPHILS # BLD AUTO: 6.9 K/UL (ref 1.8–7.7)
NEUTROPHILS NFR BLD: 71.1 % (ref 38–73)
NRBC BLD-RTO: 0 /100 WBC
PLATELET # BLD AUTO: 198 K/UL (ref 150–450)
PMV BLD AUTO: 10.5 FL (ref 9.2–12.9)
POTASSIUM SERPL-SCNC: 4.3 MMOL/L (ref 3.5–5.1)
PROT SERPL-MCNC: 7.6 G/DL (ref 6–8.4)
RBC # BLD AUTO: 3.62 M/UL (ref 4.6–6.2)
SATURATED IRON: 44 % (ref 20–50)
SODIUM SERPL-SCNC: 139 MMOL/L (ref 136–145)
TOTAL IRON BINDING CAPACITY: 237 UG/DL (ref 250–450)
TRANSFERRIN SERPL-MCNC: 160 MG/DL (ref 200–375)
VIT B12 SERPL-MCNC: 494 PG/ML (ref 210–950)
WBC # BLD AUTO: 9.7 K/UL (ref 3.9–12.7)

## 2023-09-11 PROCEDURE — 1157F PR ADVANCE CARE PLAN OR EQUIV PRESENT IN MEDICAL RECORD: ICD-10-PCS | Mod: HCNC,CPTII,S$GLB, | Performed by: INTERNAL MEDICINE

## 2023-09-11 PROCEDURE — 1126F AMNT PAIN NOTED NONE PRSNT: CPT | Mod: HCNC,CPTII,S$GLB, | Performed by: INTERNAL MEDICINE

## 2023-09-11 PROCEDURE — 82607 VITAMIN B-12: CPT | Mod: HCNC | Performed by: INTERNAL MEDICINE

## 2023-09-11 PROCEDURE — 1101F PT FALLS ASSESS-DOCD LE1/YR: CPT | Mod: HCNC,CPTII,S$GLB, | Performed by: INTERNAL MEDICINE

## 2023-09-11 PROCEDURE — 85025 COMPLETE CBC W/AUTO DIFF WBC: CPT | Mod: HCNC,PN | Performed by: INTERNAL MEDICINE

## 2023-09-11 PROCEDURE — 83540 ASSAY OF IRON: CPT | Mod: HCNC | Performed by: INTERNAL MEDICINE

## 2023-09-11 PROCEDURE — 84466 ASSAY OF TRANSFERRIN: CPT | Mod: HCNC | Performed by: INTERNAL MEDICINE

## 2023-09-11 PROCEDURE — 99215 PR OFFICE/OUTPT VISIT, EST, LEVL V, 40-54 MIN: ICD-10-PCS | Mod: HCNC,S$GLB,, | Performed by: INTERNAL MEDICINE

## 2023-09-11 PROCEDURE — 99999 PR PBB SHADOW E&M-EST. PATIENT-LVL III: CPT | Mod: PBBFAC,HCNC,, | Performed by: INTERNAL MEDICINE

## 2023-09-11 PROCEDURE — 3288F FALL RISK ASSESSMENT DOCD: CPT | Mod: HCNC,CPTII,S$GLB, | Performed by: INTERNAL MEDICINE

## 2023-09-11 PROCEDURE — 80053 COMPREHEN METABOLIC PANEL: CPT | Mod: HCNC,PN | Performed by: INTERNAL MEDICINE

## 2023-09-11 PROCEDURE — 1159F MED LIST DOCD IN RCRD: CPT | Mod: HCNC,CPTII,S$GLB, | Performed by: INTERNAL MEDICINE

## 2023-09-11 PROCEDURE — 1159F PR MEDICATION LIST DOCUMENTED IN MEDICAL RECORD: ICD-10-PCS | Mod: HCNC,CPTII,S$GLB, | Performed by: INTERNAL MEDICINE

## 2023-09-11 PROCEDURE — 3288F PR FALLS RISK ASSESSMENT DOCUMENTED: ICD-10-PCS | Mod: HCNC,CPTII,S$GLB, | Performed by: INTERNAL MEDICINE

## 2023-09-11 PROCEDURE — 1101F PR PT FALLS ASSESS DOC 0-1 FALLS W/OUT INJ PAST YR: ICD-10-PCS | Mod: HCNC,CPTII,S$GLB, | Performed by: INTERNAL MEDICINE

## 2023-09-11 PROCEDURE — 1160F PR REVIEW ALL MEDS BY PRESCRIBER/CLIN PHARMACIST DOCUMENTED: ICD-10-PCS | Mod: HCNC,CPTII,S$GLB, | Performed by: INTERNAL MEDICINE

## 2023-09-11 PROCEDURE — 99999 PR PBB SHADOW E&M-EST. PATIENT-LVL III: ICD-10-PCS | Mod: PBBFAC,HCNC,, | Performed by: INTERNAL MEDICINE

## 2023-09-11 PROCEDURE — 36415 COLL VENOUS BLD VENIPUNCTURE: CPT | Mod: HCNC,PN | Performed by: INTERNAL MEDICINE

## 2023-09-11 PROCEDURE — 1126F PR PAIN SEVERITY QUANTIFIED, NO PAIN PRESENT: ICD-10-PCS | Mod: HCNC,CPTII,S$GLB, | Performed by: INTERNAL MEDICINE

## 2023-09-11 PROCEDURE — 82728 ASSAY OF FERRITIN: CPT | Mod: HCNC | Performed by: INTERNAL MEDICINE

## 2023-09-11 PROCEDURE — 1157F ADVNC CARE PLAN IN RCRD: CPT | Mod: HCNC,CPTII,S$GLB, | Performed by: INTERNAL MEDICINE

## 2023-09-11 PROCEDURE — 1160F RVW MEDS BY RX/DR IN RCRD: CPT | Mod: HCNC,CPTII,S$GLB, | Performed by: INTERNAL MEDICINE

## 2023-09-11 PROCEDURE — 99215 OFFICE O/P EST HI 40 MIN: CPT | Mod: HCNC,S$GLB,, | Performed by: INTERNAL MEDICINE

## 2023-09-11 NOTE — PROGRESS NOTES
PATIENT: Amor Alcazar  MRN: [id number]  DATE: 9/11/2023      Diagnosis:   1. Prostate cancer    2. Anemia, unspecified type    3. Chronic kidney disease, stage 3a    4. Unspecified severe protein-calorie malnutrition                        Chief Complaint: Prostate Cancer      Subjective:    Interval History: Mr. Alcazar is a 88 y.o. male with Arthritis, hypothyroidism, valvular heart disease, iron deficiency anemia who presents for follow up of prostate cancer after starting on September 4, 2023  Abiraterone and Prednisone.  His last Lupron was on April 14, 2023. He lost 10 pounds since January 2023.    The patient denies CP, cough, SOB, abdominal pain, nausea, vomiting, constipation, diarrhea.  The patient denies fever, chills, night sweats, weight loss, new lumps or bumps, easy bruising or bleeding.    Prior History:  The patient was diagnosed with prostate cancer Tio 9 (4+5) 6/03/14.  He had a rising PSA and underwent PSMA PET/CT 5/09/22 showing disease in the prostate and mediastinal LN's.  The patient has been receiving Lupron 45mg 6 months dosing with Dr Bellamy with last treatment on 4/20/22.  He has previously been on Casodex and Apalutamide with progression.  He was started on Mitoxantrone and prednisone 7/01/22.  Last Echo 8/26/22 showed normal EF.  The patient saw Dr Kasper in cardiology on 9/30/22 and recommended repeat Echo in 3 months.  The patient underwent an Echo on 12/13/22 showing normal diastolic and systolic function.  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  The patient underwent PSA on 7/14/23 showing increase to 4.1    Since the last clinic visit the patient underwent PSMA PET-CT on 08/04/2023 showing shrinkage of mediastinal lymph nodes with decreased FDG uptake with paratracheal lymph node measuring 1.4 x 1 cm; subcarinal lymph node measuring 1.2 x 1.3 cm; increased  radiotracer accumulation within the prostate gland.    Past Medical History:   Past Medical History:   Diagnosis Date    Arthritis     History of skin cancer     details unknown    Hypothyroidism, unspecified     Prostate cancer     injections q 6 months    Valvular heart disease     mild AS, MR and TR  ECHO       Past Surgical HIstory:   Past Surgical History:   Procedure Laterality Date    BONE MARROW BIOPSY Bilateral 6/15/2022    Procedure: Biopsy-bone marrow;  Surgeon: Hermann Jackson MD;  Location: [facility] OR;  Service: Oncology;  Laterality: Bilateral;    CATARACT EXTRACTION W/  INTRAOCULAR LENS IMPLANT Bilateral     ESOPHAGOGASTRODUODENOSCOPY      INSERTION OF TUNNELED CENTRAL VENOUS CATHETER (CVC) WITH SUBCUTANEOUS PORT N/A 6/15/2022    Procedure: INSERTION, PORT-A-CATH;  Surgeon: Marques Rivero MD;  Location: [facility] OR;  Service: General;  Laterality: N/A;       Family History:   Family History   Problem Relation Age of Onset    Lung cancer Sister          of       Social History:  reports that he has never smoked. He has never used smokeless tobacco. He reports that he does not currently use alcohol. He reports that he does not use drugs.    Allergies:  Review of patient's allergies indicates:  No Known Allergies    Medications:  Current Outpatient Medications   Medication Sig Dispense Refill    abiraterone (ZYTIGA) 500 mg Tab Take 1,000 mg by mouth once daily. 60 tablet 6    atorvastatin (LIPITOR) 10 MG tablet Take 1 tablet (10 mg total) by mouth once daily. 90 tablet 0    folic acid (FOLVITE) 400 MCG tablet Take 400 mcg by mouth once daily.      levothyroxine (SYNTHROID) 50 MCG tablet Take 1 tablet (50 mcg total) by mouth before breakfast. 90 tablet 2    LUPRON DEPOT, 6 MONTH, 45 mg SyKt injection Inject into the muscle every 6 (six) months.      metoprolol succinate (TOPROL-XL) 25 MG 24 hr tablet Take 1 tablet (25 mg total) by mouth daily with breakfast AND 0.5 tablets (12.5 mg total) before  "evening meal. 135 tablet 0    multivit-min-FA-lycopen-lutein (CENTRUM SILVER MEN) 300-600-300 mcg Tab Take by mouth once daily.      predniSONE (DELTASONE) 5 MG tablet Take 1 tablet (5 mg total) by mouth once daily. 60 tablet 6     No current facility-administered medications for this visit.       Review of Systems   Constitutional:  Negative for chills, diaphoresis, fatigue, fever and unexpected weight change.   Respiratory:  Negative for cough and shortness of breath.    Cardiovascular:  Negative for chest pain and palpitations.   Gastrointestinal:  Negative for abdominal pain, constipation, diarrhea, nausea and vomiting.   Skin:  Negative for color change and rash.   Neurological:  Negative for headaches.   Hematological:  Negative for adenopathy. Does not bruise/bleed easily.       ECOG Performance Status: 1   Objective:      Vitals:   Vitals:    09/11/23 1045   BP: 119/69   BP Location: Right arm   Patient Position: Sitting   BP Method: Medium (Automatic)   Pulse: 82   Resp: 16   Temp: 97.7 °F (36.5 °C)   TempSrc: Temporal   SpO2: 99%   Weight: 64.1 kg (141 lb 5 oz)   Height: 5' 7" (1.702 m)                 Physical Exam  Constitutional:       General: He is not in acute distress.     Appearance: He is well-developed. He is not diaphoretic.   HENT:      Head: Normocephalic and atraumatic.   Cardiovascular:      Rate and Rhythm: Normal rate and regular rhythm.      Heart sounds: Normal heart sounds. No murmur heard.     No friction rub. No gallop.   Pulmonary:      Effort: Pulmonary effort is normal. No respiratory distress.      Breath sounds: Normal breath sounds. No wheezing or rales.   Chest:      Chest wall: No tenderness.   Abdominal:      General: Bowel sounds are normal. There is no distension.      Palpations: Abdomen is soft. There is no mass.      Tenderness: There is no abdominal tenderness. There is no rebound.   Musculoskeletal:      Cervical back: Normal range of motion.      Comments: PORT in " left chest   Lymphadenopathy:      Cervical: No cervical adenopathy.      Upper Body:      Right upper body: No supraclavicular or axillary adenopathy.      Left upper body: No supraclavicular or axillary adenopathy.   Skin:     General: Skin is warm and dry.      Findings: No erythema, lesion or rash.   Neurological:      Mental Status: He is alert and oriented to person, place, and time.   Psychiatric:         Behavior: Behavior normal.         Laboratory Data:  No visits with results within 1 Week(s) from this visit.   Latest known visit with results is:   Lab Visit on 07/14/2023   Component Date Value Ref Range Status    WBC 07/14/2023 9.69  3.90 - 12.70 K/uL Final    RBC 07/14/2023 3.36 (L)  4.60 - 6.20 M/uL Final    Hemoglobin 07/14/2023 10.8 (L)  14.0 - 18.0 g/dL Final    Hematocrit 07/14/2023 32.3 (L)  40.0 - 54.0 % Final    MCV 07/14/2023 96  82 - 98 fL Final    MCH 07/14/2023 32.1 (H)  27.0 - 31.0 pg Final    MCHC 07/14/2023 33.4  32.0 - 36.0 g/dL Final    RDW 07/14/2023 14.1  11.5 - 14.5 % Final    Platelets 07/14/2023 186  150 - 450 K/uL Final    MPV 07/14/2023 9.3  9.2 - 12.9 fL Final    Immature Granulocytes 07/14/2023 0.9 (H)  0.0 - 0.5 % Final    Gran # (ANC) 07/14/2023 5.2  1.8 - 7.7 K/uL Final    Immature Grans (Abs) 07/14/2023 0.09 (H)  0.00 - 0.04 K/uL Final    Comment: Mild elevation in immature granulocytes is non specific and   can be seen in a variety of conditions including stress response,   acute inflammation, trauma and pregnancy. Correlation with other   laboratory and clinical findings is essential.      Lymph # 07/14/2023 2.8  1.0 - 4.8 K/uL Final    Mono # 07/14/2023 1.1 (H)  0.3 - 1.0 K/uL Final    Eos # 07/14/2023 0.4  0.0 - 0.5 K/uL Final    Baso # 07/14/2023 0.07  0.00 - 0.20 K/uL Final    nRBC 07/14/2023 0  0 /100 WBC Final    Gran % 07/14/2023 53.8  38.0 - 73.0 % Final    Lymph % 07/14/2023 29.1  18.0 - 48.0 % Final    Mono % 07/14/2023 11.7  4.0 - 15.0 % Final    Eosinophil %  07/14/2023 3.8  0.0 - 8.0 % Final    Basophil % 07/14/2023 0.7  0.0 - 1.9 % Final    Differential Method 07/14/2023 Automated   Final    Sodium 07/14/2023 139  136 - 145 mmol/L Final    Potassium 07/14/2023 4.6  3.5 - 5.1 mmol/L Final    Chloride 07/14/2023 104  95 - 110 mmol/L Final    CO2 07/14/2023 25  23 - 29 mmol/L Final    Glucose 07/14/2023 109  70 - 110 mg/dL Final    BUN 07/14/2023 25 (H)  8 - 23 mg/dL Final    Creatinine 07/14/2023 1.2  0.5 - 1.4 mg/dL Final    Calcium 07/14/2023 9.5  8.7 - 10.5 mg/dL Final    Total Protein 07/14/2023 7.4  6.0 - 8.4 g/dL Final    Albumin 07/14/2023 3.7  3.5 - 5.2 g/dL Final    Total Bilirubin 07/14/2023 0.3  0.1 - 1.0 mg/dL Final    Comment: For infants and newborns, interpretation of results should be based  on gestational age, weight and in agreement with clinical  observations.    Premature Infant recommended reference ranges:  Up to 24 hours.............<8.0 mg/dL  Up to 48 hours............<12.0 mg/dL  3-5 days..................<15.0 mg/dL  6-29 days.................<15.0 mg/dL      Alkaline Phosphatase 07/14/2023 22 (L)  55 - 135 U/L Final    AST 07/14/2023 23  10 - 40 U/L Final    ALT 07/14/2023 11  10 - 44 U/L Final    eGFR 07/14/2023 58.9 (A)  >60 mL/min/1.73 m^2 Final    Anion Gap 07/14/2023 10  8 - 16 mmol/L Final    PSA Diagnostic 07/14/2023 4.1 (H)  0.00 - 4.00 ng/mL Final    Comment: The testing method is a chemiluminescent microparticle immunoassay   manufactured by [employer] and performed on the    or   [employer] system. Values obtained with different assay manufacturers   for   methods may be different and cannot be used interchangeably.  PSA Expected levels:  Hormonal Therapy: <0.05 ng/ml  Prostatectomy: <0.01 ng/ml  Radiation Therapy: <1.00 ng/ml           Imaging:     PSMA PET/CT 8/04/23    Head/neck:     No significant abnormal foci of increased radiotracer accumulation are present in the head and neck region.     Chest:     Since  the previous study, there has been interval shrinkage of mediastinal lymph nodes which now do not exhibit significant increased radiotracer accumulation.  On image 106 a right paratracheal node now measures 1.4 x 1.0 cm compared to 2.0 x 1.3 cm previously and has a max SUV of 1.8 compared to 4.2 previously.  On image 119 a subcarinal node now measures 1.3 x 1.2 cm compared to 1.8 x 1.6 cm previously and has a max SUV of 1.9 compared to 3.8 previously.     Abdomen/pelvis:     There continues to be increased radiotracer accumulation within the prostate gland.  On image 269 the max SUV is 29.7 compared to 11.2 previously.  No lymphadenopathy is present in the pelvis or retroperitoneum.     Skeleton:     No significant foci of abnormally increased radiotracer accumulation are present within the skeleton.  There are no findings to suggest osseous metastatic disease.       Assessment:       1. Prostate cancer    2. Anemia, unspecified type    3. Chronic kidney disease, stage 3a    4. 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  May predispose pt any other family member to increased risk of cancer  -PT to see genetic counselor  -PSA increased to 4.1 on 7/14/23  -PSMA PET/CT shows decrease in size and avidity of paratracheal LN and subcarinal LN but increased uptake in the prostate  -Pt to see radiation oncology for consideration of  radiation  - Started Abiraterone 1000mg daily and prednisone 5mg BID on September 4, 2023.  -He will have today CBC CMP iron studies  Vitamin B12.  -He will be seen again in October 13, 2023 with repeat CBC CMP PSA.    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 with  October 13, 2023 with repeat CBC CMP PSA. He will have today CBC CMP iron studies Vitamin B12.   Follow up with [facility]    Infusion scheduling note    Injection scheduling note    Labs    Imaging    Pharmacy appointment    Other referrals          Treatment Plan Information   OP MITOXANTRONE PREDNISONE Q3W   Beka Alvarez MD   Upcoming Treatment Dates - OP MITOXANTRONE PREDNISONE Q3W    No upcoming days in selected categories.    Supportive Plan Information  OP PROSTATE LEUPROLIDE (LUPRON) 6 MONTH   Beka Alvarez MD   Upcoming Treatment Dates - OP PROSTATE LEUPROLIDE (LUPRON) 6 MONTH    No upcoming days in selected categories.    Therapy Plan Information  Flushes  heparin, porcine (PF) 100 unit/mL injection flush 500 Units  500 Units, Intravenous, Every visit  sodium chloride 0.9% flush 10 mL  10 mL, Intravenous, Every visit      Susi Heredia MD  [facility]  Hematology and Oncology  [facility]   [address] [place] [address]   O: [phone]  F: [phone]

## 2023-09-21 ENCOUNTER — CLINICAL SUPPORT (OUTPATIENT)
Dept: CARDIOLOGY | Facility: HOSPITAL | Age: 88
End: 2023-09-21
Attending: INTERNAL MEDICINE
Payer: MEDICARE

## 2023-09-21 VITALS — HEART RATE: 77 BPM | WEIGHT: 141 LBS | HEIGHT: 67 IN | BODY MASS INDEX: 22.13 KG/M2

## 2023-09-21 DIAGNOSIS — C61 PROSTATE CANCER: ICD-10-CM

## 2023-09-21 LAB
ASCENDING AORTA: 3.16 CM
AV INDEX (PROSTH): 0.56
AV MEAN GRADIENT: 7 MMHG
AV PEAK GRADIENT: 14 MMHG
AV VALVE AREA BY VELOCITY RATIO: 2.21 CM²
AV VALVE AREA: 2.21 CM²
AV VELOCITY RATIO: 0.56
BSA FOR ECHO PROCEDURE: 1.74 M2
CV ECHO LV RWT: 0.47 CM
DOP CALC AO PEAK VEL: 1.87 M/S
DOP CALC AO VTI: 27.5 CM
DOP CALC LVOT AREA: 3.9 CM2
DOP CALC LVOT DIAMETER: 2.24 CM
DOP CALC LVOT PEAK VEL: 1.05 M/S
DOP CALC LVOT STROKE VOLUME: 60.66 CM3
DOP CALCLVOT PEAK VEL VTI: 15.4 CM
E WAVE DECELERATION TIME: 201.54 MSEC
E/A RATIO: 0.83
E/E' RATIO: 11.47 M/S
ECHO LV POSTERIOR WALL: 1.01 CM (ref 0.6–1.1)
EJECTION FRACTION: 65 %
FRACTIONAL SHORTENING: 34 % (ref 28–44)
GLOBAL LONGITUIDAL STRAIN: 16 %
INTERVENTRICULAR SEPTUM: 1 CM (ref 0.6–1.1)
IVRT: 154.14 MSEC
LEFT ATRIUM SIZE: 2.62 CM
LEFT ATRIUM VOLUME INDEX MOD: 18.2 ML/M2
LEFT ATRIUM VOLUME MOD: 31.69 CM3
LEFT INTERNAL DIMENSION IN SYSTOLE: 2.84 CM (ref 2.1–4)
LEFT VENTRICLE DIASTOLIC VOLUME INDEX: 48.22 ML/M2
LEFT VENTRICLE DIASTOLIC VOLUME: 83.91 ML
LEFT VENTRICLE MASS INDEX: 83 G/M2
LEFT VENTRICLE SYSTOLIC VOLUME INDEX: 17.6 ML/M2
LEFT VENTRICLE SYSTOLIC VOLUME: 30.54 ML
LEFT VENTRICULAR INTERNAL DIMENSION IN DIASTOLE: 4.32 CM (ref 3.5–6)
LEFT VENTRICULAR MASS: 144.55 G
LV LATERAL E/E' RATIO: 10.75 M/S
LV SEPTAL E/E' RATIO: 12.29 M/S
LVOT MG: 2.28 MMHG
LVOT MV: 0.71 CM/S
MV PEAK A VEL: 1.04 M/S
MV PEAK E VEL: 0.86 M/S
MV STENOSIS PRESSURE HALF TIME: 58.45 MS
MV VALVE AREA P 1/2 METHOD: 3.76 CM2
PISA MRMAX VEL: 5.31 M/S
PISA TR MAX VEL: 2.59 M/S
RA MAJOR: 4.02 CM
RA PRESSURE ESTIMATED: 3 MMHG
RA WIDTH: 3.2 CM
RIGHT VENTRICULAR END-DIASTOLIC DIMENSION: 3.53 CM
RIGHT VENTRICULAR LENGTH IN DIASTOLE (APICAL 4-CHAMBER VIEW): 4.7 CM
RV MID DIAMA: 2.95 CM
RV TB RVSP: 6 MMHG
RV TISSUE DOPPLER FREE WALL SYSTOLIC VELOCITY 1 (APICAL 4 CHAMBER VIEW): 12.12 CM/S
SINUS: 3.48 CM
STJ: 2.85 CM
TDI LATERAL: 0.08 M/S
TDI SEPTAL: 0.07 M/S
TDI: 0.08 M/S
TR MAX PG: 27 MMHG
TRICUSPID ANNULAR PLANE SYSTOLIC EXCURSION: 1.75 CM
TV REST PULMONARY ARTERY PRESSURE: 30 MMHG
Z-SCORE OF LEFT VENTRICULAR DIMENSION IN END DIASTOLE: -1.09
Z-SCORE OF LEFT VENTRICULAR DIMENSION IN END SYSTOLE: -0.38

## 2023-09-21 PROCEDURE — 93306 TTE W/DOPPLER COMPLETE: CPT | Mod: 26,HCNC,, | Performed by: INTERNAL MEDICINE

## 2023-09-21 PROCEDURE — 93306 ECHO (CUPID ONLY): ICD-10-PCS | Mod: 26,HCNC,, | Performed by: INTERNAL MEDICINE

## 2023-09-21 PROCEDURE — 93306 TTE W/DOPPLER COMPLETE: CPT | Mod: HCNC,PO

## 2023-09-29 ENCOUNTER — TELEPHONE (OUTPATIENT)
Dept: HEMATOLOGY/ONCOLOGY | Facility: CLINIC | Age: 88
End: 2023-09-29
Payer: MEDICARE

## 2023-09-29 ENCOUNTER — INFUSION (OUTPATIENT)
Dept: INFUSION THERAPY | Facility: HOSPITAL | Age: 88
End: 2023-09-29
Attending: INTERNAL MEDICINE
Payer: MEDICARE

## 2023-09-29 DIAGNOSIS — Z45.2 ENCOUNTER FOR INSERTION OF VENOUS ACCESS PORT: Primary | ICD-10-CM

## 2023-09-29 DIAGNOSIS — D50.8 OTHER IRON DEFICIENCY ANEMIA: ICD-10-CM

## 2023-09-29 DIAGNOSIS — C61 MALIGNANT NEOPLASM OF PROSTATE: ICD-10-CM

## 2023-09-29 PROCEDURE — 63600175 PHARM REV CODE 636 W HCPCS: Mod: HCNC,PN | Performed by: INTERNAL MEDICINE

## 2023-09-29 PROCEDURE — 25000003 PHARM REV CODE 250: Mod: HCNC,PN | Performed by: INTERNAL MEDICINE

## 2023-09-29 PROCEDURE — A4216 STERILE WATER/SALINE, 10 ML: HCPCS | Mod: HCNC,PN | Performed by: INTERNAL MEDICINE

## 2023-09-29 PROCEDURE — 96523 IRRIG DRUG DELIVERY DEVICE: CPT | Mod: HCNC,PN

## 2023-09-29 RX ORDER — HEPARIN 100 UNIT/ML
500 SYRINGE INTRAVENOUS
Status: DISCONTINUED | OUTPATIENT
Start: 2023-09-29 | End: 2023-09-29 | Stop reason: HOSPADM

## 2023-09-29 RX ORDER — HEPARIN 100 UNIT/ML
500 SYRINGE INTRAVENOUS
Status: CANCELLED | OUTPATIENT
Start: 2023-09-29

## 2023-09-29 RX ORDER — SODIUM CHLORIDE 0.9 % (FLUSH) 0.9 %
10 SYRINGE (ML) INJECTION
Status: DISCONTINUED | OUTPATIENT
Start: 2023-09-29 | End: 2023-09-29 | Stop reason: HOSPADM

## 2023-09-29 RX ORDER — SODIUM CHLORIDE 0.9 % (FLUSH) 0.9 %
10 SYRINGE (ML) INJECTION
Status: CANCELLED | OUTPATIENT
Start: 2023-09-29

## 2023-09-29 RX ADMIN — Medication 500 UNITS: at 10:09

## 2023-09-29 RX ADMIN — Medication 10 ML: at 10:09

## 2023-09-29 NOTE — TELEPHONE ENCOUNTER
----- Message from Jacqueline Kasper MD sent at 9/21/2023  3:09 PM CDT -----  Please let the patient know the echo is overall ok

## 2023-09-29 NOTE — TELEPHONE ENCOUNTER
Pt portal msg sent to pt:   Hi.  Dr Velasco, Cardio said your echo result was overall ok.  You can discuss the echo with him at your next appointment on dec 8th at 10am.   If you have any questions or concerns please contact us.  Thank you,  Barbara Brandt LPN Hematology Oncology Nurse Navigator  [facility] in [place]

## 2023-10-11 ENCOUNTER — LAB VISIT (OUTPATIENT)
Dept: LAB | Facility: HOSPITAL | Age: 88
End: 2023-10-11
Attending: INTERNAL MEDICINE
Payer: MEDICARE

## 2023-10-11 DIAGNOSIS — D64.9 ANEMIA, UNSPECIFIED TYPE: ICD-10-CM

## 2023-10-11 DIAGNOSIS — C61 PROSTATE CANCER: ICD-10-CM

## 2023-10-11 LAB
ALBUMIN SERPL BCP-MCNC: 3.2 G/DL (ref 3.5–5.2)
ALP SERPL-CCNC: 329 U/L (ref 55–135)
ALT SERPL W/O P-5'-P-CCNC: 109 U/L (ref 10–44)
ANION GAP SERPL CALC-SCNC: 10 MMOL/L (ref 8–16)
AST SERPL-CCNC: 127 U/L (ref 10–40)
BASOPHILS # BLD AUTO: 0.04 K/UL (ref 0–0.2)
BASOPHILS NFR BLD: 0.5 % (ref 0–1.9)
BILIRUB SERPL-MCNC: 0.9 MG/DL (ref 0.1–1)
BUN SERPL-MCNC: 22 MG/DL (ref 8–23)
CALCIUM SERPL-MCNC: 9.1 MG/DL (ref 8.7–10.5)
CHLORIDE SERPL-SCNC: 104 MMOL/L (ref 95–110)
CO2 SERPL-SCNC: 24 MMOL/L (ref 23–29)
COMPLEXED PSA SERPL-MCNC: 5.2 NG/ML (ref 0–4)
CREAT SERPL-MCNC: 1.1 MG/DL (ref 0.5–1.4)
DIFFERENTIAL METHOD: ABNORMAL
EOSINOPHIL # BLD AUTO: 0.2 K/UL (ref 0–0.5)
EOSINOPHIL NFR BLD: 2.7 % (ref 0–8)
ERYTHROCYTE [DISTWIDTH] IN BLOOD BY AUTOMATED COUNT: 15.8 % (ref 11.5–14.5)
EST. GFR  (NO RACE VARIABLE): >60 ML/MIN/1.73 M^2
GLUCOSE SERPL-MCNC: 107 MG/DL (ref 70–110)
HCT VFR BLD AUTO: 32.4 % (ref 40–54)
HGB BLD-MCNC: 10.3 G/DL (ref 14–18)
IMM GRANULOCYTES # BLD AUTO: 0.06 K/UL (ref 0–0.04)
IMM GRANULOCYTES NFR BLD AUTO: 0.7 % (ref 0–0.5)
LYMPHOCYTES # BLD AUTO: 1.7 K/UL (ref 1–4.8)
LYMPHOCYTES NFR BLD: 20.3 % (ref 18–48)
MCH RBC QN AUTO: 31.4 PG (ref 27–31)
MCHC RBC AUTO-ENTMCNC: 31.8 G/DL (ref 32–36)
MCV RBC AUTO: 99 FL (ref 82–98)
MONOCYTES # BLD AUTO: 0.7 K/UL (ref 0.3–1)
MONOCYTES NFR BLD: 8.9 % (ref 4–15)
NEUTROPHILS # BLD AUTO: 5.5 K/UL (ref 1.8–7.7)
NEUTROPHILS NFR BLD: 66.9 % (ref 38–73)
NRBC BLD-RTO: 0 /100 WBC
PLATELET # BLD AUTO: 154 K/UL (ref 150–450)
PMV BLD AUTO: 10.1 FL (ref 9.2–12.9)
POTASSIUM SERPL-SCNC: 4.3 MMOL/L (ref 3.5–5.1)
PROT SERPL-MCNC: 6.8 G/DL (ref 6–8.4)
RBC # BLD AUTO: 3.28 M/UL (ref 4.6–6.2)
SODIUM SERPL-SCNC: 138 MMOL/L (ref 136–145)
WBC # BLD AUTO: 8.27 K/UL (ref 3.9–12.7)

## 2023-10-11 PROCEDURE — 80053 COMPREHEN METABOLIC PANEL: CPT | Mod: HCNC,PN | Performed by: INTERNAL MEDICINE

## 2023-10-11 PROCEDURE — 36415 COLL VENOUS BLD VENIPUNCTURE: CPT | Mod: HCNC,PN | Performed by: INTERNAL MEDICINE

## 2023-10-11 PROCEDURE — 84153 ASSAY OF PSA TOTAL: CPT | Mod: HCNC | Performed by: INTERNAL MEDICINE

## 2023-10-11 PROCEDURE — 85025 COMPLETE CBC W/AUTO DIFF WBC: CPT | Mod: HCNC,PN | Performed by: INTERNAL MEDICINE

## 2023-10-13 ENCOUNTER — INFUSION (OUTPATIENT)
Dept: INFUSION THERAPY | Facility: HOSPITAL | Age: 88
End: 2023-10-13
Attending: INTERNAL MEDICINE
Payer: MEDICARE

## 2023-10-13 ENCOUNTER — OFFICE VISIT (OUTPATIENT)
Dept: HEMATOLOGY/ONCOLOGY | Facility: CLINIC | Age: 88
End: 2023-10-13
Payer: MEDICARE

## 2023-10-13 VITALS
HEIGHT: 69 IN | HEART RATE: 97 BPM | BODY MASS INDEX: 20.57 KG/M2 | SYSTOLIC BLOOD PRESSURE: 128 MMHG | TEMPERATURE: 97 F | DIASTOLIC BLOOD PRESSURE: 80 MMHG | RESPIRATION RATE: 16 BRPM | WEIGHT: 138.88 LBS | OXYGEN SATURATION: 100 %

## 2023-10-13 VITALS
WEIGHT: 138.88 LBS | SYSTOLIC BLOOD PRESSURE: 128 MMHG | RESPIRATION RATE: 16 BRPM | HEART RATE: 97 BPM | DIASTOLIC BLOOD PRESSURE: 80 MMHG | OXYGEN SATURATION: 100 % | BODY MASS INDEX: 20.57 KG/M2 | TEMPERATURE: 97 F | HEIGHT: 69 IN

## 2023-10-13 DIAGNOSIS — E03.9 ACQUIRED HYPOTHYROIDISM: ICD-10-CM

## 2023-10-13 DIAGNOSIS — C61 PROSTATE CANCER: Primary | ICD-10-CM

## 2023-10-13 DIAGNOSIS — N18.31 ANEMIA DUE TO STAGE 3A CHRONIC KIDNEY DISEASE: ICD-10-CM

## 2023-10-13 DIAGNOSIS — D63.1 ANEMIA DUE TO STAGE 3A CHRONIC KIDNEY DISEASE: ICD-10-CM

## 2023-10-13 DIAGNOSIS — R79.89 ABNORMAL LFTS: ICD-10-CM

## 2023-10-13 DIAGNOSIS — R63.4 WEIGHT LOSS, UNINTENTIONAL: ICD-10-CM

## 2023-10-13 DIAGNOSIS — N18.31 CHRONIC KIDNEY DISEASE, STAGE 3A: ICD-10-CM

## 2023-10-13 DIAGNOSIS — R63.0 POOR APPETITE: ICD-10-CM

## 2023-10-13 PROCEDURE — 1101F PT FALLS ASSESS-DOCD LE1/YR: CPT | Mod: HCNC,CPTII,S$GLB, | Performed by: INTERNAL MEDICINE

## 2023-10-13 PROCEDURE — 1160F RVW MEDS BY RX/DR IN RCRD: CPT | Mod: HCNC,CPTII,S$GLB, | Performed by: INTERNAL MEDICINE

## 2023-10-13 PROCEDURE — 3288F PR FALLS RISK ASSESSMENT DOCUMENTED: ICD-10-PCS | Mod: HCNC,CPTII,S$GLB, | Performed by: INTERNAL MEDICINE

## 2023-10-13 PROCEDURE — 96402 CHEMO HORMON ANTINEOPL SQ/IM: CPT | Mod: HCNC,PN

## 2023-10-13 PROCEDURE — 99215 OFFICE O/P EST HI 40 MIN: CPT | Mod: HCNC,S$GLB,, | Performed by: INTERNAL MEDICINE

## 2023-10-13 PROCEDURE — 1101F PR PT FALLS ASSESS DOC 0-1 FALLS W/OUT INJ PAST YR: ICD-10-PCS | Mod: HCNC,CPTII,S$GLB, | Performed by: INTERNAL MEDICINE

## 2023-10-13 PROCEDURE — 99999 PR PBB SHADOW E&M-EST. PATIENT-LVL III: ICD-10-PCS | Mod: PBBFAC,HCNC,, | Performed by: INTERNAL MEDICINE

## 2023-10-13 PROCEDURE — 1159F PR MEDICATION LIST DOCUMENTED IN MEDICAL RECORD: ICD-10-PCS | Mod: HCNC,CPTII,S$GLB, | Performed by: INTERNAL MEDICINE

## 2023-10-13 PROCEDURE — 1159F MED LIST DOCD IN RCRD: CPT | Mod: HCNC,CPTII,S$GLB, | Performed by: INTERNAL MEDICINE

## 2023-10-13 PROCEDURE — 1126F AMNT PAIN NOTED NONE PRSNT: CPT | Mod: HCNC,CPTII,S$GLB, | Performed by: INTERNAL MEDICINE

## 2023-10-13 PROCEDURE — 1157F PR ADVANCE CARE PLAN OR EQUIV PRESENT IN MEDICAL RECORD: ICD-10-PCS | Mod: HCNC,CPTII,S$GLB, | Performed by: INTERNAL MEDICINE

## 2023-10-13 PROCEDURE — 1157F ADVNC CARE PLAN IN RCRD: CPT | Mod: HCNC,CPTII,S$GLB, | Performed by: INTERNAL MEDICINE

## 2023-10-13 PROCEDURE — 99215 PR OFFICE/OUTPT VISIT, EST, LEVL V, 40-54 MIN: ICD-10-PCS | Mod: HCNC,S$GLB,, | Performed by: INTERNAL MEDICINE

## 2023-10-13 PROCEDURE — 63600175 PHARM REV CODE 636 W HCPCS: Mod: JZ,JG,HCNC,PN | Performed by: INTERNAL MEDICINE

## 2023-10-13 PROCEDURE — 3288F FALL RISK ASSESSMENT DOCD: CPT | Mod: HCNC,CPTII,S$GLB, | Performed by: INTERNAL MEDICINE

## 2023-10-13 PROCEDURE — 1160F PR REVIEW ALL MEDS BY PRESCRIBER/CLIN PHARMACIST DOCUMENTED: ICD-10-PCS | Mod: HCNC,CPTII,S$GLB, | Performed by: INTERNAL MEDICINE

## 2023-10-13 PROCEDURE — 1126F PR PAIN SEVERITY QUANTIFIED, NO PAIN PRESENT: ICD-10-PCS | Mod: HCNC,CPTII,S$GLB, | Performed by: INTERNAL MEDICINE

## 2023-10-13 PROCEDURE — 99999 PR PBB SHADOW E&M-EST. PATIENT-LVL III: CPT | Mod: PBBFAC,HCNC,, | Performed by: INTERNAL MEDICINE

## 2023-10-13 RX ORDER — CYPROHEPTADINE HYDROCHLORIDE 4 MG/1
4 TABLET ORAL 3 TIMES DAILY PRN
Qty: 90 TABLET | Refills: 0 | Status: SHIPPED | OUTPATIENT
Start: 2023-10-13 | End: 2023-11-12

## 2023-10-13 RX ADMIN — LEUPROLIDE ACETATE 45 MG: KIT at 02:10

## 2023-10-13 NOTE — PROGRESS NOTES
PATIENT: Amor Alcazar  MRN: [id number]  DATE: 10/13/2023      Diagnosis:   1. Prostate cancer    2. Chronic kidney disease, stage 3a    3. Anemia due to stage 3a chronic kidney disease    4. Acquired hypothyroidism    5. Abnormal LFTs    6. Weight loss, unintentional    7. Poor appetite            Chief Complaint: Prostate Cancer (Follow up)      Subjective:    Interval History: Mr. Alcazar is a 88 y.o. male with Arthritis, hypothyroidism, valvular heart disease, iron deficiency anemia who presents for follow up of prostate cancer after starting on September 4, 2023  Abiraterone and Prednisone.  His last Lupron was on April 14, 2023. He lost 16 pounds since January 2023.    The patient denies CP, cough, SOB, abdominal pain, nausea, vomiting, constipation, diarrhea.  The patient denies fever, chills, night sweats, weight loss, new lumps or bumps, easy bruising or bleeding.    Prior History:  The patient was diagnosed with prostate cancer [place] [address] (4+5) 6/03/14.  He had a rising PSA and underwent PSMA PET/CT 5/09/22 showing disease in the prostate and mediastinal LN's.  The patient has been receiving Lupron 45mg 6 months dosing with Dr Bellamy with last treatment on 4/20/22.  He has previously been on Casodex and Apalutamide with progression.  He was started on Mitoxantrone and prednisone 7/01/22.  Last Echo 8/26/22 showed normal EF.  The patient saw Dr Kasper in cardiology on 9/30/22 and recommended repeat Echo in 3 months.  The patient underwent an Echo on 12/13/22 showing normal diastolic and systolic function.  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  The patient underwent PSA on 7/14/23 showing increase to 4.1    Since the last clinic visit the patient underwent PSMA PET-CT on 08/04/2023 showing shrinkage of mediastinal lymph nodes with decreased FDG uptake with paratracheal lymph node  measuring 1.4 x 1 cm; subcarinal lymph node measuring 1.2 x 1.3 cm; increased radiotracer accumulation within the prostate gland.    Past Medical History:   Past Medical History:   Diagnosis Date    Arthritis     History of skin cancer     details unknown    Hypothyroidism, unspecified     Prostate cancer     injections q 6 months    Valvular heart disease     mild AS, MR and TR  ECHO       Past Surgical HIstory:   Past Surgical History:   Procedure Laterality Date    BONE MARROW BIOPSY Bilateral 6/15/2022    Procedure: Biopsy-bone marrow;  Surgeon: Hermann Jackson MD;  Location: [facility] OR;  Service: Oncology;  Laterality: Bilateral;    CATARACT EXTRACTION W/  INTRAOCULAR LENS IMPLANT Bilateral     ESOPHAGOGASTRODUODENOSCOPY      INSERTION OF TUNNELED CENTRAL VENOUS CATHETER (CVC) WITH SUBCUTANEOUS PORT N/A 6/15/2022    Procedure: INSERTION, PORT-A-CATH;  Surgeon: Marques Rivero MD;  Location: [facility] OR;  Service: General;  Laterality: N/A;       Family History:   Family History   Problem Relation Age of Onset    Lung cancer Sister          of       Social History:  reports that he has never smoked. He has never used smokeless tobacco. He reports that he does not currently use alcohol. He reports that he does not use drugs.    Allergies:  Review of patient's allergies indicates:  No Known Allergies    Medications:  Current Outpatient Medications   Medication Sig Dispense Refill    abiraterone (ZYTIGA) 500 mg Tab Take 1,000 mg by mouth once daily. 60 tablet 6    atorvastatin (LIPITOR) 10 MG tablet Take 1 tablet (10 mg total) by mouth once daily. 90 tablet 0    folic acid (FOLVITE) 400 MCG tablet Take 400 mcg by mouth once daily.      levothyroxine (SYNTHROID) 50 MCG tablet Take 1 tablet (50 mcg total) by mouth before breakfast. 90 tablet 2    LUPRON DEPOT, 6 MONTH, 45 mg SyKt injection Inject into the muscle every 6 (six) months.      metoprolol succinate (TOPROL-XL) 25 MG 24 hr tablet Take 1 tablet (25 mg  "total) by mouth daily with breakfast AND 0.5 tablets (12.5 mg total) before evening meal. 135 tablet 0    multivit-min-FA-lycopen-lutein (CENTRUM SILVER MEN) 300-600-300 mcg Tab Take by mouth once daily.      predniSONE (DELTASONE) 5 MG tablet Take 1 tablet (5 mg total) by mouth once daily. 60 tablet 6    cyproheptadine (PERIACTIN) 4 mg tablet Take 1 tablet (4 mg total) by mouth 3 (three) times daily as needed (poor appetite). 90 tablet 0     No current facility-administered medications for this visit.       Review of Systems   Constitutional:  Negative for chills, diaphoresis, fatigue, fever and unexpected weight change.   Respiratory:  Negative for cough and shortness of breath.    Cardiovascular:  Negative for chest pain and palpitations.   Gastrointestinal:  Negative for abdominal pain, constipation, diarrhea, nausea and vomiting.   Skin:  Negative for color change and rash.   Neurological:  Negative for headaches.   Hematological:  Negative for adenopathy. Does not bruise/bleed easily.       ECOG Performance Status: 1   Objective:      Vitals:   Vitals:    10/13/23 1330   BP: 128/80   BP Location: Left arm   Patient Position: Sitting   BP Method: Medium (Manual)   Pulse: 97   Resp: 16   Temp: 97.1 °F (36.2 °C)   TempSrc: Temporal   SpO2: 100%   Weight: 63 kg (138 lb 14.2 oz)   Height: 5' 8.5" (1.74 m)                 Physical Exam  Constitutional:       General: He is not in acute distress.     Appearance: He is well-developed. He is not diaphoretic.   Cardiovascular:      Rate and Rhythm: Regular rhythm.      Heart sounds: Normal heart sounds.   Pulmonary:      Effort: No respiratory distress.      Breath sounds: Normal breath sounds. No wheezing or rales.   Chest:      Chest wall: No tenderness.   Abdominal:      General: Bowel sounds are normal.      Palpations: Abdomen is soft.   Musculoskeletal:         General: No swelling.      Cervical back: Normal range of motion.      Comments: PORT in left chest "   Lymphadenopathy:      Cervical: No cervical adenopathy.      Upper Body:      Right upper body: No supraclavicular or axillary adenopathy.      Left upper body: No supraclavicular or axillary adenopathy.   Skin:     General: Skin is warm.   Neurological:      Mental Status: He is alert and oriented to person, place, and time.   Psychiatric:         Behavior: Behavior normal.         Laboratory Data:  Lab Visit on 10/11/2023   Component Date Value Ref Range Status    WBC 10/11/2023 8.27  3.90 - 12.70 K/uL Final    RBC 10/11/2023 3.28 (L)  4.60 - 6.20 M/uL Final    Hemoglobin 10/11/2023 10.3 (L)  14.0 - 18.0 g/dL Final    Hematocrit 10/11/2023 32.4 (L)  40.0 - 54.0 % Final    MCV 10/11/2023 99 (H)  82 - 98 fL Final    MCH 10/11/2023 31.4 (H)  27.0 - 31.0 pg Final    MCHC 10/11/2023 31.8 (L)  32.0 - 36.0 g/dL Final    RDW 10/11/2023 15.8 (H)  11.5 - 14.5 % Final    Platelets 10/11/2023 154  150 - 450 K/uL Final    MPV 10/11/2023 10.1  9.2 - 12.9 fL Final    Immature Granulocytes 10/11/2023 0.7 (H)  0.0 - 0.5 % Final    Gran # (ANC) 10/11/2023 5.5  1.8 - 7.7 K/uL Final    Immature Grans (Abs) 10/11/2023 0.06 (H)  0.00 - 0.04 K/uL Final    Comment: Mild elevation in immature granulocytes is non specific and   can be seen in a variety of conditions including stress response,   acute inflammation, trauma and pregnancy. Correlation with other   laboratory and clinical findings is essential.      Lymph # 10/11/2023 1.7  1.0 - 4.8 K/uL Final    Mono # 10/11/2023 0.7  0.3 - 1.0 K/uL Final    Eos # 10/11/2023 0.2  0.0 - 0.5 K/uL Final    Baso # 10/11/2023 0.04  0.00 - 0.20 K/uL Final    nRBC 10/11/2023 0  0 /100 WBC Final    Gran % 10/11/2023 66.9  38.0 - 73.0 % Final    Lymph % 10/11/2023 20.3  18.0 - 48.0 % Final    Mono % 10/11/2023 8.9  4.0 - 15.0 % Final    Eosinophil % 10/11/2023 2.7  0.0 - 8.0 % Final    Basophil % 10/11/2023 0.5  0.0 - 1.9 % Final    Differential Method 10/11/2023 Automated   Final    Sodium  10/11/2023 138  136 - 145 mmol/L Final    Potassium 10/11/2023 4.3  3.5 - 5.1 mmol/L Final    Chloride 10/11/2023 104  95 - 110 mmol/L Final    CO2 10/11/2023 24  23 - 29 mmol/L Final    Glucose 10/11/2023 107  70 - 110 mg/dL Final    BUN 10/11/2023 22  8 - 23 mg/dL Final    Creatinine 10/11/2023 1.1  0.5 - 1.4 mg/dL Final    Calcium 10/11/2023 9.1  8.7 - 10.5 mg/dL Final    Total Protein 10/11/2023 6.8  6.0 - 8.4 g/dL Final    Albumin 10/11/2023 3.2 (L)  3.5 - 5.2 g/dL Final    Total Bilirubin 10/11/2023 0.9  0.1 - 1.0 mg/dL Final    Comment: For infants and newborns, interpretation of results should be based  on gestational age, weight and in agreement with clinical  observations.    Premature Infant recommended reference ranges:  Up to 24 hours.............<8.0 mg/dL  Up to 48 hours............<12.0 mg/dL  3-5 days..................<15.0 mg/dL  6-29 days.................<15.0 mg/dL      Alkaline Phosphatase 10/11/2023 329 (H)  55 - 135 U/L Final    AST 10/11/2023 127 (H)  10 - 40 U/L Final    ALT 10/11/2023 109 (H)  10 - 44 U/L Final    eGFR 10/11/2023 >60.0  >60 mL/min/1.73 m^2 Final    Anion Gap 10/11/2023 10  8 - 16 mmol/L Final    PSA Diagnostic 10/11/2023 5.2 (H)  0.00 - 4.00 ng/mL Final    Comment: The testing method is a chemiluminescent microparticle immunoassay   manufactured by [employer] and performed on the [employer]   or   [employer] system. Values obtained with different assay manufacturers   for   methods may be different and cannot be used interchangeably.  PSA Expected levels:  Hormonal Therapy: <0.05 ng/ml  Prostatectomy: <0.01 ng/ml  Radiation Therapy: <1.00 ng/ml           Imaging:     PSMA PET/CT 8/04/23    Head/neck:     No significant abnormal foci of increased radiotracer accumulation are present in the head and neck region.     Chest:     Since the previous study, there has been interval shrinkage of mediastinal lymph nodes which now do not exhibit significant increased  radiotracer accumulation.  On image 106 a right paratracheal node now measures 1.4 x 1.0 cm compared to 2.0 x 1.3 cm previously and has a max SUV of 1.8 compared to 4.2 previously.  On image 119 a subcarinal node now measures 1.3 x 1.2 cm compared to 1.8 x 1.6 cm previously and has a max SUV of 1.9 compared to 3.8 previously.     Abdomen/pelvis:     There continues to be increased radiotracer accumulation within the prostate gland.  On image 269 the max SUV is 29.7 compared to 11.2 previously.  No lymphadenopathy is present in the pelvis or retroperitoneum.     Skeleton:     No significant foci of abnormally increased radiotracer accumulation are present within the skeleton.  There are no findings to suggest osseous metastatic disease.       Assessment:       1. Prostate cancer    2. Chronic kidney disease, stage 3a    3. Anemia due to stage 3a chronic kidney disease    4. Acquired hypothyroidism    5. Abnormal LFTs    6. Weight loss, unintentional    7. 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  May predispose pt any other family member to increased risk of cancer  -PT to see genetic counselor  -PSA increased to 5.2  on 10/11/23 -  it was 4.1 on 7/14/2023  -PSMA PET/CT shows decrease in size and avidity of paratracheal LN and subcarinal LN but increased uptake in the prostate  -Pt to saw  radiation oncology for consideration of radiation- he declined to  proceed.   - Started Abiraterone 1000mg daily and prednisone 5mg BID on September 4, 2023. It will be put on hold for now in view of his elevated LFT's  -He will receive his lupron injection today  -He will be seen again in  10 days to   discuss management     Elevated LFT's:  Alkp: 329   on 10/11/2023  Most likely due to Abiraterone. It will be put on hold. Repeat LFT's in 1 week.    Poor appetite and weight loss:  He will be started on periactin.    CHECK 2 mutation   Pt saw René Stovall on 8/03/23  He is at high risk for prostate, breast , colon cancer.  Genetic testing of relatives was strongly recommended to clarify who else in the family (e.g. parents, children, siblings, aunts, uncles, cousins, etc.) has these cancer risks that warrant increased cancer screening.    Anemia of Chronic Disease   - Hemoglobin was 10.3g/dL on 10/11/23  -Vitamin B12 494- iron studies showed a ferritin of 1524 on 9/11/2023  -Will monitor      CKD stage 3a  Crea: 1  Will continue to monitor.      Hypothyroidism   - pt on synthroid  -management per PCP    Route Chart for Scheduling    Med Onc Chart Routing      Follow up with physician Other. 10 days - repeat CMP next week on October 20, 2023. he is cleared to have his Lupron   Follow up with [facility]    Infusion scheduling note    Injection scheduling note    Labs    Imaging    Pharmacy appointment    Other referrals              Treatment Plan Information   OP MITOXANTRONE PREDNISONE Q3W   Beka Alvarez MD   Upcoming Treatment Dates - OP MITOXANTRONE PREDNISONE Q3W    No upcoming days in selected categories.    Supportive Plan Information  OP PROSTATE LEUPROLIDE (LUPRON) 6 MONTH   Beka Alvarez MD   Upcoming Treatment Dates - OP PROSTATE LEUPROLIDE (LUPRON) 6 MONTH    9/29/2023       Chemotherapy       leuprolide acetate (6 month) injection 45 mg  3/15/2024       Chemotherapy       leuprolide acetate (6 month) injection 45 mg  8/30/2024       Chemotherapy        leuprolide acetate (6 month) injection 45 mg    Therapy Plan Information  Flushes  heparin, porcine (PF) 100 unit/mL injection flush 500 Units  500 Units, Intravenous, Every visit  sodium chloride 0.9% flush 10 mL  10 mL, Intravenous, Every visit      Susi Heredia MD  [facility]  Hematology and Oncology  [facility]   [address]   [place] [address]   O: [phone]  F: [phone]

## 2023-10-13 NOTE — PLAN OF CARE
Problem: Adult Inpatient Plan of Care  Goal: Plan of Care Review  Outcome: Ongoing, Progressing  Goal: Patient-Specific Goal (Individualized)  Outcome: Ongoing, Progressing     Problem: Fatigue  Goal: Improved Activity Tolerance  Outcome: Ongoing, Progressing   Pt tolerated lupron injection well.   No adverse reaction noted.  All questions answered.  Pt left clinic in no acute distress.

## 2023-10-20 ENCOUNTER — LAB VISIT (OUTPATIENT)
Dept: LAB | Facility: HOSPITAL | Age: 88
End: 2023-10-20
Attending: INTERNAL MEDICINE
Payer: MEDICARE

## 2023-10-20 ENCOUNTER — PATIENT MESSAGE (OUTPATIENT)
Dept: HEMATOLOGY/ONCOLOGY | Facility: CLINIC | Age: 88
End: 2023-10-20
Payer: MEDICARE

## 2023-10-20 DIAGNOSIS — R79.89 ABNORMAL LFTS: ICD-10-CM

## 2023-10-20 LAB
ALBUMIN SERPL BCP-MCNC: 3.3 G/DL (ref 3.5–5.2)
ALP SERPL-CCNC: 81 U/L (ref 55–135)
ALT SERPL W/O P-5'-P-CCNC: 16 U/L (ref 10–44)
ANION GAP SERPL CALC-SCNC: 10 MMOL/L (ref 8–16)
AST SERPL-CCNC: 23 U/L (ref 10–40)
BILIRUB SERPL-MCNC: 0.5 MG/DL (ref 0.1–1)
BUN SERPL-MCNC: 17 MG/DL (ref 8–23)
CALCIUM SERPL-MCNC: 9.3 MG/DL (ref 8.7–10.5)
CHLORIDE SERPL-SCNC: 106 MMOL/L (ref 95–110)
CO2 SERPL-SCNC: 26 MMOL/L (ref 23–29)
CREAT SERPL-MCNC: 1 MG/DL (ref 0.5–1.4)
EST. GFR  (NO RACE VARIABLE): >60 ML/MIN/1.73 M^2
GLUCOSE SERPL-MCNC: 107 MG/DL (ref 70–110)
POTASSIUM SERPL-SCNC: 3.9 MMOL/L (ref 3.5–5.1)
PROT SERPL-MCNC: 7.2 G/DL (ref 6–8.4)
SODIUM SERPL-SCNC: 142 MMOL/L (ref 136–145)

## 2023-10-20 PROCEDURE — 36415 COLL VENOUS BLD VENIPUNCTURE: CPT | Mod: HCNC,PN | Performed by: INTERNAL MEDICINE

## 2023-10-20 PROCEDURE — 80053 COMPREHEN METABOLIC PANEL: CPT | Mod: HCNC,PN | Performed by: INTERNAL MEDICINE

## 2023-10-31 ENCOUNTER — OFFICE VISIT (OUTPATIENT)
Dept: HEMATOLOGY/ONCOLOGY | Facility: CLINIC | Age: 88
End: 2023-10-31
Payer: MEDICARE

## 2023-10-31 ENCOUNTER — OFFICE VISIT (OUTPATIENT)
Dept: PSYCHIATRY | Facility: CLINIC | Age: 88
End: 2023-10-31
Payer: MEDICARE

## 2023-10-31 VITALS
OXYGEN SATURATION: 90 % | DIASTOLIC BLOOD PRESSURE: 67 MMHG | WEIGHT: 137.56 LBS | BODY MASS INDEX: 20.38 KG/M2 | SYSTOLIC BLOOD PRESSURE: 136 MMHG | TEMPERATURE: 98 F | HEART RATE: 50 BPM | HEIGHT: 69 IN | RESPIRATION RATE: 16 BRPM

## 2023-10-31 DIAGNOSIS — C61 PROSTATE CANCER: ICD-10-CM

## 2023-10-31 DIAGNOSIS — F43.22 ADJUSTMENT DISORDER WITH ANXIETY: ICD-10-CM

## 2023-10-31 DIAGNOSIS — T45.1X5A IMMUNOSUPPRESSED DUE TO CHEMOTHERAPY: ICD-10-CM

## 2023-10-31 DIAGNOSIS — Z79.899 IMMUNOSUPPRESSED DUE TO CHEMOTHERAPY: ICD-10-CM

## 2023-10-31 DIAGNOSIS — D63.1 ANEMIA DUE TO STAGE 3A CHRONIC KIDNEY DISEASE: ICD-10-CM

## 2023-10-31 DIAGNOSIS — N18.31 ANEMIA DUE TO STAGE 3A CHRONIC KIDNEY DISEASE: ICD-10-CM

## 2023-10-31 DIAGNOSIS — R63.4 WEIGHT LOSS, UNINTENTIONAL: ICD-10-CM

## 2023-10-31 DIAGNOSIS — E03.9 ACQUIRED HYPOTHYROIDISM: ICD-10-CM

## 2023-10-31 DIAGNOSIS — N18.31 CHRONIC KIDNEY DISEASE, STAGE 3A: ICD-10-CM

## 2023-10-31 DIAGNOSIS — R79.89 ABNORMAL LFTS: ICD-10-CM

## 2023-10-31 DIAGNOSIS — C61 PROSTATE CANCER: Primary | ICD-10-CM

## 2023-10-31 DIAGNOSIS — D84.821 IMMUNOSUPPRESSED DUE TO CHEMOTHERAPY: ICD-10-CM

## 2023-10-31 PROCEDURE — 90791 PSYCH DIAGNOSTIC EVALUATION: CPT | Mod: HCNC,S$GLB,,

## 2023-10-31 PROCEDURE — 99999 PR PBB SHADOW E&M-EST. PATIENT-LVL III: ICD-10-PCS | Mod: PBBFAC,HCNC,, | Performed by: INTERNAL MEDICINE

## 2023-10-31 PROCEDURE — 1160F RVW MEDS BY RX/DR IN RCRD: CPT | Mod: HCNC,CPTII,S$GLB, | Performed by: INTERNAL MEDICINE

## 2023-10-31 PROCEDURE — 99214 OFFICE O/P EST MOD 30 MIN: CPT | Mod: HCNC,S$GLB,, | Performed by: INTERNAL MEDICINE

## 2023-10-31 PROCEDURE — 1159F PR MEDICATION LIST DOCUMENTED IN MEDICAL RECORD: ICD-10-PCS | Mod: HCNC,CPTII,S$GLB, | Performed by: INTERNAL MEDICINE

## 2023-10-31 PROCEDURE — 99999 PR PBB SHADOW E&M-EST. PATIENT-LVL I: CPT | Mod: PBBFAC,HCNC,,

## 2023-10-31 PROCEDURE — 3288F PR FALLS RISK ASSESSMENT DOCUMENTED: ICD-10-PCS | Mod: HCNC,CPTII,S$GLB, | Performed by: INTERNAL MEDICINE

## 2023-10-31 PROCEDURE — 99214 PR OFFICE/OUTPT VISIT, EST, LEVL IV, 30-39 MIN: ICD-10-PCS | Mod: HCNC,S$GLB,, | Performed by: INTERNAL MEDICINE

## 2023-10-31 PROCEDURE — 1160F PR REVIEW ALL MEDS BY PRESCRIBER/CLIN PHARMACIST DOCUMENTED: ICD-10-PCS | Mod: HCNC,CPTII,S$GLB, | Performed by: INTERNAL MEDICINE

## 2023-10-31 PROCEDURE — 1157F PR ADVANCE CARE PLAN OR EQUIV PRESENT IN MEDICAL RECORD: ICD-10-PCS | Mod: HCNC,CPTII,S$GLB,

## 2023-10-31 PROCEDURE — 99999 PR PBB SHADOW E&M-EST. PATIENT-LVL I: ICD-10-PCS | Mod: PBBFAC,HCNC,,

## 2023-10-31 PROCEDURE — 3288F FALL RISK ASSESSMENT DOCD: CPT | Mod: HCNC,CPTII,S$GLB, | Performed by: INTERNAL MEDICINE

## 2023-10-31 PROCEDURE — 1126F PR PAIN SEVERITY QUANTIFIED, NO PAIN PRESENT: ICD-10-PCS | Mod: HCNC,CPTII,S$GLB, | Performed by: INTERNAL MEDICINE

## 2023-10-31 PROCEDURE — 1157F ADVNC CARE PLAN IN RCRD: CPT | Mod: HCNC,CPTII,S$GLB, | Performed by: INTERNAL MEDICINE

## 2023-10-31 PROCEDURE — 1157F PR ADVANCE CARE PLAN OR EQUIV PRESENT IN MEDICAL RECORD: ICD-10-PCS | Mod: HCNC,CPTII,S$GLB, | Performed by: INTERNAL MEDICINE

## 2023-10-31 PROCEDURE — 1159F MED LIST DOCD IN RCRD: CPT | Mod: HCNC,CPTII,S$GLB, | Performed by: INTERNAL MEDICINE

## 2023-10-31 PROCEDURE — 1101F PT FALLS ASSESS-DOCD LE1/YR: CPT | Mod: HCNC,CPTII,S$GLB, | Performed by: INTERNAL MEDICINE

## 2023-10-31 PROCEDURE — 90791 PR PSYCHIATRIC DIAGNOSTIC EVALUATION: ICD-10-PCS | Mod: HCNC,S$GLB,,

## 2023-10-31 PROCEDURE — 1126F AMNT PAIN NOTED NONE PRSNT: CPT | Mod: HCNC,CPTII,S$GLB, | Performed by: INTERNAL MEDICINE

## 2023-10-31 PROCEDURE — 99999 PR PBB SHADOW E&M-EST. PATIENT-LVL III: CPT | Mod: PBBFAC,HCNC,, | Performed by: INTERNAL MEDICINE

## 2023-10-31 PROCEDURE — 1157F ADVNC CARE PLAN IN RCRD: CPT | Mod: HCNC,CPTII,S$GLB,

## 2023-10-31 PROCEDURE — 1101F PR PT FALLS ASSESS DOC 0-1 FALLS W/OUT INJ PAST YR: ICD-10-PCS | Mod: HCNC,CPTII,S$GLB, | Performed by: INTERNAL MEDICINE

## 2023-10-31 NOTE — PROGRESS NOTES
PSYCHO-ONCOLOGY INTAKE    Diagnostic Interview - CPT 17367    Date: 10/31/2023  Site: [place]    Evaluation Length (direct face-to-face time):  30 minutes    This includes face to face time and non-face to face time preparing to see the patient, obtaining and/or reviewing separately obtained history, documenting clinical information in the electronic or other health record, independently interpreting results and communicating results to the patient/family/caregiver, or care coordinator.     Referral Source: Coy Burger PsyD   Oncologist: Susi Heredia MD   PCP: Dipti Dubose MD    Clinical status of patient: Outpatient    Amor Alcazar, a 88 y.o. male, seen for initial evaluation visit.    Amor Alcazar reviewed and agreed to informed consent and the limits of confidentiality.    Chief complaint/reason for encounter: adjustment to illness    Medical/Surgical History:    Patient Active Problem List   Diagnosis    Malignant neoplasm of prostate    Arthritis    Hypertriglyceridemia    Pseudophakia of both eyes    History of skin cancer    Anemia    Chronic kidney disease, stage 3a    Iron deficiency anemia    Encounter for insertion of venous access port    Prostate cancer    Acquired hypothyroidism    Abnormal LFTs    Weight loss, unintentional    Poor appetite    Immunosuppressed due to chemotherapy       Health Behaviors:       ETOH Use: No       Tobacco Use: No   Illicit Drug Use:  No     Prescription Misuse:No   Caffeine: Iced green tea bottles (1x/day)   Exercise:The patient engages in environmental activity only.   Firearms:  Yes, stored loaded in his nightstand   Advanced directives:Yes     Family History:   Psychiatric illness: No     Alcohol/Drug Abuse: No     Suicide: Yes, 1 nephew a few years ago     Past Psychiatric History:   Inpatient treatment: No     Outpatient treatment: Yes, with Dr. Burger  Since August 2022   Prior substance abuse treatment: No     Suicide  Attempts: No     Psychotropic Medications:  Current: none       Past: none    Current medications as per below, allergies reviewed in chart.    Current Outpatient Medications   Medication    abiraterone (ZYTIGA) 500 mg Tab    atorvastatin (LIPITOR) 10 MG tablet    cyproheptadine (PERIACTIN) 4 mg tablet    folic acid (FOLVITE) 400 MCG tablet    levothyroxine (SYNTHROID) 50 MCG tablet    LUPRON DEPOT, 6 MONTH, 45 mg SyKt injection    metoprolol succinate (TOPROL-XL) 25 MG 24 hr tablet    multivit-min-FA-lycopen-lutein (CENTRUM SILVER MEN) 300-600-300 mcg Tab    predniSONE (DELTASONE) 5 MG tablet     No current facility-administered medications for this visit.              Social situation/Stressors: Amor Alcazar lives  with his wife on [address] acres in [place], [place].  He is retired but previously served in the [employer] ([place]).    Amor Alcazar has been  60 years and has 4 children.  His partner is Polina ([place]).   The patient reports excellent social support.  Amor Alcazar is an active Mandaeism.  Amor Alcazar's hobbies include yard work, campfire, home projects, and spending time w/ his family.   Additional stressors: none    Strengths:Housing stability, Vocational interests, hobbies and/or talents, Interpersonal relationships and supports available - family, relatives, friends, and Cultural/spiritual/Zoroastrianism and community involvement  Liabilities: Complicated medical illness    Current Evaluation:     Mental Status Exam: Amor Alcazar arrived promptly for the assessment session.  The patient was fully cooperative throughout the interview and was an adequate historian   Appearance: age appropriate, appropriately  dressed, adequately  groomed  Behavior/Cooperation: friendly and cooperative  Speech: normal in rate, volume, and tone  Mood: euthymic  Affect: euthymic  Thought Process: goal-directed, logical  Thought Content: normal, no suicidality, no homicidality, delusions, or  paranoia;did not appear to be responding to internal stimuli during the interview.   Orientation: grossly intact  Memory: grossly intact  Attention Span/Concentration: Attends to interview without distraction; reports no difficulty  Fund of Knowledge: average  Estimate of Intelligence: average from verbal skills and history  Cognition: grossly intact  Insight: patient has awareness of illness; good insight into own behavior and behavior of others  Judgment: the patient's behavior is adequate to circumstances      History of present illness:    Oncology History   Prostate cancer   6/27/2022 Initial Diagnosis    Prostate cancer     7/1/2022 -  Chemotherapy    Treatment Summary   Plan Name: OP MITOXANTRONE PREDNISONE Q3W  Treatment Goal: Palliative  Status: Active  Start Date: 7/1/2022  End Date: 1/6/2023  Provider: Beka Alvarez MD  Chemotherapy: mitoXANTRONE (NOVANTRONE) 12 mg/m2 = 22 mg in sodium chloride 0.9% 50 mL chemo infusion, 12 mg/m2 = 22 mg, Intravenous, Clinic/HOD 1 time, 10 of 10 cycles  Administration: 22 mg (7/1/2022), 22 mg (7/22/2022), 22 mg (11/4/2022), 22 mg (11/25/2022), 22 mg (12/16/2022), 22 mg (1/6/2023), 22 mg (8/12/2022), 22 mg (9/2/2022), 22 mg (9/23/2022), 22 mg (10/14/2022)     7/20/2023 Cancer Staged    Staging form: Prostate, AJCC 8th Edition  - Clinical: Stage IVB (cTX, cNX, cM1)           Amor Alcazar has adjusted to illness fairly well primarily through prayer. He has engaged in appropriate information gathering.  The patient has good family/friend support.  His support system is coping well with the diagnosis/treatment/prognosis. He did not report any illness-related psychosocial stressors. The patient has a excellent partnership with his [facility] treatment team. The patient reports the following barriers to cancer care:distance from the hospital.     NCCN Distress thermometer:       10/31/2023    10:24 AM 10/13/2023     1:32 PM 9/11/2023    10:49 AM 9/1/2023      9:21 AM 8/18/2023     3:02 PM 8/7/2023    10:37 AM 11/11/2022     8:35 AM   DISTRESS SCREENING   Distress Score 0 - No Distress 0 - No Distress 0 - No Distress 0 - No Distress 0 - No Distress 0 - No Distress 0 - No Distress   Practical Problems None of these None of these None of these None of these None of these None of these None of these   Family Problems None of these None of these None of these None of these None of these None of these None of these   Emotional Problems None of these None of these None of these None of these None of these None of these None of these   Spiritual / Sabianism Concerns No No No No No No No   Physical Problems None of these None of these None of these None of these None of these Fatigue None of these        Symptoms:   Mood: denied;  no prior; no SI/HI  Anxiety: worries about once per week; no prior   Substance abuse: denied  Cognitive functioning: denied  Health behaviors: noncontributory  Sleep: denied difficulties   Pain: denied      Assessment - Diagnosis - Goals:       ICD-10-CM ICD-9-CM   1. Adjustment disorder with anxiety  F43.22 309.24   2. Prostate cancer  C61 185         Plan: patient was encouraged to follow up as needed    Summary and Recommendations  Aomr Alcazar is a 88 y.o. male referred by Coy Burger PsyD for psychological evaluation and treatment.  Mr. Alcazar appears to be coping well with his diagnosis and proposed treatment course.  There are no recommendations for psychological treatment at this time. However, he was encouraged to schedule an appointment with this provider if he requires psychological support in the future.     Return to clinic: as needed                      Mary Ivey, PhD  Clinical Health Psychology Fellow

## 2023-10-31 NOTE — PROGRESS NOTES
PATIENT: Amor Alcazar  MRN: [id number]  DATE: 10/31/2023      Diagnosis:   1. Prostate cancer    2. Acquired hypothyroidism    3. Chronic kidney disease, stage 3a    4. 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 Abnormal LFTs    6. 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 Alcazar is a 88 y.o. male with Arthritis, hypothyroidism, valvular heart disease, iron deficiency anemia who presents for follow up of prostate cancer after starting on September 4, 2023  Abiraterone and Prednisone. The Abiraterone/prednisone was stopped during his last visit in view of his abnormal LFT's.  His appetite has improved after starting periactin.   The patient denies CP, cough, SOB, abdominal pain, nausea, vomiting, constipation, diarrhea.  The patient denies fever, chills, night sweats, weight loss, new lumps or bumps, easy bruising or bleeding.    Prior History:  The patient was diagnosed with prostate cancer Tio 9 (4+5) 6/03/14.  He had a rising PSA and underwent PSMA PET/CT 5/09/22 showing disease in the prostate and mediastinal LN's.  The patient has been receiving Lupron 45mg 6 months dosing with Dr Bellamy with last treatment on 4/20/22.  He has previously been on Casodex and Apalutamide with progression.  He was started on Mitoxantrone and prednisone 7/01/22.  Last Echo 8/26/22 showed normal EF.  The patient saw Dr Kasper in cardiology on 9/30/22 and recommended repeat Echo in 3 months.  The patient underwent an Echo on 12/13/22 showing normal diastolic and systolic function.  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  The patient underwent PSA on 7/14/23 showing increase to 4.1    Since the last clinic visit the patient underwent PSMA PET-CT on 08/04/2023 showing  shrinkage of mediastinal lymph nodes with decreased FDG uptake with paratracheal lymph node measuring 1.4 x 1 cm; subcarinal lymph node measuring 1.2 x 1.3 cm; increased radiotracer accumulation within the prostate gland.    Past Medical History:   Past Medical History:   Diagnosis Date    Arthritis     History of skin cancer     details unknown    Hypothyroidism, unspecified     Prostate cancer     injections q 6 months    Valvular heart disease     mild AS, MR and TR  ECHO       Past Surgical HIstory:   Past Surgical History:   Procedure Laterality Date    BONE MARROW BIOPSY Bilateral 6/15/2022    Procedure: Biopsy-bone marrow;  Surgeon: Hermann Jackson MD;  Location: [facility] OR;  Service: Oncology;  Laterality: Bilateral;    CATARACT EXTRACTION W/  INTRAOCULAR LENS IMPLANT Bilateral     ESOPHAGOGASTRODUODENOSCOPY      INSERTION OF TUNNELED CENTRAL VENOUS CATHETER (CVC) WITH SUBCUTANEOUS PORT N/A 6/15/2022    Procedure: INSERTION, PORT-A-CATH;  Surgeon: Marques Rivero MD;  Location: [facility] OR;  Service: General;  Laterality: N/A;       Family History:   Family History   Problem Relation Age of Onset    Lung cancer Sister          of       Social History:  reports that he has never smoked. He has never used smokeless tobacco. He reports that he does not currently use alcohol. He reports that he does not use drugs.    Allergies:  Review of patient's allergies indicates:  No Known Allergies    Medications:  Current Outpatient Medications   Medication Sig Dispense Refill    atorvastatin (LIPITOR) 10 MG tablet Take 1 tablet (10 mg total) by mouth once daily. 90 tablet 0    cyproheptadine (PERIACTIN) 4 mg tablet Take 1 tablet (4 mg total) by mouth 3 (three) times daily as needed (poor appetite). 90 tablet 0    folic acid (FOLVITE) 400 MCG tablet Take 400 mcg by mouth once daily.      levothyroxine (SYNTHROID) 50 MCG tablet Take 1 tablet (50 mcg total) by mouth before breakfast. 90 tablet 2    LUPRON DEPOT, 6  "MONTH, 45 mg SyKt injection Inject into the muscle every 6 (six) months.      metoprolol succinate (TOPROL-XL) 25 MG 24 hr tablet Take 1 tablet (25 mg total) by mouth daily with breakfast AND 0.5 tablets (12.5 mg total) before evening meal. 135 tablet 0    multivit-min-FA-lycopen-lutein (CENTRUM SILVER MEN) 300-600-300 mcg Tab Take by mouth once daily.      abiraterone (ZYTIGA) 500 mg Tab Take 1,000 mg by mouth once daily. (Patient not taking: Reported on 10/31/2023.) 60 tablet 6    predniSONE (DELTASONE) 5 MG tablet Take 1 tablet (5 mg total) by mouth once daily. (Patient not taking: Reported on 10/31/2023) 60 tablet 6     No current facility-administered medications for this visit.       Review of Systems   Constitutional:  Negative for chills, diaphoresis, fatigue, fever and unexpected weight change.   Respiratory:  Negative for cough and shortness of breath.    Cardiovascular:  Negative for chest pain and palpitations.   Gastrointestinal:  Negative for abdominal pain, constipation, diarrhea, nausea and vomiting.   Skin:  Negative for color change and rash.   Neurological:  Negative for headaches.   Hematological:  Negative for adenopathy. Does not bruise/bleed easily.       ECOG Performance Status: 1   Objective:      Vitals:   Vitals:    10/31/23 1023   BP: 136/67   BP Location: Left arm   Patient Position: Sitting   BP Method: Medium (Manual)   Pulse: (!) 50   Resp: 16   Temp: 97.5 °F (36.4 °C)   TempSrc: Temporal   SpO2: (!) 90%   Weight: 62.4 kg (137 lb 9.1 oz)   Height: 5' 8.5" (1.74 m)                 Physical Exam  Constitutional:       General: He is not in acute distress.     Appearance: He is well-developed. He is not diaphoretic.   Cardiovascular:      Rate and Rhythm: Regular rhythm.      Heart sounds: Normal heart sounds.   Pulmonary:      Effort: No respiratory distress.      Breath sounds: Normal breath sounds. No wheezing or rales.   Chest:      Chest wall: No tenderness.   Abdominal:      " General: Bowel sounds are normal.      Palpations: Abdomen is soft.   Musculoskeletal:         General: No swelling.      Cervical back: Normal range of motion.      Comments: PORT in left chest   Lymphadenopathy:      Cervical: No cervical adenopathy.      Upper Body:      Right upper body: No supraclavicular or axillary adenopathy.      Left upper body: No supraclavicular or axillary adenopathy.   Skin:     General: Skin is warm.   Neurological:      Mental Status: He is alert and oriented to person, place, and time.   Psychiatric:         Behavior: Behavior normal.         Laboratory Data:  No visits with results within 1 Week(s) from this visit.   Latest known visit with results is:   Lab Visit on 10/20/2023   Component Date Value Ref Range Status    Sodium 10/20/2023 142  136 - 145 mmol/L Final    Potassium 10/20/2023 3.9  3.5 - 5.1 mmol/L Final    Chloride 10/20/2023 106  95 - 110 mmol/L Final    CO2 10/20/2023 26  23 - 29 mmol/L Final    Glucose 10/20/2023 107  70 - 110 mg/dL Final    BUN 10/20/2023 17  8 - 23 mg/dL Final    Creatinine 10/20/2023 1.0  0.5 - 1.4 mg/dL Final    Calcium 10/20/2023 9.3  8.7 - 10.5 mg/dL Final    Total Protein 10/20/2023 7.2  6.0 - 8.4 g/dL Final    Albumin 10/20/2023 3.3 (L)  3.5 - 5.2 g/dL Final    Total Bilirubin 10/20/2023 0.5  0.1 - 1.0 mg/dL Final    Comment: For infants and newborns, interpretation of results should be based  on gestational age, weight and in agreement with clinical  observations.    Premature Infant recommended reference ranges:  Up to 24 hours.............<8.0 mg/dL  Up to 48 hours............<12.0 mg/dL  3-5 days..................<15.0 mg/dL  6-29 days.................<15.0 mg/dL      Alkaline Phosphatase 10/20/2023 81  55 - 135 U/L Final    AST 10/20/2023 23  10 - 40 U/L Final    ALT 10/20/2023 16  10 - 44 U/L Final    eGFR 10/20/2023 >60.0  >60 mL/min/1.73 m^2 Final    Anion Gap 10/20/2023 10  8 - 16 mmol/L Final         Imaging:     PSMA PET/CT  8/04/23    Head/neck:     No significant abnormal foci of increased radiotracer accumulation are present in the head and neck region.     Chest:     Since the previous study, there has been interval shrinkage of mediastinal lymph nodes which now do not exhibit significant increased radiotracer accumulation.  On image 106 a right paratracheal node now measures 1.4 x 1.0 cm compared to 2.0 x 1.3 cm previously and has a max SUV of 1.8 compared to 4.2 previously.  On image 119 a subcarinal node now measures 1.3 x 1.2 cm compared to 1.8 x 1.6 cm previously and has a max SUV of 1.9 compared to 3.8 previously.     Abdomen/pelvis:     There continues to be increased radiotracer accumulation within the prostate gland.  On image 269 the max SUV is 29.7 compared to 11.2 previously.  No lymphadenopathy is present in the pelvis or retroperitoneum.     Skeleton:     No significant foci of abnormally increased radiotracer accumulation are present within the skeleton.  There are no findings to suggest osseous metastatic disease.       Assessment:       1. Prostate cancer    2. Acquired hypothyroidism    3. Chronic kidney disease, stage 3a    4. 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 Abnormal LFTs    6. 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  May predispose pt any other family member to increased risk of  cancer  -PT to see genetic counselor  -PSA increased to 5.2  on 10/11/23 -  it was 4.1 on 7/14/2023  -PSMA PET/CT shows decrease in size and avidity of paratracheal LN and subcarinal LN but increased uptake in the prostate  -Pt to saw  radiation oncology for consideration of radiation- he declined to proceed.   - Started Abiraterone 1000mg daily and prednisone 5mg BID on September 4, 2023. It will be put on hold starting 10/13/2023  for now in view of his elevated LFT's  -He received his lupron injection on October 13, 2023  - He will restart the Abiraterone and prednisone today.  -He will be seen in a month with repeat CBC CMP PSA and ferritin.    Immunosuppression due to chemotherapy  No signs of infection.  Will continue to monitor fever      Decreased PO2:  I took it again myself and his PO2 was 99% on room air.  Most likely it was related to a poor reading due to his cold hands.      Elevated LFT's:  Resolved on labs 10/20/2023. Alkp: 329   on 10/11/2023   Most likely due to Abiraterone. It will be restarted     Poor appetite and weight loss:  He will be started on periactin.    CHECK 2 mutation   Pt saw René Stovall on 8/03/23  He is at high risk for prostate, breast , colon cancer.  Genetic testing of relatives was strongly recommended to clarify who else in the family (e.g. parents, children, siblings, aunts, uncles, cousins, etc.) has these cancer risks that warrant increased cancer screening.    Anemia of Chronic Disease   - Hemoglobin was 10.3g/dL on 10/11/23  -Vitamin B12 494- iron studies showed a ferritin of 1524 on 9/11/2023  -Will monitor      CKD stage 3a  Crea: 1  Will continue to monitor.      Hypothyroidism   - pt on synthroid  -management per PCP    Route Chart for Scheduling    Med Onc Chart Routing      Follow up with physician 1 month.  with repeat CBC CMP PSA ferritin   Follow up with [facility]    Infusion scheduling note    Injection scheduling note    Labs    Imaging     Pharmacy appointment    Other referrals              Treatment Plan Information   OP MITOXANTRONE PREDNISONE Q3W   Beka Alvarez MD   Upcoming Treatment Dates - OP MITOXANTRONE PREDNISONE Q3W    No upcoming days in selected categories.    Supportive Plan Information  OP PROSTATE LEUPROLIDE (LUPRON) 6 MONTH   Beka Alvarez MD   Upcoming Treatment Dates - OP PROSTATE LEUPROLIDE (LUPRON) 6 MONTH    3/15/2024       Chemotherapy       leuprolide acetate (6 month) injection 45 mg  8/30/2024       Chemotherapy       leuprolide acetate (6 month) injection 45 mg    Therapy Plan Information  Flushes  heparin, porcine (PF) 100 unit/mL injection flush 500 Units  500 Units, Intravenous, Every visit  sodium chloride 0.9% flush 10 mL  10 mL, Intravenous, Every visit      Susi Heredia MD  [facility]  Hematology and Oncology  [facility]   [address] [place] [address]   O: [phone]  F: [phone]

## 2023-11-03 ENCOUNTER — PATIENT MESSAGE (OUTPATIENT)
Dept: HEMATOLOGY/ONCOLOGY | Facility: CLINIC | Age: 88
End: 2023-11-03
Payer: MEDICARE

## 2023-11-11 DIAGNOSIS — C61 PROSTATE CANCER: ICD-10-CM

## 2023-11-13 ENCOUNTER — PATIENT MESSAGE (OUTPATIENT)
Dept: HEMATOLOGY/ONCOLOGY | Facility: CLINIC | Age: 88
End: 2023-11-13
Payer: MEDICARE

## 2023-11-13 RX ORDER — PREDNISONE 5 MG/1
5 TABLET ORAL DAILY
Qty: 60 TABLET | Refills: 6 | Status: SHIPPED | OUTPATIENT
Start: 2023-11-13 | End: 2024-03-21 | Stop reason: SDUPTHER

## 2023-11-27 ENCOUNTER — LAB VISIT (OUTPATIENT)
Dept: LAB | Facility: HOSPITAL | Age: 88
End: 2023-11-27
Attending: INTERNAL MEDICINE
Payer: MEDICARE

## 2023-11-27 DIAGNOSIS — N18.31 ANEMIA DUE TO STAGE 3A CHRONIC KIDNEY DISEASE: ICD-10-CM

## 2023-11-27 DIAGNOSIS — N18.31 CHRONIC KIDNEY DISEASE, STAGE 3A: ICD-10-CM

## 2023-11-27 DIAGNOSIS — Z79.899 IMMUNOSUPPRESSED DUE TO CHEMOTHERAPY: ICD-10-CM

## 2023-11-27 DIAGNOSIS — T45.1X5A IMMUNOSUPPRESSED DUE TO CHEMOTHERAPY: ICD-10-CM

## 2023-11-27 DIAGNOSIS — D63.1 ANEMIA DUE TO STAGE 3A CHRONIC KIDNEY DISEASE: ICD-10-CM

## 2023-11-27 DIAGNOSIS — D84.821 IMMUNOSUPPRESSED DUE TO CHEMOTHERAPY: ICD-10-CM

## 2023-11-27 DIAGNOSIS — C61 PROSTATE CANCER: ICD-10-CM

## 2023-11-27 LAB
ALBUMIN SERPL BCP-MCNC: 3.3 G/DL (ref 3.5–5.2)
ALP SERPL-CCNC: 44 U/L (ref 55–135)
ALT SERPL W/O P-5'-P-CCNC: 10 U/L (ref 10–44)
ANION GAP SERPL CALC-SCNC: 10 MMOL/L (ref 8–16)
AST SERPL-CCNC: 29 U/L (ref 10–40)
BASOPHILS # BLD AUTO: 0.05 K/UL (ref 0–0.2)
BASOPHILS NFR BLD: 0.4 % (ref 0–1.9)
BILIRUB SERPL-MCNC: 0.5 MG/DL (ref 0.1–1)
BUN SERPL-MCNC: 16 MG/DL (ref 8–23)
CALCIUM SERPL-MCNC: 9.2 MG/DL (ref 8.7–10.5)
CHLORIDE SERPL-SCNC: 107 MMOL/L (ref 95–110)
CO2 SERPL-SCNC: 23 MMOL/L (ref 23–29)
COMPLEXED PSA SERPL-MCNC: 6.7 NG/ML (ref 0–4)
CREAT SERPL-MCNC: 1.2 MG/DL (ref 0.5–1.4)
DIFFERENTIAL METHOD: ABNORMAL
EOSINOPHIL # BLD AUTO: 0.3 K/UL (ref 0–0.5)
EOSINOPHIL NFR BLD: 2.4 % (ref 0–8)
ERYTHROCYTE [DISTWIDTH] IN BLOOD BY AUTOMATED COUNT: 15.2 % (ref 11.5–14.5)
EST. GFR  (NO RACE VARIABLE): 58.9 ML/MIN/1.73 M^2
FERRITIN SERPL-MCNC: 1282 NG/ML (ref 20–300)
GLUCOSE SERPL-MCNC: 94 MG/DL (ref 70–110)
HCT VFR BLD AUTO: 31.1 % (ref 40–54)
HGB BLD-MCNC: 10.2 G/DL (ref 14–18)
IMM GRANULOCYTES # BLD AUTO: 0.08 K/UL (ref 0–0.04)
IMM GRANULOCYTES NFR BLD AUTO: 0.7 % (ref 0–0.5)
LYMPHOCYTES # BLD AUTO: 2.6 K/UL (ref 1–4.8)
LYMPHOCYTES NFR BLD: 22.7 % (ref 18–48)
MCH RBC QN AUTO: 32.5 PG (ref 27–31)
MCHC RBC AUTO-ENTMCNC: 32.8 G/DL (ref 32–36)
MCV RBC AUTO: 99 FL (ref 82–98)
MONOCYTES # BLD AUTO: 0.7 K/UL (ref 0.3–1)
MONOCYTES NFR BLD: 6.3 % (ref 4–15)
NEUTROPHILS # BLD AUTO: 7.7 K/UL (ref 1.8–7.7)
NEUTROPHILS NFR BLD: 67.5 % (ref 38–73)
NRBC BLD-RTO: 0 /100 WBC
PLATELET # BLD AUTO: 270 K/UL (ref 150–450)
PMV BLD AUTO: 10.9 FL (ref 9.2–12.9)
POTASSIUM SERPL-SCNC: 4 MMOL/L (ref 3.5–5.1)
PROT SERPL-MCNC: 7.2 G/DL (ref 6–8.4)
RBC # BLD AUTO: 3.14 M/UL (ref 4.6–6.2)
SODIUM SERPL-SCNC: 140 MMOL/L (ref 136–145)
WBC # BLD AUTO: 11.43 K/UL (ref 3.9–12.7)

## 2023-11-27 PROCEDURE — 84153 ASSAY OF PSA TOTAL: CPT | Mod: HCNC | Performed by: INTERNAL MEDICINE

## 2023-11-27 PROCEDURE — 82728 ASSAY OF FERRITIN: CPT | Mod: HCNC | Performed by: INTERNAL MEDICINE

## 2023-11-27 PROCEDURE — 80053 COMPREHEN METABOLIC PANEL: CPT | Mod: HCNC,PN | Performed by: INTERNAL MEDICINE

## 2023-11-27 PROCEDURE — 85025 COMPLETE CBC W/AUTO DIFF WBC: CPT | Mod: HCNC,PN | Performed by: INTERNAL MEDICINE

## 2023-11-27 PROCEDURE — 36415 COLL VENOUS BLD VENIPUNCTURE: CPT | Mod: HCNC,PN | Performed by: INTERNAL MEDICINE

## 2023-11-29 ENCOUNTER — INFUSION (OUTPATIENT)
Dept: INFUSION THERAPY | Facility: HOSPITAL | Age: 88
End: 2023-11-29
Attending: INTERNAL MEDICINE
Payer: MEDICARE

## 2023-11-29 ENCOUNTER — OFFICE VISIT (OUTPATIENT)
Dept: HEMATOLOGY/ONCOLOGY | Facility: CLINIC | Age: 88
End: 2023-11-29
Payer: MEDICARE

## 2023-11-29 VITALS
OXYGEN SATURATION: 97 % | TEMPERATURE: 97 F | DIASTOLIC BLOOD PRESSURE: 70 MMHG | WEIGHT: 134.69 LBS | BODY MASS INDEX: 19.95 KG/M2 | SYSTOLIC BLOOD PRESSURE: 110 MMHG | HEIGHT: 69 IN | HEART RATE: 67 BPM

## 2023-11-29 DIAGNOSIS — D50.8 OTHER IRON DEFICIENCY ANEMIA: ICD-10-CM

## 2023-11-29 DIAGNOSIS — C61 PROSTATE CANCER: Primary | ICD-10-CM

## 2023-11-29 DIAGNOSIS — C61 MALIGNANT NEOPLASM OF PROSTATE: Primary | ICD-10-CM

## 2023-11-29 DIAGNOSIS — D63.8 ANEMIA OF CHRONIC DISEASE: ICD-10-CM

## 2023-11-29 DIAGNOSIS — E03.9 ACQUIRED HYPOTHYROIDISM: ICD-10-CM

## 2023-11-29 DIAGNOSIS — R74.8 ELEVATED LIVER ENZYMES: ICD-10-CM

## 2023-11-29 DIAGNOSIS — C77.1 METASTASIS TO MEDIASTINAL LYMPH NODE: ICD-10-CM

## 2023-11-29 DIAGNOSIS — Z45.2 ENCOUNTER FOR INSERTION OF VENOUS ACCESS PORT: ICD-10-CM

## 2023-11-29 DIAGNOSIS — Q99.9 GENETIC DEFECT: ICD-10-CM

## 2023-11-29 PROCEDURE — 99215 OFFICE O/P EST HI 40 MIN: CPT | Mod: HCNC,S$GLB,, | Performed by: INTERNAL MEDICINE

## 2023-11-29 PROCEDURE — 1101F PT FALLS ASSESS-DOCD LE1/YR: CPT | Mod: HCNC,CPTII,S$GLB, | Performed by: INTERNAL MEDICINE

## 2023-11-29 PROCEDURE — 3288F PR FALLS RISK ASSESSMENT DOCUMENTED: ICD-10-PCS | Mod: HCNC,CPTII,S$GLB, | Performed by: INTERNAL MEDICINE

## 2023-11-29 PROCEDURE — 99215 PR OFFICE/OUTPT VISIT, EST, LEVL V, 40-54 MIN: ICD-10-PCS | Mod: HCNC,S$GLB,, | Performed by: INTERNAL MEDICINE

## 2023-11-29 PROCEDURE — 25000003 PHARM REV CODE 250: Mod: HCNC,PN | Performed by: INTERNAL MEDICINE

## 2023-11-29 PROCEDURE — 1101F PR PT FALLS ASSESS DOC 0-1 FALLS W/OUT INJ PAST YR: ICD-10-PCS | Mod: HCNC,CPTII,S$GLB, | Performed by: INTERNAL MEDICINE

## 2023-11-29 PROCEDURE — 99999 PR PBB SHADOW E&M-EST. PATIENT-LVL III: ICD-10-PCS | Mod: PBBFAC,HCNC,, | Performed by: INTERNAL MEDICINE

## 2023-11-29 PROCEDURE — 3288F FALL RISK ASSESSMENT DOCD: CPT | Mod: HCNC,CPTII,S$GLB, | Performed by: INTERNAL MEDICINE

## 2023-11-29 PROCEDURE — 1157F PR ADVANCE CARE PLAN OR EQUIV PRESENT IN MEDICAL RECORD: ICD-10-PCS | Mod: HCNC,CPTII,S$GLB, | Performed by: INTERNAL MEDICINE

## 2023-11-29 PROCEDURE — 99999 PR PBB SHADOW E&M-EST. PATIENT-LVL III: CPT | Mod: PBBFAC,HCNC,, | Performed by: INTERNAL MEDICINE

## 2023-11-29 PROCEDURE — A4216 STERILE WATER/SALINE, 10 ML: HCPCS | Mod: HCNC,PN | Performed by: INTERNAL MEDICINE

## 2023-11-29 PROCEDURE — 96523 IRRIG DRUG DELIVERY DEVICE: CPT | Mod: HCNC,PN

## 2023-11-29 PROCEDURE — 1126F AMNT PAIN NOTED NONE PRSNT: CPT | Mod: HCNC,CPTII,S$GLB, | Performed by: INTERNAL MEDICINE

## 2023-11-29 PROCEDURE — 1126F PR PAIN SEVERITY QUANTIFIED, NO PAIN PRESENT: ICD-10-PCS | Mod: HCNC,CPTII,S$GLB, | Performed by: INTERNAL MEDICINE

## 2023-11-29 PROCEDURE — 1157F ADVNC CARE PLAN IN RCRD: CPT | Mod: HCNC,CPTII,S$GLB, | Performed by: INTERNAL MEDICINE

## 2023-11-29 RX ORDER — HEPARIN 100 UNIT/ML
500 SYRINGE INTRAVENOUS
Status: DISCONTINUED | OUTPATIENT
Start: 2023-11-29 | End: 2023-11-29 | Stop reason: HOSPADM

## 2023-11-29 RX ORDER — SODIUM CHLORIDE 0.9 % (FLUSH) 0.9 %
10 SYRINGE (ML) INJECTION
Status: CANCELLED | OUTPATIENT
Start: 2023-11-29

## 2023-11-29 RX ORDER — SODIUM CHLORIDE 0.9 % (FLUSH) 0.9 %
10 SYRINGE (ML) INJECTION
Status: DISCONTINUED | OUTPATIENT
Start: 2023-11-29 | End: 2023-11-29 | Stop reason: HOSPADM

## 2023-11-29 RX ORDER — HEPARIN 100 UNIT/ML
500 SYRINGE INTRAVENOUS
Status: CANCELLED | OUTPATIENT
Start: 2023-11-29

## 2023-11-29 RX ADMIN — Medication 10 ML: at 09:11

## 2023-11-29 NOTE — PLAN OF CARE
Problem: Adult Inpatient Plan of Care  Goal: Plan of Care Review  Outcome: Ongoing, Progressing   Tolerated port flush well today Pt d/c to home with instructions  To private vehicle without difficulty  NADTolerated port flush well today Pt d/c to home with instructions  To private vehicle without difficulty  NAD

## 2023-11-29 NOTE — PROGRESS NOTES
PATIENT: Amor Alcazar  MRN: [id number]  DATE: 11/29/2023      Diagnosis:   1. 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 Genetic defect    4. Anemia of chronic disease    5. Acquired hypothyroidism    6. Elevated liver enzymes      Chief Complaint: Prostate Cancer (1 month follow up)      Subjective:    Interval History: Mr. Alcazar is a 88 y.o. male with Arthritis, hypothyroidism, valvular heart disease, iron deficiency anemia who presents for follow up of prostate cancer.  Since the last clinic visit with me the patient's Abiraterone was held from 10/13/23 until 10/31/23 due to liver enzyme elevation.  PSA increased to 6.7 on 11/27/23.  The patient denies CP, cough, SOB, abdominal pain, nausea, vomiting, constipation, diarrhea.  The patient denies fever, chills, night sweats, weight loss, new lumps or bumps, easy bruising or bleeding.    Prior History:  The patient was diagnosed with prostate cancer [facility] 9 (4+5) 6/03/14.  He had a rising PSA and underwent PSMA PET/CT 5/09/22 showing disease in the prostate and mediastinal LN's.  The patient has been receiving Lupron 45mg 6 months dosing with Dr Bellamy with last treatment on 4/20/22.  He has previously been on Casodex and Apalutamide with progression.  He was started on Mitoxantrone and prednisone 7/01/22.  Last Echo 8/26/22 showed normal EF.   The patient saw Dr Kasper in cardiology on 9/30/22 and recommended repeat Echo in 3 months.   The patient underwent an Echo on 12/13/22 showing normal diastolic and systolic function.   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   The patient underwent PSA on 7/14/23 showing increase to 4.1   The patient underwent PSMA PET-CT on 08/04/2023 showing shrinkage of mediastinal lymph nodes with decreased FDG uptake with paratracheal lymph node measuring 1.4 x 1 cm; subcarinal lymph node  measuring 1.2 x 1.3 cm; increased radiotracer accumulation within the prostate gland.    Past Medical History:   Past Medical History:   Diagnosis Date    Arthritis     History of skin cancer     details unknown    Hypothyroidism, unspecified     Prostate cancer     injections q 6 months    Valvular heart disease     mild AS, MR and TR  ECHO       Past Surgical HIstory:   Past Surgical History:   Procedure Laterality Date    BONE MARROW BIOPSY Bilateral 6/15/2022    Procedure: Biopsy-bone marrow;  Surgeon: Hermann aJckson MD;  Location: [facility] OR;  Service: Oncology;  Laterality: Bilateral;    CATARACT EXTRACTION W/  INTRAOCULAR LENS IMPLANT Bilateral     ESOPHAGOGASTRODUODENOSCOPY      INSERTION OF TUNNELED CENTRAL VENOUS CATHETER (CVC) WITH SUBCUTANEOUS PORT N/A 6/15/2022    Procedure: INSERTION, PORT-A-CATH;  Surgeon: Marques Rivero MD;  Location: [facility] OR;  Service: General;  Laterality: N/A;       Family History:   Family History   Problem Relation Age of Onset    Lung cancer Sister          of       Social History:  reports that he has never smoked. He has never used smokeless tobacco. He reports that he does not currently use alcohol. He reports that he does not use drugs.    Allergies:  Review of patient's allergies indicates:  No Known Allergies    Medications:  Current Outpatient Medications   Medication Sig Dispense Refill    abiraterone (ZYTIGA) 500 mg Tab Take 1,000 mg by mouth once daily. 60 tablet 6    atorvastatin (LIPITOR) 10 MG tablet Take 1 tablet (10 mg total) by mouth once daily. 90 tablet 0    folic acid (FOLVITE) 400 MCG tablet Take 400 mcg by mouth once daily.      levothyroxine (SYNTHROID) 50 MCG tablet Take 1 tablet (50 mcg total) by mouth before breakfast. 90 tablet 2    LUPRON DEPOT, 6 MONTH, 45 mg SyKt injection Inject into the muscle every 6 (six) months.      metoprolol succinate (TOPROL-XL) 25 MG 24 hr tablet Take 1 tablet (25 mg total) by mouth daily with breakfast AND 0.5  "tablets (12.5 mg total) before evening meal. 135 tablet 0    multivit-min-FA-lycopen-lutein (CENTRUM SILVER MEN) 300-600-300 mcg Tab Take by mouth once daily.      predniSONE (DELTASONE) 5 MG tablet Take 1 tablet (5 mg total) by mouth once daily. 60 tablet 6     No current facility-administered medications for this visit.       Review of Systems   Constitutional:  Negative for chills, diaphoresis, fatigue, fever and unexpected weight change.   Respiratory:  Negative for cough and shortness of breath.    Cardiovascular:  Negative for chest pain and palpitations.   Gastrointestinal:  Negative for abdominal pain, constipation, diarrhea, nausea and vomiting.   Skin:  Negative for color change and rash.   Neurological:  Negative for headaches.   Hematological:  Negative for adenopathy. Does not bruise/bleed easily.       ECOG Performance Status: 1   Objective:      Vitals:   Vitals:    11/29/23 0840   BP: 110/70   BP Location: Right arm   Patient Position: Sitting   BP Method: Medium (Manual)   Pulse: 67   Temp: 97.3 °F (36.3 °C)   TempSrc: Temporal   SpO2: 97%   Weight: 61.1 kg (134 lb 11.2 oz)   Height: 5' 8.5" (1.74 m)         Physical Exam  Constitutional:       General: He is not in acute distress.     Appearance: He is well-developed. He is not diaphoretic.   HENT:      Head: Normocephalic and atraumatic.   Cardiovascular:      Rate and Rhythm: Normal rate and regular rhythm.      Heart sounds: Normal heart sounds. No murmur heard.     No friction rub. No gallop.   Pulmonary:      Effort: Pulmonary effort is normal. No respiratory distress.      Breath sounds: Normal breath sounds. No wheezing or rales.   Chest:      Chest wall: No tenderness.   Abdominal:      General: Bowel sounds are normal. There is no distension.      Palpations: Abdomen is soft. There is no mass.      Tenderness: There is no abdominal tenderness. There is no rebound.   Musculoskeletal:      Cervical back: Normal range of motion.      " Comments: PORT in left chest   Lymphadenopathy:      Cervical: No cervical adenopathy.      Upper Body:      Right upper body: No supraclavicular or axillary adenopathy.      Left upper body: No supraclavicular or axillary adenopathy.   Skin:     General: Skin is warm and dry.      Findings: No erythema, lesion or rash.   Neurological:      Mental Status: He is alert and oriented to person, place, and time.   Psychiatric:         Behavior: Behavior normal.         Laboratory Data:  Lab Visit on 11/27/2023   Component Date Value Ref Range Status    WBC 11/27/2023 11.43  3.90 - 12.70 K/uL Final    RBC 11/27/2023 3.14 (L)  4.60 - 6.20 M/uL Final    Hemoglobin 11/27/2023 10.2 (L)  14.0 - 18.0 g/dL Final    Hematocrit 11/27/2023 31.1 (L)  40.0 - 54.0 % Final    MCV 11/27/2023 99 (H)  82 - 98 fL Final    MCH 11/27/2023 32.5 (H)  27.0 - 31.0 pg Final    MCHC 11/27/2023 32.8  32.0 - 36.0 g/dL Final    RDW 11/27/2023 15.2 (H)  11.5 - 14.5 % Final    Platelets 11/27/2023 270  150 - 450 K/uL Final    MPV 11/27/2023 10.9  9.2 - 12.9 fL Final    Immature Granulocytes 11/27/2023 0.7 (H)  0.0 - 0.5 % Final    Gran # (ANC) 11/27/2023 7.7  1.8 - 7.7 K/uL Final    Immature Grans (Abs) 11/27/2023 0.08 (H)  0.00 - 0.04 K/uL Final    Comment: Mild elevation in immature granulocytes is non specific and   can be seen in a variety of conditions including stress response,   acute inflammation, trauma and pregnancy. Correlation with other   laboratory and clinical findings is essential.      Lymph # 11/27/2023 2.6  1.0 - 4.8 K/uL Final    Mono # 11/27/2023 0.7  0.3 - 1.0 K/uL Final    Eos # 11/27/2023 0.3  0.0 - 0.5 K/uL Final    Baso # 11/27/2023 0.05  0.00 - 0.20 K/uL Final    nRBC 11/27/2023 0  0 /100 WBC Final    Gran % 11/27/2023 67.5  38.0 - 73.0 % Final    Lymph % 11/27/2023 22.7  18.0 - 48.0 % Final    Mono % 11/27/2023 6.3  4.0 - 15.0 % Final    Eosinophil % 11/27/2023 2.4  0.0 - 8.0 % Final    Basophil % 11/27/2023 0.4  0.0 - 1.9 %  Final    Differential Method 11/27/2023 Automated   Final    Sodium 11/27/2023 140  136 - 145 mmol/L Final    Potassium 11/27/2023 4.0  3.5 - 5.1 mmol/L Final    Chloride 11/27/2023 107  95 - 110 mmol/L Final    CO2 11/27/2023 23  23 - 29 mmol/L Final    Glucose 11/27/2023 94  70 - 110 mg/dL Final    BUN 11/27/2023 16  8 - 23 mg/dL Final    Creatinine 11/27/2023 1.2  0.5 - 1.4 mg/dL Final    Calcium 11/27/2023 9.2  8.7 - 10.5 mg/dL Final    Total Protein 11/27/2023 7.2  6.0 - 8.4 g/dL Final    Albumin 11/27/2023 3.3 (L)  3.5 - 5.2 g/dL Final    Total Bilirubin 11/27/2023 0.5  0.1 - 1.0 mg/dL Final    Comment: For infants and newborns, interpretation of results should be based  on gestational age, weight and in agreement with clinical  observations.    Premature Infant recommended reference ranges:  Up to 24 hours.............<8.0 mg/dL  Up to 48 hours............<12.0 mg/dL  3-5 days..................<15.0 mg/dL  6-29 days.................<15.0 mg/dL      Alkaline Phosphatase 11/27/2023 44 (L)  55 - 135 U/L Final    AST 11/27/2023 29  10 - 40 U/L Final    ALT 11/27/2023 10  10 - 44 U/L Final    eGFR 11/27/2023 58.9 (A)  >60 mL/min/1.73 m^2 Final    Anion Gap 11/27/2023 10  8 - 16 mmol/L Final    PSA Diagnostic 11/27/2023 6.7 (H)  0.00 - 4.00 ng/mL Final    Comment: The testing method is a chemiluminescent microparticle immunoassay   manufactured by [employer] and performed on the [employer]   or   [employer] system. Values obtained with different assay manufacturers   for   methods may be different and cannot be used interchangeably.  PSA Expected levels:  Hormonal Therapy: <0.05 ng/ml  Prostatectomy: <0.01 ng/ml  Radiation Therapy: <1.00 ng/ml      Ferritin 11/27/2023 1,282 (H)  20.0 - 300.0 ng/mL Final         Imaging:     PSMA PET/CT 8/04/23    Head/neck:     No significant abnormal foci of increased radiotracer accumulation are present in the head and neck region.     Chest:     Since the previous study,  there has been interval shrinkage of mediastinal lymph nodes which now do not exhibit significant increased radiotracer accumulation.  On image 106 a right paratracheal node now measures 1.4 x 1.0 cm compared to 2.0 x 1.3 cm previously and has a max SUV of 1.8 compared to 4.2 previously.  On image 119 a subcarinal node now measures 1.3 x 1.2 cm compared to 1.8 x 1.6 cm previously and has a max SUV of 1.9 compared to 3.8 previously.     Abdomen/pelvis:     There continues to be increased radiotracer accumulation within the prostate gland.  On image 269 the max SUV is 29.7 compared to 11.2 previously.  No lymphadenopathy is present in the pelvis or retroperitoneum.     Skeleton:     No significant foci of abnormally increased radiotracer accumulation are present within the skeleton.  There are no findings to suggest osseous metastatic disease.       Assessment:       1. 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 Genetic defect    4. Anemia of chronic disease    5. Acquired hypothyroidism    6. 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  May predispose pt any other family member to increased risk of cancer  -PT to see genetic counselor  -PSMA PET/CT shows decrease in size and avidity of paratracheal LN and subcarinal LN but increased uptake in the prostate  -Pt potential candidate for radiation previously but has not started treatment yet  -Patient on Abiraterone 1000mg daily and prednisone 5mg BID  -PSA increasing  -Will repeat PSMA  PET/CT    Elevated Liver enzymes - from Abiraterone  -Improved  -Will monitor    CHECK 2 mutation - Pt saw René Stovall on 8/03/23    Anemia of Chronic Disease - Hemoglobin was 10.2g/dL on 11/27/23  -hemoglobin stable  -Will monitor    Hypothyroidism - pt on synthroid  -management per PCP    Route Chart for Scheduling    Med Onc Chart Routing      Follow up with physician Other. Pt needs a PSMA PEt/Ct with return appt with me once it is completed.   Follow up with AMINTA    Infusion scheduling note    Injection scheduling note    Labs    Imaging    Pharmacy appointment    Other referrals              Treatment Plan Information   OP MITOXANTRONE PREDNISONE Q3W   Beka Alvarez MD   Upcoming Treatment Dates - OP MITOXANTRONE PREDNISONE Q3W    No upcoming days in selected categories.    Supportive Plan Information  OP PROSTATE LEUPROLIDE (LUPRON) 6 MONTH   Beka Alvarez MD   Upcoming Treatment Dates - OP PROSTATE LEUPROLIDE (LUPRON) 6 MONTH    3/15/2024       Chemotherapy       leuprolide acetate (6 month) injection 45 mg  8/30/2024       Chemotherapy       leuprolide acetate (6 month) injection 45 mg    Therapy Plan Information  Flushes  heparin, porcine (PF) 100 unit/mL injection flush 500 Units  500 Units, Intravenous, Every visit  sodium chloride 0.9% flush 10 mL  10 mL, Intravenous, Every visit        Beka Alvarez MD  [facility]  Hematology and Oncology  [facility]   [address] [place] [address]   O: [phone]  F: [phone]

## 2023-12-06 ENCOUNTER — TELEPHONE (OUTPATIENT)
Dept: HEMATOLOGY/ONCOLOGY | Facility: CLINIC | Age: 88
End: 2023-12-06
Payer: MEDICARE

## 2023-12-06 NOTE — TELEPHONE ENCOUNTER
Advised daughter will work in after seeing Dr Kasper.   ----- Message from Maricarmen Maxwell sent at 12/6/2023  8:33 AM CST -----  Contact: Pt Daughter  Type:  Sooner Appointment Request  Caller is requesting a sooner appointment.  Caller declined first available appointment listed below.  Caller will not accept being placed on the waitlist and is requesting a message be sent to doctor.  Name of Caller:Pt  When is the first available appointment?12/08  Would the patient rather a call back or a response via [facility]? call  Best Call Back Number: Shireen [phone]  Additional Information: Pt Daughter would like to know if the pt can be seen around 10:30 or 11? Pt has an appt w/Dr. Kasper for 10 on that day and 1:00 is a big gap... Please call to advise...   Thank you...

## 2023-12-07 ENCOUNTER — HOSPITAL ENCOUNTER (OUTPATIENT)
Dept: RADIOLOGY | Facility: HOSPITAL | Age: 88
Discharge: HOME | End: 2023-12-07
Attending: INTERNAL MEDICINE
Payer: MEDICARE

## 2023-12-07 DIAGNOSIS — C61 PROSTATE CANCER: ICD-10-CM

## 2023-12-07 PROCEDURE — 78815 NM PET CT F 18 PYL PSMA, MIDTHIGH TO VERTEX: ICD-10-PCS | Mod: 26,PS,HCNC, | Performed by: RADIOLOGY

## 2023-12-07 PROCEDURE — 78815 PET IMAGE W/CT SKULL-THIGH: CPT | Mod: TC,HCNC,PN

## 2023-12-07 PROCEDURE — 78815 PET IMAGE W/CT SKULL-THIGH: CPT | Mod: 26,PS,HCNC, | Performed by: RADIOLOGY

## 2023-12-07 PROCEDURE — A9595 NM PET CT F 18 PYL PSMA, MIDTHIGH TO VERTEX: HCPCS | Mod: TB,HCNC,PN

## 2023-12-07 NOTE — PROGRESS NOTES
PET Imaging Questionnaire    Are you a Diabetic? Recent Blood Sugar level? No    Are you anemic? Bone Marrow Stimulation Meds? No    Have you had a CT Scan, if so when & where was your last one? Yes -     Have you had a PET Scan, if so when & where was your last one? Yes -     Chemotherapy or currently on Chemotherapy? Yes    Radiation therapy? No    Surgical History:   Past Surgical History:   Procedure Laterality Date    BONE MARROW BIOPSY Bilateral 6/15/2022    Procedure: Biopsy-bone marrow;  Surgeon: Hermann Jackson MD;  Location: [facility] OR;  Service: Oncology;  Laterality: Bilateral;    CATARACT EXTRACTION W/  INTRAOCULAR LENS IMPLANT Bilateral     ESOPHAGOGASTRODUODENOSCOPY      INSERTION OF TUNNELED CENTRAL VENOUS CATHETER (CVC) WITH SUBCUTANEOUS PORT N/A 6/15/2022    Procedure: INSERTION, PORT-A-CATH;  Surgeon: Marques Rivero MD;  Location: [facility] OR;  Service: General;  Laterality: N/A;        Have you been fasting for at least 6 hours? Yes    Is there any chance you may be pregnant or breastfeeding? No    Assay: 9.36 MCi@:10:52   Injection Site:RT AC    Residual: .25 mCi@: 10:56   Technologist: Kulwinder Weldon Injected:9.11 mCi

## 2023-12-08 ENCOUNTER — OFFICE VISIT (OUTPATIENT)
Dept: CARDIOLOGY | Facility: CLINIC | Age: 88
End: 2023-12-08
Payer: MEDICARE

## 2023-12-08 ENCOUNTER — OFFICE VISIT (OUTPATIENT)
Dept: HEMATOLOGY/ONCOLOGY | Facility: CLINIC | Age: 88
End: 2023-12-08
Payer: MEDICARE

## 2023-12-08 VITALS
BODY MASS INDEX: 22.16 KG/M2 | DIASTOLIC BLOOD PRESSURE: 71 MMHG | WEIGHT: 133 LBS | HEIGHT: 65 IN | TEMPERATURE: 98 F | HEART RATE: 64 BPM | RESPIRATION RATE: 16 BRPM | SYSTOLIC BLOOD PRESSURE: 115 MMHG

## 2023-12-08 VITALS
WEIGHT: 133.81 LBS | SYSTOLIC BLOOD PRESSURE: 115 MMHG | BODY MASS INDEX: 22.3 KG/M2 | RESPIRATION RATE: 16 BRPM | OXYGEN SATURATION: 91 % | DIASTOLIC BLOOD PRESSURE: 71 MMHG | HEIGHT: 65 IN | HEART RATE: 64 BPM | TEMPERATURE: 98 F

## 2023-12-08 DIAGNOSIS — I47.19 PAT (PAROXYSMAL ATRIAL TACHYCARDIA): ICD-10-CM

## 2023-12-08 DIAGNOSIS — D63.8 ANEMIA OF CHRONIC DISEASE: ICD-10-CM

## 2023-12-08 DIAGNOSIS — I35.0 NON-RHEUMATIC AORTIC STENOSIS: ICD-10-CM

## 2023-12-08 DIAGNOSIS — E78.5 DYSLIPIDEMIA: ICD-10-CM

## 2023-12-08 DIAGNOSIS — C61 PROSTATE CANCER: Primary | ICD-10-CM

## 2023-12-08 DIAGNOSIS — E03.9 ACQUIRED HYPOTHYROIDISM: ICD-10-CM

## 2023-12-08 DIAGNOSIS — C77.1 METASTASIS TO MEDIASTINAL LYMPH NODE: ICD-10-CM

## 2023-12-08 DIAGNOSIS — Q99.9 GENETIC DEFECT: ICD-10-CM

## 2023-12-08 PROCEDURE — 1101F PT FALLS ASSESS-DOCD LE1/YR: CPT | Mod: HCNC,CPTII,S$GLB, | Performed by: INTERNAL MEDICINE

## 2023-12-08 PROCEDURE — 1160F RVW MEDS BY RX/DR IN RCRD: CPT | Mod: HCNC,CPTII,S$GLB, | Performed by: INTERNAL MEDICINE

## 2023-12-08 PROCEDURE — 99215 PR OFFICE/OUTPT VISIT, EST, LEVL V, 40-54 MIN: ICD-10-PCS | Mod: HCNC,S$GLB,, | Performed by: INTERNAL MEDICINE

## 2023-12-08 PROCEDURE — 3288F PR FALLS RISK ASSESSMENT DOCUMENTED: ICD-10-PCS | Mod: HCNC,CPTII,S$GLB, | Performed by: INTERNAL MEDICINE

## 2023-12-08 PROCEDURE — 99499 UNLISTED E&M SERVICE: CPT | Mod: HCNC,S$GLB,, | Performed by: INTERNAL MEDICINE

## 2023-12-08 PROCEDURE — 3288F FALL RISK ASSESSMENT DOCD: CPT | Mod: HCNC,CPTII,S$GLB, | Performed by: INTERNAL MEDICINE

## 2023-12-08 PROCEDURE — 1157F PR ADVANCE CARE PLAN OR EQUIV PRESENT IN MEDICAL RECORD: ICD-10-PCS | Mod: HCNC,CPTII,S$GLB, | Performed by: INTERNAL MEDICINE

## 2023-12-08 PROCEDURE — 99214 OFFICE O/P EST MOD 30 MIN: CPT | Mod: HCNC,S$GLB,, | Performed by: INTERNAL MEDICINE

## 2023-12-08 PROCEDURE — 1101F PR PT FALLS ASSESS DOC 0-1 FALLS W/OUT INJ PAST YR: ICD-10-PCS | Mod: HCNC,CPTII,S$GLB, | Performed by: INTERNAL MEDICINE

## 2023-12-08 PROCEDURE — 1126F PR PAIN SEVERITY QUANTIFIED, NO PAIN PRESENT: ICD-10-PCS | Mod: HCNC,CPTII,S$GLB, | Performed by: INTERNAL MEDICINE

## 2023-12-08 PROCEDURE — 1160F PR REVIEW ALL MEDS BY PRESCRIBER/CLIN PHARMACIST DOCUMENTED: ICD-10-PCS | Mod: HCNC,CPTII,S$GLB, | Performed by: INTERNAL MEDICINE

## 2023-12-08 PROCEDURE — 1157F ADVNC CARE PLAN IN RCRD: CPT | Mod: HCNC,CPTII,S$GLB, | Performed by: INTERNAL MEDICINE

## 2023-12-08 PROCEDURE — 1159F PR MEDICATION LIST DOCUMENTED IN MEDICAL RECORD: ICD-10-PCS | Mod: HCNC,CPTII,S$GLB, | Performed by: INTERNAL MEDICINE

## 2023-12-08 PROCEDURE — 99214 PR OFFICE/OUTPT VISIT, EST, LEVL IV, 30-39 MIN: ICD-10-PCS | Mod: HCNC,S$GLB,, | Performed by: INTERNAL MEDICINE

## 2023-12-08 PROCEDURE — 99999 PR PBB SHADOW E&M-EST. PATIENT-LVL II: ICD-10-PCS | Mod: PBBFAC,HCNC,, | Performed by: INTERNAL MEDICINE

## 2023-12-08 PROCEDURE — 99215 OFFICE O/P EST HI 40 MIN: CPT | Mod: HCNC,S$GLB,, | Performed by: INTERNAL MEDICINE

## 2023-12-08 PROCEDURE — 1159F MED LIST DOCD IN RCRD: CPT | Mod: HCNC,CPTII,S$GLB, | Performed by: INTERNAL MEDICINE

## 2023-12-08 PROCEDURE — 99999 PR PBB SHADOW E&M-EST. PATIENT-LVL II: CPT | Mod: PBBFAC,HCNC,, | Performed by: INTERNAL MEDICINE

## 2023-12-08 PROCEDURE — 1126F AMNT PAIN NOTED NONE PRSNT: CPT | Mod: HCNC,CPTII,S$GLB, | Performed by: INTERNAL MEDICINE

## 2023-12-08 PROCEDURE — 99999 PR PBB SHADOW E&M-EST. PATIENT-LVL III: CPT | Mod: PBBFAC,HCNC,, | Performed by: INTERNAL MEDICINE

## 2023-12-08 PROCEDURE — 99999 PR PBB SHADOW E&M-EST. PATIENT-LVL III: ICD-10-PCS | Mod: PBBFAC,HCNC,, | Performed by: INTERNAL MEDICINE

## 2023-12-08 NOTE — PROGRESS NOTES
PATIENT: Amor Alcazar  MRN: [id number]  DATE: 12/9/2023      Diagnosis:   1. 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 Genetic defect    4. Anemia of chronic disease    5. Acquired hypothyroidism        Chief Complaint: Prostate Cancer      Subjective:    Interval History: Mr. Alcazar is a 88 y.o. male with Arthritis, hypothyroidism, valvular heart disease, iron deficiency anemia who presents for follow up of prostate cancer.  Since the last clinic visit with me the patient underwent PSMA PET-CT on 11/29/2023 showing no evidence of metastatic disease but increased uptake in the prostate.  The patient denies CP, cough, SOB, abdominal pain, nausea, vomiting, constipation, diarrhea.  The patient denies fever, chills, night sweats, weight loss, new lumps or bumps, easy bruising or bleeding.    Prior History:  The patient was diagnosed with prostate cancer [place] [facility] (4+5) 6/03/14.  He had a rising PSA and underwent PSMA PET/CT 5/09/22 showing disease in the prostate and mediastinal LN's.  The patient has been receiving Lupron 45mg 6 months dosing with Dr Bellamy with last treatment on 4/20/22.  He has previously been on Casodex and Apalutamide with progression.  He was started on Mitoxantrone and prednisone 7/01/22.  Last Echo 8/26/22 showed normal EF.   The patient saw Dr Kasper in cardiology on 9/30/22 and recommended repeat Echo in 3 months.   The patient underwent an Echo on 12/13/22 showing normal diastolic and systolic function.   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   The patient underwent PSA on 7/14/23 showing increase to 4.1   The patient underwent PSMA PET-CT on 08/04/2023 showing shrinkage of mediastinal lymph nodes with decreased FDG uptake with paratracheal lymph node measuring 1.4 x 1 cm; subcarinal lymph node measuring 1.2 x 1.3 cm; increased radiotracer  accumulation within the prostate gland.    Abiraterone was held from 10/13/23 until 10/31/23 due to liver enzyme elevation.  PSA increased to 6.7 on 23.    Past Medical History:   Past Medical History:   Diagnosis Date    Arthritis     History of skin cancer     details unknown    Hypothyroidism, unspecified     Prostate cancer     injections q 6 months    Valvular heart disease     mild AS, MR and TR  ECHO       Past Surgical HIstory:   Past Surgical History:   Procedure Laterality Date    BONE MARROW BIOPSY Bilateral 6/15/2022    Procedure: Biopsy-bone marrow;  Surgeon: Hermann Jackson MD;  [place] [facility] [place];  Service: Oncology;  Laterality: Bilateral;    CATARACT EXTRACTION W/  INTRAOCULAR LENS IMPLANT Bilateral     ESOPHAGOGASTRODUODENOSCOPY      INSERTION OF TUNNELED CENTRAL VENOUS CATHETER (CVC) WITH SUBCUTANEOUS PORT N/A 6/15/2022    Procedure: INSERTION, PORT-A-CATH;  Surgeon: Marques Rivero MD;  [place] [facility] [place];  Service: General;  Laterality: N/A;       Family History:   Family History   Problem Relation Age of Onset    Lung cancer Sister          of       Social History:  reports that he has never smoked. He has never used smokeless tobacco. He reports that he does not currently use alcohol. He reports that he does not use drugs.    Allergies:  Review of patient's allergies indicates:  No Known Allergies    Medications:  Current Outpatient Medications   Medication Sig Dispense Refill    abiraterone (ZYTIGA) 500 mg Tab Take 1,000 mg by mouth once daily. 60 tablet 6    atorvastatin (LIPITOR) 10 MG tablet Take 1 tablet (10 mg total) by mouth once daily. 90 tablet 0    folic acid (FOLVITE) 400 MCG tablet Take 400 mcg by mouth once daily.      levothyroxine (SYNTHROID) 50 MCG tablet Take 1 tablet (50 mcg total) by mouth before breakfast. 90 tablet 2    LUPRON DEPOT, 6 MONTH, 45 mg SyKt injection Inject into the muscle every 6 (six) months.      metoprolol succinate (TOPROL-XL) 25 MG 24 hr  "tablet Take 1 tablet (25 mg total) by mouth daily with breakfast AND 0.5 tablets (12.5 mg total) before evening meal. 135 tablet 0    multivit-min-FA-lycopen-lutein (CENTRUM SILVER MEN) 300-600-300 mcg Tab Take by mouth once daily.      predniSONE (DELTASONE) 5 MG tablet Take 1 tablet (5 mg total) by mouth once daily. 60 tablet 6     No current facility-administered medications for this visit.       Review of Systems   Constitutional:  Negative for chills, diaphoresis, fatigue, fever and unexpected weight change.   Respiratory:  Negative for cough and shortness of breath.    Cardiovascular:  Negative for chest pain and palpitations.   Gastrointestinal:  Negative for abdominal pain, constipation, diarrhea, nausea and vomiting.   Skin:  Negative for color change and rash.   Neurological:  Negative for headaches.   Hematological:  Negative for adenopathy. Does not bruise/bleed easily.       ECOG Performance Status: 1   Objective:      Vitals:   Vitals:    12/08/23 1009   BP: 115/71   Pulse: 64   Resp: 16   Temp: 98.3 °F (36.8 °C)   Weight: 60.3 kg (133 lb)   Height: 5' 5" (1.651 m)           Physical Exam  Constitutional:       General: He is not in acute distress.     Appearance: He is well-developed. He is not diaphoretic.   HENT:      Head: Normocephalic and atraumatic.   Cardiovascular:      Rate and Rhythm: Normal rate and regular rhythm.      Heart sounds: Normal heart sounds. No murmur heard.     No friction rub. No gallop.   Pulmonary:      Effort: Pulmonary effort is normal. No respiratory distress.      Breath sounds: Normal breath sounds. No wheezing or rales.   Chest:      Chest wall: No tenderness.   Abdominal:      General: Bowel sounds are normal. There is no distension.      Palpations: Abdomen is soft. There is no mass.      Tenderness: There is no abdominal tenderness. There is no rebound.   Musculoskeletal:      Cervical back: Normal range of motion.      Comments: PORT in left chest "   Lymphadenopathy:      Cervical: No cervical adenopathy.      Upper Body:      Right upper body: No supraclavicular or axillary adenopathy.      Left upper body: No supraclavicular or axillary adenopathy.   Skin:     General: Skin is warm and dry.      Findings: No erythema, lesion or rash.   Neurological:      Mental Status: He is alert and oriented to person, place, and time.   Psychiatric:         Behavior: Behavior normal.         Laboratory Data:  No visits with results within 1 Week(s) from this visit.   Latest known visit with results is:   Lab Visit on 11/27/2023   Component Date Value Ref Range Status    WBC 11/27/2023 11.43  3.90 - 12.70 K/uL Final    RBC 11/27/2023 3.14 (L)  4.60 - 6.20 M/uL Final    Hemoglobin 11/27/2023 10.2 (L)  14.0 - 18.0 g/dL Final    Hematocrit 11/27/2023 31.1 (L)  40.0 - 54.0 % Final    MCV 11/27/2023 99 (H)  82 - 98 fL Final    MCH 11/27/2023 32.5 (H)  27.0 - 31.0 pg Final    MCHC 11/27/2023 32.8  32.0 - 36.0 g/dL Final    RDW 11/27/2023 15.2 (H)  11.5 - 14.5 % Final    Platelets 11/27/2023 270  150 - 450 K/uL Final    MPV 11/27/2023 10.9  9.2 - 12.9 fL Final    Immature Granulocytes 11/27/2023 0.7 (H)  0.0 - 0.5 % Final    Gran # (ANC) 11/27/2023 7.7  1.8 - 7.7 K/uL Final    Immature Grans (Abs) 11/27/2023 0.08 (H)  0.00 - 0.04 K/uL Final    Comment: Mild elevation in immature granulocytes is non specific and   can be seen in a variety of conditions including stress response,   acute inflammation, trauma and pregnancy. Correlation with other   laboratory and clinical findings is essential.      Lymph # 11/27/2023 2.6  1.0 - 4.8 K/uL Final    Mono # 11/27/2023 0.7  0.3 - 1.0 K/uL Final    Eos # 11/27/2023 0.3  0.0 - 0.5 K/uL Final    Baso # 11/27/2023 0.05  0.00 - 0.20 K/uL Final    nRBC 11/27/2023 0  0 /100 WBC Final    Gran % 11/27/2023 67.5  38.0 - 73.0 % Final    Lymph % 11/27/2023 22.7  18.0 - 48.0 % Final    Mono % 11/27/2023 6.3  4.0 - 15.0 % Final    Eosinophil %  11/27/2023 2.4  0.0 - 8.0 % Final    Basophil % 11/27/2023 0.4  0.0 - 1.9 % Final    Differential Method 11/27/2023 Automated   Final    Sodium 11/27/2023 140  136 - 145 mmol/L Final    Potassium 11/27/2023 4.0  3.5 - 5.1 mmol/L Final    Chloride 11/27/2023 107  95 - 110 mmol/L Final    CO2 11/27/2023 23  23 - 29 mmol/L Final    Glucose 11/27/2023 94  70 - 110 mg/dL Final    BUN 11/27/2023 16  8 - 23 mg/dL Final    Creatinine 11/27/2023 1.2  0.5 - 1.4 mg/dL Final    Calcium 11/27/2023 9.2  8.7 - 10.5 mg/dL Final    Total Protein 11/27/2023 7.2  6.0 - 8.4 g/dL Final    Albumin 11/27/2023 3.3 (L)  3.5 - 5.2 g/dL Final    Total Bilirubin 11/27/2023 0.5  0.1 - 1.0 mg/dL Final    Comment: For infants and newborns, interpretation of results should be based  on gestational age, weight and in agreement with clinical  observations.    Premature Infant recommended reference ranges:  Up to 24 hours.............<8.0 mg/dL  Up to 48 hours............<12.0 mg/dL  3-5 days..................<15.0 mg/dL  6-29 days.................<15.0 mg/dL      Alkaline Phosphatase 11/27/2023 44 (L)  55 - 135 U/L Final    AST 11/27/2023 29  10 - 40 U/L Final    ALT 11/27/2023 10  10 - 44 U/L Final    eGFR 11/27/2023 58.9 (A)  >60 mL/min/1.73 m^2 Final    Anion Gap 11/27/2023 10  8 - 16 mmol/L Final    PSA Diagnostic 11/27/2023 6.7 (H)  0.00 - 4.00 ng/mL Final    Comment: The testing method is a chemiluminescent microparticle immunoassay   manufactured by [employer] and performed on the    or   [place] system. Values obtained with different assay manufacturers   for   methods may be different and cannot be used interchangeably.  PSA Expected levels:  Hormonal Therapy: <0.05 ng/ml  Prostatectomy: <0.01 ng/ml  Radiation Therapy: <1.00 ng/ml      Ferritin 11/27/2023 1,282 (H)  20.0 - 300.0 ng/mL Final         Imaging:     PSMA PET/CT 11/29/23  Head and Neck:     Brain demonstrates normal metabolism. Physiologic uptake is in the  neck.     Chest:     Scarring of the lung parenchyma is noted. No significant FDG avidity. A miliary appearance is noted. Calcification of the pericardium is noted. No PET avid pulmonary lesions are present. No enlarged or PET avid lymph nodes are in the chest.     Abdomen and Pelvis:     Physiologic uptake is in the liver, spleen, urinary system and bowel. Incidental note of large stool burden the rectum low-level hypermetabolic activity of the rectal wall. No enlarged or PET avid lymph nodes are in the abdomen or pelvis. Adrenal glands are normal. Persistent abnormal hypermetabolic activity the prostate is noted does not significantly changed compared to the prior examination. No evidence for adenopathy.     Musculoskeletal:     No PET avid osseous lesions are present.       Assessment:       1. 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 Genetic defect    4. Anemia of chronic disease    5. 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  May predispose pt any other family member to increased risk of cancer  -PT to see genetic counselor  -PSMA PET/CT 8/04/23 showed decrease in size and avidity of paratracheal LN and subcarinal LN but increased uptake in the prostate  -Pt potential candidate for radiation. Will have pt return to see rad onc  -Patient on Abiraterone 1000mg daily and prednisone 5mg BID  -Repeat PSMA PET/CT done for rising PSA shows stable disease  -Will monitor PSA and labs in 1 months    CHECK 2 mutation - Pt saw René  Wilman on 8/03/23    Anemia of Chronic Disease - Hemoglobin was 10.2g/dL on 11/27/23  -hemoglobin stable  -Will monitor    Hypothyroidism - pt on synthroid  -management per PCP    Route Chart for Scheduling    Med Onc Chart Routing      Follow up with physician 1 month. Pt needs a CBC, CMP, and PSA on 1/23/24.  PT can keep his appt with me on 1/24/24.   Follow up with AMINTA    Infusion scheduling note    Injection scheduling note    Labs    Imaging    Pharmacy appointment    Other referrals              Treatment Plan Information   OP MITOXANTRONE PREDNISONE Q3W   Beka Alvarez MD   Upcoming Treatment Dates - OP MITOXANTRONE PREDNISONE Q3W    No upcoming days in selected categories.    Supportive Plan Information  OP PROSTATE LEUPROLIDE (LUPRON) 6 MONTH   Beka Alvarez MD   Upcoming Treatment Dates - OP PROSTATE LEUPROLIDE (LUPRON) 6 MONTH    3/15/2024       Chemotherapy       leuprolide acetate (6 month) injection 45 mg  8/30/2024       Chemotherapy       leuprolide acetate (6 month) injection 45 mg    Therapy Plan Information  Flushes  heparin, porcine (PF) 100 unit/mL injection flush 500 Units  500 Units, Intravenous, Every visit  sodium chloride 0.9% flush 10 mL  10 mL, Intravenous, Every visit        Beka Alvarez MD  [facility]  Hematology and Oncology  [facility] [address]   [place] [address]   O: [phone]  F: [phone]

## 2023-12-08 NOTE — PROGRESS NOTES
Subjective:    Patient ID:  Amor Alcazar is a 88 y.o. male who presents for follow-up.    HPI  He has a history of arthritis, hypothyroidism, aortic stenosis, iron deficiency anemia and metastatic prostate cancer, has been on Lupron and mitoxantrone.  He is here for follow up. Since last visit, he reports no chest pain, SOB or palpitations.   Treatment Plan Information   OP MITOXANTRONE PREDNISONE Q3W   Beka Alvarez MD   Upcoming Treatment Dates - OP MITOXANTRONE PREDNISONE Q3W     No upcoming days in selected categories.     Supportive Plan Information  OP PROSTATE LEUPROLIDE (LUPRON) 6 MONTH   Beka Alvarez MD   Upcoming Treatment Dates - OP PROSTATE LEUPROLIDE (LUPRON) 6 MONTH     3/15/2024       Chemotherapy       leuprolide acetate (6 month) injection 45 mg  8/30/2024       Chemotherapy       leuprolide acetate (6 month) injection 45 mg     Therapy Plan Information  Flushes  heparin, porcine (PF) 100 unit/mL injection flush 500 Units  500 Units, Intravenous, Every visit  sodium chloride 0.9% flush 10 mL  10 mL, Intravenous, Every visit       Review of Systems   Constitutional: Negative for chills and fever.   HENT:  Positive for hearing loss.    Eyes:  Negative for redness.   Cardiovascular:  Negative for chest pain, irregular heartbeat, leg swelling, palpitations and syncope.   Respiratory:  Negative for cough and shortness of breath.    Musculoskeletal:  Positive for joint pain.   Gastrointestinal:  Negative for vomiting.   Genitourinary:  Negative for hematuria.   Neurological:  Negative for focal weakness and seizures.   Psychiatric/Behavioral:  The patient is not nervous/anxious.    Allergic/Immunologic: Negative for hives.        Current Outpatient Medications   Medication Sig    abiraterone (ZYTIGA) 500 mg Tab Take 1,000 mg by mouth once daily.    atorvastatin (LIPITOR) 10 MG tablet Take 1 tablet (10 mg total) by mouth once daily.    folic acid (FOLVITE) 400 MCG tablet Take 400 mcg by mouth  once daily.    levothyroxine (SYNTHROID) 50 MCG tablet Take 1 tablet (50 mcg total) by mouth before breakfast.    LUPRON DEPOT, 6 MONTH, 45 mg SyKt injection Inject into the muscle every 6 (six) months.    metoprolol succinate (TOPROL-XL) 25 MG 24 hr tablet Take 1 tablet (25 mg total) by mouth daily with breakfast AND 0.5 tablets (12.5 mg total) before evening meal.    multivit-min-FA-lycopen-lutein (CENTRUM SILVER MEN) 300-600-300 mcg Tab Take by mouth once daily.    predniSONE (DELTASONE) 5 MG tablet Take 1 tablet (5 mg total) by mouth once daily.     No current facility-administered medications for this visit.       Objective:    Physical Exam  Vitals reviewed.   Constitutional:       General: He is not in acute distress.     Appearance: He is well-developed. He is ill-appearing.   HENT:      Head: Normocephalic and atraumatic.   Neck:      Vascular: No JVD.   Cardiovascular:      Rate and Rhythm: Normal rate and regular rhythm.      Heart sounds: Murmur heard.      Systolic murmur is present with a grade of 1/6 at the upper right sternal border and apex.   Pulmonary:      Effort: Pulmonary effort is normal.      Breath sounds: Normal breath sounds.   Abdominal:      Palpations: Abdomen is soft.   Musculoskeletal:      Cervical back: Neck supple.   Skin:     General: Skin is warm and dry.      Coloration: Skin is pale.   Neurological:      Mental Status: He is alert and oriented to person, place, and time.       Vitals:    12/08/23 0944   BP: 115/71   Pulse: 64   Resp: 16   Temp: 98.3 °F (36.8 °C)           Assessment:       1. Prostate cancer    2. Dyslipidemia    3. Non-rheumatic aortic stenosis    4. PAT (paroxysmal atrial tachycardia)           Plan:       I have reviewed the labs and ancillary data.    5/25/22 echo interpretation:  The left ventricle is normal in size with normal systolic function.  The estimated ejection fraction is 55-60%.  Normal left ventricular diastolic function.  Normal right  ventricular size with normal right ventricular systolic function.  There is mild aortic valve stenosis.  Aortic valve area is 1.82 cm2; peak velocity is 1.48 m/s; mean gradient is 5 mmHg.  Normal central venous pressure (3 mmHg).  The estimated PA systolic pressure is 29 mmHg.  The left ventricular global longitudinal strain is -18.32%.    8/12/22 echo interpretation:  The left ventricle is normal in size with concentric remodeling and normal systolic function.  The estimated ejection fraction is 60%.  Normal left ventricular diastolic function.  Normal right ventricular size with normal right ventricular systolic function.  There is mild aortic valve stenosis.  Aortic valve area is 1.72 cm2; peak velocity is 2.13 m/s; mean gradient is 10 mmHg.  Mild mitral regurgitation.  Mild tricuspid regurgitation.  Normal central venous pressure (3 mmHg).  The estimated PA systolic pressure is 33 mmHg.  The left ventricular global longitudinal strain is -18.1%.    12/13/22 echo interpretation:  The left ventricle is normal in size with concentric remodeling and normal systolic function.  The estimated ejection fraction is 65%.  Normal left ventricular diastolic function.  Normal right ventricular size with normal right ventricular systolic function.  There is mild aortic valve stenosis.  Aortic valve area is 1.79 cm2; peak velocity is 2.18 m/s; mean gradient is 10 mmHg.  IVC not well visualized.  The estimated PA systolic pressure is at [id number] mmHg.  Echocardiographic images too poor to obtain accurate strain measurement.    3/14/23 Holter:  The patient monitored for 2 days and 23 hours.  The rhythm is sinus with mean heart rate of 72 beats per minute, lowest heart rate 55 beats per minute maximum heart rate 111 beats per minute.  Very frequent isolated PACs burden of 8.69%  Frequent short runs of PSVT the longest is 2 minutes and 12 seconds at 10:39 a.m.  Occasional isolated PVCs burden of 0.35% and 1 ventricular triplet  No  atrial fibrillation.  No heart block.    3/14/23 echo:  The left ventricle is normal in size with normal systolic function.  The estimated ejection fraction is 60%.  Normal left ventricular diastolic function.  Normal right ventricular size with normal right ventricular systolic function.  There is mild aortic valve stenosis.  Aortic valve area is 1.84 cm2; peak velocity is 2.09 m/s; mean gradient is 10 mmHg.  Mild-to-moderate mitral regurgitation.  Mild tricuspid regurgitation.  Normal central venous pressure (3 mmHg).  The estimated PA systolic pressure is 25 mmHg.  The left ventricular global longitudinal strain is -18.6%.      6/15/23 echo:  The left ventricle is normal in size with normal systolic function.  The estimated ejection fraction is 60%.  Normal left ventricular diastolic function.  Normal right ventricular size with normal right ventricular systolic function.  There is mild aortic valve stenosis.  Aortic valve peak velocity is 2.23 m/s; mean gradient is 12 mmHg.  Mild mitral regurgitation.  Mild tricuspid regurgitation.  Normal central venous pressure (3 mmHg).  The estimated PA systolic pressure is 33 mmHg.  Image quality not high enough to accurately calculate cardiac strain, so none will be reported.    9/21/23 echo:      Left Ventricle: The left ventricle is normal in size. Normal wall thickness. There is concentric remodeling. Normal wall motion. There is normal systolic function. Ejection fraction by visual approximation is 60-65%. Global longitudinal strain is -16.0%. There is normal diastolic function.    Right Ventricle: Normal right ventricular cavity size. Wall thickness is normal. Right ventricle wall motion  is normal. Systolic function is normal.    Aortic Valve: The aortic valve is a trileaflet valve. There is aortic valve sclerosis.    Tricuspid Valve: There is mild regurgitation.    Pulmonary Artery: The estimated pulmonary artery systolic pressure is 30 mmHg.    IVC/SVC: Normal  venous pressure at 3 mmHg.    Impression and Plan:  1) prostate ca: followed by oncology; remains on ADT and mitoxantrone; repeat echo later this month.  2) aortic stenosis: mild; no intervention needed at this time.  3) dyslipidemia: continue low dose statin.   4) PAT: continue b-blocker. No symptoms.

## 2023-12-11 ENCOUNTER — TELEPHONE (OUTPATIENT)
Dept: HEMATOLOGY/ONCOLOGY | Facility: CLINIC | Age: 88
End: 2023-12-11
Payer: MEDICARE

## 2023-12-11 NOTE — TELEPHONE ENCOUNTER
I called the patient's daughter whom stated the patient was not wanting to consider radiation previously due to the long drive it would require for each treatment.  I instructed her that we would discuss this again at her next clinic visit.  She expressed understanding.  All questions were answered to her satisfaction.    Beka Alvarez MD  [facility]  Hematology and Oncology  [facility]   [address]   [place] [address]   O: [phone]  F: [phone]

## 2023-12-11 NOTE — TELEPHONE ENCOUNTER
----- Message from Mylene Vazquez, Patient Care Assistant sent at 12/11/2023 11:48 AM CST -----  Type: Needs Medical Advice  Who Called:  Shireen  Smooth Call Back Number: [phone]    Additional Information: Shireen is wanting to speak to nurse about about radiology appointment ,and why he needs it , please call top further discuss ,thank you

## 2023-12-11 NOTE — TELEPHONE ENCOUNTER
----- Message from Stephanie Alcocer RN sent at 12/11/2023  8:57 AM CST -----  Regarding: Appointment  Dr. Grace doesn't have any availability until Mid Jan.  He has seen her in the past.  I have him scheduled with Dr. Santos tomorrow.    ----- Message -----  From: Keyana Lepe MA  Sent: 12/11/2023   8:45 AM CST  To: #    Please see updated PET 12/7  ----- Message -----  From: Beka Alvarez MD  Sent: 12/9/2023   9:13 AM CST  To: Antonio MUÑOZ Staff    PT needs a return appt with radiation oncology ASAP

## 2023-12-12 ENCOUNTER — OFFICE VISIT (OUTPATIENT)
Dept: RADIATION ONCOLOGY | Facility: CLINIC | Age: 88
End: 2023-12-12
Payer: MEDICARE

## 2023-12-12 VITALS
OXYGEN SATURATION: 98 % | BODY MASS INDEX: 21.04 KG/M2 | WEIGHT: 134.06 LBS | TEMPERATURE: 98 F | HEIGHT: 67 IN | DIASTOLIC BLOOD PRESSURE: 51 MMHG | RESPIRATION RATE: 16 BRPM | SYSTOLIC BLOOD PRESSURE: 109 MMHG

## 2023-12-12 DIAGNOSIS — C61 PROSTATE CANCER: Primary | ICD-10-CM

## 2023-12-12 PROCEDURE — 1101F PT FALLS ASSESS-DOCD LE1/YR: CPT | Mod: HCNC,CPTII,S$GLB, | Performed by: RADIOLOGY

## 2023-12-12 PROCEDURE — 1159F PR MEDICATION LIST DOCUMENTED IN MEDICAL RECORD: ICD-10-PCS | Mod: HCNC,CPTII,S$GLB, | Performed by: RADIOLOGY

## 2023-12-12 PROCEDURE — 3288F PR FALLS RISK ASSESSMENT DOCUMENTED: ICD-10-PCS | Mod: HCNC,CPTII,S$GLB, | Performed by: RADIOLOGY

## 2023-12-12 PROCEDURE — 99999 PR PBB SHADOW E&M-EST. PATIENT-LVL III: ICD-10-PCS | Mod: PBBFAC,HCNC,, | Performed by: RADIOLOGY

## 2023-12-12 PROCEDURE — 1126F PR PAIN SEVERITY QUANTIFIED, NO PAIN PRESENT: ICD-10-PCS | Mod: HCNC,CPTII,S$GLB, | Performed by: RADIOLOGY

## 2023-12-12 PROCEDURE — 1126F AMNT PAIN NOTED NONE PRSNT: CPT | Mod: HCNC,CPTII,S$GLB, | Performed by: RADIOLOGY

## 2023-12-12 PROCEDURE — 1101F PR PT FALLS ASSESS DOC 0-1 FALLS W/OUT INJ PAST YR: ICD-10-PCS | Mod: HCNC,CPTII,S$GLB, | Performed by: RADIOLOGY

## 2023-12-12 PROCEDURE — 99213 OFFICE O/P EST LOW 20 MIN: CPT | Mod: HCNC,S$GLB,, | Performed by: RADIOLOGY

## 2023-12-12 PROCEDURE — 99999 PR PBB SHADOW E&M-EST. PATIENT-LVL III: CPT | Mod: PBBFAC,HCNC,, | Performed by: RADIOLOGY

## 2023-12-12 PROCEDURE — 99213 PR OFFICE/OUTPT VISIT, EST, LEVL III, 20-29 MIN: ICD-10-PCS | Mod: HCNC,S$GLB,, | Performed by: RADIOLOGY

## 2023-12-12 PROCEDURE — 1157F PR ADVANCE CARE PLAN OR EQUIV PRESENT IN MEDICAL RECORD: ICD-10-PCS | Mod: HCNC,CPTII,S$GLB, | Performed by: RADIOLOGY

## 2023-12-12 PROCEDURE — 1157F ADVNC CARE PLAN IN RCRD: CPT | Mod: HCNC,CPTII,S$GLB, | Performed by: RADIOLOGY

## 2023-12-12 PROCEDURE — 3288F FALL RISK ASSESSMENT DOCD: CPT | Mod: HCNC,CPTII,S$GLB, | Performed by: RADIOLOGY

## 2023-12-12 PROCEDURE — 1159F MED LIST DOCD IN RCRD: CPT | Mod: HCNC,CPTII,S$GLB, | Performed by: RADIOLOGY

## 2023-12-12 NOTE — PROGRESS NOTES
[facility] Department of Radiation Oncology  Follow Up Visit Note    Diagnosis:  Amor Alcazar is a 88 y.o. male with Stage IV adenocarcinoma of the prostate.  He presents today to discuss treatment of the primary with radiation therapy.     Oncologic History:  Oncology History   Prostate cancer   6/27/2022 Initial Diagnosis    Prostate cancer     7/1/2022 -  Chemotherapy    Treatment Summary   Plan Name: OP MITOXANTRONE PREDNISONE Q3W  Treatment Goal: Palliative  Status: Active  Start Date: 7/1/2022  End Date: 1/6/2023  Provider: Beka Alvarze MD  Chemotherapy: mitoXANTRONE (NOVANTRONE) 12 mg/m2 = 22 mg in sodium chloride 0.9% 50 mL chemo infusion, 12 mg/m2 = 22 mg, Intravenous, Clinic/HOD 1 time, 10 of 10 cycles  Administration: 22 mg (7/1/2022), 22 mg (7/22/2022), 22 mg (11/4/2022), 22 mg (11/25/2022), 22 mg (12/16/2022), 22 mg (1/6/2023), 22 mg (8/12/2022), 22 mg (9/2/2022), 22 mg (9/23/2022), 22 mg (10/14/2022)     7/20/2023 Cancer Staged    Staging form: Prostate, AJCC 8th Edition  - Clinical: Stage IVB (cTX, cNX, cM1)          Interval History  The patient presents today for a regularly scheduled follow up visit.  He was last seen in our clinic on 8/18/23.  Since that time patient continue on abiraterone.  Most recent PSA 11/27/23 was 6.7.     PSMA PET/CT 12/7/23 revealed:  1. Persistent abnormal hypermetabolic activity of the prostate concerning for diffuse prostate adenocarcinoma.  2. No evidence for metastatic disease.  3. Large stool burden the rectum with rectal wall thickening low-level hypermetabolic activity.  This may suggest stercoral colitis.  Recommend disimpaction.    In clinic today patient has minimal obstructive urinary symptoms AUA 2, DENNIS 19.    Review of Systems:   Constistutional: Denies fever, chills. No recent weight changes. Denies nights sweats.  Eyes: No redness or dryness.  ENT: Denies dry mouth. No ulcers.  Card: Denies chest pain. No PND, or orthopnea.  "  Resp: Denies cough. Denies shortness of breath.  Gastro: Denies nausea or vomiting, constipation, diarrhea.  Genito: No kidney stones. Denies dysuria.  Skin: No rash, psoriasis, or alopecia.  Musculoskeletal:No myalgia. No muscle weakness.  Neuro: No numbness or tingling. No history of seizures or psychosis.  Psych: Denies depression. Denies anxiety.  Endo: Denies history of diabetes. No thyroid disease.  Heme: Denies anemia. No blood clots or bleeding diathesis.  Allergic: Denies seasonal allergies.   All other systems negative    Social History:  Social History     Tobacco Use    Smoking status: Never    Smokeless tobacco: Never   Substance Use Topics    Alcohol use: Not Currently     Comment: few beers daily    Drug use: No       Family History:  Cancer-related family history includes Lung cancer in his sister.    Exam:  Vitals:    12/12/23 1304   BP: (!) 109/51   Resp: 16   Temp: 98.4 °F (36.9 °C)   SpO2: 98%   Weight: 60.8 kg (134 lb 0.6 oz)   Height: 5' 7" (1.702 m)     Constitutional: Pleasant 88 y.o. male in no acute distress.  Well nourished. Well groomed.   HEENT: Normocephalic and atraumatic   Lungs: No audible wheezing.  Normal effort.   Musculoskeletal: No gross MSK deformities. Ambulates  Skin: No rashes appreciated.   Psych: Alert and oriented with appropriate mood and affect.  Neuro: Grossly normal.    Data Review:    Independent Interpretation of Test(s): PSMA PET from 12/7/23 was personally reviewed as detailed above    Assessment:  Mr. Alcazar is a 88 year old with metastatic adenocarcinoma of the prostate. His PSA is increasing on abiraterone, only current evidence of disease is prostate.  ECOG: (1) Restricted in physically strenuous activity, ambulatory and able to do work of light nature    Plan:  Treatment options were discussed with the patient including radiation therapy to the prostate.  We discussed the goals of treatment to be palliative.  The risks, benefits, scheduling, alternatives " to and rationale of prostate-directed radiation therapy in the setting of metastatic prostate cancer were explained in detail.    Indication, course, and potential toxicities of pelvic radiation therapy reviewed in detail, including but not limited to fatigue, increased frequency, urgency, or pain with urination, increased frequency or urgency of bowel movements, diarrhea, pelvic bone fragility, erectile dysfunction, decreased volume of ejaculate, remote risk of secondary malignancy.   After this discussion, he elected to proceed with prostate directed radiation therapy.    A CT simulation will be performed at patient's convenience to begin the planning process for the patient's radiation therapy.     He was given our contact information, and he was told that he could call our clinic at any time if he has any questions or concerns.    Radiation Treatment Details:   We plan to treat the prostate and SVs to a dose of 55 Gy in 20 fractions at 2.75 Gy per fraction delivered daily  We will utilize a(n) IMRT technique.   IMRT is medically necessary to treat complex dose painted target volumes in the prostate region with concave and convex isodose lines with steep isodose gradients to spare multiple adjacent organs at risk including the rectum, bladder, penile bulb   We will utilize daily CT or orthogonal image guidance due to the need for accurate daily patient alignment to treat the target volumes accurately and avoid radiation overdose to multiple regional organs at risk since we are treating the patient with complex target volumes with multiple steep isodose gradients.

## 2023-12-19 RX ORDER — ATORVASTATIN CALCIUM 10 MG/1
10 TABLET, FILM COATED ORAL DAILY
Qty: 90 TABLET | Refills: 0 | Status: SHIPPED | OUTPATIENT
Start: 2023-12-19 | End: 2024-01-03 | Stop reason: SDUPTHER

## 2023-12-19 NOTE — TELEPHONE ENCOUNTER
Care Due:                  Date            Visit Type   Department     Provider  --------------------------------------------------------------------------------                                EP -                              PRIMARY      [facility] FAMILY    Dipti Dubose  Last Visit: 02-      CARE ([facility])   MEDICINE       Anger  Next Visit: None Scheduled  None         None Found                                                            Last  Test          Frequency    Reason                     Performed    Due Date  --------------------------------------------------------------------------------    Lipid Panel.  12 months..  atorvastatin.............  02- 02-    [facility] Embedded Care Due Messages. Reference number: [id number].   12/19/2023 2:26:06 PM CST

## 2023-12-21 ENCOUNTER — PATIENT MESSAGE (OUTPATIENT)
Dept: HEMATOLOGY/ONCOLOGY | Facility: CLINIC | Age: 88
End: 2023-12-21
Payer: MEDICARE

## 2023-12-22 ENCOUNTER — PATIENT MESSAGE (OUTPATIENT)
Dept: HEMATOLOGY/ONCOLOGY | Facility: CLINIC | Age: 88
End: 2023-12-22
Payer: MEDICARE

## 2023-12-22 ENCOUNTER — CLINICAL SUPPORT (OUTPATIENT)
Dept: CARDIOLOGY | Facility: HOSPITAL | Age: 88
End: 2023-12-22
Attending: INTERNAL MEDICINE
Payer: MEDICARE

## 2023-12-22 VITALS — WEIGHT: 134 LBS | HEIGHT: 67 IN | BODY MASS INDEX: 21.03 KG/M2

## 2023-12-22 DIAGNOSIS — C61 PROSTATE CANCER: ICD-10-CM

## 2023-12-22 LAB
ASCENDING AORTA: 3.99 CM
AV INDEX (PROSTH): 0.43
AV MEAN GRADIENT: 11 MMHG
AV PEAK GRADIENT: 21 MMHG
AV VALVE AREA BY VELOCITY RATIO: 1.7 CM²
AV VALVE AREA: 1.71 CM²
AV VELOCITY RATIO: 0.43
BSA FOR ECHO PROCEDURE: 1.69 M2
CV ECHO LV RWT: 0.42 CM
DOP CALC AO PEAK VEL: 2.29 M/S
DOP CALC AO VTI: 45.7 CM
DOP CALC LVOT AREA: 3.9 CM2
DOP CALC LVOT DIAMETER: 2.24 CM
DOP CALC LVOT PEAK VEL: 0.99 M/S
DOP CALC LVOT STROKE VOLUME: 77.99 CM3
DOP CALC MV VTI: 35.8 CM
DOP CALCLVOT PEAK VEL VTI: 19.8 CM
E WAVE DECELERATION TIME: 250.53 MSEC
E/A RATIO: 0.93
E/E' RATIO: 11.6 M/S
ECHO LV POSTERIOR WALL: 0.95 CM (ref 0.6–1.1)
EJECTION FRACTION: 65 %
FRACTIONAL SHORTENING: 31 % (ref 28–44)
GLOBAL LONGITUIDAL STRAIN: 19.1 %
INTERVENTRICULAR SEPTUM: 0.99 CM (ref 0.6–1.1)
LEFT ATRIUM VOLUME INDEX MOD: 29.5 ML/M2
LEFT ATRIUM VOLUME MOD: 50.43 CM3
LEFT INTERNAL DIMENSION IN SYSTOLE: 3.09 CM (ref 2.1–4)
LEFT VENTRICLE DIASTOLIC VOLUME INDEX: 54.06 ML/M2
LEFT VENTRICLE DIASTOLIC VOLUME: 92.45 ML
LEFT VENTRICLE MASS INDEX: 86 G/M2
LEFT VENTRICLE SYSTOLIC VOLUME INDEX: 22 ML/M2
LEFT VENTRICLE SYSTOLIC VOLUME: 37.57 ML
LEFT VENTRICULAR INTERNAL DIMENSION IN DIASTOLE: 4.5 CM (ref 3.5–6)
LEFT VENTRICULAR MASS: 147 G
LV LATERAL E/E' RATIO: 10.88 M/S
LV SEPTAL E/E' RATIO: 12.43 M/S
LVOT MG: 2.07 MMHG
LVOT MV: 0.69 CM/S
MV MEAN GRADIENT: 1 MMHG
MV PEAK A VEL: 0.94 M/S
MV PEAK E VEL: 0.87 M/S
MV PEAK GRADIENT: 4 MMHG
MV STENOSIS PRESSURE HALF TIME: 69.92 MS
MV VALVE AREA BY CONTINUITY EQUATION: 2.18 CM2
MV VALVE AREA P 1/2 METHOD: 3.15 CM2
PISA MRMAX VEL: 5.31 M/S
PISA TR MAX VEL: 2.45 M/S
PULM VEIN S/D RATIO: 1.69
PV PEAK D VEL: 0.29 M/S
PV PEAK S VEL: 0.49 M/S
RA PRESSURE ESTIMATED: 3 MMHG
RA VOL SYS: 29.87 ML
RIGHT ATRIAL AREA: 11.8 CM2
RIGHT VENTRICULAR END-DIASTOLIC DIMENSION: 3.88 CM
RIGHT VENTRICULAR LENGTH IN DIASTOLE (APICAL 4-CHAMBER VIEW): 7.88 CM
RV MID DIAMA: 2.5 CM
RV TB RVSP: 5 MMHG
RV TISSUE DOPPLER FREE WALL SYSTOLIC VELOCITY 1 (APICAL 4 CHAMBER VIEW): 8.98 CM/S
SINUS: 3.9 CM
STJ: 3.29 CM
TDI LATERAL: 0.08 M/S
TDI SEPTAL: 0.07 M/S
TDI: 0.08 M/S
TR MAX PG: 24 MMHG
TRICUSPID ANNULAR PLANE SYSTOLIC EXCURSION: 1.85 CM
TV REST PULMONARY ARTERY PRESSURE: 27 MMHG
Z-SCORE OF LEFT VENTRICULAR DIMENSION IN END DIASTOLE: -0.54
Z-SCORE OF LEFT VENTRICULAR DIMENSION IN END SYSTOLE: 0.39

## 2023-12-22 PROCEDURE — 93356 MYOCRD STRAIN IMG SPCKL TRCK: CPT | Mod: HCNC,,, | Performed by: INTERNAL MEDICINE

## 2023-12-22 PROCEDURE — 93306 TTE W/DOPPLER COMPLETE: CPT | Mod: 26,HCNC,, | Performed by: INTERNAL MEDICINE

## 2023-12-22 PROCEDURE — 93356 MYOCRD STRAIN IMG SPCKL TRCK: CPT | Mod: HCNC,PO

## 2023-12-22 PROCEDURE — 93306 ECHO (CUPID ONLY): ICD-10-PCS | Mod: 26,HCNC,, | Performed by: INTERNAL MEDICINE

## 2023-12-22 PROCEDURE — 93356 ECHO (CUPID ONLY): ICD-10-PCS | Mod: HCNC,,, | Performed by: INTERNAL MEDICINE

## 2023-12-24 ENCOUNTER — PATIENT MESSAGE (OUTPATIENT)
Dept: RADIATION ONCOLOGY | Facility: CLINIC | Age: 88
End: 2023-12-24
Payer: MEDICARE

## 2023-12-27 ENCOUNTER — HOSPITAL ENCOUNTER (OUTPATIENT)
Dept: RADIATION THERAPY | Facility: HOSPITAL | Age: 88
Discharge: HOME | End: 2023-12-27
Attending: INTERNAL MEDICINE
Payer: MEDICARE

## 2023-12-28 ENCOUNTER — PATIENT MESSAGE (OUTPATIENT)
Dept: HEMATOLOGY/ONCOLOGY | Facility: CLINIC | Age: 88
End: 2023-12-28
Payer: MEDICARE

## 2023-12-29 ENCOUNTER — PATIENT MESSAGE (OUTPATIENT)
Dept: HEMATOLOGY/ONCOLOGY | Facility: CLINIC | Age: 88
End: 2023-12-29
Payer: MEDICARE

## 2023-12-29 ENCOUNTER — TELEPHONE (OUTPATIENT)
Dept: INFUSION THERAPY | Facility: HOSPITAL | Age: 88
End: 2023-12-29
Payer: MEDICARE

## 2023-12-29 ENCOUNTER — PATIENT MESSAGE (OUTPATIENT)
Dept: RADIATION ONCOLOGY | Facility: CLINIC | Age: 88
End: 2023-12-29
Payer: MEDICARE

## 2023-12-29 NOTE — TELEPHONE ENCOUNTER
SW received call from pt's wife Sandy. SW attempted to return call no answer. No voice mail to leave message.     Hamida Weiner, NICOLEW

## 2024-01-02 ENCOUNTER — TELEPHONE (OUTPATIENT)
Dept: CARDIOLOGY | Facility: CLINIC | Age: 89
End: 2024-01-02
Payer: MEDICARE

## 2024-01-02 RX ORDER — ATORVASTATIN CALCIUM 10 MG/1
10 TABLET, FILM COATED ORAL DAILY
Qty: 90 TABLET | Refills: 0 | OUTPATIENT
Start: 2024-01-02

## 2024-01-02 NOTE — TELEPHONE ENCOUNTER
Spoke to pts wife to let her know mr hernández echo results. ----- Message from Jacqueline Kasper MD sent at 12/26/2023 10:52 AM CST -----  Please let the patient know the echo did not show any significant change compared to previous

## 2024-01-02 NOTE — TELEPHONE ENCOUNTER
No care due was identified.  [facility] Embedded Care Due Messages. Reference number: [id number].   1/02/2024 9:15:59 AM CST

## 2024-01-03 ENCOUNTER — PATIENT MESSAGE (OUTPATIENT)
Dept: FAMILY MEDICINE | Facility: CLINIC | Age: 89
End: 2024-01-03
Payer: MEDICARE

## 2024-01-03 RX ORDER — ATORVASTATIN CALCIUM 10 MG/1
10 TABLET, FILM COATED ORAL DAILY
Qty: 90 TABLET | Refills: 0 | Status: SHIPPED | OUTPATIENT
Start: 2024-01-03

## 2024-01-03 NOTE — TELEPHONE ENCOUNTER
Spoke to [facility] and they stated rx was picked up on 12/19/23.  Spoke to pts wife and she states she never picked up rx.  Informed her Dr. Dubose just sent in a new rx.  Pts wife verbalizes understanding.

## 2024-01-04 ENCOUNTER — TELEPHONE (OUTPATIENT)
Dept: INFUSION THERAPY | Facility: HOSPITAL | Age: 89
End: 2024-01-04
Payer: MEDICARE

## 2024-01-04 NOTE — TELEPHONE ENCOUNTER
TYRELL was able to speak with the pts wife to follow up on her questions about transportation for XRT. She reports they no longer are in need of assistance. They will not be doing XRT. No other needs noted. She thanked TYRELL for following up.    Hamida Weiner, NICOLEW

## 2024-01-04 NOTE — TELEPHONE ENCOUNTER
----- Message from Yen Resendiz sent at 1/3/2024  1:24 PM CST -----  Regarding: Needs return call  Type: Needs Medical Advice  Who Called:  Wife    Best Call Back Number: [phone]    Additional Information: Pt wife states that she needs to speak to the offcie regarding her  medication, she states that they do not have note of picking up a script for him in December and is needing to figure out what to do please call to advise

## 2024-01-24 ENCOUNTER — OFFICE VISIT (OUTPATIENT)
Dept: HEMATOLOGY/ONCOLOGY | Facility: CLINIC | Age: 89
End: 2024-01-24
Payer: MEDICARE

## 2024-01-24 ENCOUNTER — INFUSION (OUTPATIENT)
Dept: INFUSION THERAPY | Facility: HOSPITAL | Age: 89
End: 2024-01-24
Attending: INTERNAL MEDICINE
Payer: MEDICARE

## 2024-01-24 VITALS
RESPIRATION RATE: 16 BRPM | BODY MASS INDEX: 20.52 KG/M2 | WEIGHT: 130.75 LBS | DIASTOLIC BLOOD PRESSURE: 70 MMHG | HEIGHT: 67 IN | OXYGEN SATURATION: 99 % | TEMPERATURE: 97 F | SYSTOLIC BLOOD PRESSURE: 100 MMHG | HEART RATE: 78 BPM

## 2024-01-24 VITALS
SYSTOLIC BLOOD PRESSURE: 102 MMHG | DIASTOLIC BLOOD PRESSURE: 65 MMHG | TEMPERATURE: 98 F | RESPIRATION RATE: 18 BRPM | HEART RATE: 65 BPM

## 2024-01-24 DIAGNOSIS — C61 MALIGNANT NEOPLASM OF PROSTATE: Primary | ICD-10-CM

## 2024-01-24 DIAGNOSIS — Z45.2 ENCOUNTER FOR INSERTION OF VENOUS ACCESS PORT: ICD-10-CM

## 2024-01-24 DIAGNOSIS — C61 PROSTATE CANCER: ICD-10-CM

## 2024-01-24 DIAGNOSIS — Q99.9 GENETIC DEFECT: ICD-10-CM

## 2024-01-24 DIAGNOSIS — C77.1 METASTASIS TO MEDIASTINAL LYMPH NODE: ICD-10-CM

## 2024-01-24 DIAGNOSIS — D63.8 ANEMIA OF CHRONIC DISEASE: ICD-10-CM

## 2024-01-24 DIAGNOSIS — D50.8 OTHER IRON DEFICIENCY ANEMIA: ICD-10-CM

## 2024-01-24 DIAGNOSIS — C61 PROSTATE CANCER: Primary | ICD-10-CM

## 2024-01-24 DIAGNOSIS — E03.9 ACQUIRED HYPOTHYROIDISM: ICD-10-CM

## 2024-01-24 PROBLEM — D84.821 IMMUNOSUPPRESSED DUE TO CHEMOTHERAPY: Status: RESOLVED | Noted: 2023-10-31 | Resolved: 2024-01-24

## 2024-01-24 PROBLEM — Z79.899 IMMUNOSUPPRESSED DUE TO CHEMOTHERAPY: Status: RESOLVED | Noted: 2023-10-31 | Resolved: 2024-01-24

## 2024-01-24 PROBLEM — T45.1X5A IMMUNOSUPPRESSED DUE TO CHEMOTHERAPY: Status: RESOLVED | Noted: 2023-10-31 | Resolved: 2024-01-24

## 2024-01-24 LAB
ALBUMIN SERPL BCP-MCNC: 3.3 G/DL (ref 3.5–5.2)
ALP SERPL-CCNC: 28 U/L (ref 55–135)
ALT SERPL W/O P-5'-P-CCNC: 8 U/L (ref 10–44)
ANION GAP SERPL CALC-SCNC: 11 MMOL/L (ref 8–16)
AST SERPL-CCNC: 23 U/L (ref 10–40)
BASOPHILS # BLD AUTO: 0.06 K/UL (ref 0–0.2)
BASOPHILS NFR BLD: 0.6 % (ref 0–1.9)
BILIRUB SERPL-MCNC: 0.5 MG/DL (ref 0.1–1)
BUN SERPL-MCNC: 19 MG/DL (ref 8–23)
CALCIUM SERPL-MCNC: 9.2 MG/DL (ref 8.7–10.5)
CHLORIDE SERPL-SCNC: 105 MMOL/L (ref 95–110)
CO2 SERPL-SCNC: 25 MMOL/L (ref 23–29)
COMPLEXED PSA SERPL-MCNC: 8.4 NG/ML (ref 0–4)
CREAT SERPL-MCNC: 1 MG/DL (ref 0.5–1.4)
DIFFERENTIAL METHOD BLD: ABNORMAL
EOSINOPHIL # BLD AUTO: 0.2 K/UL (ref 0–0.5)
EOSINOPHIL NFR BLD: 2 % (ref 0–8)
ERYTHROCYTE [DISTWIDTH] IN BLOOD BY AUTOMATED COUNT: 14.9 % (ref 11.5–14.5)
EST. GFR  (NO RACE VARIABLE): >60 ML/MIN/1.73 M^2
GLUCOSE SERPL-MCNC: 110 MG/DL (ref 70–110)
HCT VFR BLD AUTO: 30.1 % (ref 40–54)
HGB BLD-MCNC: 10.1 G/DL (ref 14–18)
IMM GRANULOCYTES # BLD AUTO: 0.05 K/UL (ref 0–0.04)
IMM GRANULOCYTES NFR BLD AUTO: 0.5 % (ref 0–0.5)
LYMPHOCYTES # BLD AUTO: 2.7 K/UL (ref 1–4.8)
LYMPHOCYTES NFR BLD: 28.5 % (ref 18–48)
MCH RBC QN AUTO: 33.7 PG (ref 27–31)
MCHC RBC AUTO-ENTMCNC: 33.6 G/DL (ref 32–36)
MCV RBC AUTO: 100 FL (ref 82–98)
MONOCYTES # BLD AUTO: 0.7 K/UL (ref 0.3–1)
MONOCYTES NFR BLD: 7.4 % (ref 4–15)
NEUTROPHILS # BLD AUTO: 5.7 K/UL (ref 1.8–7.7)
NEUTROPHILS NFR BLD: 61 % (ref 38–73)
NRBC BLD-RTO: 0 /100 WBC
PLATELET # BLD AUTO: 175 K/UL (ref 150–450)
PMV BLD AUTO: 10.7 FL (ref 9.2–12.9)
POTASSIUM SERPL-SCNC: 4.3 MMOL/L (ref 3.5–5.1)
PROT SERPL-MCNC: 6.9 G/DL (ref 6–8.4)
RBC # BLD AUTO: 3 M/UL (ref 4.6–6.2)
SODIUM SERPL-SCNC: 141 MMOL/L (ref 136–145)
WBC # BLD AUTO: 9.43 K/UL (ref 3.9–12.7)

## 2024-01-24 PROCEDURE — A4216 STERILE WATER/SALINE, 10 ML: HCPCS | Mod: HCNC,PN | Performed by: INTERNAL MEDICINE

## 2024-01-24 PROCEDURE — 25000003 PHARM REV CODE 250: Mod: HCNC,PN | Performed by: INTERNAL MEDICINE

## 2024-01-24 PROCEDURE — 63600175 PHARM REV CODE 636 W HCPCS: Mod: HCNC,PN | Performed by: INTERNAL MEDICINE

## 2024-01-24 PROCEDURE — 99999 PR PBB SHADOW E&M-EST. PATIENT-LVL III: CPT | Mod: PBBFAC,HCNC,, | Performed by: INTERNAL MEDICINE

## 2024-01-24 PROCEDURE — 1159F MED LIST DOCD IN RCRD: CPT | Mod: HCNC,CPTII,S$GLB, | Performed by: INTERNAL MEDICINE

## 2024-01-24 PROCEDURE — 85025 COMPLETE CBC W/AUTO DIFF WBC: CPT | Mod: HCNC,PN | Performed by: INTERNAL MEDICINE

## 2024-01-24 PROCEDURE — G2211 COMPLEX E/M VISIT ADD ON: HCPCS | Mod: HCNC,S$GLB,, | Performed by: INTERNAL MEDICINE

## 2024-01-24 PROCEDURE — 1101F PT FALLS ASSESS-DOCD LE1/YR: CPT | Mod: HCNC,CPTII,S$GLB, | Performed by: INTERNAL MEDICINE

## 2024-01-24 PROCEDURE — 1157F ADVNC CARE PLAN IN RCRD: CPT | Mod: HCNC,CPTII,S$GLB, | Performed by: INTERNAL MEDICINE

## 2024-01-24 PROCEDURE — 36591 DRAW BLOOD OFF VENOUS DEVICE: CPT | Mod: HCNC,PN

## 2024-01-24 PROCEDURE — 80053 COMPREHEN METABOLIC PANEL: CPT | Mod: HCNC,PN | Performed by: INTERNAL MEDICINE

## 2024-01-24 PROCEDURE — 1126F AMNT PAIN NOTED NONE PRSNT: CPT | Mod: HCNC,CPTII,S$GLB, | Performed by: INTERNAL MEDICINE

## 2024-01-24 PROCEDURE — 99213 OFFICE O/P EST LOW 20 MIN: CPT | Mod: HCNC,S$GLB,, | Performed by: INTERNAL MEDICINE

## 2024-01-24 PROCEDURE — 84153 ASSAY OF PSA TOTAL: CPT | Mod: HCNC | Performed by: INTERNAL MEDICINE

## 2024-01-24 PROCEDURE — 3288F FALL RISK ASSESSMENT DOCD: CPT | Mod: HCNC,CPTII,S$GLB, | Performed by: INTERNAL MEDICINE

## 2024-01-24 RX ORDER — LIDOCAINE AND PRILOCAINE 25; 25 MG/G; MG/G
CREAM TOPICAL
Qty: 30 G | Refills: 6 | Status: SHIPPED | OUTPATIENT
Start: 2024-01-24

## 2024-01-24 RX ORDER — HEPARIN 100 UNIT/ML
500 SYRINGE INTRAVENOUS
Status: CANCELLED | OUTPATIENT
Start: 2024-01-24

## 2024-01-24 RX ORDER — SODIUM CHLORIDE 0.9 % (FLUSH) 0.9 %
10 SYRINGE (ML) INJECTION
Status: CANCELLED | OUTPATIENT
Start: 2024-01-24

## 2024-01-24 RX ORDER — HEPARIN 100 UNIT/ML
500 SYRINGE INTRAVENOUS
Status: DISCONTINUED | OUTPATIENT
Start: 2024-01-24 | End: 2024-01-24 | Stop reason: HOSPADM

## 2024-01-24 RX ORDER — SODIUM CHLORIDE 0.9 % (FLUSH) 0.9 %
10 SYRINGE (ML) INJECTION
Status: DISCONTINUED | OUTPATIENT
Start: 2024-01-24 | End: 2024-01-24 | Stop reason: HOSPADM

## 2024-01-24 RX ADMIN — Medication 10 ML: at 09:01

## 2024-01-24 RX ADMIN — Medication 500 UNITS: at 09:01

## 2024-01-24 NOTE — PROGRESS NOTES
PATIENT: Amor Alcazar  MRN: [id number]  DATE: 1/25/2024      Diagnosis:   1. 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 Genetic defect    4. Anemia of chronic disease    5. Acquired hypothyroidism          Chief Complaint: Follow-up (Prostate cancer)      Subjective:    Interval History: Mr. Alcazar is a 88 y.o. male with Arthritis, hypothyroidism, valvular heart disease, iron deficiency anemia who presents for follow up of prostate cancer.  Since the last clinic visit with me the patient states he has felt well.  The patient denies CP, cough, SOB, abdominal pain, nausea, vomiting, constipation, diarrhea.  The patient denies fever, chills, night sweats, weight loss, new lumps or bumps, easy bruising or bleeding.    Prior History:  The patient was diagnosed with prostate cancer Tio 9 (4+5) 6/03/14.  He had a rising PSA and underwent PSMA PET/CT 5/09/22 showing disease in the prostate and mediastinal LN's.  The patient has been receiving Lupron 45mg 6 months dosing with Dr Bellamy with last treatment on 4/20/22.  He has previously been on Casodex and Apalutamide with progression.  He was started on Mitoxantrone and prednisone 7/01/22.  Last Echo 8/26/22 showed normal EF.   The patient saw Dr Kasper in cardiology on 9/30/22 and recommended repeat Echo in 3 months.   The patient underwent an Echo on 12/13/22 showing normal diastolic and systolic function.   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   The patient underwent PSA on 7/14/23 showing increase to 4.1   The patient underwent PSMA PET-CT on 08/04/2023 showing shrinkage of mediastinal lymph nodes with decreased FDG uptake with paratracheal lymph node measuring 1.4 x 1 cm; subcarinal lymph node measuring 1.2 x 1.3 cm; increased radiotracer accumulation within the prostate gland.    Abiraterone was held from 10/13/23  until 10/31/23 due to liver enzyme elevation.  PSA increased to 6.7 on 23.   The patient underwent PSMA PET-CT on 2023 showing no evidence of metastatic disease but increased uptake in the prostate.    Past Medical History:   Past Medical History:   Diagnosis Date    Arthritis     History of skin cancer     details unknown    Hypothyroidism, unspecified     Prostate cancer     injections q 6 months    Valvular heart disease     mild AS, MR and TR  ECHO       Past Surgical HIstory:   Past Surgical History:   Procedure Laterality Date    BONE MARROW BIOPSY Bilateral 6/15/2022    Procedure: Biopsy-bone marrow;  Surgeon: Hermann Jackson MD;  Location: [facility] OR;  Service: Oncology;  Laterality: Bilateral;    CATARACT EXTRACTION W/  INTRAOCULAR LENS IMPLANT Bilateral     ESOPHAGOGASTRODUODENOSCOPY      INSERTION OF TUNNELED CENTRAL VENOUS CATHETER (CVC) WITH SUBCUTANEOUS PORT N/A 6/15/2022    Procedure: INSERTION, PORT-A-CATH;  Surgeon: Marques Rivero MD;  Location: [facility] OR;  Service: General;  Laterality: N/A;       Family History:   Family History   Problem Relation Age of Onset    Lung cancer Sister          of       Social History:  reports that he has never smoked. He has never used smokeless tobacco. He reports that he does not currently use alcohol. He reports that he does not use drugs.    Allergies:  Review of patient's allergies indicates:  No Known Allergies    Medications:  Current Outpatient Medications   Medication Sig Dispense Refill    abiraterone (ZYTIGA) 500 mg Tab Take 1,000 mg by mouth once daily. 60 tablet 6    atorvastatin (LIPITOR) 10 MG tablet Take 1 tablet (10 mg total) by mouth once daily. 90 tablet 0    folic acid (FOLVITE) 400 MCG tablet Take 400 mcg by mouth once daily.      levothyroxine (SYNTHROID) 50 MCG tablet Take 1 tablet (50 mcg total) by mouth before breakfast. 90 tablet 2    LUPRON DEPOT, 6 MONTH, 45 mg SyKt injection Inject into the muscle every 6 (six) months.   "    metoprolol succinate (TOPROL-XL) 25 MG 24 hr tablet Take 1 tablet (25 mg total) by mouth daily with breakfast AND 0.5 tablets (12.5 mg total) before evening meal. 135 tablet 0    multivit-min-FA-lycopen-lutein (CENTRUM SILVER MEN) 300-600-300 mcg Tab Take by mouth once daily.      predniSONE (DELTASONE) 5 MG tablet Take 1 tablet (5 mg total) by mouth once daily. 60 tablet 6    LIDOcaine-prilocaine (EMLA) cream Apply topically as needed (please apply to port site one hour prior to treatment administration). 30 g 6     No current facility-administered medications for this visit.       Review of Systems   Constitutional:  Negative for chills, diaphoresis, fatigue, fever and unexpected weight change.   Respiratory:  Negative for cough and shortness of breath.    Cardiovascular:  Negative for chest pain and palpitations.   Gastrointestinal:  Negative for abdominal pain, constipation, diarrhea, nausea and vomiting.   Skin:  Negative for color change and rash.   Neurological:  Negative for headaches.   Hematological:  Negative for adenopathy. Does not bruise/bleed easily.       ECOG Performance Status: 1   Objective:      Vitals:   Vitals:    01/24/24 1023   BP: 100/70   BP Location: Left arm   Patient Position: Sitting   BP Method: Medium (Manual)   Pulse: 78   Resp: 16   Temp: 97 °F (36.1 °C)   TempSrc: Temporal   SpO2: 99%   Weight: 59.3 kg (130 lb 11.7 oz)   Height: 5' 7" (1.702 m)             Physical Exam  Constitutional:       General: He is not in acute distress.     Appearance: He is well-developed. He is not diaphoretic.   HENT:      Head: Normocephalic and atraumatic.   Cardiovascular:      Rate and Rhythm: Normal rate and regular rhythm.      Heart sounds: Normal heart sounds. No murmur heard.     No friction rub. No gallop.   Pulmonary:      Effort: Pulmonary effort is normal. No respiratory distress.      Breath sounds: Normal breath sounds. No wheezing or rales.   Chest:      Chest wall: No tenderness. "   Abdominal:      General: Bowel sounds are normal. There is no distension.      Palpations: Abdomen is soft. There is no mass.      Tenderness: There is no abdominal tenderness. There is no rebound.   Musculoskeletal:      Cervical back: Normal range of motion.      Comments: PORT in left chest   Lymphadenopathy:      Cervical: No cervical adenopathy.      Upper Body:      Right upper body: No supraclavicular or axillary adenopathy.      Left upper body: No supraclavicular or axillary adenopathy.   Skin:     General: Skin is warm and dry.      Findings: No erythema, lesion or rash.   Neurological:      Mental Status: He is alert and oriented to person, place, and time.   Psychiatric:         Behavior: Behavior normal.         Laboratory Data:  Infusion on 01/24/2024   Component Date Value Ref Range Status    WBC 01/24/2024 9.43  3.90 - 12.70 K/uL Final    RBC 01/24/2024 3.00 (L)  4.60 - 6.20 M/uL Final    Hemoglobin 01/24/2024 10.1 (L)  14.0 - 18.0 g/dL Final    Hematocrit 01/24/2024 30.1 (L)  40.0 - 54.0 % Final    MCV 01/24/2024 100 (H)  82 - 98 fL Final    MCH 01/24/2024 33.7 (H)  27.0 - 31.0 pg Final    MCHC 01/24/2024 33.6  32.0 - 36.0 g/dL Final    RDW 01/24/2024 14.9 (H)  11.5 - 14.5 % Final    Platelets 01/24/2024 175  150 - 450 K/uL Final    MPV 01/24/2024 10.7  9.2 - 12.9 fL Final    Immature Granulocytes 01/24/2024 0.5  0.0 - 0.5 % Final    Gran # (ANC) 01/24/2024 5.7  1.8 - 7.7 K/uL Final    Immature Grans (Abs) 01/24/2024 0.05 (H)  0.00 - 0.04 K/uL Final    Comment: Mild elevation in immature granulocytes is non specific and   can be seen in a variety of conditions including stress response,   acute inflammation, trauma and pregnancy. Correlation with other   laboratory and clinical findings is essential.      Lymph # 01/24/2024 2.7  1.0 - 4.8 K/uL Final    Mono # 01/24/2024 0.7  0.3 - 1.0 K/uL Final    Eos # 01/24/2024 0.2  0.0 - 0.5 K/uL Final    Baso # 01/24/2024 0.06  0.00 - 0.20 K/uL Final    nRBC  01/24/2024 0  0 /100 WBC Final    Gran % 01/24/2024 61.0  38.0 - 73.0 % Final    Lymph % 01/24/2024 28.5  18.0 - 48.0 % Final    Mono % 01/24/2024 7.4  4.0 - 15.0 % Final    Eosinophil % 01/24/2024 2.0  0.0 - 8.0 % Final    Basophil % 01/24/2024 0.6  0.0 - 1.9 % Final    Differential Method 01/24/2024 Automated   Final    Sodium 01/24/2024 141  136 - 145 mmol/L Final    Potassium 01/24/2024 4.3  3.5 - 5.1 mmol/L Final    Chloride 01/24/2024 105  95 - 110 mmol/L Final    CO2 01/24/2024 25  23 - 29 mmol/L Final    Glucose 01/24/2024 110  70 - 110 mg/dL Final    BUN 01/24/2024 19  8 - 23 mg/dL Final    Creatinine 01/24/2024 1.0  0.5 - 1.4 mg/dL Final    Calcium 01/24/2024 9.2  8.7 - 10.5 mg/dL Final    Total Protein 01/24/2024 6.9  6.0 - 8.4 g/dL Final    Albumin 01/24/2024 3.3 (L)  3.5 - 5.2 g/dL Final    Total Bilirubin 01/24/2024 0.5  0.1 - 1.0 mg/dL Final    Comment: For infants and newborns, interpretation of results should be based  on gestational age, weight and in agreement with clinical  observations.    Premature Infant recommended reference ranges:  Up to 24 hours.............<8.0 mg/dL  Up to 48 hours............<12.0 mg/dL  3-5 days..................<15.0 mg/dL  6-29 days.................<15.0 mg/dL      Alkaline Phosphatase 01/24/2024 28 (L)  55 - 135 U/L Final    AST 01/24/2024 23  10 - 40 U/L Final    ALT 01/24/2024 8 (L)  10 - 44 U/L Final    eGFR 01/24/2024 >60.0  >60 mL/min/1.73 m^2 Final    Anion Gap 01/24/2024 11  8 - 16 mmol/L Final    PSA Diagnostic 01/24/2024 8.4 (H)  0.00 - 4.00 ng/mL Final    Comment: The testing method is a chemiluminescent microparticle immunoassay   manufactured by [employer] and performed on the "[employer]   or   [employer] system. Values obtained with different assay manufacturers   for   methods may be different and cannot be used interchangeably.  PSA Expected levels:  Hormonal Therapy: <0.05 ng/ml  Prostatectomy: <0.01 ng/ml  Radiation Therapy: <1.00 ng/ml            Imaging:     PSMA PET/CT 11/29/23  Head and Neck:     Brain demonstrates normal metabolism. Physiologic uptake is in the neck.     Chest:     Scarring of the lung parenchyma is noted. No significant FDG avidity. A miliary appearance is noted. Calcification of the pericardium is noted. No PET avid pulmonary lesions are present. No enlarged or PET avid lymph nodes are in the chest.     Abdomen and Pelvis:     Physiologic uptake is in the liver, spleen, urinary system and bowel. Incidental note of large stool burden the rectum low-level hypermetabolic activity of the rectal wall. No enlarged or PET avid lymph nodes are in the abdomen or pelvis. Adrenal glands are normal. Persistent abnormal hypermetabolic activity the prostate is noted does not significantly changed compared to the prior examination. No evidence for adenopathy.     Musculoskeletal:     No PET avid osseous lesions are present.       Assessment:       1. 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 Genetic defect    4. Anemia of chronic disease    5. 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  May predispose pt any other family member to increased risk of cancer  -PT to see genetic counselor  -PSMA PET/CT 8/04/23 showed decrease in size and avidity of paratracheal LN and subcarinal LN but increased uptake in the prostate  -Pt potential candidate for radiation. 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 Pt needs a PORT flush today.  Pt needs a CBC, CMP, and PSA in 4 weeks.  Labs will need to be doen 3 days priro to his appt with me and can br drawn from the PORT.   Follow up with AMINTA    Infusion scheduling note    Injection scheduling note    Labs    Imaging    Pharmacy appointment    Other referrals              Treatment Plan Information   OP MITOXANTRONE PREDNISONE Q3W   Beka Alvarez MD   Upcoming Treatment Dates - OP MITOXANTRONE PREDNISONE Q3W    No upcoming days in selected categories.    Supportive Plan Information  OP PROSTATE LEUPROLIDE (LUPRON) 6 MONTH   Beka Alvarez MD   Upcoming Treatment Dates - OP PROSTATE LEUPROLIDE (LUPRON) 6 MONTH    3/15/2024       Chemotherapy       leuprolide acetate (6 month) injection 45 mg  8/30/2024       Chemotherapy       leuprolide acetate (6 month) injection 45 mg    Therapy Plan Information  Flushes  heparin, porcine (PF) 100 unit/mL injection flush 500 Units  500 Units, Intravenous, Every visit  sodium chloride 0.9% flush 10 mL  10 mL, Intravenous, Every visit        Beka Alvarez MD  [facility]  Hematology and Oncology  [facility]   [address] [facility]   [place] [address]   O: [phone]  F: [phone]

## 2024-02-09 ENCOUNTER — PATIENT MESSAGE (OUTPATIENT)
Dept: FAMILY MEDICINE | Facility: CLINIC | Age: 89
End: 2024-02-09
Payer: MEDICARE

## 2024-02-12 ENCOUNTER — PATIENT MESSAGE (OUTPATIENT)
Dept: FAMILY MEDICINE | Facility: CLINIC | Age: 89
End: 2024-02-12
Payer: MEDICARE

## 2024-02-15 RX ORDER — METOPROLOL SUCCINATE 25 MG/1
TABLET, EXTENDED RELEASE ORAL
Qty: 135 TABLET | Refills: 0 | Status: SHIPPED | OUTPATIENT
Start: 2024-02-15 | End: 2024-05-02 | Stop reason: SDUPTHER

## 2024-02-20 ENCOUNTER — OFFICE VISIT (OUTPATIENT)
Dept: HEMATOLOGY/ONCOLOGY | Facility: CLINIC | Age: 89
End: 2024-02-20
Payer: MEDICARE

## 2024-02-20 ENCOUNTER — INFUSION (OUTPATIENT)
Dept: INFUSION THERAPY | Facility: HOSPITAL | Age: 89
End: 2024-02-20
Attending: INTERNAL MEDICINE
Payer: MEDICARE

## 2024-02-20 VITALS
HEART RATE: 89 BPM | TEMPERATURE: 97 F | OXYGEN SATURATION: 95 % | RESPIRATION RATE: 12 BRPM | HEIGHT: 67 IN | WEIGHT: 135.38 LBS | DIASTOLIC BLOOD PRESSURE: 70 MMHG | BODY MASS INDEX: 21.25 KG/M2 | SYSTOLIC BLOOD PRESSURE: 110 MMHG

## 2024-02-20 DIAGNOSIS — C77.1 METASTASIS TO MEDIASTINAL LYMPH NODE: ICD-10-CM

## 2024-02-20 DIAGNOSIS — Z45.2 ENCOUNTER FOR INSERTION OF VENOUS ACCESS PORT: ICD-10-CM

## 2024-02-20 DIAGNOSIS — C61 PROSTATE CANCER: ICD-10-CM

## 2024-02-20 DIAGNOSIS — Q99.9 GENETIC DEFECT: ICD-10-CM

## 2024-02-20 DIAGNOSIS — D63.8 ANEMIA OF CHRONIC DISEASE: ICD-10-CM

## 2024-02-20 DIAGNOSIS — D50.8 OTHER IRON DEFICIENCY ANEMIA: ICD-10-CM

## 2024-02-20 DIAGNOSIS — C61 PROSTATE CANCER: Primary | ICD-10-CM

## 2024-02-20 DIAGNOSIS — C61 MALIGNANT NEOPLASM OF PROSTATE: Primary | ICD-10-CM

## 2024-02-20 LAB
ALBUMIN SERPL BCP-MCNC: 3.2 G/DL (ref 3.5–5.2)
ALP SERPL-CCNC: 29 U/L (ref 55–135)
ALT SERPL W/O P-5'-P-CCNC: 6 U/L (ref 10–44)
ANION GAP SERPL CALC-SCNC: 10 MMOL/L (ref 8–16)
AST SERPL-CCNC: 23 U/L (ref 10–40)
BASOPHILS # BLD AUTO: 0.03 K/UL (ref 0–0.2)
BASOPHILS NFR BLD: 0.3 % (ref 0–1.9)
BILIRUB SERPL-MCNC: 0.5 MG/DL (ref 0.1–1)
BUN SERPL-MCNC: 21 MG/DL (ref 8–23)
CALCIUM SERPL-MCNC: 9.3 MG/DL (ref 8.7–10.5)
CHLORIDE SERPL-SCNC: 106 MMOL/L (ref 95–110)
CO2 SERPL-SCNC: 24 MMOL/L (ref 23–29)
COMPLEXED PSA SERPL-MCNC: 9.5 NG/ML (ref 0–4)
CREAT SERPL-MCNC: 1.1 MG/DL (ref 0.5–1.4)
DIFFERENTIAL METHOD BLD: ABNORMAL
EOSINOPHIL # BLD AUTO: 0.2 K/UL (ref 0–0.5)
EOSINOPHIL NFR BLD: 1.9 % (ref 0–8)
ERYTHROCYTE [DISTWIDTH] IN BLOOD BY AUTOMATED COUNT: 14.3 % (ref 11.5–14.5)
EST. GFR  (NO RACE VARIABLE): >60 ML/MIN/1.73 M^2
GLUCOSE SERPL-MCNC: 109 MG/DL (ref 70–110)
HCT VFR BLD AUTO: 29 % (ref 40–54)
HGB BLD-MCNC: 9.8 G/DL (ref 14–18)
IMM GRANULOCYTES # BLD AUTO: 0.07 K/UL (ref 0–0.04)
IMM GRANULOCYTES NFR BLD AUTO: 0.7 % (ref 0–0.5)
LYMPHOCYTES # BLD AUTO: 2.2 K/UL (ref 1–4.8)
LYMPHOCYTES NFR BLD: 23 % (ref 18–48)
MCH RBC QN AUTO: 33.9 PG (ref 27–31)
MCHC RBC AUTO-ENTMCNC: 33.8 G/DL (ref 32–36)
MCV RBC AUTO: 100 FL (ref 82–98)
MONOCYTES # BLD AUTO: 0.6 K/UL (ref 0.3–1)
MONOCYTES NFR BLD: 6.1 % (ref 4–15)
NEUTROPHILS # BLD AUTO: 6.4 K/UL (ref 1.8–7.7)
NEUTROPHILS NFR BLD: 68 % (ref 38–73)
NRBC BLD-RTO: 0 /100 WBC
PLATELET # BLD AUTO: 178 K/UL (ref 150–450)
PMV BLD AUTO: 10.9 FL (ref 9.2–12.9)
POTASSIUM SERPL-SCNC: 4 MMOL/L (ref 3.5–5.1)
PROT SERPL-MCNC: 6.8 G/DL (ref 6–8.4)
RBC # BLD AUTO: 2.89 M/UL (ref 4.6–6.2)
SODIUM SERPL-SCNC: 140 MMOL/L (ref 136–145)
WBC # BLD AUTO: 9.38 K/UL (ref 3.9–12.7)

## 2024-02-20 PROCEDURE — 85025 COMPLETE CBC W/AUTO DIFF WBC: CPT | Mod: HCNC,PN | Performed by: INTERNAL MEDICINE

## 2024-02-20 PROCEDURE — 1101F PT FALLS ASSESS-DOCD LE1/YR: CPT | Mod: HCNC,CPTII,S$GLB, | Performed by: INTERNAL MEDICINE

## 2024-02-20 PROCEDURE — 1126F AMNT PAIN NOTED NONE PRSNT: CPT | Mod: HCNC,CPTII,S$GLB, | Performed by: INTERNAL MEDICINE

## 2024-02-20 PROCEDURE — 25000003 PHARM REV CODE 250: Mod: HCNC,PN | Performed by: INTERNAL MEDICINE

## 2024-02-20 PROCEDURE — 99999 PR PBB SHADOW E&M-EST. PATIENT-LVL III: CPT | Mod: PBBFAC,HCNC,, | Performed by: INTERNAL MEDICINE

## 2024-02-20 PROCEDURE — 80053 COMPREHEN METABOLIC PANEL: CPT | Mod: HCNC,PN | Performed by: INTERNAL MEDICINE

## 2024-02-20 PROCEDURE — A4216 STERILE WATER/SALINE, 10 ML: HCPCS | Mod: HCNC,PN | Performed by: INTERNAL MEDICINE

## 2024-02-20 PROCEDURE — 36591 DRAW BLOOD OFF VENOUS DEVICE: CPT | Mod: HCNC,PN

## 2024-02-20 PROCEDURE — 99213 OFFICE O/P EST LOW 20 MIN: CPT | Mod: HCNC,S$GLB,, | Performed by: INTERNAL MEDICINE

## 2024-02-20 PROCEDURE — 84153 ASSAY OF PSA TOTAL: CPT | Mod: HCNC | Performed by: INTERNAL MEDICINE

## 2024-02-20 PROCEDURE — 1159F MED LIST DOCD IN RCRD: CPT | Mod: HCNC,CPTII,S$GLB, | Performed by: INTERNAL MEDICINE

## 2024-02-20 PROCEDURE — 1157F ADVNC CARE PLAN IN RCRD: CPT | Mod: HCNC,CPTII,S$GLB, | Performed by: INTERNAL MEDICINE

## 2024-02-20 PROCEDURE — 63600175 PHARM REV CODE 636 W HCPCS: Mod: HCNC,PN | Performed by: INTERNAL MEDICINE

## 2024-02-20 PROCEDURE — 3288F FALL RISK ASSESSMENT DOCD: CPT | Mod: HCNC,CPTII,S$GLB, | Performed by: INTERNAL MEDICINE

## 2024-02-20 RX ORDER — HEPARIN 100 UNIT/ML
500 SYRINGE INTRAVENOUS
Status: DISCONTINUED | OUTPATIENT
Start: 2024-02-20 | End: 2024-02-20 | Stop reason: HOSPADM

## 2024-02-20 RX ORDER — SODIUM CHLORIDE 0.9 % (FLUSH) 0.9 %
10 SYRINGE (ML) INJECTION
Status: CANCELLED | OUTPATIENT
Start: 2024-02-20

## 2024-02-20 RX ORDER — HEPARIN 100 UNIT/ML
500 SYRINGE INTRAVENOUS
Status: CANCELLED | OUTPATIENT
Start: 2024-02-20

## 2024-02-20 RX ORDER — SODIUM CHLORIDE 0.9 % (FLUSH) 0.9 %
10 SYRINGE (ML) INJECTION
Status: DISCONTINUED | OUTPATIENT
Start: 2024-02-20 | End: 2024-02-20 | Stop reason: HOSPADM

## 2024-02-20 RX ADMIN — Medication 10 ML: at 08:02

## 2024-02-20 RX ADMIN — Medication 500 UNITS: at 08:02

## 2024-02-20 NOTE — PROGRESS NOTES
PATIENT: Amor Alcazar  MRN: [id number]  DATE: 2/20/2024      Diagnosis:   1. 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 Genetic defect    4. Anemia of chronic disease            Chief Complaint: Prostate Cancer      Subjective:    Interval History: Mr. Alcazar is a 88 y.o. male with Arthritis, hypothyroidism, valvular heart disease, iron deficiency anemia who presents for follow up of prostate cancer.  Since the last clinic visit with me the patient states he has felt well.  The patient denies CP, cough, SOB, abdominal pain, nausea, vomiting, constipation, diarrhea.  The patient denies fever, chills, night sweats, weight loss, new lumps or bumps, easy bruising or bleeding.    Prior History:  The patient was diagnosed with prostate cancer [place] [facility] (4+5) 6/03/14.  He had a rising PSA and underwent PSMA PET/CT 5/09/22 showing disease in the prostate and mediastinal LN's.  The patient has been receiving Lupron 45mg 6 months dosing with Dr Bellamy with last treatment on 4/20/22.  He has previously been on Casodex and Apalutamide with progression.  He was started on Mitoxantrone and prednisone 7/01/22.  Last Echo 8/26/22 showed normal EF.   The patient saw Dr Kasper in cardiology on 9/30/22 and recommended repeat Echo in 3 months.   The patient underwent an Echo on 12/13/22 showing normal diastolic and systolic function.   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   The patient underwent PSA on 7/14/23 showing increase to 4.1   The patient underwent PSMA PET-CT on 08/04/2023 showing shrinkage of mediastinal lymph nodes with decreased FDG uptake with paratracheal lymph node measuring 1.4 x 1 cm; subcarinal lymph node measuring 1.2 x 1.3 cm; increased radiotracer accumulation within the prostate gland.    Abiraterone was held from 10/13/23 until 10/31/23 due to liver enzyme  elevation.  PSA increased to 6.7 on 23.   The patient underwent PSMA PET-CT on 2023 showing no evidence of metastatic disease but increased uptake in the prostate.    Past Medical History:   Past Medical History:   Diagnosis Date    Arthritis     History of skin cancer     details unknown    Hypothyroidism, unspecified     Prostate cancer     injections q 6 months    Valvular heart disease     mild AS, MR and TR  ECHO       Past Surgical HIstory:   Past Surgical History:   Procedure Laterality Date    BONE MARROW BIOPSY Bilateral 6/15/2022    Procedure: Biopsy-bone marrow;  Surgeon: Hermann Jackson MD;  Location: [facility] OR;  Service: Oncology;  Laterality: Bilateral;    CATARACT EXTRACTION W/  INTRAOCULAR LENS IMPLANT Bilateral     ESOPHAGOGASTRODUODENOSCOPY      INSERTION OF TUNNELED CENTRAL VENOUS CATHETER (CVC) WITH SUBCUTANEOUS PORT N/A 6/15/2022    Procedure: INSERTION, PORT-A-CATH;  Surgeon: Marques Rivero MD;  Location: [facility] OR;  Service: General;  Laterality: N/A;       Family History:   Family History   Problem Relation Age of Onset    Lung cancer Sister          of       Social History:  reports that he has never smoked. He has never used smokeless tobacco. He reports that he does not currently use alcohol. He reports that he does not use drugs.    Allergies:  Review of patient's allergies indicates:  No Known Allergies    Medications:  Current Outpatient Medications   Medication Sig Dispense Refill    abiraterone (ZYTIGA) 500 mg Tab Take 1,000 mg by mouth once daily. 60 tablet 6    atorvastatin (LIPITOR) 10 MG tablet Take 1 tablet (10 mg total) by mouth once daily. 90 tablet 0    folic acid (FOLVITE) 400 MCG tablet Take 400 mcg by mouth once daily.      levothyroxine (SYNTHROID) 50 MCG tablet Take 1 tablet (50 mcg total) by mouth before breakfast. 90 tablet 2    LIDOcaine-prilocaine (EMLA) cream Apply topically as needed (please apply to port site one hour prior to treatment  "administration). 30 g 6    LUPRON DEPOT, 6 MONTH, 45 mg SyKt injection Inject into the muscle every 6 (six) months.      metoprolol succinate (TOPROL-XL) 25 MG 24 hr tablet Take 1 tablet (25 mg total) by mouth daily with breakfast AND 0.5 tablets (12.5 mg total) before evening meal. 135 tablet 0    multivit-min-FA-lycopen-lutein (CENTRUM SILVER MEN) 300-600-300 mcg Tab Take by mouth once daily.      predniSONE (DELTASONE) 5 MG tablet Take 1 tablet (5 mg total) by mouth once daily. 60 tablet 6     No current facility-administered medications for this visit.       Review of Systems   Constitutional:  Negative for chills, diaphoresis, fatigue, fever and unexpected weight change.   Respiratory:  Negative for cough and shortness of breath.    Cardiovascular:  Negative for chest pain and palpitations.   Gastrointestinal:  Negative for abdominal pain, constipation, diarrhea, nausea and vomiting.   Skin:  Negative for color change and rash.   Neurological:  Negative for headaches.   Hematological:  Negative for adenopathy. Does not bruise/bleed easily.       ECOG Performance Status: 1   Objective:      Vitals:   Vitals:    02/20/24 0900   BP: 110/70   BP Location: Left arm   Patient Position: Sitting   BP Method: Medium (Manual)   Pulse: 89   Resp: 12   Temp: 96.7 °F (35.9 °C)   TempSrc: Temporal   SpO2: 95%   Weight: 61.4 kg (135 lb 5.8 oz)   Height: 5' 7" (1.702 m)               Physical Exam  Constitutional:       General: He is not in acute distress.     Appearance: He is well-developed. He is not diaphoretic.   HENT:      Head: Normocephalic and atraumatic.   Cardiovascular:      Rate and Rhythm: Normal rate and regular rhythm.      Heart sounds: Normal heart sounds. No murmur heard.     No friction rub. No gallop.   Pulmonary:      Effort: Pulmonary effort is normal. No respiratory distress.      Breath sounds: Normal breath sounds. No wheezing or rales.   Chest:      Chest wall: No tenderness.   Abdominal:      " General: Bowel sounds are normal. There is no distension.      Palpations: Abdomen is soft. There is no mass.      Tenderness: There is no abdominal tenderness. There is no rebound.   Musculoskeletal:      Cervical back: Normal range of motion.      Comments: PORT in left chest   Lymphadenopathy:      Cervical: No cervical adenopathy.      Upper Body:      Right upper body: No supraclavicular or axillary adenopathy.      Left upper body: No supraclavicular or axillary adenopathy.   Skin:     General: Skin is warm and dry.      Findings: No erythema, lesion or rash.   Neurological:      Mental Status: He is alert and oriented to person, place, and time.   Psychiatric:         Behavior: Behavior normal.         Laboratory Data:  Infusion on 02/20/2024   Component Date Value Ref Range Status    WBC 02/20/2024 9.38  3.90 - 12.70 K/uL Final    RBC 02/20/2024 2.89 (L)  4.60 - 6.20 M/uL Final    Hemoglobin 02/20/2024 9.8 (L)  14.0 - 18.0 g/dL Final    Hematocrit 02/20/2024 29.0 (L)  40.0 - 54.0 % Final    MCV 02/20/2024 100 (H)  82 - 98 fL Final    MCH 02/20/2024 33.9 (H)  27.0 - 31.0 pg Final    MCHC 02/20/2024 33.8  32.0 - 36.0 g/dL Final    RDW 02/20/2024 14.3  11.5 - 14.5 % Final    Platelets 02/20/2024 178  150 - 450 K/uL Final    MPV 02/20/2024 10.9  9.2 - 12.9 fL Final    Immature Granulocytes 02/20/2024 0.7 (H)  0.0 - 0.5 % Final    Gran # (ANC) 02/20/2024 6.4  1.8 - 7.7 K/uL Final    Immature Grans (Abs) 02/20/2024 0.07 (H)  0.00 - 0.04 K/uL Final    Comment: Mild elevation in immature granulocytes is non specific and   can be seen in a variety of conditions including stress response,   acute inflammation, trauma and pregnancy. Correlation with other   laboratory and clinical findings is essential.      Lymph # 02/20/2024 2.2  1.0 - 4.8 K/uL Final    Mono # 02/20/2024 0.6  0.3 - 1.0 K/uL Final    Eos # 02/20/2024 0.2  0.0 - 0.5 K/uL Final    Baso # 02/20/2024 0.03  0.00 - 0.20 K/uL Final    nRBC 02/20/2024 0  0  /100 WBC Final    Gran % 02/20/2024 68.0  38.0 - 73.0 % Final    Lymph % 02/20/2024 23.0  18.0 - 48.0 % Final    Mono % 02/20/2024 6.1  4.0 - 15.0 % Final    Eosinophil % 02/20/2024 1.9  0.0 - 8.0 % Final    Basophil % 02/20/2024 0.3  0.0 - 1.9 % Final    Differential Method 02/20/2024 Automated   Final    Sodium 02/20/2024 140  136 - 145 mmol/L Final    Potassium 02/20/2024 4.0  3.5 - 5.1 mmol/L Final    Chloride 02/20/2024 106  95 - 110 mmol/L Final    CO2 02/20/2024 24  23 - 29 mmol/L Final    Glucose 02/20/2024 109  70 - 110 mg/dL Final    BUN 02/20/2024 21  8 - 23 mg/dL Final    Creatinine 02/20/2024 1.1  0.5 - 1.4 mg/dL Final    Calcium 02/20/2024 9.3  8.7 - 10.5 mg/dL Final    Total Protein 02/20/2024 6.8  6.0 - 8.4 g/dL Final    Albumin 02/20/2024 3.2 (L)  3.5 - 5.2 g/dL Final    Total Bilirubin 02/20/2024 0.5  0.1 - 1.0 mg/dL Final    Comment: For infants and newborns, interpretation of results should be based  on gestational age, weight and in agreement with clinical  observations.    Premature Infant recommended reference ranges:  Up to 24 hours.............<8.0 mg/dL  Up to 48 hours............<12.0 mg/dL  3-5 days..................<15.0 mg/dL  6-29 days.................<15.0 mg/dL      Alkaline Phosphatase 02/20/2024 29 (L)  55 - 135 U/L Final    AST 02/20/2024 23  10 - 40 U/L Final    ALT 02/20/2024 6 (L)  10 - 44 U/L Final    eGFR 02/20/2024 >60.0  >60 mL/min/1.73 m^2 Final    Anion Gap 02/20/2024 10  8 - 16 mmol/L Final         Imaging:     PSMA PET/CT 11/29/23  Head and Neck:     Brain demonstrates normal metabolism. Physiologic uptake is in the neck.     Chest:     Scarring of the lung parenchyma is noted. No significant FDG avidity. A miliary appearance is noted. Calcification of the pericardium is noted. No PET avid pulmonary lesions are present. No enlarged or PET avid lymph nodes are in the chest.     Abdomen and Pelvis:     Physiologic uptake is in the liver, spleen, urinary system and bowel.  Incidental note of large stool burden the rectum low-level hypermetabolic activity of the rectal wall. No enlarged or PET avid lymph nodes are in the abdomen or pelvis. Adrenal glands are normal. Persistent abnormal hypermetabolic activity the prostate is noted does not significantly changed compared to the prior examination. No evidence for adenopathy.     Musculoskeletal:     No PET avid osseous lesions are present.       Assessment:       1. 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 Genetic defect    4. 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  May predispose pt any other family member to increased risk of cancer  -PT to see genetic counselor  -PSMA PET/CT 8/04/23 showed decrease in size and avidity of paratracheal LN and subcarinal LN but increased uptake in the prostate  -Pt potential candidate for radiation. Will have pt return to see rad onc  -Patient on Abiraterone 1000mg daily and prednisone 5mg BID  -Repeat PSMA PET/CT done 12/07/23 for rising PSA showed stable disease  -PSA 2/20/24 pending  -Will continue to monitor in 2 months    CHECK 2 mutation - Pt saw René Stovall on 8/03/23    Anemia of Chronic Disease - Hemoglobin was 9.8g/dL on 2/20/24  -hemoglobin stable  -Will monitor    Hypothyroidism - pt on synthroid  -management per PCP    Route Chart for Scheduling    Med Onc Chart Routing      Follow up with physician 2 months. Pt needs a CBC, CMP and PSA in 2 months with an appt with me a week after the labs.  Pt  needs labs from PORT.   Follow up with [facility]    Infusion scheduling note    Injection scheduling note    Labs    Imaging    Pharmacy appointment    Other referrals              Treatment Plan Information   OP MITOXANTRONE PREDNISONE Q3W   Beka Alvarez MD   Upcoming Treatment Dates - OP MITOXANTRONE PREDNISONE Q3W    No upcoming days in selected categories.    Supportive Plan Information  OP PROSTATE LEUPROLIDE (LUPRON) 6 MONTH   Beka Alvarez MD   Upcoming Treatment Dates - OP PROSTATE LEUPROLIDE (LUPRON) 6 MONTH    3/15/2024       Chemotherapy       leuprolide acetate (6 month) injection 45 mg  8/30/2024       Chemotherapy       leuprolide acetate (6 month) injection 45 mg    Therapy Plan Information  Flushes  heparin, porcine (PF) 100 unit/mL injection flush 500 Units  500 Units, Intravenous, Every visit  sodium chloride 0.9% flush 10 mL  10 mL, Intravenous, Every visit        Beka Alvarez MD  [facility]  Hematology and Oncology  [facility]   [address] [place] [address]   O: [phone]  F: [phone]

## 2024-02-21 ENCOUNTER — PATIENT MESSAGE (OUTPATIENT)
Dept: HEMATOLOGY/ONCOLOGY | Facility: CLINIC | Age: 89
End: 2024-02-21
Payer: MEDICARE

## 2024-02-23 ENCOUNTER — LAB VISIT (OUTPATIENT)
Dept: LAB | Facility: HOSPITAL | Age: 89
End: 2024-02-23
Attending: FAMILY MEDICINE
Payer: MEDICARE

## 2024-02-23 DIAGNOSIS — E78.5 DYSLIPIDEMIA: ICD-10-CM

## 2024-02-23 DIAGNOSIS — E03.4 HYPOTHYROIDISM DUE TO ACQUIRED ATROPHY OF THYROID: ICD-10-CM

## 2024-02-23 LAB
CHOLEST SERPL-MCNC: 114 MG/DL (ref 120–199)
CHOLEST/HDLC SERPL: 3.7 {RATIO} (ref 2–5)
HDLC SERPL-MCNC: 31 MG/DL (ref 40–75)
HDLC SERPL: 27.2 % (ref 20–50)
LDLC SERPL CALC-MCNC: 56.2 MG/DL (ref 63–159)
NONHDLC SERPL-MCNC: 83 MG/DL
TRIGL SERPL-MCNC: 134 MG/DL (ref 30–150)
TSH SERPL DL<=0.005 MIU/L-ACNC: 2.34 UIU/ML (ref 0.4–4)

## 2024-02-23 PROCEDURE — 80061 LIPID PANEL: CPT | Mod: HCNC | Performed by: INTERNAL MEDICINE

## 2024-02-23 PROCEDURE — 84443 ASSAY THYROID STIM HORMONE: CPT | Mod: HCNC | Performed by: FAMILY MEDICINE

## 2024-02-23 PROCEDURE — 36415 COLL VENOUS BLD VENIPUNCTURE: CPT | Mod: HCNC,PN | Performed by: FAMILY MEDICINE

## 2024-02-25 ENCOUNTER — PATIENT MESSAGE (OUTPATIENT)
Dept: FAMILY MEDICINE | Facility: CLINIC | Age: 89
End: 2024-02-25
Payer: MEDICARE

## 2024-02-25 DIAGNOSIS — E03.4 HYPOTHYROIDISM DUE TO ACQUIRED ATROPHY OF THYROID: ICD-10-CM

## 2024-02-25 RX ORDER — LEVOTHYROXINE SODIUM 50 UG/1
50 TABLET ORAL
Qty: 90 TABLET | Refills: 2 | Status: SHIPPED | OUTPATIENT
Start: 2024-02-25 | End: 2024-06-08 | Stop reason: SDUPTHER

## 2024-02-27 ENCOUNTER — TELEPHONE (OUTPATIENT)
Dept: CARDIOLOGY | Facility: CLINIC | Age: 89
End: 2024-02-27
Payer: MEDICARE

## 2024-02-27 ENCOUNTER — PATIENT MESSAGE (OUTPATIENT)
Dept: PSYCHIATRY | Facility: CLINIC | Age: 89
End: 2024-02-27
Payer: MEDICARE

## 2024-02-27 NOTE — TELEPHONE ENCOUNTER
Sent a portal message to patients chart.     ----- Message from Jacqueline Kasper MD sent at 2/27/2024  2:44 PM CST -----  Please let the patient know his lipids have significantly improved

## 2024-03-08 ENCOUNTER — PATIENT MESSAGE (OUTPATIENT)
Dept: FAMILY MEDICINE | Facility: CLINIC | Age: 89
End: 2024-03-08
Payer: MEDICARE

## 2024-03-08 NOTE — TELEPHONE ENCOUNTER
----- Message from Keyana Lepe MA sent at 5/23/2022 10:15 AM CDT -----  Sounds good. I will schedule the bone marrow at 7am and you can follow?  ----- Message -----  From: Morris Bhatia LPN  Sent: 5/23/2022   9:18 AM CDT  To: Keyana Lepe MA    How about Wednesday June 1?    ----- Message -----  From: Keyana Lepe MA  Sent: 5/20/2022   4:17 PM CDT  To: Morris Bhatia LPN    Ok let me know when you get it scheduled and I will try to coordinate it with the bone marrow  ----- Message -----  From: Morris Bhatia LPN  Sent: 5/20/2022   4:01 PM CDT  To: Keyana Lepe MA    Hi,  Dr Rivero does surgery in [place] on Wednesdays.  Let me Know,  Morris  ----- Message -----  From: Keyana Lepe MA  Sent: 5/20/2022   3:53 PM CDT  To: [facility] Staff    We are referring patient for port placement and Dr Jackson would like to coordinate doing a bone marrow biopsy at same time of port placement to save a patient time and additional sedation. Can you have the surgery scheduler contact me to work on this please?              <-- Click to add NO significant Past Surgical History

## 2024-03-15 ENCOUNTER — OFFICE VISIT (OUTPATIENT)
Dept: CARDIOLOGY | Facility: CLINIC | Age: 89
End: 2024-03-15
Payer: MEDICARE

## 2024-03-15 VITALS
HEIGHT: 68 IN | OXYGEN SATURATION: 100 % | WEIGHT: 132.06 LBS | DIASTOLIC BLOOD PRESSURE: 60 MMHG | TEMPERATURE: 97 F | BODY MASS INDEX: 20.01 KG/M2 | RESPIRATION RATE: 16 BRPM | HEART RATE: 95 BPM | SYSTOLIC BLOOD PRESSURE: 114 MMHG

## 2024-03-15 DIAGNOSIS — I35.0 NON-RHEUMATIC AORTIC STENOSIS: ICD-10-CM

## 2024-03-15 DIAGNOSIS — C61 PROSTATE CANCER: Primary | ICD-10-CM

## 2024-03-15 DIAGNOSIS — E78.5 DYSLIPIDEMIA: ICD-10-CM

## 2024-03-15 DIAGNOSIS — I47.19 PAT (PAROXYSMAL ATRIAL TACHYCARDIA): ICD-10-CM

## 2024-03-15 PROCEDURE — 99999 PR PBB SHADOW E&M-EST. PATIENT-LVL III: CPT | Mod: PBBFAC,HCNC,, | Performed by: INTERNAL MEDICINE

## 2024-03-15 PROCEDURE — 1160F RVW MEDS BY RX/DR IN RCRD: CPT | Mod: HCNC,CPTII,S$GLB, | Performed by: INTERNAL MEDICINE

## 2024-03-15 PROCEDURE — 1126F AMNT PAIN NOTED NONE PRSNT: CPT | Mod: HCNC,CPTII,S$GLB, | Performed by: INTERNAL MEDICINE

## 2024-03-15 PROCEDURE — 1157F ADVNC CARE PLAN IN RCRD: CPT | Mod: HCNC,CPTII,S$GLB, | Performed by: INTERNAL MEDICINE

## 2024-03-15 PROCEDURE — 1159F MED LIST DOCD IN RCRD: CPT | Mod: HCNC,CPTII,S$GLB, | Performed by: INTERNAL MEDICINE

## 2024-03-15 PROCEDURE — 99214 OFFICE O/P EST MOD 30 MIN: CPT | Mod: HCNC,S$GLB,, | Performed by: INTERNAL MEDICINE

## 2024-03-15 PROCEDURE — G2211 COMPLEX E/M VISIT ADD ON: HCPCS | Mod: HCNC,S$GLB,, | Performed by: INTERNAL MEDICINE

## 2024-03-15 PROCEDURE — 1101F PT FALLS ASSESS-DOCD LE1/YR: CPT | Mod: HCNC,CPTII,S$GLB, | Performed by: INTERNAL MEDICINE

## 2024-03-15 PROCEDURE — 3288F FALL RISK ASSESSMENT DOCD: CPT | Mod: HCNC,CPTII,S$GLB, | Performed by: INTERNAL MEDICINE

## 2024-03-15 NOTE — PROGRESS NOTES
Subjective:    Patient ID:  Amor Alcazar is a 88 y.o. male who presents for follow-up.    HPI  He has a history of arthritis, hypothyroidism, aortic stenosis, iron deficiency anemia and metastatic prostate cancer, has been on Lupron and mitoxantrone.  He is here for follow up. Since last visit, he reports no chest pain, SOB or palpitations. His appetite has not been great, states he cannot taste food.  Treatment Plan Information   OP MITOXANTRONE PREDNISONE Q3W   Beka Alvarez MD   Upcoming Treatment Dates - OP MITOXANTRONE PREDNISONE Q3W     No upcoming days in selected categories.     Supportive Plan Information  OP PROSTATE LEUPROLIDE (LUPRON) 6 MONTH   Beka Alvarez MD   Upcoming Treatment Dates - OP PROSTATE LEUPROLIDE (LUPRON) 6 MONTH     3/15/2024       Chemotherapy       leuprolide acetate (6 month) injection 45 mg  8/30/2024       Chemotherapy       leuprolide acetate (6 month) injection 45 mg     Therapy Plan Information  Flushes  heparin, porcine (PF) 100 unit/mL injection flush 500 Units  500 Units, Intravenous, Every visit  sodium chloride 0.9% flush 10 mL  10 mL, Intravenous, Every visit       Review of Systems   Constitutional: Negative for chills and fever.   HENT:  Positive for hearing loss.    Eyes:  Negative for redness.   Cardiovascular:  Negative for chest pain, irregular heartbeat, leg swelling, palpitations and syncope.   Respiratory:  Negative for cough and shortness of breath.    Musculoskeletal:  Positive for joint pain.   Gastrointestinal:  Negative for vomiting.   Genitourinary:  Negative for hematuria.   Neurological:  Negative for focal weakness and seizures.   Psychiatric/Behavioral:  The patient is not nervous/anxious.    Allergic/Immunologic: Negative for hives.        Current Outpatient Medications   Medication Sig    abiraterone (ZYTIGA) 500 mg Tab Take 1,000 mg by mouth once daily.    atorvastatin (LIPITOR) 10 MG tablet Take 1 tablet (10 mg total) by mouth once daily.     folic acid (FOLVITE) 400 MCG tablet Take 400 mcg by mouth once daily.    levothyroxine (SYNTHROID) 50 MCG tablet Take 1 tablet (50 mcg total) by mouth before breakfast.    LIDOcaine-prilocaine (EMLA) cream Apply topically as needed (please apply to port site one hour prior to treatment administration).    LUPRON DEPOT, 6 MONTH, 45 mg SyKt injection Inject into the muscle every 6 (six) months.    metoprolol succinate (TOPROL-XL) 25 MG 24 hr tablet Take 1 tablet (25 mg total) by mouth daily with breakfast AND 0.5 tablets (12.5 mg total) before evening meal.    multivit-min-FA-lycopen-lutein (CENTRUM SILVER MEN) 300-600-300 mcg Tab Take by mouth once daily.    predniSONE (DELTASONE) 5 MG tablet Take 1 tablet (5 mg total) by mouth once daily.     No current facility-administered medications for this visit.       Objective:    Physical Exam  Vitals reviewed.   Constitutional:       General: He is not in acute distress.     Appearance: He is well-developed. He is ill-appearing.   HENT:      Head: Normocephalic and atraumatic.   Neck:      Vascular: No JVD.   Cardiovascular:      Rate and Rhythm: Normal rate and regular rhythm.      Heart sounds: Murmur heard.      Systolic murmur is present with a grade of 1/6 at the upper right sternal border and apex.   Pulmonary:      Effort: Pulmonary effort is normal.      Breath sounds: Normal breath sounds.   Abdominal:      Palpations: Abdomen is soft.   Musculoskeletal:      Cervical back: Neck supple.   Skin:     General: Skin is warm and dry.      Coloration: Skin is pale.   Neurological:      Mental Status: He is alert and oriented to person, place, and time.       Vitals:    03/15/24 0937   BP: 114/60   Pulse: 95   Resp: 16   Temp: 97 °F (36.1 °C)             Assessment:       1. Prostate cancer    2. Non-rheumatic aortic stenosis    3. Dyslipidemia    4. PAT (paroxysmal atrial tachycardia)             Plan:       I have reviewed the labs and ancillary data.    5/25/22 echo  interpretation:  The left ventricle is normal in size with normal systolic function.  The estimated ejection fraction is 55-60%.  Normal left ventricular diastolic function.  Normal right ventricular size with normal right ventricular systolic function.  There is mild aortic valve stenosis.  Aortic valve area is 1.82 cm2; peak velocity is 1.48 m/s; mean gradient is 5 mmHg.  Normal central venous pressure (3 mmHg).  The estimated PA systolic pressure is 29 mmHg.  The left ventricular global longitudinal strain is -18.32%.    8/12/22 echo interpretation:  The left ventricle is normal in size with concentric remodeling and normal systolic function.  The estimated ejection fraction is 60%.  Normal left ventricular diastolic function.  Normal right ventricular size with normal right ventricular systolic function.  There is mild aortic valve stenosis.  Aortic valve area is 1.72 cm2; peak velocity is 2.13 m/s; mean gradient is 10 mmHg.  Mild mitral regurgitation.  Mild tricuspid regurgitation.  Normal central venous pressure (3 mmHg).  The estimated PA systolic pressure is 33 mmHg.  The left ventricular global longitudinal strain is -18.1%.    12/13/22 echo interpretation:  The left ventricle is normal in size with concentric remodeling and normal systolic function.  The estimated ejection fraction is 65%.  Normal left ventricular diastolic function.  Normal right ventricular size with normal right ventricular systolic function.  There is mild aortic valve stenosis.  Aortic valve area is 1.79 cm2; peak velocity is 2.18 m/s; mean gradient is 10 mmHg.  IVC not well visualized.  The estimated PA systolic pressure is at [id number] mmHg.  Echocardiographic images too poor to obtain accurate strain measurement.    3/14/23 Holter:  The patient monitored for 2 days and 23 hours.  The rhythm is sinus with mean heart rate of 72 beats per minute, lowest heart rate 55 beats per minute maximum heart rate 111 beats per minute.  Very  frequent isolated PACs burden of 8.69%  Frequent short runs of PSVT the longest is 2 minutes and 12 seconds at 10:39 a.m.  Occasional isolated PVCs burden of 0.35% and 1 ventricular triplet  No atrial fibrillation.  No heart block.    3/14/23 echo:  The left ventricle is normal in size with normal systolic function.  The estimated ejection fraction is 60%.  Normal left ventricular diastolic function.  Normal right ventricular size with normal right ventricular systolic function.  There is mild aortic valve stenosis.  Aortic valve area is 1.84 cm2; peak velocity is 2.09 m/s; mean gradient is 10 mmHg.  Mild-to-moderate mitral regurgitation.  Mild tricuspid regurgitation.  Normal central venous pressure (3 mmHg).  The estimated PA systolic pressure is 25 mmHg.  The left ventricular global longitudinal strain is -18.6%.      6/15/23 echo:  The left ventricle is normal in size with normal systolic function.  The estimated ejection fraction is 60%.  Normal left ventricular diastolic function.  Normal right ventricular size with normal right ventricular systolic function.  There is mild aortic valve stenosis.  Aortic valve peak velocity is 2.23 m/s; mean gradient is 12 mmHg.  Mild mitral regurgitation.  Mild tricuspid regurgitation.  Normal central venous pressure (3 mmHg).  The estimated PA systolic pressure is 33 mmHg.  Image quality not high enough to accurately calculate cardiac strain, so none will be reported.    9/21/23 echo:      Left Ventricle: The left ventricle is normal in size. Normal wall thickness. There is concentric remodeling. Normal wall motion. There is normal systolic function. Ejection fraction by visual approximation is 60-65%. Global longitudinal strain is -16.0%. There is normal diastolic function.    Right Ventricle: Normal right ventricular cavity size. Wall thickness is normal. Right ventricle wall motion  is normal. Systolic function is normal.    Aortic Valve: The aortic valve is a trileaflet  valve. There is aortic valve sclerosis.    Tricuspid Valve: There is mild regurgitation.    Pulmonary Artery: The estimated pulmonary artery systolic pressure is 30 mmHg.    IVC/SVC: Normal venous pressure at 3 mmHg.    12/22/23 echo:    Left Ventricle: The left ventricle is normal in size. Normal wall thickness. Normal wall motion. There is normal systolic function. Ejection fraction by visual approximation is 65%. Global longitudinal strain is -19.1%. There is normal diastolic function.    Right Ventricle: Normal right ventricular cavity size. Wall thickness is normal. Right ventricle wall motion  is normal. Systolic function is normal.    Aortic Valve: The aortic valve is a trileaflet valve. There is mild aortic valve sclerosis. Mildly restricted motion. There is mild stenosis. Aortic valve area by VTI is 1.71 cm². Aortic valve peak velocity is 2.29 m/s. Mean gradient is 11 mmHg. The dimensionless index is 0.43.    Mitral Valve: There is mild regurgitation.    Tricuspid Valve: There is mild regurgitation.    Pulmonary Artery: The estimated pulmonary artery systolic pressure is 27 mmHg.    IVC/SVC: Normal venous pressure at 3 mmHg.    Impression and Plan:  1) prostate ca: followed by oncology; remains on ADT and mitoxantrone; repeat echo later this month.  2) aortic stenosis: mild; no intervention needed at this time.  3) dyslipidemia: continue low dose statin. LDL at goal.  4) PAT: continue b-blocker. No symptoms.

## 2024-03-19 ENCOUNTER — PATIENT MESSAGE (OUTPATIENT)
Dept: FAMILY MEDICINE | Facility: CLINIC | Age: 89
End: 2024-03-19
Payer: MEDICARE

## 2024-03-19 NOTE — TELEPHONE ENCOUNTER
Care Due:                  Date            Visit Type   Department     Provider  --------------------------------------------------------------------------------                                EP -                              PRIMARY      [facility]    Dipti Dubose  Last Visit: 02-      CARE ([facility])   MEDICINE       Anger  Next Visit: None Scheduled  None         None Found                                                            Last  Test          Frequency    Reason                     Performed    Due Date  --------------------------------------------------------------------------------    Office Visit  15 months..  atorvastatin,              02- 05-                             levothyroxine, metoprolol    [employer] Embedded Care Due Messages. Reference number: [id number].   3/19/2024 1:42:44 PM CDT

## 2024-03-20 ENCOUNTER — INFUSION (OUTPATIENT)
Dept: INFUSION THERAPY | Facility: HOSPITAL | Age: 89
End: 2024-03-20
Attending: INTERNAL MEDICINE
Payer: MEDICARE

## 2024-03-20 VITALS — WEIGHT: 132.06 LBS | BODY MASS INDEX: 20.01 KG/M2 | HEIGHT: 68 IN

## 2024-03-20 DIAGNOSIS — D50.8 OTHER IRON DEFICIENCY ANEMIA: ICD-10-CM

## 2024-03-20 DIAGNOSIS — Z45.2 ENCOUNTER FOR INSERTION OF VENOUS ACCESS PORT: ICD-10-CM

## 2024-03-20 DIAGNOSIS — C61 MALIGNANT NEOPLASM OF PROSTATE: Primary | ICD-10-CM

## 2024-03-20 PROCEDURE — 25000003 PHARM REV CODE 250: Mod: HCNC,PN | Performed by: INTERNAL MEDICINE

## 2024-03-20 PROCEDURE — 63600175 PHARM REV CODE 636 W HCPCS: Mod: HCNC,PN | Performed by: INTERNAL MEDICINE

## 2024-03-20 PROCEDURE — 96523 IRRIG DRUG DELIVERY DEVICE: CPT | Mod: HCNC,PN

## 2024-03-20 PROCEDURE — A4216 STERILE WATER/SALINE, 10 ML: HCPCS | Mod: HCNC,PN | Performed by: INTERNAL MEDICINE

## 2024-03-20 RX ORDER — HEPARIN 100 UNIT/ML
500 SYRINGE INTRAVENOUS
Status: CANCELLED | OUTPATIENT
Start: 2024-03-20

## 2024-03-20 RX ORDER — SODIUM CHLORIDE 0.9 % (FLUSH) 0.9 %
10 SYRINGE (ML) INJECTION
Status: CANCELLED | OUTPATIENT
Start: 2024-03-20

## 2024-03-20 RX ORDER — ATORVASTATIN CALCIUM 10 MG/1
10 TABLET, FILM COATED ORAL DAILY
Qty: 90 TABLET | Refills: 3 | Status: SHIPPED | OUTPATIENT
Start: 2024-03-20

## 2024-03-20 RX ORDER — SODIUM CHLORIDE 0.9 % (FLUSH) 0.9 %
10 SYRINGE (ML) INJECTION
Status: DISCONTINUED | OUTPATIENT
Start: 2024-03-20 | End: 2024-03-20 | Stop reason: HOSPADM

## 2024-03-20 RX ORDER — HEPARIN 100 UNIT/ML
500 SYRINGE INTRAVENOUS
Status: DISCONTINUED | OUTPATIENT
Start: 2024-03-20 | End: 2024-03-20 | Stop reason: HOSPADM

## 2024-03-20 RX ADMIN — SODIUM CHLORIDE, PRESERVATIVE FREE 10 ML: 5 INJECTION INTRAVENOUS at 11:03

## 2024-03-20 RX ADMIN — Medication 500 UNITS: at 11:03

## 2024-03-21 DIAGNOSIS — C61 PROSTATE CANCER: ICD-10-CM

## 2024-03-21 RX ORDER — ABIRATERONE 500 MG/1
1000 TABLET ORAL DAILY
Qty: 60 TABLET | Refills: 6 | Status: SHIPPED | OUTPATIENT
Start: 2024-03-21 | End: 2025-03-21

## 2024-03-21 RX ORDER — PREDNISONE 5 MG/1
5 TABLET ORAL DAILY
Qty: 60 TABLET | Refills: 6 | Status: SHIPPED | OUTPATIENT
Start: 2024-03-21

## 2024-03-22 ENCOUNTER — HOSPITAL ENCOUNTER (OUTPATIENT)
Dept: CARDIOLOGY | Facility: HOSPITAL | Age: 89
Discharge: HOME | End: 2024-03-22
Attending: INTERNAL MEDICINE
Payer: MEDICARE

## 2024-03-22 VITALS — WEIGHT: 132 LBS | BODY MASS INDEX: 20 KG/M2 | HEIGHT: 68 IN

## 2024-03-22 DIAGNOSIS — C61 PROSTATE CANCER: ICD-10-CM

## 2024-03-22 PROCEDURE — 93306 TTE W/DOPPLER COMPLETE: CPT | Mod: HCNC,PO

## 2024-03-22 PROCEDURE — 93306 TTE W/DOPPLER COMPLETE: CPT | Mod: 26,HCNC,, | Performed by: INTERNAL MEDICINE

## 2024-03-25 ENCOUNTER — TELEPHONE (OUTPATIENT)
Dept: CARDIOLOGY | Facility: CLINIC | Age: 89
End: 2024-03-25
Payer: MEDICARE

## 2024-03-25 LAB
ASCENDING AORTA: 3.37 CM
AV INDEX (PROSTH): 0.48
AV MEAN GRADIENT: 8 MMHG
AV PEAK GRADIENT: 18 MMHG
AV VALVE AREA BY VELOCITY RATIO: 1.62 CM²
AV VALVE AREA: 1.86 CM²
AV VELOCITY RATIO: 0.42
BSA FOR ECHO PROCEDURE: 1.69 M2
CV ECHO LV RWT: 0.42 CM
DOP CALC AO PEAK VEL: 2.1 M/S
DOP CALC AO VTI: 38.8 CM
DOP CALC LVOT AREA: 3.9 CM2
DOP CALC LVOT DIAMETER: 2.22 CM
DOP CALC LVOT PEAK VEL: 0.88 M/S
DOP CALC LVOT STROKE VOLUME: 72.35 CM3
DOP CALCLVOT PEAK VEL VTI: 18.7 CM
E WAVE DECELERATION TIME: 306.33 MSEC
E/A RATIO: 0.83
E/E' RATIO: 11.07 M/S
ECHO LV POSTERIOR WALL: 0.97 CM (ref 0.6–1.1)
EJECTION FRACTION: 60 %
FRACTIONAL SHORTENING: 32 % (ref 28–44)
GLOBAL LONGITUIDAL STRAIN: 20.5 %
INTERVENTRICULAR SEPTUM: 1.1 CM (ref 0.6–1.1)
IVRT: 123.69 MSEC
LEFT ATRIUM SIZE: 3.03 CM
LEFT ATRIUM VOLUME INDEX MOD: 30.5 ML/M2
LEFT ATRIUM VOLUME MOD: 52.17 CM3
LEFT INTERNAL DIMENSION IN SYSTOLE: 3.13 CM (ref 2.1–4)
LEFT VENTRICLE DIASTOLIC VOLUME INDEX: 55.95 ML/M2
LEFT VENTRICLE DIASTOLIC VOLUME: 95.68 ML
LEFT VENTRICLE MASS INDEX: 96 G/M2
LEFT VENTRICLE SYSTOLIC VOLUME INDEX: 22.8 ML/M2
LEFT VENTRICLE SYSTOLIC VOLUME: 38.94 ML
LEFT VENTRICULAR INTERNAL DIMENSION IN DIASTOLE: 4.57 CM (ref 3.5–6)
LEFT VENTRICULAR MASS: 164.76 G
LV LATERAL E/E' RATIO: 9.22 M/S
LV SEPTAL E/E' RATIO: 13.83 M/S
LVOT MG: 1.7 MMHG
LVOT MV: 0.61 CM/S
MV PEAK A VEL: 1 M/S
MV PEAK E VEL: 0.83 M/S
PISA TR MAX VEL: 2.5 M/S
PULM VEIN S/D RATIO: 1.63
PV PEAK D VEL: 0.3 M/S
PV PEAK S VEL: 0.49 M/S
RA PRESSURE ESTIMATED: 3 MMHG
RA VOL SYS: 33.67 ML
RIGHT ATRIAL AREA: 12.5 CM2
RIGHT VENTRICULAR END-DIASTOLIC DIMENSION: 3.38 CM
RIGHT VENTRICULAR LENGTH IN DIASTOLE (APICAL 4-CHAMBER VIEW): 6.79 CM
RV MID DIAMA: 2.66 CM
RV TB RVSP: 6 MMHG
RV TISSUE DOPPLER FREE WALL SYSTOLIC VELOCITY 1 (APICAL 4 CHAMBER VIEW): 12.87 CM/S
SINUS: 3.72 CM
STJ: 3.49 CM
TDI LATERAL: 0.09 M/S
TDI SEPTAL: 0.06 M/S
TDI: 0.08 M/S
TR MAX PG: 25 MMHG
TRICUSPID ANNULAR PLANE SYSTOLIC EXCURSION: 1.39 CM
TV REST PULMONARY ARTERY PRESSURE: 28 MMHG
Z-SCORE OF LEFT VENTRICULAR DIMENSION IN END DIASTOLE: -0.38
Z-SCORE OF LEFT VENTRICULAR DIMENSION IN END SYSTOLE: 0.49

## 2024-03-25 NOTE — TELEPHONE ENCOUNTER
Spoke to patients spouse to let them know results of echo and what Dr conteh said. ----- Message from Jacqueline Conteh MD sent at 3/25/2024  2:50 PM CDT -----  Please let the patient know the echo did not show any significant change compared to previous

## 2024-04-19 ENCOUNTER — INFUSION (OUTPATIENT)
Dept: INFUSION THERAPY | Facility: HOSPITAL | Age: 89
End: 2024-04-19
Attending: INTERNAL MEDICINE
Payer: MEDICARE

## 2024-04-19 DIAGNOSIS — D50.8 OTHER IRON DEFICIENCY ANEMIA: ICD-10-CM

## 2024-04-19 DIAGNOSIS — C61 PROSTATE CANCER: ICD-10-CM

## 2024-04-19 DIAGNOSIS — C61 MALIGNANT NEOPLASM OF PROSTATE: Primary | ICD-10-CM

## 2024-04-19 DIAGNOSIS — Z45.2 ENCOUNTER FOR INSERTION OF VENOUS ACCESS PORT: ICD-10-CM

## 2024-04-19 LAB
ALBUMIN SERPL BCP-MCNC: 3 G/DL (ref 3.5–5.2)
ALP SERPL-CCNC: 30 U/L (ref 55–135)
ALT SERPL W/O P-5'-P-CCNC: 7 U/L (ref 10–44)
ANION GAP SERPL CALC-SCNC: 10 MMOL/L (ref 8–16)
AST SERPL-CCNC: 22 U/L (ref 10–40)
BASOPHILS # BLD AUTO: 0.03 K/UL (ref 0–0.2)
BASOPHILS NFR BLD: 0.3 % (ref 0–1.9)
BILIRUB SERPL-MCNC: 0.5 MG/DL (ref 0.1–1)
BUN SERPL-MCNC: 21 MG/DL (ref 8–23)
CALCIUM SERPL-MCNC: 9.2 MG/DL (ref 8.7–10.5)
CHLORIDE SERPL-SCNC: 107 MMOL/L (ref 95–110)
CO2 SERPL-SCNC: 23 MMOL/L (ref 23–29)
COMPLEXED PSA SERPL-MCNC: 11.2 NG/ML (ref 0–4)
CREAT SERPL-MCNC: 1.1 MG/DL (ref 0.5–1.4)
DIFFERENTIAL METHOD BLD: ABNORMAL
EOSINOPHIL # BLD AUTO: 0.1 K/UL (ref 0–0.5)
EOSINOPHIL NFR BLD: 0.6 % (ref 0–8)
ERYTHROCYTE [DISTWIDTH] IN BLOOD BY AUTOMATED COUNT: 13.7 % (ref 11.5–14.5)
EST. GFR  (NO RACE VARIABLE): >60 ML/MIN/1.73 M^2
GLUCOSE SERPL-MCNC: 107 MG/DL (ref 70–110)
HCT VFR BLD AUTO: 28.2 % (ref 40–54)
HGB BLD-MCNC: 9.2 G/DL (ref 14–18)
IMM GRANULOCYTES # BLD AUTO: 0.05 K/UL (ref 0–0.04)
IMM GRANULOCYTES NFR BLD AUTO: 0.6 % (ref 0–0.5)
LYMPHOCYTES # BLD AUTO: 2.5 K/UL (ref 1–4.8)
LYMPHOCYTES NFR BLD: 28.1 % (ref 18–48)
MCH RBC QN AUTO: 32.9 PG (ref 27–31)
MCHC RBC AUTO-ENTMCNC: 32.6 G/DL (ref 32–36)
MCV RBC AUTO: 101 FL (ref 82–98)
MONOCYTES # BLD AUTO: 0.4 K/UL (ref 0.3–1)
MONOCYTES NFR BLD: 3.9 % (ref 4–15)
NEUTROPHILS # BLD AUTO: 5.9 K/UL (ref 1.8–7.7)
NEUTROPHILS NFR BLD: 66.5 % (ref 38–73)
NRBC BLD-RTO: 0 /100 WBC
PLATELET # BLD AUTO: 185 K/UL (ref 150–450)
PMV BLD AUTO: 11.2 FL (ref 9.2–12.9)
POTASSIUM SERPL-SCNC: 4.1 MMOL/L (ref 3.5–5.1)
PROT SERPL-MCNC: 6.7 G/DL (ref 6–8.4)
RBC # BLD AUTO: 2.8 M/UL (ref 4.6–6.2)
SODIUM SERPL-SCNC: 140 MMOL/L (ref 136–145)
WBC # BLD AUTO: 8.92 K/UL (ref 3.9–12.7)

## 2024-04-19 PROCEDURE — 80053 COMPREHEN METABOLIC PANEL: CPT | Mod: HCNC,PN | Performed by: INTERNAL MEDICINE

## 2024-04-19 PROCEDURE — 36591 DRAW BLOOD OFF VENOUS DEVICE: CPT | Mod: HCNC,PN

## 2024-04-19 PROCEDURE — 85025 COMPLETE CBC W/AUTO DIFF WBC: CPT | Mod: HCNC,PN | Performed by: INTERNAL MEDICINE

## 2024-04-19 PROCEDURE — 84153 ASSAY OF PSA TOTAL: CPT | Mod: HCNC | Performed by: INTERNAL MEDICINE

## 2024-04-19 RX ORDER — SODIUM CHLORIDE 0.9 % (FLUSH) 0.9 %
10 SYRINGE (ML) INJECTION
Status: DISCONTINUED | OUTPATIENT
Start: 2024-04-19 | End: 2024-04-19 | Stop reason: HOSPADM

## 2024-04-19 RX ORDER — HEPARIN 100 UNIT/ML
500 SYRINGE INTRAVENOUS
Status: DISCONTINUED | OUTPATIENT
Start: 2024-04-19 | End: 2024-04-19 | Stop reason: HOSPADM

## 2024-04-23 ENCOUNTER — OFFICE VISIT (OUTPATIENT)
Dept: HEMATOLOGY/ONCOLOGY | Facility: CLINIC | Age: 89
End: 2024-04-23
Payer: MEDICARE

## 2024-04-23 ENCOUNTER — INFUSION (OUTPATIENT)
Dept: INFUSION THERAPY | Facility: HOSPITAL | Age: 89
End: 2024-04-23
Attending: INTERNAL MEDICINE
Payer: MEDICARE

## 2024-04-23 VITALS
OXYGEN SATURATION: 97 % | SYSTOLIC BLOOD PRESSURE: 110 MMHG | RESPIRATION RATE: 14 BRPM | TEMPERATURE: 96 F | DIASTOLIC BLOOD PRESSURE: 70 MMHG | HEIGHT: 68 IN | BODY MASS INDEX: 20.15 KG/M2 | WEIGHT: 132.94 LBS | HEART RATE: 76 BPM

## 2024-04-23 VITALS
SYSTOLIC BLOOD PRESSURE: 110 MMHG | TEMPERATURE: 96 F | RESPIRATION RATE: 16 BRPM | DIASTOLIC BLOOD PRESSURE: 70 MMHG | HEART RATE: 76 BPM

## 2024-04-23 DIAGNOSIS — Q99.9 GENETIC DEFECT: ICD-10-CM

## 2024-04-23 DIAGNOSIS — C77.1 METASTASIS TO MEDIASTINAL LYMPH NODE: ICD-10-CM

## 2024-04-23 DIAGNOSIS — D63.8 ANEMIA OF CHRONIC DISEASE: ICD-10-CM

## 2024-04-23 DIAGNOSIS — C61 PROSTATE CANCER: Primary | ICD-10-CM

## 2024-04-23 PROCEDURE — 99215 OFFICE O/P EST HI 40 MIN: CPT | Mod: S$GLB,,, | Performed by: INTERNAL MEDICINE

## 2024-04-23 PROCEDURE — 99999 PR PBB SHADOW E&M-EST. PATIENT-LVL III: CPT | Mod: PBBFAC,,, | Performed by: INTERNAL MEDICINE

## 2024-04-23 PROCEDURE — 1126F AMNT PAIN NOTED NONE PRSNT: CPT | Mod: CPTII,S$GLB,, | Performed by: INTERNAL MEDICINE

## 2024-04-23 PROCEDURE — 1101F PT FALLS ASSESS-DOCD LE1/YR: CPT | Mod: CPTII,S$GLB,, | Performed by: INTERNAL MEDICINE

## 2024-04-23 PROCEDURE — 3288F FALL RISK ASSESSMENT DOCD: CPT | Mod: CPTII,S$GLB,, | Performed by: INTERNAL MEDICINE

## 2024-04-23 PROCEDURE — 1157F ADVNC CARE PLAN IN RCRD: CPT | Mod: CPTII,S$GLB,, | Performed by: INTERNAL MEDICINE

## 2024-04-23 PROCEDURE — 96402 CHEMO HORMON ANTINEOPL SQ/IM: CPT | Mod: PN

## 2024-04-23 PROCEDURE — G2211 COMPLEX E/M VISIT ADD ON: HCPCS | Mod: S$GLB,,, | Performed by: INTERNAL MEDICINE

## 2024-04-23 PROCEDURE — 63600175 PHARM REV CODE 636 W HCPCS: Mod: JZ,JG,PN | Performed by: INTERNAL MEDICINE

## 2024-04-23 RX ADMIN — LEUPROLIDE ACETATE 45 MG: KIT at 10:04

## 2024-04-23 NOTE — PROGRESS NOTES
PATIENT: Amor Alcazar  MRN: [id number]  DATE: 4/23/2024      Diagnosis:   1. 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 Genetic defect    4. Anemia of chronic disease              Chief Complaint: Prostate Cancer      Subjective:    Interval History: Mr. Alcazar is a 89 y.o. male with Arthritis, hypothyroidism, valvular heart disease, iron deficiency anemia who presents for follow up of prostate cancer.  Since the last clinic visit the patient underwent a PSA on 4/19/24 which was elevated at 11.2ng/mL.  The patient denies CP, cough, SOB, abdominal pain, nausea, vomiting, constipation, diarrhea.  The patient denies fever, chills, night sweats, weight loss, new lumps or bumps, easy bruising or bleeding.    Prior History:  The patient was diagnosed with prostate cancer [place] 9 (4+5) 6/03/14.  He had a rising PSA and underwent PSMA PET/CT 5/09/22 showing disease in the prostate and mediastinal LN's.  The patient has been receiving Lupron 45mg 6 months dosing with Dr Bellamy with last treatment on 4/20/22.  He has previously been on Casodex and Apalutamide with progression.  He was started on Mitoxantrone and prednisone 7/01/22.  Last Echo 8/26/22 showed normal EF.   The patient saw Dr Kasper in cardiology on 9/30/22 and recommended repeat Echo in 3 months.   The patient underwent an Echo on 12/13/22 showing normal diastolic and systolic function.   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   The patient underwent PSA on 7/14/23 showing increase to 4.1   The patient underwent PSMA PET-CT on 08/04/2023 showing shrinkage of mediastinal lymph nodes with decreased FDG uptake with paratracheal lymph node measuring 1.4 x 1 cm; subcarinal lymph node measuring 1.2 x 1.3 cm; increased radiotracer accumulation within the prostate gland.    Abiraterone was held from 10/13/23 until 10/31/23  due to liver enzyme elevation.  PSA increased to 6.7 on 23.   The patient underwent PSMA PET-CT on 2023 showing no evidence of metastatic disease but increased uptake in the prostate.    Past Medical History:   Past Medical History:   Diagnosis Date    Arthritis     History of skin cancer     details unknown    Hypothyroidism, unspecified     Prostate cancer     injections q 6 months    Valvular heart disease     mild AS, MR and TR  ECHO       Past Surgical HIstory:   Past Surgical History:   Procedure Laterality Date    BONE MARROW BIOPSY Bilateral 6/15/2022    Procedure: Biopsy-bone marrow;  Surgeon: Hermann Jackson MD;  Location: [facility] OR;  Service: Oncology;  Laterality: Bilateral;    CATARACT EXTRACTION W/  INTRAOCULAR LENS IMPLANT Bilateral     ESOPHAGOGASTRODUODENOSCOPY      INSERTION OF TUNNELED CENTRAL VENOUS CATHETER (CVC) WITH SUBCUTANEOUS PORT N/A 6/15/2022    Procedure: INSERTION, PORT-A-CATH;  Surgeon: Marques Rivero MD;  Location: [facility] OR;  Service: General;  Laterality: N/A;       Family History:   Family History   Problem Relation Name Age of Onset    Lung cancer Sister           of       Social History:  reports that he has never smoked. He has never used smokeless tobacco. He reports that he does not currently use alcohol. He reports that he does not use drugs.    Allergies:  Review of patient's allergies indicates:  No Known Allergies    Medications:  Current Outpatient Medications   Medication Sig Dispense Refill    abiraterone (ZYTIGA) 500 mg Tab Take 1,000 mg by mouth once daily. 60 tablet 6    atorvastatin (LIPITOR) 10 MG tablet Take 1 tablet (10 mg total) by mouth once daily. 90 tablet 3    folic acid (FOLVITE) 400 MCG tablet Take 400 mcg by mouth once daily.      levothyroxine (SYNTHROID) 50 MCG tablet Take 1 tablet (50 mcg total) by mouth before breakfast. 90 tablet 2    LIDOcaine-prilocaine (EMLA) cream Apply topically as needed (please apply to port site one hour  "prior to treatment administration). 30 g 6    LUPRON DEPOT, 6 MONTH, 45 mg SyKt injection Inject into the muscle every 6 (six) months.      metoprolol succinate (TOPROL-XL) 25 MG 24 hr tablet Take 1 tablet (25 mg total) by mouth daily with breakfast AND 0.5 tablets (12.5 mg total) before evening meal. 135 tablet 0    multivit-min-FA-lycopen-lutein (CENTRUM SILVER MEN) 300-600-300 mcg Tab Take by mouth once daily.      predniSONE (DELTASONE) 5 MG tablet Take 1 tablet (5 mg total) by mouth once daily. 60 tablet 6     No current facility-administered medications for this visit.       Review of Systems   Constitutional:  Negative for chills, diaphoresis, fatigue, fever and unexpected weight change.   Respiratory:  Negative for cough and shortness of breath.    Cardiovascular:  Negative for chest pain and palpitations.   Gastrointestinal:  Negative for abdominal pain, constipation, diarrhea, nausea and vomiting.   Skin:  Negative for color change and rash.   Neurological:  Negative for headaches.   Hematological:  Negative for adenopathy. Does not bruise/bleed easily.       ECOG Performance Status: 1   Objective:      Vitals:   Vitals:    04/23/24 0858   BP: 110/70   BP Location: Left arm   Patient Position: Sitting   BP Method: Medium (Manual)   Pulse: 76   Resp: 14   Temp: 96.4 °F (35.8 °C)   TempSrc: Temporal   SpO2: 97%   Weight: 60.3 kg (132 lb 15 oz)   Height: 5' 8" (1.727 m)       Physical Exam  Constitutional:       General: He is not in acute distress.     Appearance: He is well-developed. He is not diaphoretic.   HENT:      Head: Normocephalic and atraumatic.   Cardiovascular:      Rate and Rhythm: Normal rate and regular rhythm.      Heart sounds: Normal heart sounds. No murmur heard.     No friction rub. No gallop.   Pulmonary:      Effort: Pulmonary effort is normal. No respiratory distress.      Breath sounds: Normal breath sounds. No wheezing or rales.   Chest:      Chest wall: No tenderness.   Abdominal: "      General: Bowel sounds are normal. There is no distension.      Palpations: Abdomen is soft. There is no mass.      Tenderness: There is no abdominal tenderness. There is no rebound.   Musculoskeletal:      Cervical back: Normal range of motion.      Comments: PORT in left chest   Lymphadenopathy:      Cervical: No cervical adenopathy.      Upper Body:      Right upper body: No supraclavicular or axillary adenopathy.      Left upper body: No supraclavicular or axillary adenopathy.   Skin:     General: Skin is warm and dry.      Findings: No erythema, lesion or rash.   Neurological:      Mental Status: He is alert and oriented to person, place, and time.   Psychiatric:         Behavior: Behavior normal.         Laboratory Data:  Infusion on 04/19/2024   Component Date Value Ref Range Status    WBC 04/19/2024 8.92  3.90 - 12.70 K/uL Final    RBC 04/19/2024 2.80 (L)  4.60 - 6.20 M/uL Final    Hemoglobin 04/19/2024 9.2 (L)  14.0 - 18.0 g/dL Final    Hematocrit 04/19/2024 28.2 (L)  40.0 - 54.0 % Final    MCV 04/19/2024 101 (H)  82 - 98 fL Final    MCH 04/19/2024 32.9 (H)  27.0 - 31.0 pg Final    MCHC 04/19/2024 32.6  32.0 - 36.0 g/dL Final    RDW 04/19/2024 13.7  11.5 - 14.5 % Final    Platelets 04/19/2024 185  150 - 450 K/uL Final    MPV 04/19/2024 11.2  9.2 - 12.9 fL Final    Immature Granulocytes 04/19/2024 0.6 (H)  0.0 - 0.5 % Final    Gran # (ANC) 04/19/2024 5.9  1.8 - 7.7 K/uL Final    Immature Grans (Abs) 04/19/2024 0.05 (H)  0.00 - 0.04 K/uL Final    Comment: Mild elevation in immature granulocytes is non specific and   can be seen in a variety of conditions including stress response,   acute inflammation, trauma and pregnancy. Correlation with other   laboratory and clinical findings is essential.      Lymph # 04/19/2024 2.5  1.0 - 4.8 K/uL Final    Mono # 04/19/2024 0.4  0.3 - 1.0 K/uL Final    Eos # 04/19/2024 0.1  0.0 - 0.5 K/uL Final    Baso # 04/19/2024 0.03  0.00 - 0.20 K/uL Final    nRBC 04/19/2024 0   0 /100 WBC Final    Gran % 04/19/2024 66.5  38.0 - 73.0 % Final    Lymph % 04/19/2024 28.1  18.0 - 48.0 % Final    Mono % 04/19/2024 3.9 (L)  4.0 - 15.0 % Final    Eosinophil % 04/19/2024 0.6  0.0 - 8.0 % Final    Basophil % 04/19/2024 0.3  0.0 - 1.9 % Final    Differential Method 04/19/2024 Automated   Final    Sodium 04/19/2024 140  136 - 145 mmol/L Final    Potassium 04/19/2024 4.1  3.5 - 5.1 mmol/L Final    Chloride 04/19/2024 107  95 - 110 mmol/L Final    CO2 04/19/2024 23  23 - 29 mmol/L Final    Glucose 04/19/2024 107  70 - 110 mg/dL Final    BUN 04/19/2024 21  8 - 23 mg/dL Final    Creatinine 04/19/2024 1.1  0.5 - 1.4 mg/dL Final    Calcium 04/19/2024 9.2  8.7 - 10.5 mg/dL Final    Total Protein 04/19/2024 6.7  6.0 - 8.4 g/dL Final    Albumin 04/19/2024 3.0 (L)  3.5 - 5.2 g/dL Final    Total Bilirubin 04/19/2024 0.5  0.1 - 1.0 mg/dL Final    Comment: For infants and newborns, interpretation of results should be based  on gestational age, weight and in agreement with clinical  observations.    Premature Infant recommended reference ranges:  Up to 24 hours.............<8.0 mg/dL  Up to 48 hours............<12.0 mg/dL  3-5 days..................<15.0 mg/dL  6-29 days.................<15.0 mg/dL      Alkaline Phosphatase 04/19/2024 30 (L)  55 - 135 U/L Final    AST 04/19/2024 22  10 - 40 U/L Final    ALT 04/19/2024 7 (L)  10 - 44 U/L Final    eGFR 04/19/2024 >60.0  >60 mL/min/1.73 m^2 Final    Anion Gap 04/19/2024 10  8 - 16 mmol/L Final    PSA Diagnostic 04/19/2024 11.2 (H)  0.00 - 4.00 ng/mL Final    Comment: The testing method is a chemiluminescent microparticle immunoassay   manufactured by [employer] and performed on the [employer]   or   Roobiq system. Values obtained with different assay manufacturers   for   methods may be different and cannot be used interchangeably.  PSA Expected levels:  Hormonal Therapy: <0.05 ng/ml  Prostatectomy: <0.01 ng/ml  Radiation Therapy: <1.00 ng/ml            Imaging:     PSMA PET/CT 11/29/23  Head and Neck:     Brain demonstrates normal metabolism. Physiologic uptake is in the neck.     Chest:     Scarring of the lung parenchyma is noted. No significant FDG avidity. A miliary appearance is noted. Calcification of the pericardium is noted. No PET avid pulmonary lesions are present. No enlarged or PET avid lymph nodes are in the chest.     Abdomen and Pelvis:     Physiologic uptake is in the liver, spleen, urinary system and bowel. Incidental note of large stool burden the rectum low-level hypermetabolic activity of the rectal wall. No enlarged or PET avid lymph nodes are in the abdomen or pelvis. Adrenal glands are normal. Persistent abnormal hypermetabolic activity the prostate is noted does not significantly changed compared to the prior examination. No evidence for adenopathy.     Musculoskeletal:     No PET avid osseous lesions are present.       Assessment:       1. 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 Genetic defect    4. 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  May predispose pt any other family member to increased risk of cancer  -PT to see genetic counselor  -PSMA PET/CT 8/04/23 showed decrease in size and avidity of paratracheal LN and subcarinal LN but increased uptake in the prostate  -Pt potential candidate for radiation. Will have pt return to see rad onc  -Patient on Abiraterone 1000mg daily and prednisone 5mg BID  -Repeat PSMA PET/CT done 12/07/23  for rising PSA showed stable disease  -PSA 4/29/24 elevated at 11.2  -Will repeat PSMA PET/CT with pt to return once completed  -Would consider docetaxel versus pluvicto as next line of treatment  Visit today included increased complexity associated with the care of the episodic problem (ADT and Abiraterone ) addressed and managing the longitudinal care of the patient due to the serious and/or complex managed problem(s) prostate cancer.    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 Pt can get lupron today.  Pt needs a PSMA PET/CT with a return appt with me once completed.   Follow up with AMINTA    Infusion scheduling note    Injection scheduling note    Labs    Imaging    Pharmacy appointment    Other referrals              Treatment Plan Information   OP MITOXANTRONE PREDNISONE Q3W   Beka Alvarez MD   Upcoming Treatment Dates - OP MITOXANTRONE PREDNISONE Q3W    No upcoming days in selected categories.    Supportive Plan Information  OP PROSTATE LEUPROLIDE (LUPRON) 6 MONTH   Beka Alvarez MD   Upcoming Treatment Dates - OP PROSTATE LEUPROLIDE (LUPRON) 6 MONTH    8/30/2024       Chemotherapy       leuprolide acetate (6 month) injection 45 mg    Therapy Plan Information  Flushes  heparin, porcine (PF) 100 unit/mL injection flush 500 Units  500 Units, Intravenous, Every visit  sodium chloride 0.9% flush 10 mL  10 mL, Intravenous, Every visit        Beka Alvarez MD  [facility]  Hematology and Oncology  [facility] [address]   [place] [address]   O: [phone]  F: [phone]

## 2024-04-25 ENCOUNTER — PATIENT MESSAGE (OUTPATIENT)
Dept: HEMATOLOGY/ONCOLOGY | Facility: CLINIC | Age: 89
End: 2024-04-25
Payer: MEDICARE

## 2024-04-30 ENCOUNTER — HOSPITAL ENCOUNTER (OUTPATIENT)
Dept: RADIOLOGY | Facility: HOSPITAL | Age: 89
Discharge: HOME | End: 2024-04-30
Attending: INTERNAL MEDICINE
Payer: MEDICARE

## 2024-04-30 DIAGNOSIS — C61 PROSTATE CANCER: ICD-10-CM

## 2024-04-30 PROCEDURE — 78815 PET IMAGE W/CT SKULL-THIGH: CPT | Mod: TC,PS,PN

## 2024-04-30 PROCEDURE — A9595 HC PIFLUFOLASTAT F-18, DX, PER 1 MCI: HCPCS | Mod: TB,PN | Performed by: INTERNAL MEDICINE

## 2024-04-30 PROCEDURE — 78815 PET IMAGE W/CT SKULL-THIGH: CPT | Mod: 26,PS,, | Performed by: RADIOLOGY

## 2024-04-30 RX ADMIN — PIFLUFOLASTAT F-18 8.8 MILLICURIE: 80 INJECTION INTRAVENOUS at 11:04

## 2024-04-30 NOTE — PROGRESS NOTES
PET Imaging Questionnaire    Are you a Diabetic? Recent Blood Sugar level? No    Are you anemic? Bone Marrow Stimulation Meds? No    Have you had a CT Scan, if so when & where was your last one? Yes -     Have you had a PET Scan, if so when & where was your last one? Yes -     Chemotherapy or currently on Chemotherapy? Yes    Radiation therapy? No    Surgical History:   Past Surgical History:   Procedure Laterality Date    BONE MARROW BIOPSY Bilateral 6/15/2022    Procedure: Biopsy-bone marrow;  Surgeon: Hermann Jackson MD;  Location: [facility] OR;  Service: Oncology;  Laterality: Bilateral;    CATARACT EXTRACTION W/  INTRAOCULAR LENS IMPLANT Bilateral     ESOPHAGOGASTRODUODENOSCOPY      INSERTION OF TUNNELED CENTRAL VENOUS CATHETER (CVC) WITH SUBCUTANEOUS PORT N/A 6/15/2022    Procedure: INSERTION, PORT-A-CATH;  Surgeon: Marques Rivero MD;  Location: [facility] OR;  Service: General;  Laterality: N/A;        Have you been fasting for at least 6 hours? Yes    Is there any chance you may be pregnant or breastfeeding? No    Assay: 9.1 MCi@:11:19   Injection Site:LT AC    Residual: 0.3 mCi@: 11:23   Technologist: Rg Weldon Injected:8.8 mCi

## 2024-05-02 ENCOUNTER — OFFICE VISIT (OUTPATIENT)
Dept: FAMILY MEDICINE | Facility: CLINIC | Age: 89
End: 2024-05-02
Payer: MEDICARE

## 2024-05-02 VITALS
TEMPERATURE: 98 F | HEART RATE: 99 BPM | BODY MASS INDEX: 19.56 KG/M2 | WEIGHT: 129.06 LBS | HEIGHT: 68 IN | DIASTOLIC BLOOD PRESSURE: 76 MMHG | SYSTOLIC BLOOD PRESSURE: 114 MMHG | OXYGEN SATURATION: 99 % | RESPIRATION RATE: 16 BRPM

## 2024-05-02 DIAGNOSIS — I47.19 PAT (PAROXYSMAL ATRIAL TACHYCARDIA): ICD-10-CM

## 2024-05-02 DIAGNOSIS — E78.5 HYPERLIPIDEMIA, UNSPECIFIED HYPERLIPIDEMIA TYPE: ICD-10-CM

## 2024-05-02 DIAGNOSIS — E43 UNSPECIFIED SEVERE PROTEIN-CALORIE MALNUTRITION: ICD-10-CM

## 2024-05-02 DIAGNOSIS — C61 PROSTATE CANCER: ICD-10-CM

## 2024-05-02 DIAGNOSIS — E03.4 HYPOTHYROIDISM DUE TO ACQUIRED ATROPHY OF THYROID: Primary | ICD-10-CM

## 2024-05-02 PROBLEM — N18.31 CHRONIC KIDNEY DISEASE, STAGE 3A: Status: RESOLVED | Noted: 2021-11-15 | Resolved: 2024-05-02

## 2024-05-02 PROCEDURE — 3288F FALL RISK ASSESSMENT DOCD: CPT | Mod: CPTII,S$GLB,, | Performed by: FAMILY MEDICINE

## 2024-05-02 PROCEDURE — 1160F RVW MEDS BY RX/DR IN RCRD: CPT | Mod: CPTII,S$GLB,, | Performed by: FAMILY MEDICINE

## 2024-05-02 PROCEDURE — 1159F MED LIST DOCD IN RCRD: CPT | Mod: CPTII,S$GLB,, | Performed by: FAMILY MEDICINE

## 2024-05-02 PROCEDURE — 1126F AMNT PAIN NOTED NONE PRSNT: CPT | Mod: CPTII,S$GLB,, | Performed by: FAMILY MEDICINE

## 2024-05-02 PROCEDURE — 99214 OFFICE O/P EST MOD 30 MIN: CPT | Mod: S$GLB,,, | Performed by: FAMILY MEDICINE

## 2024-05-02 PROCEDURE — 1157F ADVNC CARE PLAN IN RCRD: CPT | Mod: CPTII,S$GLB,, | Performed by: FAMILY MEDICINE

## 2024-05-02 PROCEDURE — 1101F PT FALLS ASSESS-DOCD LE1/YR: CPT | Mod: CPTII,S$GLB,, | Performed by: FAMILY MEDICINE

## 2024-05-02 RX ORDER — METOPROLOL SUCCINATE 25 MG/1
TABLET, EXTENDED RELEASE ORAL
Qty: 135 TABLET | Refills: 3 | Status: SHIPPED | OUTPATIENT
Start: 2024-05-02 | End: 2025-05-02

## 2024-05-02 NOTE — PROGRESS NOTES
Subjective:       Patient ID: Amor Alcazar is a 89 y.o. male.    Chief Complaint: Annual Exam    HPI  The patient is an 89-year-old who is here today for follow-up.  Overall, he is doing great and feeling well.  He is keeping a positive attitude and stays busy.  He enjoys playing games on his iPad.  He does stay active walking every hour.  He was out cutting his grass on his riding  the other day.  He does have a diminished appetite and has lost weight.  His appetite is diminished because he does not have any taste.  He is taking his boost twice a day.    Today we discussed the followin)  Metastatic prostate cancer.  He continues to follow closely with Oncology.  He recently had a PET scan that showed active disease in the prostate only.  He will be seeing Oncology soon.  There has been discussion about radiation.  He is currently taking Lupron, Zytiga and prednisone from Oncology   2)  PAT.  He denies any palpitations or tachycardia.  He is taking his Toprol and does need a refill on this.  He does see his cardiologist regularly to follow-up on this issue  3) hypothyroidism.  He is taking his Synthroid consistently.  His recent TSH in February was 2.341   4)  Hyperlipidemia.  He is taking his Lipitor consistently.  In February his total cholesterol was 114 and his LDL was 56      Review of Systems   Constitutional:  Positive for appetite change. Negative for chills, diaphoresis, fatigue, fever and unexpected weight change.   HENT:  Negative for congestion, ear pain, postnasal drip, rhinorrhea, sinus pressure, sneezing, sore throat and trouble swallowing.    Eyes:  Negative for pain, discharge and visual disturbance.   Respiratory:  Negative for cough, chest tightness, shortness of breath and wheezing.    Cardiovascular:  Negative for chest pain, palpitations and leg swelling.   Gastrointestinal:  Negative for abdominal distention, abdominal pain, blood in stool, constipation, diarrhea, nausea  and vomiting.   Skin:  Negative for rash.         Objective:      Physical Exam  Constitutional:       General: He is not in acute distress.     Appearance: Normal appearance. He is well-developed and underweight.   HENT:      Head: Normocephalic and atraumatic.      Right Ear: Hearing, tympanic membrane, ear canal and external ear normal.      Left Ear: Hearing, tympanic membrane, ear canal and external ear normal.      Nose: Nose normal.      Mouth/Throat:      Mouth: No oral lesions.      Pharynx: No oropharyngeal exudate or posterior oropharyngeal erythema.   Eyes:      General: Lids are normal. No scleral icterus.     Extraocular Movements: Extraocular movements intact.      Conjunctiva/sclera: Conjunctivae normal.      Pupils: Pupils are equal, round, and reactive to light.   Neck:      Thyroid: No thyroid mass or thyromegaly.      Vascular: No carotid bruit.   Cardiovascular:      Rate and Rhythm: Normal rate and regular rhythm. No extrasystoles are present.     Chest Wall: PMI is not displaced.      Heart sounds: Normal heart sounds. No murmur heard.     No gallop.   Pulmonary:      Effort: Pulmonary effort is normal. No accessory muscle usage or respiratory distress.      Breath sounds: Normal breath sounds.   Abdominal:      General: Bowel sounds are normal. There is no abdominal bruit.      Palpations: Abdomen is soft.      Tenderness: There is no abdominal tenderness. There is no rebound.   Musculoskeletal:      Cervical back: Normal range of motion and neck supple.   Lymphadenopathy:      Head:      Right side of head: No submental or submandibular adenopathy.      Left side of head: No submental or submandibular adenopathy.      Cervical:      Right cervical: No superficial, deep or posterior cervical adenopathy.     Left cervical: No superficial, deep or posterior cervical adenopathy.      Upper Body:      Right upper body: No supraclavicular adenopathy.      Left upper body: No supraclavicular  "adenopathy.   Skin:     General: Skin is warm and dry.   Neurological:      Mental Status: He is alert and oriented to person, place, and time.      Cranial Nerves: No cranial nerve deficit.      Sensory: No sensory deficit.   Psychiatric:         Speech: Speech normal.         Behavior: Behavior normal.         Thought Content: Thought content normal.       Blood pressure 114/76, pulse 99, temperature 98.2 °F (36.8 °C), temperature source Temporal, resp. rate 16, height 5' 8" (1.727 m), weight 58.6 kg (129 lb 1.3 oz), SpO2 99%.Body mass index is 19.63 kg/m².            A/P:  1)  Metastatic prostate cancer now with active disease confined to the prostate.  Follow up with Oncology and continue with medication under their direction   2)  PAT.  Asymptomatic.  Continue with Toprol which I did refill.  Follow up with Cardiology as planned    3) hypothyroidism.  Well controlled.  Continue with Synthroid.  We will check TSH next year   4)  Hyperlipidemia.  Well controlled.  Continue with Lipitor.  We will check a fasting lipid profile next year  5) malnutrition with weight loss.  He will continue with his boost twice a day and continue to try to eat despite having no taste.      Son as he does well, I will see him back in 1 year sooner if needed  "

## 2024-05-06 NOTE — PROGRESS NOTES
PATIENT: Amor Alcazar  MRN: [id number]  DATE: 5/7/2024      Diagnosis:   1. 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 Genetic defect    4. Anemia of chronic disease                Chief Complaint: Prostate Cancer      Subjective:    Interval History: Mr. Alcazar is a 89 y.o. male with Arthritis, hypothyroidism, valvular heart disease, iron deficiency anemia who presents for follow up of prostate cancer.  Since the last clinic visit  the patient underwent PSMA PET-CT on 04/30/2024 showing diffuse markedly increased radiotracer accumulation throughout the entire prostate with increased SUV from 40.9 to 55.  The patient denies CP, cough, SOB, abdominal pain, nausea, vomiting, constipation, diarrhea.  The patient denies fever, chills, night sweats, weight loss, new lumps or bumps, easy bruising or bleeding.    Prior History:  The patient was diagnosed with prostate cancer Dorris 9 (4+5) 6/03/14.  He had a rising PSA and underwent PSMA PET/CT 5/09/22 showing disease in the prostate and mediastinal LN's.  The patient has been receiving Lupron 45mg 6 months dosing with Dr Bellamy with last treatment on 4/20/22.  He has previously been on Casodex and Apalutamide with progression.  He was started on Mitoxantrone and prednisone 7/01/22.  Last Echo 8/26/22 showed normal EF.   The patient saw Dr Kasper in cardiology on 9/30/22 and recommended repeat Echo in 3 months.   The patient underwent an Echo on 12/13/22 showing normal diastolic and systolic function.   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   The patient underwent PSA on 7/14/23 showing increase to 4.1   The patient underwent PSMA PET-CT on 08/04/2023 showing shrinkage of mediastinal lymph nodes with decreased FDG uptake with paratracheal lymph node measuring 1.4 x 1 cm; subcarinal lymph node measuring 1.2 x 1.3 cm; increased  radiotracer accumulation within the prostate gland.    Abiraterone was held from 10/13/23 until 10/31/23 due to liver enzyme elevation.  PSA increased to 6.7 on 23.   The patient underwent PSMA PET-CT on 2023 showing no evidence of metastatic disease but increased uptake in the prostate.   The patient underwent a PSA on 24 which was elevated at 11.2ng/mL.    Past Medical History:   Past Medical History:   Diagnosis Date    Arthritis     History of skin cancer     details unknown    Hypothyroidism, unspecified     PAT (paroxysmal atrial tachycardia)     Prostate cancer     injections q 6 months    Valvular heart disease     mild AS, MR and TR  ECHO       Past Surgical HIstory:   Past Surgical History:   Procedure Laterality Date    BONE MARROW BIOPSY Bilateral 6/15/2022    Procedure: Biopsy-bone marrow;  Surgeon: Hermann Jackson MD;  Location: [facility] OR;  Service: Oncology;  Laterality: Bilateral;    CATARACT EXTRACTION W/  INTRAOCULAR LENS IMPLANT Bilateral     ESOPHAGOGASTRODUODENOSCOPY      INSERTION OF TUNNELED CENTRAL VENOUS CATHETER (CVC) WITH SUBCUTANEOUS PORT N/A 6/15/2022    Procedure: INSERTION, PORT-A-CATH;  Surgeon: Marques Rivero MD;  Location: [facility] OR;  Service: General;  Laterality: N/A;       Family History:   Family History   Problem Relation Name Age of Onset    Lung cancer Sister           of       Social History:  reports that he has never smoked. He has never used smokeless tobacco. He reports that he does not currently use alcohol. He reports that he does not use drugs.    Allergies:  Review of patient's allergies indicates:  No Known Allergies    Medications:  Current Outpatient Medications   Medication Sig Dispense Refill    abiraterone (ZYTIGA) 500 mg Tab Take 1,000 mg by mouth once daily. 60 tablet 6    atorvastatin (LIPITOR) 10 MG tablet Take 1 tablet (10 mg total) by mouth once daily. 90 tablet 3    folic acid (FOLVITE) 400 MCG tablet Take 400 mcg by mouth once  "daily.      levothyroxine (SYNTHROID) 50 MCG tablet Take 1 tablet (50 mcg total) by mouth before breakfast. 90 tablet 2    LIDOcaine-prilocaine (EMLA) cream Apply topically as needed (please apply to port site one hour prior to treatment administration). 30 g 6    LUPRON DEPOT, 6 MONTH, 45 mg SyKt injection Inject into the muscle every 6 (six) months.      metoprolol succinate (TOPROL-XL) 25 MG 24 hr tablet Take 1 tablet (25 mg total) by mouth daily with breakfast AND 0.5 tablets (12.5 mg total) before evening meal. 135 tablet 3    multivit-min-FA-lycopen-lutein (CENTRUM SILVER MEN) 300-600-300 mcg Tab Take by mouth once daily.      predniSONE (DELTASONE) 5 MG tablet Take 1 tablet (5 mg total) by mouth once daily. 60 tablet 6     No current facility-administered medications for this visit.       Review of Systems   Constitutional:  Negative for chills, diaphoresis, fatigue, fever and unexpected weight change.   Respiratory:  Negative for cough and shortness of breath.    Cardiovascular:  Negative for chest pain and palpitations.   Gastrointestinal:  Negative for abdominal pain, constipation, diarrhea, nausea and vomiting.   Skin:  Negative for color change and rash.   Neurological:  Negative for headaches.   Hematological:  Negative for adenopathy. Does not bruise/bleed easily.       ECOG Performance Status: 1   Objective:      Vitals:   Vitals:    05/07/24 0934   BP: 98/70   BP Location: Right arm   Patient Position: Sitting   BP Method: Medium (Manual)   Pulse: 94   Resp: 16   Temp: 97.6 °F (36.4 °C)   TempSrc: Temporal   SpO2: 98%   Weight: 58.6 kg (129 lb 3 oz)   Height: 5' 8" (1.727 m)         Physical Exam  Constitutional:       General: He is not in acute distress.     Appearance: He is well-developed. He is not diaphoretic.   HENT:      Head: Normocephalic and atraumatic.   Cardiovascular:      Rate and Rhythm: Normal rate and regular rhythm.      Heart sounds: Normal heart sounds. No murmur heard.     No " friction rub. No gallop.   Pulmonary:      Effort: Pulmonary effort is normal. No respiratory distress.      Breath sounds: Normal breath sounds. No wheezing or rales.   Chest:      Chest wall: No tenderness.   Abdominal:      General: Bowel sounds are normal. There is no distension.      Palpations: Abdomen is soft. There is no mass.      Tenderness: There is no abdominal tenderness. There is no rebound.   Musculoskeletal:      Cervical back: Normal range of motion.      Comments: PORT in left chest   Lymphadenopathy:      Cervical: No cervical adenopathy.      Upper Body:      Right upper body: No supraclavicular or axillary adenopathy.      Left upper body: No supraclavicular or axillary adenopathy.   Skin:     General: Skin is warm and dry.      Findings: No erythema, lesion or rash.   Neurological:      Mental Status: He is alert and oriented to person, place, and time.   Psychiatric:         Behavior: Behavior normal.         Laboratory Data:  No visits with results within 1 Week(s) from this visit.   Latest known visit with results is:   Infusion on 04/19/2024   Component Date Value Ref Range Status    WBC 04/19/2024 8.92  3.90 - 12.70 K/uL Final    RBC 04/19/2024 2.80 (L)  4.60 - 6.20 M/uL Final    Hemoglobin 04/19/2024 9.2 (L)  14.0 - 18.0 g/dL Final    Hematocrit 04/19/2024 28.2 (L)  40.0 - 54.0 % Final    MCV 04/19/2024 101 (H)  82 - 98 fL Final    MCH 04/19/2024 32.9 (H)  27.0 - 31.0 pg Final    MCHC 04/19/2024 32.6  32.0 - 36.0 g/dL Final    RDW 04/19/2024 13.7  11.5 - 14.5 % Final    Platelets 04/19/2024 185  150 - 450 K/uL Final    MPV 04/19/2024 11.2  9.2 - 12.9 fL Final    Immature Granulocytes 04/19/2024 0.6 (H)  0.0 - 0.5 % Final    Gran # (ANC) 04/19/2024 5.9  1.8 - 7.7 K/uL Final    Immature Grans (Abs) 04/19/2024 0.05 (H)  0.00 - 0.04 K/uL Final    Comment: Mild elevation in immature granulocytes is non specific and   can be seen in a variety of conditions including stress response,   acute  inflammation, trauma and pregnancy. Correlation with other   laboratory and clinical findings is essential.      Lymph # 04/19/2024 2.5  1.0 - 4.8 K/uL Final    Mono # 04/19/2024 0.4  0.3 - 1.0 K/uL Final    Eos # 04/19/2024 0.1  0.0 - 0.5 K/uL Final    Baso # 04/19/2024 0.03  0.00 - 0.20 K/uL Final    nRBC 04/19/2024 0  0 /100 WBC Final    Gran % 04/19/2024 66.5  38.0 - 73.0 % Final    Lymph % 04/19/2024 28.1  18.0 - 48.0 % Final    Mono % 04/19/2024 3.9 (L)  4.0 - 15.0 % Final    Eosinophil % 04/19/2024 0.6  0.0 - 8.0 % Final    Basophil % 04/19/2024 0.3  0.0 - 1.9 % Final    Differential Method 04/19/2024 Automated   Final    Sodium 04/19/2024 140  136 - 145 mmol/L Final    Potassium 04/19/2024 4.1  3.5 - 5.1 mmol/L Final    Chloride 04/19/2024 107  95 - 110 mmol/L Final    CO2 04/19/2024 23  23 - 29 mmol/L Final    Glucose 04/19/2024 107  70 - 110 mg/dL Final    BUN 04/19/2024 21  8 - 23 mg/dL Final    Creatinine 04/19/2024 1.1  0.5 - 1.4 mg/dL Final    Calcium 04/19/2024 9.2  8.7 - 10.5 mg/dL Final    Total Protein 04/19/2024 6.7  6.0 - 8.4 g/dL Final    Albumin 04/19/2024 3.0 (L)  3.5 - 5.2 g/dL Final    Total Bilirubin 04/19/2024 0.5  0.1 - 1.0 mg/dL Final    Comment: For infants and newborns, interpretation of results should be based  on gestational age, weight and in agreement with clinical  observations.    Premature Infant recommended reference ranges:  Up to 24 hours.............<8.0 mg/dL  Up to 48 hours............<12.0 mg/dL  3-5 days..................<15.0 mg/dL  6-29 days.................<15.0 mg/dL      Alkaline Phosphatase 04/19/2024 30 (L)  55 - 135 U/L Final    AST 04/19/2024 22  10 - 40 U/L Final    ALT 04/19/2024 7 (L)  10 - 44 U/L Final    eGFR 04/19/2024 >60.0  >60 mL/min/1.73 m^2 Final    Anion Gap 04/19/2024 10  8 - 16 mmol/L Final    PSA Diagnostic 04/19/2024 11.2 (H)  0.00 - 4.00 ng/mL Final    Comment: The testing method is a chemiluminescent microparticle immunoassay   manufactured by  [employer] and performed on the    or   [employer] system. Values obtained with different assay manufacturers   for   methods may be different and cannot be used interchangeably.  PSA Expected levels:  Hormonal Therapy: <0.05 ng/ml  Prostatectomy: <0.01 ng/ml  Radiation Therapy: <1.00 ng/ml           Imaging:     PSMA PET/CT 4/30/24    Head/neck:     No significant abnormal foci of increased radiotracer accumulation are present in the head neck. No lymphadenopathy is present.     Chest:     No significant abnormal foci of increased radiotracer accumulation are present in the chest. Chronic poorly PSMA avid fibrotic changes are present in both lung apices which are unchanged. No pulmonary nodules, lymphadenopathy, or pleural effusion are present.     Abdomen/pelvis:     Diffuse markedly increased radiotracer accumulation is present throughout the entire prostate gland in a similar pattern as on the previous study. The max SUV is 40.9 compared to 55.0 previously. The seminal vesicles appear normal. No lymphadenopathy is present. There does not appear to be extracapsular extension.     Skeleton:     No significant abnormal foci of increased radiotracer accumulation are present within the skeleton. There are no findings to suggest osseous metastatic disease.       Assessment:       1. 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 Genetic defect    4. 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  May predispose pt  any other family member to increased risk of cancer  -PT to see genetic counselor  -PSMA PET/CT 8/04/23 showed decrease in size and avidity of paratracheal LN and subcarinal LN but increased uptake in the prostate  -Pt potential candidate for radiation. Will have pt return to see rad onc  -Patient on Abiraterone 1000mg daily and prednisone 5mg BID  -Repeat PSMA PET/CT done 12/07/23 for rising PSA showed stable disease  -PSA 4/29/24 elevated at 11.2  -PSMA PET/CT on   04/30/2024 showed continued uptake isolated to the prostate  -Will have patient see radiation oncology for consider radiation treatment  -Will continue abiraterone and prednisone  -Would consider docetaxel versus pluvicto as next line of treatment if pt  were to progress on scans  Visit today included increased complexity associated with the care of the episodic problem (ADT and Abiraterone ) addressed and managing the longitudinal care of the patient due to the serious and/or complex managed problem(s) prostate cancer.    CHECK 2 mutation - Pt saw René Stovall on 8/03/23    Anemia of Chronic Disease - Hemoglobin was 9.2g/dL on 4/19/24  -hemoglobin stable  -Will monitor    Hypothyroidism - pt on synthroid  -management per PCP    Route Chart for Scheduling    Med Onc Chart Routing      Follow up with physician 2 months. Pt needs a return appt wtlin Hough in radiaiton oncology [facility].  Pt needs a CBC, CMP and PSA in 2 months with an appt with me at least a day after ronald labs.   Follow up with [facility]    Infusion scheduling note    Injection scheduling note    Labs    Imaging    Pharmacy appointment    Other referrals              Treatment Plan Information   OP MITOXANTRONE PREDNISONE Q3W   Beka Alvarez MD   Upcoming Treatment Dates - OP MITOXANTRONE PREDNISONE Q3W    No upcoming days in selected categories.    Supportive Plan Information  OP PROSTATE LEUPROLIDE (LUPRON) 6 MONTH   Beka Alvarez MD   Upcoming Treatment Dates - OP PROSTATE LEUPROLIDE  (LUPRON) 6 MONTH    8/30/2024       Chemotherapy       leuprolide acetate (6 month) injection 45 mg    Therapy Plan Information  Flushes  heparin, porcine (PF) 100 unit/mL injection flush 500 Units  500 Units, Intravenous, Every visit  sodium chloride 0.9% flush 10 mL  10 mL, Intravenous, Every visit        Beka Alvarez MD  [facility]  Hematology and Oncology  [facility]   [address]   [place] [address]   O: [phone]  F: [phone]

## 2024-05-07 ENCOUNTER — OFFICE VISIT (OUTPATIENT)
Dept: HEMATOLOGY/ONCOLOGY | Facility: CLINIC | Age: 89
End: 2024-05-07
Payer: MEDICARE

## 2024-05-07 VITALS
HEART RATE: 94 BPM | BODY MASS INDEX: 19.58 KG/M2 | TEMPERATURE: 98 F | DIASTOLIC BLOOD PRESSURE: 70 MMHG | RESPIRATION RATE: 16 BRPM | OXYGEN SATURATION: 98 % | WEIGHT: 129.19 LBS | SYSTOLIC BLOOD PRESSURE: 98 MMHG | HEIGHT: 68 IN

## 2024-05-07 DIAGNOSIS — D63.8 ANEMIA OF CHRONIC DISEASE: ICD-10-CM

## 2024-05-07 DIAGNOSIS — C61 PROSTATE CANCER: Primary | ICD-10-CM

## 2024-05-07 DIAGNOSIS — Q99.9 GENETIC DEFECT: ICD-10-CM

## 2024-05-07 DIAGNOSIS — C77.1 METASTASIS TO MEDIASTINAL LYMPH NODE: ICD-10-CM

## 2024-05-07 PROCEDURE — 3288F FALL RISK ASSESSMENT DOCD: CPT | Mod: HCNC,CPTII,S$GLB, | Performed by: INTERNAL MEDICINE

## 2024-05-07 PROCEDURE — 99215 OFFICE O/P EST HI 40 MIN: CPT | Mod: HCNC,S$GLB,, | Performed by: INTERNAL MEDICINE

## 2024-05-07 PROCEDURE — 99999 PR PBB SHADOW E&M-EST. PATIENT-LVL III: CPT | Mod: PBBFAC,,, | Performed by: INTERNAL MEDICINE

## 2024-05-07 PROCEDURE — 1159F MED LIST DOCD IN RCRD: CPT | Mod: HCNC,CPTII,S$GLB, | Performed by: INTERNAL MEDICINE

## 2024-05-07 PROCEDURE — 1126F AMNT PAIN NOTED NONE PRSNT: CPT | Mod: HCNC,CPTII,S$GLB, | Performed by: INTERNAL MEDICINE

## 2024-05-07 PROCEDURE — G2211 COMPLEX E/M VISIT ADD ON: HCPCS | Mod: HCNC,S$GLB,, | Performed by: INTERNAL MEDICINE

## 2024-05-07 PROCEDURE — 1101F PT FALLS ASSESS-DOCD LE1/YR: CPT | Mod: HCNC,CPTII,S$GLB, | Performed by: INTERNAL MEDICINE

## 2024-05-07 PROCEDURE — 1157F ADVNC CARE PLAN IN RCRD: CPT | Mod: HCNC,CPTII,S$GLB, | Performed by: INTERNAL MEDICINE

## 2024-05-10 ENCOUNTER — OFFICE VISIT (OUTPATIENT)
Dept: RADIATION ONCOLOGY | Facility: CLINIC | Age: 89
End: 2024-05-10
Payer: MEDICARE

## 2024-05-10 VITALS
TEMPERATURE: 99 F | HEART RATE: 94 BPM | BODY MASS INDEX: 19.34 KG/M2 | RESPIRATION RATE: 16 BRPM | WEIGHT: 127.19 LBS | OXYGEN SATURATION: 98 % | DIASTOLIC BLOOD PRESSURE: 68 MMHG | SYSTOLIC BLOOD PRESSURE: 113 MMHG

## 2024-05-10 DIAGNOSIS — C61 PROSTATE CANCER: Primary | ICD-10-CM

## 2024-05-10 PROCEDURE — 1157F ADVNC CARE PLAN IN RCRD: CPT | Mod: HCNC,CPTII,S$GLB, | Performed by: RADIOLOGY

## 2024-05-10 PROCEDURE — 1101F PT FALLS ASSESS-DOCD LE1/YR: CPT | Mod: HCNC,CPTII,S$GLB, | Performed by: RADIOLOGY

## 2024-05-10 PROCEDURE — 1126F AMNT PAIN NOTED NONE PRSNT: CPT | Mod: HCNC,CPTII,S$GLB, | Performed by: RADIOLOGY

## 2024-05-10 PROCEDURE — 3288F FALL RISK ASSESSMENT DOCD: CPT | Mod: HCNC,CPTII,S$GLB, | Performed by: RADIOLOGY

## 2024-05-10 PROCEDURE — 99999 PR PBB SHADOW E&M-EST. PATIENT-LVL III: CPT | Mod: PBBFAC,HCNC,, | Performed by: RADIOLOGY

## 2024-05-10 PROCEDURE — 1159F MED LIST DOCD IN RCRD: CPT | Mod: HCNC,CPTII,S$GLB, | Performed by: RADIOLOGY

## 2024-05-10 PROCEDURE — 99213 OFFICE O/P EST LOW 20 MIN: CPT | Mod: HCNC,S$GLB,, | Performed by: RADIOLOGY

## 2024-05-10 NOTE — PROGRESS NOTES
[facility] Department of Radiation Oncology  Follow Up Visit Note    Diagnosis:  Amor Alcazar is a 89 y.o. male with Stage IV adenocarcinoma of the prostate.  He presents today to discuss treatment of the primary with radiation therapy.     Oncologic History:  Oncology History   Prostate cancer   6/27/2022 Initial Diagnosis    Prostate cancer     7/1/2022 -  Chemotherapy    Treatment Summary   Plan Name: OP MITOXANTRONE PREDNISONE Q3W  Treatment Goal: Palliative  Status: Active  Start Date: 7/1/2022  End Date: 1/6/2023  Provider: Beka Alvarez MD  Chemotherapy: mitoXANTRONE (NOVANTRONE) 12 mg/m2 = 22 mg in sodium chloride 0.9% 50 mL chemo infusion, 12 mg/m2 = 22 mg, Intravenous, Clinic/HOD 1 time, 10 of 10 cycles  Administration: 22 mg (7/1/2022), 22 mg (7/22/2022), 22 mg (11/4/2022), 22 mg (11/25/2022), 22 mg (12/16/2022), 22 mg (1/6/2023), 22 mg (8/12/2022), 22 mg (9/2/2022), 22 mg (9/23/2022), 22 mg (10/14/2022)     7/20/2023 Cancer Staged    Staging form: Prostate, AJCC 8th Edition  - Clinical: Stage IVB (cTX, cNX, cM1)          Interval History  The patient presents today for a regularly scheduled follow up visit.  He was last seen in our clinic on 12/12/23.  Since that time patient continue on abiraterone.  Most recent PSA 4/19/23 was 11.2.    PSMA PET/CT 4/30/24 revealed:  Persistent markedly increased radiotracer accumulation diffusely thick within the prostate gland consistent with the patient's known prostate cancer. There does not appear to be extracapsular extension or evidence of metastatic disease.     Patient previously declined to proceed with therapy as he did not want to proceed with the bowel prep. He no longer has this concern.    Review of Systems:   Constistutional: Denies fever, chills. No recent weight changes. Denies nights sweats.  Eyes: No redness or dryness.  ENT: Denies dry mouth. No ulcers.  Card: Denies chest pain. No PND, or orthopnea.   Resp: Denies  cough. Denies shortness of breath.  Gastro: Denies nausea or vomiting, constipation, diarrhea.  Genito: No kidney stones. Denies dysuria.  Skin: No rash, psoriasis, or alopecia.  Musculoskeletal:No myalgia. No muscle weakness.  Neuro: No numbness or tingling. No history of seizures or psychosis.  Psych: Denies depression. Denies anxiety.  Endo: Denies history of diabetes. No thyroid disease.  Heme: Denies anemia. No blood clots or bleeding diathesis.  Allergic: Denies seasonal allergies.   All other systems negative    Social History:  Social History     Tobacco Use    Smoking status: Never    Smokeless tobacco: Never   Substance Use Topics    Alcohol use: Not Currently     Comment: few beers daily    Drug use: No       Family History:  Cancer-related family history includes Lung cancer in his sister.    Exam:  Vitals:    05/10/24 0859   BP: 113/68   Pulse: 94   Resp: 16   Temp: 98.9 °F (37.2 °C)   SpO2: 98%   Weight: 57.7 kg (127 lb 3.3 oz)     Constitutional: Pleasant 89 y.o. male in no acute distress.  Well nourished. Well groomed.   HEENT: Normocephalic and atraumatic   Lungs: No audible wheezing.  Normal effort.   Musculoskeletal: No gross MSK deformities. Ambulates  Skin: No rashes appreciated.   Psych: Alert and oriented with appropriate mood and affect.  Neuro: Grossly normal.    Data Review:    Independent Interpretation of Test(s): PSMA PET from 4/30/24 was personally reviewed as detailed above    Assessment:  Mr. Alcazar is a 89 year old with metastatic adenocarcinoma of the prostate. His PSA is increasing on abiraterone, only current evidence of disease is prostate.  ECOG: (1) Restricted in physically strenuous activity, ambulatory and able to do work of light nature    Plan:  Treatment options were discussed with the patient including radiation therapy to the prostate.  We discussed the goals of treatment to be palliative.  The risks, benefits, scheduling, alternatives to and rationale of  prostate-directed radiation therapy in the setting of metastatic prostate cancer were explained in detail.    Indication, course, and potential toxicities of pelvic radiation therapy reviewed in detail, including but not limited to fatigue, increased frequency, urgency, or pain with urination, increased frequency or urgency of bowel movements, diarrhea, pelvic bone fragility, erectile dysfunction, decreased volume of ejaculate, remote risk of secondary malignancy.   After this discussion, he elected to proceed with prostate directed radiation therapy.    A CT simulation will be performed at patient's convenience to begin the planning process for the patient's radiation therapy.     He was given our contact information, and he was told that he could call our clinic at any time if he has any questions or concerns.    Radiation Treatment Details:   We plan to treat the prostate and SVs to a dose of 55 Gy in 20 fractions at 2.75 Gy per fraction delivered daily  We will utilize a(n) IMRT technique.   IMRT is medically necessary to treat complex dose painted target volumes in the prostate region with concave and convex isodose lines with steep isodose gradients to spare multiple adjacent organs at risk including the rectum, bladder, penile bulb   We will utilize daily CT image guidance due to the need for accurate daily patient alignment to treat the target volumes accurately and avoid radiation overdose to multiple regional organs at risk since we are treating the patient with complex target volumes with multiple steep isodose gradients.

## 2024-05-16 ENCOUNTER — PATIENT MESSAGE (OUTPATIENT)
Dept: HEMATOLOGY/ONCOLOGY | Facility: CLINIC | Age: 89
End: 2024-05-16
Payer: MEDICARE

## 2024-05-18 ENCOUNTER — PATIENT MESSAGE (OUTPATIENT)
Dept: HEMATOLOGY/ONCOLOGY | Facility: CLINIC | Age: 89
End: 2024-05-18
Payer: MEDICARE

## 2024-05-20 ENCOUNTER — HOSPITAL ENCOUNTER (OUTPATIENT)
Dept: RADIATION THERAPY | Facility: HOSPITAL | Age: 89
Discharge: HOME | End: 2024-05-20
Attending: INTERNAL MEDICINE
Payer: MEDICARE

## 2024-05-20 ENCOUNTER — OFFICE VISIT (OUTPATIENT)
Dept: RADIATION ONCOLOGY | Facility: CLINIC | Age: 89
End: 2024-05-20
Payer: MEDICARE

## 2024-05-20 DIAGNOSIS — C61 PROSTATE CANCER: Primary | ICD-10-CM

## 2024-05-20 PROCEDURE — 77263 THER RADIOLOGY TX PLNG CPLX: CPT | Mod: HCNC,,, | Performed by: RADIOLOGY

## 2024-05-20 PROCEDURE — 77014 PR  CT GUIDANCE PLACEMENT RAD THERAPY FIELDS: CPT | Mod: 26,HCNC,, | Performed by: RADIOLOGY

## 2024-05-20 PROCEDURE — 1101F PT FALLS ASSESS-DOCD LE1/YR: CPT | Mod: HCNC,CPTII,S$GLB, | Performed by: RADIOLOGY

## 2024-05-20 PROCEDURE — 77334 RADIATION TREATMENT AID(S): CPT | Mod: 26,HCNC,, | Performed by: RADIOLOGY

## 2024-05-20 PROCEDURE — 3288F FALL RISK ASSESSMENT DOCD: CPT | Mod: HCNC,CPTII,S$GLB, | Performed by: RADIOLOGY

## 2024-05-20 PROCEDURE — 99212 OFFICE O/P EST SF 10 MIN: CPT | Mod: HCNC,S$GLB,, | Performed by: RADIOLOGY

## 2024-05-20 PROCEDURE — 77014 HC CT GUIDANCE RADIATION THERAPY FLDS PLACEMENT: CPT | Mod: TC,HCNC,PN | Performed by: RADIOLOGY

## 2024-05-20 PROCEDURE — 77334 RADIATION TREATMENT AID(S): CPT | Mod: TC,HCNC,PN | Performed by: RADIOLOGY

## 2024-05-20 PROCEDURE — 99999 PR PBB SHADOW E&M-EST. PATIENT-LVL II: CPT | Mod: PBBFAC,HCNC,, | Performed by: RADIOLOGY

## 2024-05-20 PROCEDURE — 1157F ADVNC CARE PLAN IN RCRD: CPT | Mod: HCNC,CPTII,S$GLB, | Performed by: RADIOLOGY

## 2024-05-20 PROCEDURE — 1159F MED LIST DOCD IN RCRD: CPT | Mod: HCNC,CPTII,S$GLB, | Performed by: RADIOLOGY

## 2024-05-20 NOTE — PROGRESS NOTES
[facility] Department of Radiation Oncology  Follow Up Visit Note    Diagnosis:  Amor Alcazar is a 89 y.o. male with Stage IV adenocarcinoma of the prostate.  He presents today to discuss treatment of the primary with radiation therapy.     Oncologic History:  Oncology History   Prostate cancer   6/27/2022 Initial Diagnosis    Prostate cancer     7/1/2022 -  Chemotherapy    Treatment Summary   Plan Name: OP MITOXANTRONE PREDNISONE Q3W  Treatment Goal: Palliative  Status: Active  Start Date: 7/1/2022  End Date: 1/6/2023  Provider: Beka Alvarez MD  Chemotherapy: mitoXANTRONE (NOVANTRONE) 12 mg/m2 = 22 mg in sodium chloride 0.9% 50 mL chemo infusion, 12 mg/m2 = 22 mg, Intravenous, Clinic/HOD 1 time, 10 of 10 cycles  Administration: 22 mg (7/1/2022), 22 mg (7/22/2022), 22 mg (11/4/2022), 22 mg (11/25/2022), 22 mg (12/16/2022), 22 mg (1/6/2023), 22 mg (8/12/2022), 22 mg (9/2/2022), 22 mg (9/23/2022), 22 mg (10/14/2022)     7/20/2023 Cancer Staged    Staging form: Prostate, AJCC 8th Edition  - Clinical: Stage IVB (cTX, cNX, cM1)          Interval History  The patient presents today for a simulation.  He was last seen in our clinic on 5/10/24.  Since that time patient continue on abiraterone.  Most recent PSA 4/19/23 was 11.2.    PSMA PET/CT 4/30/24 revealed:  Persistent markedly increased radiotracer accumulation diffusely thick within the prostate gland consistent with the patient's known prostate cancer. There does not appear to be extracapsular extension or evidence of metastatic disease.     Patient previously declined to proceed with therapy as he did not want to proceed with the bowel prep. He no longer has this concern.    Review of Systems:   Constistutional: Denies fever, chills. No recent weight changes. Denies nights sweats.  Eyes: No redness or dryness.  ENT: Denies dry mouth. No ulcers.  Card: Denies chest pain. No PND, or orthopnea.   Resp: Denies cough. Denies shortness of  breath.  Gastro: Denies nausea or vomiting, constipation, diarrhea.  Genito: No kidney stones. Denies dysuria.  Skin: No rash, psoriasis, or alopecia.  Musculoskeletal:No myalgia. No muscle weakness.  Neuro: No numbness or tingling. No history of seizures or psychosis.  Psych: Denies depression. Denies anxiety.  Endo: Denies history of diabetes. No thyroid disease.  Heme: Denies anemia. No blood clots or bleeding diathesis.  Allergic: Denies seasonal allergies.   All other systems negative    Social History:  Social History     Tobacco Use    Smoking status: Never    Smokeless tobacco: Never   Substance Use Topics    Alcohol use: Not Currently     Comment: few beers daily    Drug use: No       Family History:  Cancer-related family history includes Lung cancer in his sister.    Exam:  There were no vitals filed for this visit.    Constitutional: Pleasant 89 y.o. male in no acute distress.  Well nourished. Well groomed.   HEENT: Normocephalic and atraumatic   Lungs: No audible wheezing.  Normal effort.   Musculoskeletal: No gross MSK deformities. Ambulates  Skin: No rashes appreciated.   Psych: Alert and oriented with appropriate mood and affect.  Neuro: Grossly normal.    Data Review:    Independent Interpretation of Test(s): PSMA PET from 4/30/24 was personally reviewed as detailed above    Assessment:  Mr. Alcazar is a 89 year old with metastatic adenocarcinoma of the prostate. His PSA is increasing on abiraterone, only current evidence of disease is prostate.  ECOG: (1) Restricted in physically strenuous activity, ambulatory and able to do work of light nature    Plan:  Treatment options were discussed with the patient including radiation therapy to the prostate.  We discussed the goals of treatment to be palliative.  The risks, benefits, scheduling, alternatives to and rationale of prostate-directed radiation therapy in the setting of metastatic prostate cancer were explained in detail.    Indication, course,  and potential toxicities of pelvic radiation therapy reviewed in detail, including but not limited to fatigue, increased frequency, urgency, or pain with urination, increased frequency or urgency of bowel movements, diarrhea, pelvic bone fragility, erectile dysfunction, decreased volume of ejaculate, remote risk of secondary malignancy.   After this discussion, he elected to proceed with prostate directed radiation therapy.    A CT simulation will be performed today to begin the planning process for the patient's radiation therapy.     He was given our contact information, and he was told that he could call our clinic at any time if he has any questions or concerns.    Radiation Treatment Details:   We plan to treat the prostate and SVs to a dose of 55 Gy in 20 fractions at 2.75 Gy per fraction delivered daily  We will utilize a(n) IMRT technique.   IMRT is medically necessary to treat complex dose painted target volumes in the prostate region with concave and convex isodose lines with steep isodose gradients to spare multiple adjacent organs at risk including the rectum, bladder, penile bulb   We will utilize daily CT image guidance due to the need for accurate daily patient alignment to treat the target volumes accurately and avoid radiation overdose to multiple regional organs at risk since we are treating the patient with complex target volumes with multiple steep isodose gradients.

## 2024-05-24 ENCOUNTER — PATIENT MESSAGE (OUTPATIENT)
Dept: CARDIOLOGY | Facility: CLINIC | Age: 89
End: 2024-05-24

## 2024-05-24 ENCOUNTER — OFFICE VISIT (OUTPATIENT)
Dept: CARDIOLOGY | Facility: CLINIC | Age: 89
End: 2024-05-24
Payer: MEDICARE

## 2024-05-24 VITALS
OXYGEN SATURATION: 97 % | HEART RATE: 109 BPM | DIASTOLIC BLOOD PRESSURE: 73 MMHG | WEIGHT: 129 LBS | BODY MASS INDEX: 20.25 KG/M2 | SYSTOLIC BLOOD PRESSURE: 134 MMHG | RESPIRATION RATE: 16 BRPM | TEMPERATURE: 97 F | HEIGHT: 67 IN

## 2024-05-24 DIAGNOSIS — I47.19 PAT (PAROXYSMAL ATRIAL TACHYCARDIA): ICD-10-CM

## 2024-05-24 DIAGNOSIS — C61 PROSTATE CANCER: Primary | ICD-10-CM

## 2024-05-24 DIAGNOSIS — E78.5 DYSLIPIDEMIA: ICD-10-CM

## 2024-05-24 DIAGNOSIS — I35.0 NON-RHEUMATIC AORTIC STENOSIS: ICD-10-CM

## 2024-05-24 PROCEDURE — G2211 COMPLEX E/M VISIT ADD ON: HCPCS | Mod: HCNC,S$GLB,, | Performed by: INTERNAL MEDICINE

## 2024-05-24 PROCEDURE — 1159F MED LIST DOCD IN RCRD: CPT | Mod: HCNC,CPTII,S$GLB, | Performed by: INTERNAL MEDICINE

## 2024-05-24 PROCEDURE — 77301 RADIOTHERAPY DOSE PLAN IMRT: CPT | Mod: TC,HCNC,PN | Performed by: RADIOLOGY

## 2024-05-24 PROCEDURE — 1126F AMNT PAIN NOTED NONE PRSNT: CPT | Mod: HCNC,CPTII,S$GLB, | Performed by: INTERNAL MEDICINE

## 2024-05-24 PROCEDURE — 1160F RVW MEDS BY RX/DR IN RCRD: CPT | Mod: HCNC,CPTII,S$GLB, | Performed by: INTERNAL MEDICINE

## 2024-05-24 PROCEDURE — 99999 PR PBB SHADOW E&M-EST. PATIENT-LVL III: CPT | Mod: PBBFAC,HCNC,, | Performed by: INTERNAL MEDICINE

## 2024-05-24 PROCEDURE — 99214 OFFICE O/P EST MOD 30 MIN: CPT | Mod: HCNC,S$GLB,, | Performed by: INTERNAL MEDICINE

## 2024-05-24 PROCEDURE — 1101F PT FALLS ASSESS-DOCD LE1/YR: CPT | Mod: HCNC,CPTII,S$GLB, | Performed by: INTERNAL MEDICINE

## 2024-05-24 PROCEDURE — 3288F FALL RISK ASSESSMENT DOCD: CPT | Mod: HCNC,CPTII,S$GLB, | Performed by: INTERNAL MEDICINE

## 2024-05-24 PROCEDURE — 1157F ADVNC CARE PLAN IN RCRD: CPT | Mod: HCNC,CPTII,S$GLB, | Performed by: INTERNAL MEDICINE

## 2024-05-24 PROCEDURE — 77301 RADIOTHERAPY DOSE PLAN IMRT: CPT | Mod: 26,HCNC,, | Performed by: RADIOLOGY

## 2024-05-24 NOTE — PROGRESS NOTES
Subjective:    Patient ID:  Amor Alcazar is a 89 y.o. male who presents for follow-up.    HPI  He has a history of arthritis, hypothyroidism, aortic stenosis, iron deficiency anemia and metastatic prostate cancer, has been on Lupron and mitoxantrone.  He is here for follow up. Since last visit, he reports no chest pain, SOB or palpitations. His appetite has not been good. He is planned to undergo XRT.  Treatment Plan Information   OP MITOXANTRONE PREDNISONE Q3W   Beka Alvarez MD   Upcoming Treatment Dates - OP MITOXANTRONE PREDNISONE Q3W     No upcoming days in selected categories.     Supportive Plan Information  OP PROSTATE LEUPROLIDE (LUPRON) 6 MONTH   Bkea Alvarez MD   Upcoming Treatment Dates - OP PROSTATE LEUPROLIDE (LUPRON) 6 MONTH     3/15/2024       Chemotherapy       leuprolide acetate (6 month) injection 45 mg  8/30/2024       Chemotherapy       leuprolide acetate (6 month) injection 45 mg     Therapy Plan Information  Flushes  heparin, porcine (PF) 100 unit/mL injection flush 500 Units  500 Units, Intravenous, Every visit  sodium chloride 0.9% flush 10 mL  10 mL, Intravenous, Every visit       Review of Systems   Constitutional: Positive for decreased appetite. Negative for chills and fever.   HENT:  Positive for hearing loss.    Eyes:  Negative for redness.   Cardiovascular:  Negative for chest pain, irregular heartbeat, leg swelling, palpitations and syncope.   Respiratory:  Negative for cough and shortness of breath.    Musculoskeletal:  Positive for joint pain.   Gastrointestinal:  Negative for vomiting.   Genitourinary:  Negative for hematuria.   Neurological:  Negative for focal weakness and seizures.   Psychiatric/Behavioral:  The patient is not nervous/anxious.    Allergic/Immunologic: Negative for hives.        Current Outpatient Medications   Medication Sig    abiraterone (ZYTIGA) 500 mg Tab Take 1,000 mg by mouth once daily.    atorvastatin (LIPITOR) 10 MG tablet Take 1 tablet (10  mg total) by mouth once daily.    folic acid (FOLVITE) 400 MCG tablet Take 400 mcg by mouth once daily.    levothyroxine (SYNTHROID) 50 MCG tablet Take 1 tablet (50 mcg total) by mouth before breakfast.    LIDOcaine-prilocaine (EMLA) cream Apply topically as needed (please apply to port site one hour prior to treatment administration).    LUPRON DEPOT, 6 MONTH, 45 mg SyKt injection Inject into the muscle every 6 (six) months.    metoprolol succinate (TOPROL-XL) 25 MG 24 hr tablet Take 1 tablet (25 mg total) by mouth daily with breakfast AND 0.5 tablets (12.5 mg total) before evening meal.    multivit-min-FA-lycopen-lutein (CENTRUM SILVER MEN) 300-600-300 mcg Tab Take by mouth once daily.    predniSONE (DELTASONE) 5 MG tablet Take 1 tablet (5 mg total) by mouth once daily.     No current facility-administered medications for this visit.       Objective:    Physical Exam  Vitals reviewed.   Constitutional:       General: He is not in acute distress.     Appearance: He is well-developed. He is ill-appearing.   HENT:      Head: Normocephalic and atraumatic.   Neck:      Vascular: No JVD.   Cardiovascular:      Rate and Rhythm: Normal rate and regular rhythm.      Heart sounds: Murmur heard.      Systolic murmur is present with a grade of 1/6 at the upper right sternal border and apex.   Pulmonary:      Effort: Pulmonary effort is normal.      Breath sounds: Normal breath sounds.   Abdominal:      Palpations: Abdomen is soft.   Musculoskeletal:      Cervical back: Neck supple.   Skin:     General: Skin is warm and dry.      Coloration: Skin is pale.   Neurological:      Mental Status: He is alert and oriented to person, place, and time.       Vitals:    05/24/24 1038   BP: 134/73   Pulse: 109   Resp: 16   Temp: 97.1 °F (36.2 °C)               Assessment:       1. Prostate cancer    2. Non-rheumatic aortic stenosis    3. Dyslipidemia    4. PAT (paroxysmal atrial tachycardia)               Plan:       I have reviewed the  labs and ancillary data.    5/25/22 echo interpretation:  The left ventricle is normal in size with normal systolic function.  The estimated ejection fraction is 55-60%.  Normal left ventricular diastolic function.  Normal right ventricular size with normal right ventricular systolic function.  There is mild aortic valve stenosis.  Aortic valve area is 1.82 cm2; peak velocity is 1.48 m/s; mean gradient is 5 mmHg.  Normal central venous pressure (3 mmHg).  The estimated PA systolic pressure is 29 mmHg.  The left ventricular global longitudinal strain is -18.32%.    8/12/22 echo interpretation:  The left ventricle is normal in size with concentric remodeling and normal systolic function.  The estimated ejection fraction is 60%.  Normal left ventricular diastolic function.  Normal right ventricular size with normal right ventricular systolic function.  There is mild aortic valve stenosis.  Aortic valve area is 1.72 cm2; peak velocity is 2.13 m/s; mean gradient is 10 mmHg.  Mild mitral regurgitation.  Mild tricuspid regurgitation.  Normal central venous pressure (3 mmHg).  The estimated PA systolic pressure is 33 mmHg.  The left ventricular global longitudinal strain is -18.1%.    12/13/22 echo interpretation:  The left ventricle is normal in size with concentric remodeling and normal systolic function.  The estimated ejection fraction is 65%.  Normal left ventricular diastolic function.  Normal right ventricular size with normal right ventricular systolic function.  There is mild aortic valve stenosis.  Aortic valve area is 1.79 cm2; peak velocity is 2.18 m/s; mean gradient is 10 mmHg.  IVC not well visualized.  The estimated PA systolic pressure is at seveul86 mmHg.  Echocardiographic images too poor to obtain accurate strain measurement.    3/14/23 Holter:  The patient monitored for 2 days and 23 hours.  The rhythm is sinus with mean heart rate of 72 beats per minute, lowest heart rate 55 beats per minute maximum  heart rate 111 beats per minute.  Very frequent isolated PACs burden of 8.69%  Frequent short runs of PSVT the longest is 2 minutes and 12 seconds at 10:39 a.m.  Occasional isolated PVCs burden of 0.35% and 1 ventricular triplet  No atrial fibrillation.  No heart block.    3/14/23 echo:  The left ventricle is normal in size with normal systolic function.  The estimated ejection fraction is 60%.  Normal left ventricular diastolic function.  Normal right ventricular size with normal right ventricular systolic function.  There is mild aortic valve stenosis.  Aortic valve area is 1.84 cm2; peak velocity is 2.09 m/s; mean gradient is 10 mmHg.  Mild-to-moderate mitral regurgitation.  Mild tricuspid regurgitation.  Normal central venous pressure (3 mmHg).  The estimated PA systolic pressure is 25 mmHg.  The left ventricular global longitudinal strain is -18.6%.      6/15/23 echo:  The left ventricle is normal in size with normal systolic function.  The estimated ejection fraction is 60%.  Normal left ventricular diastolic function.  Normal right ventricular size with normal right ventricular systolic function.  There is mild aortic valve stenosis.  Aortic valve peak velocity is 2.23 m/s; mean gradient is 12 mmHg.  Mild mitral regurgitation.  Mild tricuspid regurgitation.  Normal central venous pressure (3 mmHg).  The estimated PA systolic pressure is 33 mmHg.  Image quality not high enough to accurately calculate cardiac strain, so none will be reported.    9/21/23 echo:      Left Ventricle: The left ventricle is normal in size. Normal wall thickness. There is concentric remodeling. Normal wall motion. There is normal systolic function. Ejection fraction by visual approximation is 60-65%. Global longitudinal strain is -16.0%. There is normal diastolic function.    Right Ventricle: Normal right ventricular cavity size. Wall thickness is normal. Right ventricle wall motion  is normal. Systolic function is normal.    Aortic  Valve: The aortic valve is a trileaflet valve. There is aortic valve sclerosis.    Tricuspid Valve: There is mild regurgitation.    Pulmonary Artery: The estimated pulmonary artery systolic pressure is 30 mmHg.    IVC/SVC: Normal venous pressure at 3 mmHg.    12/22/23 echo:    Left Ventricle: The left ventricle is normal in size. Normal wall thickness. Normal wall motion. There is normal systolic function. Ejection fraction by visual approximation is 65%. Global longitudinal strain is -19.1%. There is normal diastolic function.    Right Ventricle: Normal right ventricular cavity size. Wall thickness is normal. Right ventricle wall motion  is normal. Systolic function is normal.    Aortic Valve: The aortic valve is a trileaflet valve. There is mild aortic valve sclerosis. Mildly restricted motion. There is mild stenosis. Aortic valve area by VTI is 1.71 cm². Aortic valve peak velocity is 2.29 m/s. Mean gradient is 11 mmHg. The dimensionless index is 0.43.    Mitral Valve: There is mild regurgitation.    Tricuspid Valve: There is mild regurgitation.    Pulmonary Artery: The estimated pulmonary artery systolic pressure is 27 mmHg.    IVC/SVC: Normal venous pressure at 3 mmHg.    3/22/24 echo:    Left Ventricle: There is normal systolic function. Ejection fraction by visual approximation is 60%. Global longitudinal strain is -20.5%. There is normal diastolic function.    Right Ventricle: Normal right ventricular cavity size. Wall thickness is normal. Right ventricle wall motion  is normal. Systolic function is normal.    Aortic Valve: The aortic valve is a trileaflet valve. There is mild aortic valve sclerosis. Mildly restricted motion. There is mild stenosis. Aortic valve area by VTI is 1.86 cm². Aortic valve peak velocity is 2.1 m/s. Mean gradient is 8 mmHg. The dimensionless index is 0.48.    Mitral Valve: There is mild regurgitation.    Tricuspid Valve: There is mild regurgitation.    Pulmonary Artery: The estimated  pulmonary artery systolic pressure is 28 mmHg.    IVC/SVC: Normal venous pressure at 3 mmHg.      Impression and Plan:  1) prostate ca: followed by oncology; remains on ADT and mitoxantrone; repeat echo in June.  2) aortic stenosis: mild; no intervention needed at this time.  3) dyslipidemia: continue low dose statin. LDL at goal.  4) PAT: continue b-blocker. No symptoms.

## 2024-05-25 PROCEDURE — 77338 DESIGN MLC DEVICE FOR IMRT: CPT | Mod: 26,HCNC,, | Performed by: RADIOLOGY

## 2024-05-25 PROCEDURE — 77300 RADIATION THERAPY DOSE PLAN: CPT | Mod: TC,HCNC,PN | Performed by: RADIOLOGY

## 2024-05-25 PROCEDURE — 77338 DESIGN MLC DEVICE FOR IMRT: CPT | Mod: TC,HCNC,PN | Performed by: RADIOLOGY

## 2024-05-25 PROCEDURE — 77300 RADIATION THERAPY DOSE PLAN: CPT | Mod: 26,HCNC,, | Performed by: RADIOLOGY

## 2024-05-29 ENCOUNTER — DOCUMENTATION ONLY (OUTPATIENT)
Dept: RADIATION ONCOLOGY | Facility: CLINIC | Age: 89
End: 2024-05-29
Payer: MEDICARE

## 2024-05-29 PROCEDURE — 77427 RADIATION TX MANAGEMENT X5: CPT | Mod: HCNC,,, | Performed by: RADIOLOGY

## 2024-05-29 PROCEDURE — 77014 PR  CT GUIDANCE PLACEMENT RAD THERAPY FIELDS: CPT | Mod: 26,,, | Performed by: RADIOLOGY

## 2024-05-29 PROCEDURE — 77385 HC IMRT, SIMPLE: CPT | Mod: PN | Performed by: RADIOLOGY

## 2024-05-29 NOTE — PLAN OF CARE
Patient completed first outpatient external beam radiation treatment to the prostate without difficulty.  Education provided and patient verbalizes understanding.  All questions and concerns addressed by the XRT team this visit.

## 2024-05-30 PROCEDURE — 77014 PR  CT GUIDANCE PLACEMENT RAD THERAPY FIELDS: CPT | Mod: 26,,, | Performed by: RADIOLOGY

## 2024-05-30 PROCEDURE — 77385 HC IMRT, SIMPLE: CPT | Mod: PN | Performed by: RADIOLOGY

## 2024-05-31 PROCEDURE — 77014 PR  CT GUIDANCE PLACEMENT RAD THERAPY FIELDS: CPT | Mod: 26,,, | Performed by: RADIOLOGY

## 2024-05-31 PROCEDURE — 77385 HC IMRT, SIMPLE: CPT | Mod: PN | Performed by: RADIOLOGY

## 2024-06-03 ENCOUNTER — HOSPITAL ENCOUNTER (OUTPATIENT)
Dept: RADIATION THERAPY | Facility: HOSPITAL | Age: 89
Discharge: HOME | End: 2024-06-03
Attending: RADIOLOGY
Payer: MEDICARE

## 2024-06-03 PROCEDURE — 77385 HC IMRT, SIMPLE: CPT | Mod: HCNC,PN | Performed by: RADIOLOGY

## 2024-06-03 PROCEDURE — 77014 PR  CT GUIDANCE PLACEMENT RAD THERAPY FIELDS: CPT | Mod: 26,HCNC,, | Performed by: RADIOLOGY

## 2024-06-04 PROCEDURE — 77336 RADIATION PHYSICS CONSULT: CPT | Mod: HCNC,PN | Performed by: RADIOLOGY

## 2024-06-04 PROCEDURE — 77014 PR  CT GUIDANCE PLACEMENT RAD THERAPY FIELDS: CPT | Mod: 26,HCNC,, | Performed by: RADIOLOGY

## 2024-06-04 PROCEDURE — 77385 HC IMRT, SIMPLE: CPT | Mod: HCNC,PN | Performed by: RADIOLOGY

## 2024-06-05 PROCEDURE — 77427 RADIATION TX MANAGEMENT X5: CPT | Mod: HCNC,,, | Performed by: RADIOLOGY

## 2024-06-05 PROCEDURE — 77014 PR  CT GUIDANCE PLACEMENT RAD THERAPY FIELDS: CPT | Mod: 26,HCNC,, | Performed by: RADIOLOGY

## 2024-06-05 PROCEDURE — 77385 HC IMRT, SIMPLE: CPT | Mod: HCNC,PN | Performed by: RADIOLOGY

## 2024-06-06 ENCOUNTER — DOCUMENTATION ONLY (OUTPATIENT)
Dept: RADIATION ONCOLOGY | Facility: CLINIC | Age: 89
End: 2024-06-06
Payer: MEDICARE

## 2024-06-06 PROCEDURE — 77385 HC IMRT, SIMPLE: CPT | Mod: HCNC,PN | Performed by: RADIOLOGY

## 2024-06-06 PROCEDURE — 77014 PR  CT GUIDANCE PLACEMENT RAD THERAPY FIELDS: CPT | Mod: 26,HCNC,, | Performed by: RADIOLOGY

## 2024-06-06 NOTE — PLAN OF CARE
Completed 7 of 20 outpatient radiation treatments to the prostate without difficulty.  All questions/concerns addressed by MD this visit.

## 2024-06-07 PROCEDURE — 77385 HC IMRT, SIMPLE: CPT | Mod: HCNC,PN | Performed by: RADIOLOGY

## 2024-06-07 PROCEDURE — 77014 PR  CT GUIDANCE PLACEMENT RAD THERAPY FIELDS: CPT | Mod: 26,HCNC,, | Performed by: RADIOLOGY

## 2024-06-08 ENCOUNTER — PATIENT MESSAGE (OUTPATIENT)
Dept: FAMILY MEDICINE | Facility: CLINIC | Age: 89
End: 2024-06-08
Payer: MEDICARE

## 2024-06-08 DIAGNOSIS — E03.4 HYPOTHYROIDISM DUE TO ACQUIRED ATROPHY OF THYROID: ICD-10-CM

## 2024-06-08 NOTE — TELEPHONE ENCOUNTER
No care due was identified.  [facility] Embedded Care Due Messages. Reference number: [id number].   6/08/2024 6:00:10 AM CDT

## 2024-06-10 PROCEDURE — 77385 HC IMRT, SIMPLE: CPT | Mod: HCNC,PN | Performed by: RADIOLOGY

## 2024-06-10 PROCEDURE — 77014 PR  CT GUIDANCE PLACEMENT RAD THERAPY FIELDS: CPT | Mod: 26,HCNC,, | Performed by: RADIOLOGY

## 2024-06-11 PROCEDURE — 77014 PR  CT GUIDANCE PLACEMENT RAD THERAPY FIELDS: CPT | Mod: 26,HCNC,, | Performed by: RADIOLOGY

## 2024-06-11 PROCEDURE — 77385 HC IMRT, SIMPLE: CPT | Mod: HCNC,PN | Performed by: RADIOLOGY

## 2024-06-11 PROCEDURE — 77336 RADIATION PHYSICS CONSULT: CPT | Mod: HCNC,PN | Performed by: RADIOLOGY

## 2024-06-11 RX ORDER — LEVOTHYROXINE SODIUM 50 UG/1
50 TABLET ORAL
Qty: 90 TABLET | Refills: 2 | Status: SHIPPED | OUTPATIENT
Start: 2024-06-11 | End: 2025-06-11

## 2024-06-12 PROCEDURE — 77427 RADIATION TX MANAGEMENT X5: CPT | Mod: HCNC,,, | Performed by: RADIOLOGY

## 2024-06-12 PROCEDURE — 77385 HC IMRT, SIMPLE: CPT | Mod: HCNC,PN | Performed by: RADIOLOGY

## 2024-06-12 PROCEDURE — 77014 PR  CT GUIDANCE PLACEMENT RAD THERAPY FIELDS: CPT | Mod: 26,HCNC,, | Performed by: RADIOLOGY

## 2024-06-13 ENCOUNTER — PATIENT MESSAGE (OUTPATIENT)
Dept: FAMILY MEDICINE | Facility: CLINIC | Age: 89
End: 2024-06-13
Payer: MEDICARE

## 2024-06-13 ENCOUNTER — DOCUMENTATION ONLY (OUTPATIENT)
Dept: RADIATION ONCOLOGY | Facility: CLINIC | Age: 89
End: 2024-06-13
Payer: MEDICARE

## 2024-06-13 DIAGNOSIS — E03.4 HYPOTHYROIDISM DUE TO ACQUIRED ATROPHY OF THYROID: ICD-10-CM

## 2024-06-13 PROCEDURE — 77014 PR  CT GUIDANCE PLACEMENT RAD THERAPY FIELDS: CPT | Mod: 26,HCNC,, | Performed by: RADIOLOGY

## 2024-06-13 PROCEDURE — 77385 HC IMRT, SIMPLE: CPT | Mod: HCNC,PN | Performed by: RADIOLOGY

## 2024-06-13 RX ORDER — LEVOTHYROXINE SODIUM 50 UG/1
50 TABLET ORAL
Qty: 90 TABLET | Refills: 2 | Status: CANCELLED | OUTPATIENT
Start: 2024-06-13 | End: 2025-06-13

## 2024-06-13 NOTE — PLAN OF CARE
Completed 12 of 20 outpatient radiation treatments to the prostate without difficulty.  No new problems noted.   All questions and concerns addressed with MD this visit.

## 2024-06-13 NOTE — TELEPHONE ENCOUNTER
No care due was identified.  [facility] Embedded Care Due Messages. Reference number: [id number].   6/13/2024 6:04:21 AM CDT

## 2024-06-14 ENCOUNTER — PATIENT MESSAGE (OUTPATIENT)
Dept: FAMILY MEDICINE | Facility: CLINIC | Age: 89
End: 2024-06-14
Payer: MEDICARE

## 2024-06-14 PROCEDURE — 77385 HC IMRT, SIMPLE: CPT | Mod: HCNC,PN | Performed by: RADIOLOGY

## 2024-06-14 PROCEDURE — 77014 PR  CT GUIDANCE PLACEMENT RAD THERAPY FIELDS: CPT | Mod: 26,HCNC,, | Performed by: RADIOLOGY

## 2024-06-17 PROCEDURE — 77385 HC IMRT, SIMPLE: CPT | Mod: HCNC,PN | Performed by: RADIOLOGY

## 2024-06-17 PROCEDURE — 77014 PR  CT GUIDANCE PLACEMENT RAD THERAPY FIELDS: CPT | Mod: 26,HCNC,, | Performed by: RADIOLOGY

## 2024-06-20 ENCOUNTER — PATIENT MESSAGE (OUTPATIENT)
Dept: HEMATOLOGY/ONCOLOGY | Facility: CLINIC | Age: 89
End: 2024-06-20
Payer: MEDICARE

## 2024-06-21 ENCOUNTER — DOCUMENTATION ONLY (OUTPATIENT)
Dept: RADIATION ONCOLOGY | Facility: CLINIC | Age: 89
End: 2024-06-21
Payer: MEDICARE

## 2024-06-21 ENCOUNTER — PATIENT MESSAGE (OUTPATIENT)
Dept: HEMATOLOGY/ONCOLOGY | Facility: CLINIC | Age: 89
End: 2024-06-21
Payer: MEDICARE

## 2024-06-24 ENCOUNTER — PATIENT MESSAGE (OUTPATIENT)
Dept: RADIATION ONCOLOGY | Facility: CLINIC | Age: 89
End: 2024-06-24
Payer: MEDICARE

## 2024-06-24 DIAGNOSIS — C61 PROSTATE CA: Primary | ICD-10-CM

## 2024-06-25 ENCOUNTER — DOCUMENTATION ONLY (OUTPATIENT)
Dept: RADIATION ONCOLOGY | Facility: CLINIC | Age: 89
End: 2024-06-25
Payer: MEDICARE

## 2024-06-25 NOTE — PLAN OF CARE
Completed all outpatient radiation treatments to the prostate this visit without difficulty.  Follow-up information and appt provided and patient verbalizes understanding.

## 2024-06-28 ENCOUNTER — HOSPITAL ENCOUNTER (OUTPATIENT)
Dept: CARDIOLOGY | Facility: HOSPITAL | Age: 89
Discharge: HOME | End: 2024-06-28
Attending: INTERNAL MEDICINE
Payer: MEDICARE

## 2024-06-28 VITALS — HEIGHT: 67 IN | WEIGHT: 128 LBS | BODY MASS INDEX: 20.09 KG/M2

## 2024-06-28 DIAGNOSIS — C61 PROSTATE CANCER: ICD-10-CM

## 2024-06-28 LAB
ASCENDING AORTA: 3.49 CM
AV INDEX (PROSTH): 0.78
AV MEAN GRADIENT: 6 MMHG
AV PEAK GRADIENT: 9 MMHG
AV VALVE AREA BY VELOCITY RATIO: 2.4 CM²
AV VALVE AREA: 3.66 CM²
AV VELOCITY RATIO: 0.51
BSA FOR ECHO PROCEDURE: 1.66 M2
CV ECHO LV RWT: 0.71 CM
DOP CALC AO PEAK VEL: 1.51 M/S
DOP CALC AO VTI: 22.3 CM
DOP CALC LVOT AREA: 4.7 CM2
DOP CALC LVOT DIAMETER: 2.45 CM
DOP CALC LVOT PEAK VEL: 0.77 M/S
DOP CALC LVOT STROKE VOLUME: 81.52 CM3
DOP CALCLVOT PEAK VEL VTI: 17.3 CM
E WAVE DECELERATION TIME: 218.59 MSEC
E/A RATIO: 0.85
E/E' RATIO: 10.63 M/S
ECHO LV POSTERIOR WALL: 1.17 CM (ref 0.6–1.1)
EJECTION FRACTION: 65 %
FRACTIONAL SHORTENING: 22 % (ref 28–44)
GLOBAL LONGITUIDAL STRAIN: 19.4 %
INTERVENTRICULAR SEPTUM: 1.11 CM (ref 0.6–1.1)
IVRT: 148.43 MSEC
LEFT ATRIUM AREA SYSTOLIC (APICAL 2 CHAMBER): 12.87 CM2
LEFT ATRIUM AREA SYSTOLIC (APICAL 4 CHAMBER): 11.09 CM2
LEFT ATRIUM SIZE: 3.24 CM
LEFT ATRIUM VOLUME INDEX MOD: 14.6 ML/M2
LEFT ATRIUM VOLUME MOD: 24.45 CM3
LEFT INTERNAL DIMENSION IN SYSTOLE: 2.55 CM (ref 2.1–4)
LEFT VENTRICLE DIASTOLIC VOLUME INDEX: 26.12 ML/M2
LEFT VENTRICLE DIASTOLIC VOLUME: 43.62 ML
LEFT VENTRICLE END SYSTOLIC VOLUME APICAL 2 CHAMBER: 22.68 ML
LEFT VENTRICLE END SYSTOLIC VOLUME APICAL 4 CHAMBER: 20.58 ML
LEFT VENTRICLE MASS INDEX: 68 G/M2
LEFT VENTRICLE SYSTOLIC VOLUME INDEX: 14.1 ML/M2
LEFT VENTRICLE SYSTOLIC VOLUME: 23.51 ML
LEFT VENTRICULAR INTERNAL DIMENSION IN DIASTOLE: 3.28 CM (ref 3.5–6)
LEFT VENTRICULAR MASS: 114.24 G
LV LATERAL E/E' RATIO: 8.5 M/S
LV SEPTAL E/E' RATIO: 14.17 M/S
LVED V (TEICH): 43.62 ML
LVES V (TEICH): 23.51 ML
LVOT MG: 1.05 MMHG
LVOT MV: 0.47 CM/S
MV PEAK A VEL: 1 M/S
MV PEAK E VEL: 0.85 M/S
MV STENOSIS PRESSURE HALF TIME: 63.39 MS
MV VALVE AREA P 1/2 METHOD: 3.47 CM2
OHS CV RV/LV RATIO: 0.94 CM
PISA MRMAX VEL: 3.22 M/S
PISA TR MAX VEL: 2.51 M/S
PULM VEIN S/D RATIO: 1.77
PV PEAK D VEL: 0.26 M/S
PV PEAK S VEL: 0.46 M/S
RA PRESSURE ESTIMATED: 3 MMHG
RIGHT VENTRICLE DIASTOLIC LENGTH: 4.6 CM
RIGHT VENTRICLE DIASTOLIC MID DIMENSION: 2.5 CM
RIGHT VENTRICULAR END-DIASTOLIC DIMENSION: 3.09 CM
RIGHT VENTRICULAR LENGTH IN DIASTOLE (APICAL 4-CHAMBER VIEW): 4.58 CM
RV MID DIAMA: 2.52 CM
RV TB RVSP: 6 MMHG
RV TISSUE DOPPLER FREE WALL SYSTOLIC VELOCITY 1 (APICAL 4 CHAMBER VIEW): 8.65 CM/S
SINUS: 3.9 CM
STJ: 3.43 CM
TDI LATERAL: 0.1 M/S
TDI SEPTAL: 0.06 M/S
TDI: 0.08 M/S
TR MAX PG: 25 MMHG
TRICUSPID ANNULAR PLANE SYSTOLIC EXCURSION: 1.35 CM
TV REST PULMONARY ARTERY PRESSURE: 28 MMHG
Z-SCORE OF LEFT VENTRICULAR DIMENSION IN END DIASTOLE: -3.53
Z-SCORE OF LEFT VENTRICULAR DIMENSION IN END SYSTOLE: -1

## 2024-06-28 PROCEDURE — 93306 TTE W/DOPPLER COMPLETE: CPT | Mod: HCNC,PO

## 2024-06-28 PROCEDURE — 93356 MYOCRD STRAIN IMG SPCKL TRCK: CPT | Mod: HCNC,,, | Performed by: INTERNAL MEDICINE

## 2024-06-28 PROCEDURE — 93356 MYOCRD STRAIN IMG SPCKL TRCK: CPT | Mod: HCNC,PO

## 2024-06-28 PROCEDURE — 93306 TTE W/DOPPLER COMPLETE: CPT | Mod: 26,HCNC,, | Performed by: INTERNAL MEDICINE

## 2024-07-08 ENCOUNTER — LAB VISIT (OUTPATIENT)
Dept: LAB | Facility: HOSPITAL | Age: 89
End: 2024-07-08
Attending: FAMILY MEDICINE
Payer: MEDICARE

## 2024-07-08 DIAGNOSIS — C61 PROSTATE CANCER: ICD-10-CM

## 2024-07-08 LAB
ALBUMIN SERPL BCP-MCNC: 3.2 G/DL (ref 3.5–5.2)
ALP SERPL-CCNC: 30 U/L (ref 55–135)
ALT SERPL W/O P-5'-P-CCNC: 10 U/L (ref 10–44)
ANION GAP SERPL CALC-SCNC: 11 MMOL/L (ref 8–16)
AST SERPL-CCNC: 28 U/L (ref 10–40)
BASOPHILS # BLD AUTO: 0.04 K/UL (ref 0–0.2)
BASOPHILS NFR BLD: 0.4 % (ref 0–1.9)
BILIRUB SERPL-MCNC: 0.4 MG/DL (ref 0.1–1)
BUN SERPL-MCNC: 17 MG/DL (ref 8–23)
CALCIUM SERPL-MCNC: 9.3 MG/DL (ref 8.7–10.5)
CHLORIDE SERPL-SCNC: 108 MMOL/L (ref 95–110)
CO2 SERPL-SCNC: 24 MMOL/L (ref 23–29)
COMPLEXED PSA SERPL-MCNC: 8.7 NG/ML (ref 0–4)
CREAT SERPL-MCNC: 1.2 MG/DL (ref 0.5–1.4)
DIFFERENTIAL METHOD BLD: ABNORMAL
EOSINOPHIL # BLD AUTO: 0.2 K/UL (ref 0–0.5)
EOSINOPHIL NFR BLD: 1.5 % (ref 0–8)
ERYTHROCYTE [DISTWIDTH] IN BLOOD BY AUTOMATED COUNT: 14.6 % (ref 11.5–14.5)
EST. GFR  (NO RACE VARIABLE): 58.5 ML/MIN/1.73 M^2
GLUCOSE SERPL-MCNC: 107 MG/DL (ref 70–110)
HCT VFR BLD AUTO: 31.1 % (ref 40–54)
HGB BLD-MCNC: 10.2 G/DL (ref 14–18)
IMM GRANULOCYTES # BLD AUTO: 0.07 K/UL (ref 0–0.04)
IMM GRANULOCYTES NFR BLD AUTO: 0.7 % (ref 0–0.5)
LYMPHOCYTES # BLD AUTO: 2.1 K/UL (ref 1–4.8)
LYMPHOCYTES NFR BLD: 21.1 % (ref 18–48)
MCH RBC QN AUTO: 33 PG (ref 27–31)
MCHC RBC AUTO-ENTMCNC: 32.8 G/DL (ref 32–36)
MCV RBC AUTO: 101 FL (ref 82–98)
MONOCYTES # BLD AUTO: 0.6 K/UL (ref 0.3–1)
MONOCYTES NFR BLD: 5.4 % (ref 4–15)
NEUTROPHILS # BLD AUTO: 7.2 K/UL (ref 1.8–7.7)
NEUTROPHILS NFR BLD: 70.9 % (ref 38–73)
NRBC BLD-RTO: 0 /100 WBC
PLATELET # BLD AUTO: 178 K/UL (ref 150–450)
PMV BLD AUTO: 10.9 FL (ref 9.2–12.9)
POTASSIUM SERPL-SCNC: 4.4 MMOL/L (ref 3.5–5.1)
PROT SERPL-MCNC: 7.1 G/DL (ref 6–8.4)
RBC # BLD AUTO: 3.09 M/UL (ref 4.6–6.2)
SODIUM SERPL-SCNC: 143 MMOL/L (ref 136–145)
WBC # BLD AUTO: 10.15 K/UL (ref 3.9–12.7)

## 2024-07-08 PROCEDURE — 84153 ASSAY OF PSA TOTAL: CPT | Mod: HCNC | Performed by: INTERNAL MEDICINE

## 2024-07-08 PROCEDURE — 36415 COLL VENOUS BLD VENIPUNCTURE: CPT | Mod: HCNC,PN | Performed by: INTERNAL MEDICINE

## 2024-07-08 PROCEDURE — 80053 COMPREHEN METABOLIC PANEL: CPT | Mod: HCNC,PN | Performed by: INTERNAL MEDICINE

## 2024-07-08 PROCEDURE — 85025 COMPLETE CBC W/AUTO DIFF WBC: CPT | Mod: HCNC,PN | Performed by: INTERNAL MEDICINE

## 2024-07-09 NOTE — PROGRESS NOTES
PATIENT: Amor Alcazar  MRN: [id number]  DATE: 7/10/2024      Diagnosis:   1. 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 Genetic defect    4. Anemia of chronic disease                  Chief Complaint: Follow-up (Prostate cancer)      Subjective:    Interval History: Mr. Alcazar is a 89 y.o. male with Arthritis, hypothyroidism, valvular heart disease, iron deficiency anemia who presents for follow up of prostate cancer.  Since the last clinic visit the patient completed radiation to the prostate under the care of Dr Santos 6/2024.  PSA 8,7ng/mL on 7/08/24.  The patient denies CP, cough, SOB, abdominal pain, nausea, vomiting, constipation, diarrhea.  The patient denies fever, chills, night sweats, weight loss, new lumps or bumps, easy bruising or bleeding.  PT endorses fatigue.  The patient denies CP, cough, SOB, abdominal pain, nausea, vomiting, constipation, diarrhea.  The patient denies fever, chills, night sweats, weight loss, new lumps or bumps, easy bruising or bleeding.    Prior History:  The patient was diagnosed with prostate cancer [place] 9 (4+5) 6/03/14.  He had a rising PSA and underwent PSMA PET/CT 5/09/22 showing disease in the prostate and mediastinal LN's.  The patient has been receiving Lupron 45mg 6 months dosing with Dr Bellamy with last treatment on 4/20/22.  He has previously been on Casodex and Apalutamide with progression.  He was started on Mitoxantrone and prednisone 7/01/22.  Last Echo 8/26/22 showed normal EF.   The patient saw Dr Kasper in cardiology on 9/30/22 and recommended repeat Echo in 3 months.   The patient underwent an Echo on 12/13/22 showing normal diastolic and systolic function.   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   The patient underwent PSA on 7/14/23 showing increase to 4.1   The patient underwent PSMA PET-CT on 08/04/2023  showing shrinkage of mediastinal lymph nodes with decreased FDG uptake with paratracheal lymph node measuring 1.4 x 1 cm; subcarinal lymph node measuring 1.2 x 1.3 cm; increased radiotracer accumulation within the prostate gland.    Abiraterone was held from 10/13/23 until 10/31/23 due to liver enzyme elevation.  PSA increased to 6.7 on 23.   The patient underwent PSMA PET-CT on 2023 showing no evidence of metastatic disease but increased uptake in the prostate.   The patient underwent a PSA on 24 which was elevated at 11.2ng/mL.   The patient underwent PSMA PET-CT on 2024 showing diffuse markedly increased radiotracer accumulation throughout the entire prostate with increased SUV from 40.9 to 55.    Past Medical History:   Past Medical History:   Diagnosis Date    Arthritis     History of skin cancer     details unknown    Hypothyroidism, unspecified     PAT (paroxysmal atrial tachycardia)     Prostate cancer     injections q 6 months    Valvular heart disease     mild AS, MR and TR  ECHO       Past Surgical HIstory:   Past Surgical History:   Procedure Laterality Date    BONE MARROW BIOPSY Bilateral 6/15/2022    Procedure: Biopsy-bone marrow;  Surgeon: Hermann Jackson MD;  Location: [facility] OR;  Service: Oncology;  Laterality: Bilateral;    CATARACT EXTRACTION W/  INTRAOCULAR LENS IMPLANT Bilateral     ESOPHAGOGASTRODUODENOSCOPY      INSERTION OF TUNNELED CENTRAL VENOUS CATHETER (CVC) WITH SUBCUTANEOUS PORT N/A 6/15/2022    Procedure: INSERTION, PORT-A-CATH;  Surgeon: Marques Rivero MD;  Location: [facility] OR;  Service: General;  Laterality: N/A;       Family History:   Family History   Problem Relation Name Age of Onset    Lung cancer Sister           of       Social History:  reports that he has never smoked. He has never used smokeless tobacco. He reports that he does not currently use alcohol. He reports that he does not use drugs.    Allergies:  Review of patient's allergies  indicates:  No Known Allergies    Medications:  Current Outpatient Medications   Medication Sig Dispense Refill    abiraterone (ZYTIGA) 500 mg Tab Take 1,000 mg by mouth once daily. 60 tablet 6    atorvastatin (LIPITOR) 10 MG tablet Take 1 tablet (10 mg total) by mouth once daily. 90 tablet 3    folic acid (FOLVITE) 400 MCG tablet Take 400 mcg by mouth once daily.      levothyroxine (SYNTHROID) 50 MCG tablet Take 1 tablet (50 mcg total) by mouth before breakfast. 90 tablet 2    LIDOcaine-prilocaine (EMLA) cream Apply topically as needed (please apply to port site one hour prior to treatment administration). 30 g 6    LUPRON DEPOT, 6 MONTH, 45 mg SyKt injection Inject into the muscle every 6 (six) months.      metoprolol succinate (TOPROL-XL) 25 MG 24 hr tablet Take 1 tablet (25 mg total) by mouth daily with breakfast AND 0.5 tablets (12.5 mg total) before evening meal. 135 tablet 3    multivit-min-FA-lycopen-lutein (CENTRUM SILVER MEN) 300-600-300 mcg Tab Take by mouth once daily.      predniSONE (DELTASONE) 5 MG tablet Take 1 tablet (5 mg total) by mouth once daily. 60 tablet 6     No current facility-administered medications for this visit.       Review of Systems   Constitutional:  Positive for fatigue. Negative for chills, diaphoresis, fever and unexpected weight change.   Respiratory:  Negative for cough and shortness of breath.    Cardiovascular:  Negative for chest pain and palpitations.   Gastrointestinal:  Negative for abdominal pain, constipation, diarrhea, nausea and vomiting.   Skin:  Negative for color change and rash.   Neurological:  Negative for headaches.   Hematological:  Negative for adenopathy. Does not bruise/bleed easily.       ECOG Performance Status: 1   Objective:      Vitals:   Vitals:    07/10/24 1138   BP: 139/67   BP Location: Right arm   Patient Position: Sitting   BP Method: Medium (Automatic)   Pulse: 100   Resp: 17   Temp: 97.3 °F (36.3 °C)   TempSrc: Temporal   Weight: 57.8 kg (127  "lb 6.8 oz)   Height: 5' 7" (1.702 m)           Physical Exam  Constitutional:       General: He is not in acute distress.     Appearance: He is well-developed. He is not diaphoretic.   HENT:      Head: Normocephalic and atraumatic.   Cardiovascular:      Rate and Rhythm: Normal rate and regular rhythm.      Heart sounds: Normal heart sounds. No murmur heard.     No friction rub. No gallop.   Pulmonary:      Effort: Pulmonary effort is normal. No respiratory distress.      Breath sounds: Normal breath sounds. No wheezing or rales.   Chest:      Chest wall: No tenderness.   Abdominal:      General: Bowel sounds are normal. There is no distension.      Palpations: Abdomen is soft. There is no mass.      Tenderness: There is no abdominal tenderness. There is no rebound.   Musculoskeletal:      Cervical back: Normal range of motion.      Comments: PORT in left chest   Lymphadenopathy:      Cervical: No cervical adenopathy.      Upper Body:      Right upper body: No supraclavicular or axillary adenopathy.      Left upper body: No supraclavicular or axillary adenopathy.   Skin:     General: Skin is warm and dry.      Findings: No erythema, lesion or rash.   Neurological:      Mental Status: He is alert and oriented to person, place, and time.   Psychiatric:         Behavior: Behavior normal.         Laboratory Data:  Lab Visit on 07/08/2024   Component Date Value Ref Range Status    WBC 07/08/2024 10.15  3.90 - 12.70 K/uL Final    RBC 07/08/2024 3.09 (L)  4.60 - 6.20 M/uL Final    Hemoglobin 07/08/2024 10.2 (L)  14.0 - 18.0 g/dL Final    Hematocrit 07/08/2024 31.1 (L)  40.0 - 54.0 % Final    MCV 07/08/2024 101 (H)  82 - 98 fL Final    MCH 07/08/2024 33.0 (H)  27.0 - 31.0 pg Final    MCHC 07/08/2024 32.8  32.0 - 36.0 g/dL Final    RDW 07/08/2024 14.6 (H)  11.5 - 14.5 % Final    Platelets 07/08/2024 178  150 - 450 K/uL Final    MPV 07/08/2024 10.9  9.2 - 12.9 fL Final    Immature Granulocytes 07/08/2024 0.7 (H)  0.0 - 0.5 % " Final    Gran # (ANC) 07/08/2024 7.2  1.8 - 7.7 K/uL Final    Immature Grans (Abs) 07/08/2024 0.07 (H)  0.00 - 0.04 K/uL Final    Comment: Mild elevation in immature granulocytes is non specific and   can be seen in a variety of conditions including stress response,   acute inflammation, trauma and pregnancy. Correlation with other   laboratory and clinical findings is essential.      Lymph # 07/08/2024 2.1  1.0 - 4.8 K/uL Final    Mono # 07/08/2024 0.6  0.3 - 1.0 K/uL Final    Eos # 07/08/2024 0.2  0.0 - 0.5 K/uL Final    Baso # 07/08/2024 0.04  0.00 - 0.20 K/uL Final    nRBC 07/08/2024 0  0 /100 WBC Final    Gran % 07/08/2024 70.9  38.0 - 73.0 % Final    Lymph % 07/08/2024 21.1  18.0 - 48.0 % Final    Mono % 07/08/2024 5.4  4.0 - 15.0 % Final    Eosinophil % 07/08/2024 1.5  0.0 - 8.0 % Final    Basophil % 07/08/2024 0.4  0.0 - 1.9 % Final    Differential Method 07/08/2024 Automated   Final    Sodium 07/08/2024 143  136 - 145 mmol/L Final    Potassium 07/08/2024 4.4  3.5 - 5.1 mmol/L Final    Chloride 07/08/2024 108  95 - 110 mmol/L Final    CO2 07/08/2024 24  23 - 29 mmol/L Final    Glucose 07/08/2024 107  70 - 110 mg/dL Final    BUN 07/08/2024 17  8 - 23 mg/dL Final    Creatinine 07/08/2024 1.2  0.5 - 1.4 mg/dL Final    Calcium 07/08/2024 9.3  8.7 - 10.5 mg/dL Final    Total Protein 07/08/2024 7.1  6.0 - 8.4 g/dL Final    Albumin 07/08/2024 3.2 (L)  3.5 - 5.2 g/dL Final    Total Bilirubin 07/08/2024 0.4  0.1 - 1.0 mg/dL Final    Comment: For infants and newborns, interpretation of results should be based  on gestational age, weight and in agreement with clinical  observations.    Premature Infant recommended reference ranges:  Up to 24 hours.............<8.0 mg/dL  Up to 48 hours............<12.0 mg/dL  3-5 days..................<15.0 mg/dL  6-29 days.................<15.0 mg/dL      Alkaline Phosphatase 07/08/2024 30 (L)  55 - 135 U/L Final    AST 07/08/2024 28  10 - 40 U/L Final    ALT 07/08/2024 10  10 - 44  U/L Final    eGFR 07/08/2024 58.5 (A)  >60 mL/min/1.73 m^2 Final    Anion Gap 07/08/2024 11  8 - 16 mmol/L Final    PSA Diagnostic 07/08/2024 8.7 (H)  0.00 - 4.00 ng/mL Final    Comment: The testing method is a chemiluminescent microparticle immunoassay   manufactured by [employer] and performed on the [employer]   or   [facility] system. Values obtained with different assay manufacturers   for   methods may be different and cannot be used interchangeably.  PSA Expected levels:  Hormonal Therapy: <0.05 ng/ml  Prostatectomy: <0.01 ng/ml  Radiation Therapy: <1.00 ng/ml           Imaging:     PSMA PET/CT 4/30/24    Head/neck:     No significant abnormal foci of increased radiotracer accumulation are present in the head neck. No lymphadenopathy is present.     Chest:     No significant abnormal foci of increased radiotracer accumulation are present in the chest. Chronic poorly PSMA avid fibrotic changes are present in both lung apices which are unchanged. No pulmonary nodules, lymphadenopathy, or pleural effusion are present.     Abdomen/pelvis:     Diffuse markedly increased radiotracer accumulation is present throughout the entire prostate gland in a similar pattern as on the previous study. The max SUV is 40.9 compared to 55.0 previously. The seminal vesicles appear normal. No lymphadenopathy is present. There does not appear to be extracapsular extension.     Skeleton:     No significant abnormal foci of increased radiotracer accumulation are present within the skeleton. There are no findings to suggest osseous metastatic disease.       Assessment:       1. 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 Genetic defect    4. 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  May predispose pt any other family member to increased risk of cancer  -PT to see genetic counselor  -PSMA PET/CT 8/04/23 showed decrease in size and avidity of paratracheal LN and subcarinal LN but increased uptake in the prostate  -Pt potential candidate for radiation. Will have pt return to see rad onc  -Patient on Abiraterone 1000mg daily and prednisone 5mg BID  -Repeat PSMA PET/CT done 12/07/23 for rising PSA showed stable disease  -PSA 4/29/24 elevated at 11.2  -PSMA PET/CT on 04/30/2024 showed continued uptake isolated to the prostate  -Patient completed radiation to the prostate under the care of Dr Santos 6/2024  -PSA 8.7ng/mL on 7/08/24  -Will continue abiraterone and prednisone  -Would consider docetaxel versus pluvicto as next line of treatment if pt  were to progress on scans  Visit today included increased complexity associated with the care of the episodic problem (ADT and Abiraterone ) addressed and managing the longitudinal care of the patient due to the serious and/or complex managed problem(s) prostate cancer.    CHECK 2 mutation - Pt saw René Stovall on 8/03/23    Anemia of Chronic Disease - Hemoglobin was 10.2g/dL on 7/08/24  -hemoglobin stable  -Will monitor    Hypothyroidism - pt on synthroid  -management per PCP    Route Chart for Scheduling    Med Onc Chart Routing      Follow up with physician 3 months. Pt needs a CBC, CMP, and PSA 10/22/24 with appt with me and lupron on 10/23/24.   Follow up with [facility]    Infusion scheduling note    Injection scheduling note    Labs    Imaging    Pharmacy appointment    Other referrals              Treatment Plan Information   OP MITOXANTRONE PREDNISONE Q3W   Beka Alvarez MD   Upcoming  Treatment Dates - OP MITOXANTRONE PREDNISONE Q3W    No upcoming days in selected categories.    Supportive Plan Information  OP PROSTATE LEUPROLIDE (LUPRON) 6 MONTH   Beka Alvarez MD   Upcoming Treatment Dates - OP PROSTATE LEUPROLIDE (LUPRON) 6 MONTH    8/30/2024       Chemotherapy       leuprolide acetate (6 month) injection 45 mg    Therapy Plan Information  Flushes  heparin, porcine (PF) 100 unit/mL injection flush 500 Units  500 Units, Intravenous, Every visit  sodium chloride 0.9% flush 10 mL  10 mL, Intravenous, Every visit        Beka Alvarez MD  [facility]  Hematology and Oncology  [facility]   [address] [place] [address]   O: [phone]  F: [phone]

## 2024-07-10 ENCOUNTER — OFFICE VISIT (OUTPATIENT)
Dept: HEMATOLOGY/ONCOLOGY | Facility: CLINIC | Age: 89
End: 2024-07-10
Payer: MEDICARE

## 2024-07-10 VITALS
RESPIRATION RATE: 17 BRPM | DIASTOLIC BLOOD PRESSURE: 67 MMHG | HEIGHT: 67 IN | SYSTOLIC BLOOD PRESSURE: 139 MMHG | WEIGHT: 127.44 LBS | TEMPERATURE: 97 F | BODY MASS INDEX: 20 KG/M2 | HEART RATE: 100 BPM

## 2024-07-10 DIAGNOSIS — Q99.9 GENETIC DEFECT: ICD-10-CM

## 2024-07-10 DIAGNOSIS — C77.1 METASTASIS TO MEDIASTINAL LYMPH NODE: ICD-10-CM

## 2024-07-10 DIAGNOSIS — D63.8 ANEMIA OF CHRONIC DISEASE: ICD-10-CM

## 2024-07-10 DIAGNOSIS — C61 PROSTATE CANCER: Primary | ICD-10-CM

## 2024-07-10 PROCEDURE — G2211 COMPLEX E/M VISIT ADD ON: HCPCS | Mod: HCNC,S$GLB,, | Performed by: INTERNAL MEDICINE

## 2024-07-10 PROCEDURE — 99999 PR PBB SHADOW E&M-EST. PATIENT-LVL III: CPT | Mod: PBBFAC,HCNC,, | Performed by: INTERNAL MEDICINE

## 2024-07-10 PROCEDURE — 99215 OFFICE O/P EST HI 40 MIN: CPT | Mod: HCNC,S$GLB,, | Performed by: INTERNAL MEDICINE

## 2024-07-10 PROCEDURE — 1101F PT FALLS ASSESS-DOCD LE1/YR: CPT | Mod: HCNC,CPTII,S$GLB, | Performed by: INTERNAL MEDICINE

## 2024-07-10 PROCEDURE — 1159F MED LIST DOCD IN RCRD: CPT | Mod: HCNC,CPTII,S$GLB, | Performed by: INTERNAL MEDICINE

## 2024-07-10 PROCEDURE — 1126F AMNT PAIN NOTED NONE PRSNT: CPT | Mod: HCNC,CPTII,S$GLB, | Performed by: INTERNAL MEDICINE

## 2024-07-10 PROCEDURE — 1157F ADVNC CARE PLAN IN RCRD: CPT | Mod: HCNC,CPTII,S$GLB, | Performed by: INTERNAL MEDICINE

## 2024-07-10 PROCEDURE — 3288F FALL RISK ASSESSMENT DOCD: CPT | Mod: HCNC,CPTII,S$GLB, | Performed by: INTERNAL MEDICINE

## 2024-07-12 ENCOUNTER — PATIENT MESSAGE (OUTPATIENT)
Dept: SURGERY | Facility: CLINIC | Age: 89
End: 2024-07-12
Payer: MEDICARE

## 2024-07-16 DIAGNOSIS — C61 PROSTATE CANCER: ICD-10-CM

## 2024-07-16 NOTE — TELEPHONE ENCOUNTER
Please see the attached refill request.   Received shift report and assumed care of patient.  Patient awake, calm and stable, currently positioned in bed for comfort and safety; call light within reach.  Denies pain or discomfort at this time.  Will continue to monitor.

## 2024-07-17 RX ORDER — ABIRATERONE 500 MG/1
1000 TABLET ORAL DAILY
Qty: 60 TABLET | Refills: 6 | Status: SHIPPED | OUTPATIENT
Start: 2024-07-17 | End: 2025-07-17

## 2024-07-17 RX ORDER — PREDNISONE 5 MG/1
5 TABLET ORAL DAILY
Qty: 60 TABLET | Refills: 6 | Status: SHIPPED | OUTPATIENT
Start: 2024-07-17

## 2024-08-02 ENCOUNTER — OFFICE VISIT (OUTPATIENT)
Dept: CARDIOLOGY | Facility: CLINIC | Age: 89
End: 2024-08-02
Payer: MEDICARE

## 2024-08-02 VITALS
BODY MASS INDEX: 19.21 KG/M2 | SYSTOLIC BLOOD PRESSURE: 135 MMHG | HEART RATE: 74 BPM | DIASTOLIC BLOOD PRESSURE: 64 MMHG | TEMPERATURE: 97 F | RESPIRATION RATE: 18 BRPM | WEIGHT: 126.75 LBS | HEIGHT: 68 IN | OXYGEN SATURATION: 95 %

## 2024-08-02 DIAGNOSIS — I35.0 NON-RHEUMATIC AORTIC STENOSIS: ICD-10-CM

## 2024-08-02 DIAGNOSIS — I47.19 PAT (PAROXYSMAL ATRIAL TACHYCARDIA): ICD-10-CM

## 2024-08-02 DIAGNOSIS — C61 PROSTATE CANCER: Primary | ICD-10-CM

## 2024-08-02 DIAGNOSIS — E78.5 DYSLIPIDEMIA: ICD-10-CM

## 2024-08-02 PROCEDURE — 99999 PR PBB SHADOW E&M-EST. PATIENT-LVL III: CPT | Mod: PBBFAC,HCNC,, | Performed by: INTERNAL MEDICINE

## 2024-08-02 NOTE — PROGRESS NOTES
Subjective:    Patient ID:  Amor Alcazar is a 89 y.o. male who presents for follow-up.    HPI  He has a history of arthritis, hypothyroidism, aortic stenosis, iron deficiency anemia and metastatic prostate cancer, has been on Lupron and mitoxantrone.  He is here for follow up. Since last visit, he reports no chest pain, SOB or palpitations. His appetite has not been good.   Treatment Plan Information   OP MITOXANTRONE PREDNISONE Q3W   Beka Alvarez MD   Upcoming Treatment Dates - OP MITOXANTRONE PREDNISONE Q3W     No upcoming days in selected categories.     Supportive Plan Information  OP PROSTATE LEUPROLIDE (LUPRON) 6 MONTH   Beka Alvarez MD   Upcoming Treatment Dates - OP PROSTATE LEUPROLIDE (LUPRON) 6 MONTH     3/15/2024       Chemotherapy       leuprolide acetate (6 month) injection 45 mg  8/30/2024       Chemotherapy       leuprolide acetate (6 month) injection 45 mg     Therapy Plan Information  Flushes  heparin, porcine (PF) 100 unit/mL injection flush 500 Units  500 Units, Intravenous, Every visit  sodium chloride 0.9% flush 10 mL  10 mL, Intravenous, Every visit     Treatment Plan Information   OP MITOXANTRONE PREDNISONE Q3W   Beka Alvarez MD   Upcoming Treatment Dates - OP MITOXANTRONE PREDNISONE Q3W     No upcoming days in selected categories.     Supportive Plan Information  OP PROSTATE LEUPROLIDE (LUPRON) 6 MONTH   Beka Alvarez MD   Upcoming Treatment Dates - OP PROSTATE LEUPROLIDE (LUPRON) 6 MONTH     8/30/2024       Chemotherapy       leuprolide acetate (6 month) injection 45 mg     Therapy Plan Information  Flushes  heparin, porcine (PF) 100 unit/mL injection flush 500 Units  500 Units, Intravenous, Every visit  sodium chloride 0.9% flush 10 mL  10 mL, Intravenous, Every visit  Review of Systems   Constitutional: Positive for decreased appetite. Negative for chills and fever.   HENT:  Positive for hearing loss.    Eyes:  Negative for redness.   Cardiovascular:  Negative for chest  pain, irregular heartbeat, leg swelling, palpitations and syncope.   Respiratory:  Negative for cough and shortness of breath.    Musculoskeletal:  Positive for joint pain.   Gastrointestinal:  Negative for vomiting.   Genitourinary:  Negative for hematuria.   Neurological:  Negative for focal weakness and seizures.   Psychiatric/Behavioral:  The patient is not nervous/anxious.    Allergic/Immunologic: Negative for hives.        Current Outpatient Medications   Medication Sig    abiraterone (ZYTIGA) 500 mg Tab Take 2 tablets (1,000 mg total) by mouth once daily.    atorvastatin (LIPITOR) 10 MG tablet Take 1 tablet (10 mg total) by mouth once daily.    folic acid (FOLVITE) 400 MCG tablet Take 400 mcg by mouth once daily.    levothyroxine (SYNTHROID) 50 MCG tablet Take 1 tablet (50 mcg total) by mouth before breakfast.    LIDOcaine-prilocaine (EMLA) cream Apply topically as needed (please apply to port site one hour prior to treatment administration).    LUPRON DEPOT, 6 MONTH, 45 mg SyKt injection Inject into the muscle every 6 (six) months.    metoprolol succinate (TOPROL-XL) 25 MG 24 hr tablet Take 1 tablet (25 mg total) by mouth daily with breakfast AND 0.5 tablets (12.5 mg total) before evening meal.    multivit-min-FA-lycopen-lutein (CENTRUM SILVER MEN) 300-600-300 mcg Tab Take by mouth once daily.    predniSONE (DELTASONE) 5 MG tablet Take 1 tablet (5 mg total) by mouth once daily.     No current facility-administered medications for this visit.       Objective:    Physical Exam  Vitals reviewed.   Constitutional:       General: He is not in acute distress.     Appearance: He is well-developed. He is ill-appearing.   HENT:      Head: Normocephalic and atraumatic.   Neck:      Vascular: No JVD.   Cardiovascular:      Rate and Rhythm: Normal rate and regular rhythm.      Heart sounds: Murmur heard.      Systolic murmur is present with a grade of 1/6 at the upper right sternal border and apex.   Pulmonary:       Effort: Pulmonary effort is normal.      Breath sounds: Normal breath sounds.   Abdominal:      Palpations: Abdomen is soft.   Musculoskeletal:      Cervical back: Neck supple.   Skin:     General: Skin is warm and dry.      Coloration: Skin is pale.   Neurological:      Mental Status: He is alert and oriented to person, place, and time.       Vitals:    08/02/24 1019   BP: 135/64   Pulse: 74   Resp: 18   Temp: 96.8 °F (36 °C)                 Assessment:       1. Prostate cancer    2. Non-rheumatic aortic stenosis    3. Dyslipidemia    4. PAT (paroxysmal atrial tachycardia)                 Plan:       I have reviewed the labs and ancillary data.    5/25/22 echo interpretation:  The left ventricle is normal in size with normal systolic function.  The estimated ejection fraction is 55-60%.  Normal left ventricular diastolic function.  Normal right ventricular size with normal right ventricular systolic function.  There is mild aortic valve stenosis.  Aortic valve area is 1.82 cm2; peak velocity is 1.48 m/s; mean gradient is 5 mmHg.  Normal central venous pressure (3 mmHg).  The estimated PA systolic pressure is 29 mmHg.  The left ventricular global longitudinal strain is -18.32%.    8/12/22 echo interpretation:  The left ventricle is normal in size with concentric remodeling and normal systolic function.  The estimated ejection fraction is 60%.  Normal left ventricular diastolic function.  Normal right ventricular size with normal right ventricular systolic function.  There is mild aortic valve stenosis.  Aortic valve area is 1.72 cm2; peak velocity is 2.13 m/s; mean gradient is 10 mmHg.  Mild mitral regurgitation.  Mild tricuspid regurgitation.  Normal central venous pressure (3 mmHg).  The estimated PA systolic pressure is 33 mmHg.  The left ventricular global longitudinal strain is -18.1%.    12/13/22 echo interpretation:  The left ventricle is normal in size with concentric remodeling and normal systolic  function.  The estimated ejection fraction is 65%.  Normal left ventricular diastolic function.  Normal right ventricular size with normal right ventricular systolic function.  There is mild aortic valve stenosis.  Aortic valve area is 1.79 cm2; peak velocity is 2.18 m/s; mean gradient is 10 mmHg.  IVC not well visualized.  The estimated PA systolic pressure is at [id number] mmHg.  Echocardiographic images too poor to obtain accurate strain measurement.    3/14/23 Holter:  The patient monitored for 2 days and 23 hours.  The rhythm is sinus with mean heart rate of 72 beats per minute, lowest heart rate 55 beats per minute maximum heart rate 111 beats per minute.  Very frequent isolated PACs burden of 8.69%  Frequent short runs of PSVT the longest is 2 minutes and 12 seconds at 10:39 a.m.  Occasional isolated PVCs burden of 0.35% and 1 ventricular triplet  No atrial fibrillation.  No heart block.    3/14/23 echo:  The left ventricle is normal in size with normal systolic function.  The estimated ejection fraction is 60%.  Normal left ventricular diastolic function.  Normal right ventricular size with normal right ventricular systolic function.  There is mild aortic valve stenosis.  Aortic valve area is 1.84 cm2; peak velocity is 2.09 m/s; mean gradient is 10 mmHg.  Mild-to-moderate mitral regurgitation.  Mild tricuspid regurgitation.  Normal central venous pressure (3 mmHg).  The estimated PA systolic pressure is 25 mmHg.  The left ventricular global longitudinal strain is -18.6%.      6/15/23 echo:  The left ventricle is normal in size with normal systolic function.  The estimated ejection fraction is 60%.  Normal left ventricular diastolic function.  Normal right ventricular size with normal right ventricular systolic function.  There is mild aortic valve stenosis.  Aortic valve peak velocity is 2.23 m/s; mean gradient is 12 mmHg.  Mild mitral regurgitation.  Mild tricuspid regurgitation.  Normal central venous  pressure (3 mmHg).  The estimated PA systolic pressure is 33 mmHg.  Image quality not high enough to accurately calculate cardiac strain, so none will be reported.    9/21/23 echo:      Left Ventricle: The left ventricle is normal in size. Normal wall thickness. There is concentric remodeling. Normal wall motion. There is normal systolic function. Ejection fraction by visual approximation is 60-65%. Global longitudinal strain is -16.0%. There is normal diastolic function.    Right Ventricle: Normal right ventricular cavity size. Wall thickness is normal. Right ventricle wall motion  is normal. Systolic function is normal.    Aortic Valve: The aortic valve is a trileaflet valve. There is aortic valve sclerosis.    Tricuspid Valve: There is mild regurgitation.    Pulmonary Artery: The estimated pulmonary artery systolic pressure is 30 mmHg.    IVC/SVC: Normal venous pressure at 3 mmHg.    12/22/23 echo:    Left Ventricle: The left ventricle is normal in size. Normal wall thickness. Normal wall motion. There is normal systolic function. Ejection fraction by visual approximation is 65%. Global longitudinal strain is -19.1%. There is normal diastolic function.    Right Ventricle: Normal right ventricular cavity size. Wall thickness is normal. Right ventricle wall motion  is normal. Systolic function is normal.    Aortic Valve: The aortic valve is a trileaflet valve. There is mild aortic valve sclerosis. Mildly restricted motion. There is mild stenosis. Aortic valve area by VTI is 1.71 cm². Aortic valve peak velocity is 2.29 m/s. Mean gradient is 11 mmHg. The dimensionless index is 0.43.    Mitral Valve: There is mild regurgitation.    Tricuspid Valve: There is mild regurgitation.    Pulmonary Artery: The estimated pulmonary artery systolic pressure is 27 mmHg.    IVC/SVC: Normal venous pressure at 3 mmHg.    3/22/24 echo:    Left Ventricle: There is normal systolic function. Ejection fraction by visual approximation is  60%. Global longitudinal strain is -20.5%. There is normal diastolic function.    Right Ventricle: Normal right ventricular cavity size. Wall thickness is normal. Right ventricle wall motion  is normal. Systolic function is normal.    Aortic Valve: The aortic valve is a trileaflet valve. There is mild aortic valve sclerosis. Mildly restricted motion. There is mild stenosis. Aortic valve area by VTI is 1.86 cm². Aortic valve peak velocity is 2.1 m/s. Mean gradient is 8 mmHg. The dimensionless index is 0.48.    Mitral Valve: There is mild regurgitation.    Tricuspid Valve: There is mild regurgitation.    Pulmonary Artery: The estimated pulmonary artery systolic pressure is 28 mmHg.    IVC/SVC: Normal venous pressure at 3 mmHg.    6/28/24 echo:    Left Ventricle: The left ventricle is normal in size. Normal wall thickness. There is concentric remodeling. There is normal systolic function. Ejection fraction by visual approximation is 60-65%. Global longitudinal strain is -19.4%. There is normal diastolic function.    Right Ventricle: Normal right ventricular cavity size. Wall thickness is normal. Systolic function is normal.    Aortic Valve: The aortic valve is a trileaflet valve. There is mild aortic valve sclerosis without significant stenosis on this study.    Mitral Valve: There is moderate regurgitation.    Pulmonary Artery: The estimated pulmonary artery systolic pressure is 28 mmHg.    IVC/SVC: Normal venous pressure at 3 mmHg.     Impression and Plan:  1) prostate ca: followed by oncology; remains on ADT and mitoxantrone; repeat echo in September.  2) aortic stenosis: was mild; no intervention needed at this time.  3) dyslipidemia: continue low dose statin. LDL at goal.  4) PAT: continue b-blocker. No symptoms.

## 2024-09-05 ENCOUNTER — PATIENT MESSAGE (OUTPATIENT)
Dept: HEMATOLOGY/ONCOLOGY | Facility: CLINIC | Age: 89
End: 2024-09-05
Payer: MEDICARE

## 2024-09-23 ENCOUNTER — LAB VISIT (OUTPATIENT)
Dept: LAB | Facility: HOSPITAL | Age: 89
End: 2024-09-23
Attending: RADIOLOGY
Payer: MEDICARE

## 2024-09-23 DIAGNOSIS — C61 PROSTATE CA: ICD-10-CM

## 2024-09-23 LAB — COMPLEXED PSA SERPL-MCNC: 4 NG/ML (ref 0–4)

## 2024-09-23 PROCEDURE — 84153 ASSAY OF PSA TOTAL: CPT | Mod: HCNC | Performed by: RADIOLOGY

## 2024-09-23 PROCEDURE — 36415 COLL VENOUS BLD VENIPUNCTURE: CPT | Mod: HCNC,PN | Performed by: RADIOLOGY

## 2024-09-24 ENCOUNTER — INFUSION (OUTPATIENT)
Dept: INFUSION THERAPY | Facility: HOSPITAL | Age: 89
End: 2024-09-24
Attending: INTERNAL MEDICINE
Payer: MEDICARE

## 2024-09-24 ENCOUNTER — OFFICE VISIT (OUTPATIENT)
Dept: RADIATION ONCOLOGY | Facility: CLINIC | Age: 89
End: 2024-09-24
Payer: MEDICARE

## 2024-09-24 VITALS
SYSTOLIC BLOOD PRESSURE: 127 MMHG | TEMPERATURE: 98 F | WEIGHT: 127 LBS | BODY MASS INDEX: 19.25 KG/M2 | DIASTOLIC BLOOD PRESSURE: 58 MMHG | OXYGEN SATURATION: 98 % | RESPIRATION RATE: 16 BRPM | HEIGHT: 68 IN | HEART RATE: 105 BPM

## 2024-09-24 VITALS — WEIGHT: 126.75 LBS | HEIGHT: 68 IN | BODY MASS INDEX: 19.21 KG/M2

## 2024-09-24 DIAGNOSIS — C61 PROSTATE CANCER: ICD-10-CM

## 2024-09-24 DIAGNOSIS — C61 MALIGNANT NEOPLASM OF PROSTATE: Primary | ICD-10-CM

## 2024-09-24 DIAGNOSIS — Z45.2 ENCOUNTER FOR INSERTION OF VENOUS ACCESS PORT: ICD-10-CM

## 2024-09-24 DIAGNOSIS — D50.8 OTHER IRON DEFICIENCY ANEMIA: ICD-10-CM

## 2024-09-24 PROCEDURE — 99212 OFFICE O/P EST SF 10 MIN: CPT | Mod: HCNC,S$GLB,, | Performed by: RADIOLOGY

## 2024-09-24 PROCEDURE — 1101F PT FALLS ASSESS-DOCD LE1/YR: CPT | Mod: HCNC,CPTII,S$GLB, | Performed by: RADIOLOGY

## 2024-09-24 PROCEDURE — 96523 IRRIG DRUG DELIVERY DEVICE: CPT | Mod: HCNC,PN

## 2024-09-24 PROCEDURE — 1159F MED LIST DOCD IN RCRD: CPT | Mod: HCNC,CPTII,S$GLB, | Performed by: RADIOLOGY

## 2024-09-24 PROCEDURE — 99999 PR PBB SHADOW E&M-EST. PATIENT-LVL III: CPT | Mod: PBBFAC,HCNC,, | Performed by: RADIOLOGY

## 2024-09-24 PROCEDURE — A4216 STERILE WATER/SALINE, 10 ML: HCPCS | Mod: HCNC,PN | Performed by: INTERNAL MEDICINE

## 2024-09-24 PROCEDURE — 1157F ADVNC CARE PLAN IN RCRD: CPT | Mod: HCNC,CPTII,S$GLB, | Performed by: RADIOLOGY

## 2024-09-24 PROCEDURE — 3288F FALL RISK ASSESSMENT DOCD: CPT | Mod: HCNC,CPTII,S$GLB, | Performed by: RADIOLOGY

## 2024-09-24 PROCEDURE — 25000003 PHARM REV CODE 250: Mod: HCNC,PN | Performed by: INTERNAL MEDICINE

## 2024-09-24 PROCEDURE — 1126F AMNT PAIN NOTED NONE PRSNT: CPT | Mod: HCNC,CPTII,S$GLB, | Performed by: RADIOLOGY

## 2024-09-24 PROCEDURE — 63600175 PHARM REV CODE 636 W HCPCS: Mod: HCNC,PN | Performed by: INTERNAL MEDICINE

## 2024-09-24 RX ORDER — HEPARIN 100 UNIT/ML
500 SYRINGE INTRAVENOUS
OUTPATIENT
Start: 2024-09-24

## 2024-09-24 RX ORDER — HEPARIN 100 UNIT/ML
500 SYRINGE INTRAVENOUS
Status: DISCONTINUED | OUTPATIENT
Start: 2024-09-24 | End: 2024-09-24 | Stop reason: HOSPADM

## 2024-09-24 RX ORDER — SODIUM CHLORIDE 0.9 % (FLUSH) 0.9 %
10 SYRINGE (ML) INJECTION
Status: DISCONTINUED | OUTPATIENT
Start: 2024-09-24 | End: 2024-09-24 | Stop reason: HOSPADM

## 2024-09-24 RX ORDER — SODIUM CHLORIDE 0.9 % (FLUSH) 0.9 %
10 SYRINGE (ML) INJECTION
OUTPATIENT
Start: 2024-09-24

## 2024-09-24 RX ORDER — HEPARIN 100 UNIT/ML
500 SYRINGE INTRAVENOUS
Status: CANCELLED | OUTPATIENT
Start: 2024-09-24

## 2024-09-24 RX ORDER — SODIUM CHLORIDE 0.9 % (FLUSH) 0.9 %
10 SYRINGE (ML) INJECTION
Status: CANCELLED | OUTPATIENT
Start: 2024-09-24

## 2024-09-24 RX ADMIN — SODIUM CHLORIDE, PRESERVATIVE FREE 10 ML: 5 INJECTION INTRAVENOUS at 10:09

## 2024-09-24 RX ADMIN — Medication 500 UNITS: at 10:09

## 2024-09-24 NOTE — PROGRESS NOTES
09/24/2024    [facility]  Department of Radiation Oncology  [facility]  Follow Up Visit Note    Diagnosis:  Amor Alcazar is a 89 y.o. male with a(n) Stage IV adenocarcinoma of the prostate. He completed prostate-directed radiation therapy.      Oncologic History:  Oncology History   Prostate cancer   6/27/2022 Initial Diagnosis    Prostate cancer     7/1/2022 - 1/6/2023 Chemotherapy    Treatment Summary   Plan Name: OP MITOXANTRONE PREDNISONE Q3W  Treatment Goal: Palliative  Status: Inactive  Start Date: 7/1/2022  End Date: 1/6/2023  Provider: Beka Alvarez MD  Chemotherapy: mitoXANTRONE (NOVANTRONE) 12 mg/m2 = 22 mg in sodium chloride 0.9% 50 mL chemo infusion, 12 mg/m2 = 22 mg, Intravenous, Clinic/HOD 1 time, 10 of 10 cycles  Administration: 22 mg (7/1/2022), 22 mg (7/22/2022), 22 mg (11/4/2022), 22 mg (11/25/2022), 22 mg (12/16/2022), 22 mg (1/6/2023), 22 mg (8/12/2022), 22 mg (9/2/2022), 22 mg (9/23/2022), 22 mg (10/14/2022)     7/20/2023 Cancer Staged    Staging form: Prostate, AJCC 8th Edition  - Clinical: Stage IVB (cTX, cNX, cM1)     5/29/2024 - 6/25/2024 Radiation Therapy    Treating physician: Annmarie Grace  Total Dose: 55 Gy  Fractions: 20 (prostate directed)  Treatment Site Ref. ID Energy Dose/Fx (Gy) #Fx Dose Correction (Gy) Total Dose (Gy) Start Date End Date Elapsed Days   IM Prostate PTV 6X 2.75 20 / 20 0 55 5/29/2024 6/25/2024 27             Interval History  The patient presents today for a regularly scheduled follow up visit.  He was last seen in our clinic on 6/25/24.   Since that time, he continues systemic therapy with Dr. Alvarez. .     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  He was treated with casodex and apalutamiide, with POD, and transitioned to Lupron, followed by mitoxantrone and prednisone. Most recent PSA increased from 2.8 to 4.1 (71 at time of diagnosis).     Latest Reference Range & Units Most Recent 11/22/22 09:00 12/12/22 08:43 01/04/23 09:29 04/10/23 08:18 07/14/23 11:52   PSA Diagnostic 0.00 - 4.00 ng/mL 4.1 (H)  7/14/23 11:52 5.6 (H) 4.6 (H) 4.0 2.8 4.1 (H)   (H): Data is abnormally high     Interval PSMA 8/4/23 showed shrinkage of mediastinal lymph nodes with decreased FDG avidity, but increased accumulation within the prostate gland.  He was referred for discussion of prostate-directed radiation therapy in the setting of oligometastatic disease. Plan to initiate abiraterone when insurance authorization received.      IPSS is 1, with no frequency, no urgency, and nocturia x 1.      PSMA PET/CT 4/30/24 revealed:  Persistent markedly increased radiotracer accumulation diffusely thick within the prostate gland consistent with the patient's known prostate cancer. There does not appear to be extracapsular extension or evidence of metastatic disease.      History of prior irradiation: As above  History of prior systemic anti-cancer therapy: Yes - see above  History of collagen vascular disease: No  Implanted electronic device (pacer/defib/nerve stimulator): No    Review of Systems   Review of Systems   Gastrointestinal:  Negative for abdominal pain, blood in stool and diarrhea (after RT a few times, resolved).   Genitourinary:  Negative for dysuria, frequency, hematuria and urgency.       Social History:  Social History     Tobacco Use    Smoking status: Never    Smokeless tobacco: Never   Substance Use Topics    Alcohol use: Not Currently     Comment: few beers daily    Drug use: No       Family  "History:  Cancer-related family history includes Lung cancer in his sister.    Exam:  Vitals:    09/24/24 1133   BP: (!) 127/58   Pulse: 105   Resp: 16   Temp: 98.2 °F (36.8 °C)   SpO2: 98%   Weight: 57.6 kg (126 lb 15.8 oz)   Height: 5' 8" (1.727 m)     Constitutional: Pleasant 89 y.o. male in no acute distress.  Well nourished. Well groomed.   HEENT: Normocephalic and atraumatic   Lungs: No audible wheezing.  Normal effort.   Musculoskeletal: No gross MSK deformities. Ambulates  Skin: No rashes appreciated.   Psych: Alert and oriented with appropriate mood and affect.  Neuro:   Grossly normal.      Assessment:  Recovered well from acute radiation therapy-related toxicities  Ongoing systemic therapy   ECOG: (1) Restricted in physically strenuous activity, ambulatory and able to do work of light nature    Plan:  Follow up as directed for ongoing systemic therapy with Dr. Alvarez  He was given our contact information, and he was told that he could call our clinic at anytime if he has any questions or concerns.  Follow up with other providers as directed        "

## 2024-09-30 ENCOUNTER — HOSPITAL ENCOUNTER (OUTPATIENT)
Dept: CARDIOLOGY | Facility: HOSPITAL | Age: 89
Discharge: HOME | End: 2024-09-30
Attending: INTERNAL MEDICINE
Payer: MEDICARE

## 2024-09-30 VITALS — HEIGHT: 68 IN | BODY MASS INDEX: 19.1 KG/M2 | WEIGHT: 126 LBS

## 2024-09-30 DIAGNOSIS — C61 PROSTATE CANCER: ICD-10-CM

## 2024-09-30 LAB
ASCENDING AORTA: 3.54 CM
AV INDEX (PROSTH): 0.44
AV MEAN GRADIENT: 10.8 MMHG
AV PEAK GRADIENT: 17.6 MMHG
AV VALVE AREA BY VELOCITY RATIO: 1.6 CM²
AV VALVE AREA: 1.7 CM²
AV VELOCITY RATIO: 0.43
BSA FOR ECHO PROCEDURE: 1.66 M2
CV ECHO LV RWT: 0.51 CM
DOP CALC AO PEAK VEL: 2.1 M/S
DOP CALC AO VTI: 44.1 CM
DOP CALC LVOT AREA: 3.8 CM2
DOP CALC LVOT DIAMETER: 2.2 CM
DOP CALC LVOT PEAK VEL: 0.9 M/S
DOP CALC LVOT STROKE VOLUME: 74.5 CM3
DOP CALCLVOT PEAK VEL VTI: 19.6 CM
E WAVE DECELERATION TIME: 178.62 MSEC
E/A RATIO: 0.69
E/E' RATIO: 11.85 M/S
ECHO LV POSTERIOR WALL: 1.1 CM (ref 0.6–1.1)
EJECTION FRACTION: 60 %
FRACTIONAL SHORTENING: 25.6 % (ref 28–44)
GLOBAL LONGITUIDAL STRAIN: 19.6 %
INTERVENTRICULAR SEPTUM: 1.1 CM (ref 0.6–1.1)
LEFT ATRIUM AREA SYSTOLIC (APICAL 2 CHAMBER): 19.44 CM2
LEFT ATRIUM AREA SYSTOLIC (APICAL 4 CHAMBER): 10.95 CM2
LEFT ATRIUM SIZE: 2.88 CM
LEFT ATRIUM VOLUME INDEX MOD: 23 ML/M2
LEFT ATRIUM VOLUME MOD: 38.67 ML
LEFT INTERNAL DIMENSION IN SYSTOLE: 3.2 CM (ref 2.1–4)
LEFT VENTRICLE DIASTOLIC VOLUME INDEX: 48.61 ML/M2
LEFT VENTRICLE DIASTOLIC VOLUME: 81.66 ML
LEFT VENTRICLE END SYSTOLIC VOLUME APICAL 2 CHAMBER: 61.56 ML
LEFT VENTRICLE END SYSTOLIC VOLUME APICAL 4 CHAMBER: 20.15 ML
LEFT VENTRICLE MASS INDEX: 97 G/M2
LEFT VENTRICLE SYSTOLIC VOLUME INDEX: 23.5 ML/M2
LEFT VENTRICLE SYSTOLIC VOLUME: 39.46 ML
LEFT VENTRICULAR INTERNAL DIMENSION IN DIASTOLE: 4.3 CM (ref 3.5–6)
LEFT VENTRICULAR MASS: 162.9 G
LV LATERAL E/E' RATIO: 9.63 M/S
LV SEPTAL E/E' RATIO: 15.4 M/S
LVED V (TEICH): 81.66 ML
LVES V (TEICH): 39.46 ML
LVOT MG: 1.72 MMHG
LVOT MV: 0.63 CM/S
MV PEAK A VEL: 1.11 M/S
MV PEAK E VEL: 0.77 M/S
MV STENOSIS PRESSURE HALF TIME: 51.8 MS
MV VALVE AREA P 1/2 METHOD: 4.25 CM2
PISA TR MAX VEL: 2.59 M/S
RA PRESSURE ESTIMATED: 3 MMHG
RA VOL SYS: 24.78 ML
RIGHT ATRIAL AREA: 9.8 CM2
RIGHT ATRIUM VOLUME AREA LENGTH APICAL 4 CHAMBER: 22.46 ML
RIGHT VENTRICLE DIASTOLIC LENGTH: 6.9 CM
RIGHT VENTRICLE DIASTOLIC MID DIMENSION: 2.6 CM
RIGHT VENTRICULAR END-DIASTOLIC DIMENSION: 3.29 CM
RIGHT VENTRICULAR LENGTH IN DIASTOLE (APICAL 4-CHAMBER VIEW): 6.88 CM
RV MID DIAMA: 2.57 CM
RV TB RVSP: 6 MMHG
RV TISSUE DOPPLER FREE WALL SYSTOLIC VELOCITY 1 (APICAL 4 CHAMBER VIEW): 9.21 CM/S
SINUS: 3.47 CM
STJ: 3.27 CM
TDI LATERAL: 0.08 M/S
TDI SEPTAL: 0.05 M/S
TDI: 0.07 M/S
TR MAX PG: 27 MMHG
TRICUSPID ANNULAR PLANE SYSTOLIC EXCURSION: 1.41 CM
TV REST PULMONARY ARTERY PRESSURE: 30 MMHG
Z-SCORE OF LEFT VENTRICULAR DIMENSION IN END DIASTOLE: -0.86
Z-SCORE OF LEFT VENTRICULAR DIMENSION IN END SYSTOLE: 0.77

## 2024-09-30 PROCEDURE — 93356 MYOCRD STRAIN IMG SPCKL TRCK: CPT | Mod: HCNC,,, | Performed by: INTERNAL MEDICINE

## 2024-09-30 PROCEDURE — 93306 TTE W/DOPPLER COMPLETE: CPT | Mod: HCNC,PO

## 2024-09-30 PROCEDURE — 93356 MYOCRD STRAIN IMG SPCKL TRCK: CPT | Mod: HCNC,PO

## 2024-09-30 PROCEDURE — 93306 TTE W/DOPPLER COMPLETE: CPT | Mod: 26,HCNC,, | Performed by: INTERNAL MEDICINE

## 2024-10-02 ENCOUNTER — TELEPHONE (OUTPATIENT)
Dept: CARDIOLOGY | Facility: CLINIC | Age: 89
End: 2024-10-02
Payer: MEDICARE

## 2024-10-02 ENCOUNTER — PATIENT MESSAGE (OUTPATIENT)
Dept: HEMATOLOGY/ONCOLOGY | Facility: CLINIC | Age: 89
End: 2024-10-02
Payer: MEDICARE

## 2024-10-02 NOTE — TELEPHONE ENCOUNTER
Sent portal message to patient----- Message from Jacqueline Kasper MD sent at 10/1/2024  4:35 PM CDT -----  Please let the patient know the echo is overall ok, no significant change compared to previous

## 2024-10-21 NOTE — PROGRESS NOTES
PATIENT: Amor Alcazar  MRN: [id number]  DATE: 10/23/2024      Diagnosis:   1. 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 Genetic defect    4. Anemia of chronic disease                    Chief Complaint: Prostate Cancer (3 month follow up )      Subjective:    Interval History: Mr. Alcazar is a 89 y.o. male with Arthritis, hypothyroidism, valvular heart disease, iron deficiency anemia who presents for follow up of prostate cancer.  Since the last clinic visit the patient underwent labs on 10/22/24 with PSA at 3.5ng/mL.  The patient denies CP, cough, SOB, abdominal pain, nausea, vomiting, constipation, diarrhea.  The patient denies fever, chills, night sweats, weight loss, new lumps or bumps, easy bruising or bleeding.      Prior History:  The patient was diagnosed with prostate cancer [place] 9 (4+5) 6/03/14.  He had a rising PSA and underwent PSMA PET/CT 5/09/22 showing disease in the prostate and mediastinal LN's.  The patient has been receiving Lupron 45mg 6 months dosing with Dr Bellamy with last treatment on 4/20/22.  He has previously been on Casodex and Apalutamide with progression.  He was started on Mitoxantrone and prednisone 7/01/22.  Last Echo 8/26/22 showed normal EF.   The patient saw Dr Kasper in cardiology on 9/30/22 and recommended repeat Echo in 3 months.   The patient underwent an Echo on 12/13/22 showing normal diastolic and systolic function.   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   The patient underwent PSA on 7/14/23 showing increase to 4.1   The patient underwent PSMA PET-CT on 08/04/2023 showing shrinkage of mediastinal lymph nodes with decreased FDG uptake with paratracheal lymph node measuring 1.4 x 1 cm; subcarinal lymph node measuring 1.2 x 1.3 cm; increased radiotracer accumulation within the prostate gland.    Abiraterone was held from  10/13/23 until 10/31/23 due to liver enzyme elevation.  PSA increased to 6.7 on 23.   The patient underwent PSMA PET-CT on 2023 showing no evidence of metastatic disease but increased uptake in the prostate.   The patient underwent a PSA on 24 which was elevated at 11.2ng/mL.   The patient underwent PSMA PET-CT on 2024 showing diffuse markedly increased radiotracer accumulation throughout the entire prostate with increased SUV from 40.9 to 55.   The patient completed radiation to the prostate under the care of Dr Santos 2024.  PSA 8,7ng/mL on 24    Past Medical History:   Past Medical History:   Diagnosis Date    Arthritis     History of skin cancer     details unknown    Hypothyroidism, unspecified     PAT (paroxysmal atrial tachycardia)     Prostate cancer     injections q 6 months    Valvular heart disease     mild AS, MR and TR  ECHO       Past Surgical HIstory:   Past Surgical History:   Procedure Laterality Date    BONE MARROW BIOPSY Bilateral 6/15/2022    Procedure: Biopsy-bone marrow;  Surgeon: Hermann Jackson MD;  Location: [facility] OR;  Service: Oncology;  Laterality: Bilateral;    CATARACT EXTRACTION W/  INTRAOCULAR LENS IMPLANT Bilateral     ESOPHAGOGASTRODUODENOSCOPY      INSERTION OF TUNNELED CENTRAL VENOUS CATHETER (CVC) WITH SUBCUTANEOUS PORT N/A 6/15/2022    Procedure: INSERTION, PORT-A-CATH;  Surgeon: Marques Rivero MD;  Location: [facility] OR;  Service: General;  Laterality: N/A;       Family History:   Family History   Problem Relation Name Age of Onset    Lung cancer Sister           of       Social History:  reports that he has never smoked. He has never used smokeless tobacco. He reports that he does not currently use alcohol. He reports that he does not use drugs.    Allergies:  Review of patient's allergies indicates:  No Known Allergies    Medications:  Current Outpatient Medications   Medication Sig Dispense Refill    atorvastatin (LIPITOR) 10 MG tablet  Take 1 tablet (10 mg total) by mouth once daily. 90 tablet 3    folic acid (FOLVITE) 400 MCG tablet Take 400 mcg by mouth once daily.      levothyroxine (SYNTHROID) 50 MCG tablet Take 1 tablet (50 mcg total) by mouth before breakfast. 90 tablet 2    LIDOcaine-prilocaine (EMLA) cream Apply topically as needed (please apply to port site one hour prior to treatment administration). 30 g 6    LUPRON DEPOT, 6 MONTH, 45 mg SyKt injection Inject into the muscle every 6 (six) months.      metoprolol succinate (TOPROL-XL) 25 MG 24 hr tablet Take 1 tablet (25 mg total) by mouth daily with breakfast AND 0.5 tablets (12.5 mg total) before evening meal. 135 tablet 3    multivit-min-FA-lycopen-lutein (CENTRUM SILVER MEN) 300-600-300 mcg Tab Take by mouth once daily.      abiraterone (ZYTIGA) 500 mg Tab Take 2 tablets (1,000 mg total) by mouth once daily. 60 tablet 6    predniSONE (DELTASONE) 5 MG tablet Take 1 tablet (5 mg total) by mouth once daily. 60 tablet 6     No current facility-administered medications for this visit.     Facility-Administered Medications Ordered in Other Visits   Medication Dose Route Frequency Provider Last Rate Last Admin    heparin, porcine (PF) 100 unit/mL injection flush 500 Units  500 Units Intravenous PRN Beka Alvarez MD   500 Units at 10/23/24 0951       Review of Systems   Constitutional:  Positive for fatigue. Negative for appetite change, chills, diaphoresis, fever and unexpected weight change.   HENT:  Negative for mouth sores.    Eyes:  Negative for visual disturbance.   Respiratory:  Negative for cough and shortness of breath.    Cardiovascular:  Negative for chest pain and palpitations.   Gastrointestinal:  Negative for abdominal pain, constipation, diarrhea, nausea and vomiting.   Genitourinary:  Negative for frequency.   Musculoskeletal:  Negative for back pain.   Skin:  Negative for color change and rash.   Neurological:  Negative for headaches.   Hematological:  Negative for  "adenopathy. Does not bruise/bleed easily.       ECOG Performance Status: 1   Objective:      Vitals:   Vitals:    10/23/24 0850   BP: 108/78   BP Location: Right arm   Patient Position: Sitting   Pulse: 67   Resp: 18   Temp: 98 °F (36.7 °C)   TempSrc: Temporal   SpO2: 96%   Weight: 56.9 kg (125 lb 7.1 oz)   Height: 5' 8" (1.727 m)             Physical Exam  Constitutional:       General: He is not in acute distress.     Appearance: He is well-developed. He is not diaphoretic.   HENT:      Head: Normocephalic and atraumatic.   Cardiovascular:      Rate and Rhythm: Normal rate and regular rhythm.      Heart sounds: Normal heart sounds. No murmur heard.     No friction rub. No gallop.   Pulmonary:      Effort: Pulmonary effort is normal. No respiratory distress.      Breath sounds: Normal breath sounds. No wheezing or rales.   Chest:      Chest wall: No tenderness.   Abdominal:      General: Bowel sounds are normal. There is no distension.      Palpations: Abdomen is soft. There is no mass.      Tenderness: There is no abdominal tenderness. There is no rebound.   Musculoskeletal:      Cervical back: Normal range of motion.      Comments: PORT in left chest   Lymphadenopathy:      Cervical: No cervical adenopathy.      Upper Body:      Right upper body: No supraclavicular or axillary adenopathy.      Left upper body: No supraclavicular or axillary adenopathy.   Skin:     General: Skin is warm and dry.      Findings: No erythema, lesion or rash.   Neurological:      Mental Status: He is alert and oriented to person, place, and time.   Psychiatric:         Behavior: Behavior normal.         Laboratory Data:  Lab Visit on 10/22/2024   Component Date Value Ref Range Status    WBC 10/22/2024 9.02  3.90 - 12.70 K/uL Final    RBC 10/22/2024 3.13 (L)  4.60 - 6.20 M/uL Final    Hemoglobin 10/22/2024 10.0 (L)  14.0 - 18.0 g/dL Final    Hematocrit 10/22/2024 31.6 (L)  40.0 - 54.0 % Final    MCV 10/22/2024 101 (H)  82 - 98 fL Final "    MCH 10/22/2024 31.9 (H)  27.0 - 31.0 pg Final    MCHC 10/22/2024 31.6 (L)  32.0 - 36.0 g/dL Final    RDW 10/22/2024 14.4  11.5 - 14.5 % Final    Platelets 10/22/2024 171  150 - 450 K/uL Final    MPV 10/22/2024 10.9  9.2 - 12.9 fL Final    Immature Granulocytes 10/22/2024 0.9 (H)  0.0 - 0.5 % Final    Gran # (ANC) 10/22/2024 5.9  1.8 - 7.7 K/uL Final    Immature Grans (Abs) 10/22/2024 0.08 (H)  0.00 - 0.04 K/uL Final    Comment: Mild elevation in immature granulocytes is non specific and   can be seen in a variety of conditions including stress response,   acute inflammation, trauma and pregnancy. Correlation with other   laboratory and clinical findings is essential.      Lymph # 10/22/2024 2.3  1.0 - 4.8 K/uL Final    Mono # 10/22/2024 0.5  0.3 - 1.0 K/uL Final    Eos # 10/22/2024 0.2  0.0 - 0.5 K/uL Final    Baso # 10/22/2024 0.03  0.00 - 0.20 K/uL Final    nRBC 10/22/2024 0  0 /100 WBC Final    Gran % 10/22/2024 65.7  38.0 - 73.0 % Final    Lymph % 10/22/2024 25.5  18.0 - 48.0 % Final    Mono % 10/22/2024 5.7  4.0 - 15.0 % Final    Eosinophil % 10/22/2024 1.9  0.0 - 8.0 % Final    Basophil % 10/22/2024 0.3  0.0 - 1.9 % Final    Differential Method 10/22/2024 Automated   Final    Sodium 10/22/2024 140  136 - 145 mmol/L Final    Potassium 10/22/2024 4.0  3.5 - 5.1 mmol/L Final    Chloride 10/22/2024 105  95 - 110 mmol/L Final    CO2 10/22/2024 25  23 - 29 mmol/L Final    Glucose 10/22/2024 109  70 - 110 mg/dL Final    BUN 10/22/2024 20  8 - 23 mg/dL Final    Creatinine 10/22/2024 1.0  0.5 - 1.4 mg/dL Final    Calcium 10/22/2024 8.7  8.7 - 10.5 mg/dL Final    Total Protein 10/22/2024 6.8  6.0 - 8.4 g/dL Final    Albumin 10/22/2024 2.9 (L)  3.5 - 5.2 g/dL Final    Total Bilirubin 10/22/2024 0.4  0.1 - 1.0 mg/dL Final    Comment: For infants and newborns, interpretation of results should be based  on gestational age, weight and in agreement with clinical  observations.    Premature Infant recommended reference  ranges:  Up to 24 hours.............<8.0 mg/dL  Up to 48 hours............<12.0 mg/dL  3-5 days..................<15.0 mg/dL  6-29 days.................<15.0 mg/dL      Alkaline Phosphatase 10/22/2024 31 (L)  40 - 150 U/L Final    AST 10/22/2024 24  10 - 40 U/L Final    ALT 10/22/2024 10  10 - 44 U/L Final    eGFR 10/22/2024 >60.0  >60 mL/min/1.73 m^2 Final    Anion Gap 10/22/2024 10  8 - 16 mmol/L Final    PSA Diagnostic 10/22/2024 3.5  0.00 - 4.00 ng/mL Final    Comment: The testing method is a chemiluminescent microparticle immunoassay   manufactured by [employer] and performed on the [employer]   or   [employer] system. Values obtained with different assay manufacturers   for   methods may be different and cannot be used interchangeably.  PSA Expected levels:  Hormonal Therapy: <0.05 ng/ml  Prostatectomy: <0.01 ng/ml  Radiation Therapy: <1.00 ng/ml           Imaging:     PSMA PET/CT 4/30/24    Head/neck:     No significant abnormal foci of increased radiotracer accumulation are present in the head neck. No lymphadenopathy is present.     Chest:     No significant abnormal foci of increased radiotracer accumulation are present in the chest. Chronic poorly PSMA avid fibrotic changes are present in both lung apices which are unchanged. No pulmonary nodules, lymphadenopathy, or pleural effusion are present.     Abdomen/pelvis:     Diffuse markedly increased radiotracer accumulation is present throughout the entire prostate gland in a similar pattern as on the previous study. The max SUV is 40.9 compared to 55.0 previously. The seminal vesicles appear normal. No lymphadenopathy is present. There does not appear to be extracapsular extension.     Skeleton:     No significant abnormal foci of increased radiotracer accumulation are present within the skeleton. There are no findings to suggest osseous metastatic disease.       Assessment:       1. 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  Genetic defect    4. 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  May predispose pt any other family member to increased risk of cancer  -PT to see genetic counselor  -PSMA PET/CT 8/04/23 showed decrease in size and avidity of paratracheal LN and subcarinal LN but increased uptake in the prostate  -Pt potential candidate for radiation. Will have pt return to see rad onc  -Patient on Abiraterone 1000mg daily and prednisone 5mg BID  -Repeat PSMA PET/CT done 12/07/23 for rising PSA showed stable disease  -PSA 4/29/24 elevated at 11.2  -PSMA PET/CT on 04/30/2024 showed continued uptake isolated to the prostate  -Patient completed radiation to the prostate under the care of Dr Santos 6/2024  -PSA 3.5ng/mL on 10/22/24  -Will continue abiraterone and prednisone  -Would consider docetaxel versus pluvicto as next line of treatment if pt  were to progress on scans  Visit today included increased complexity associated with the care of the episodic problem (ADT and Abiraterone ) addressed and managing the longitudinal care of the patient due to the serious and/or complex managed problem(s) prostate cancer.    CHECK 2 mutation - Pt saw René Stovall on 8/03/23    Anemia of Chronic Disease - Hemoglobin was 10.0g/dL on 10/22/24  -hemoglobin stable  -Will monitor    Hypothyroidism - pt on synthroid  -management per PCP    Route Chart for Scheduling    Med Onc Chart Routing      Follow up with physician 3 months. Pt can proceed with Lupron.  Pt needs a CBC, CMP and PSA in 3  months with an appt the day after the labs.   Follow up with [facility]    Infusion scheduling note    Injection scheduling note    Labs    Imaging    Pharmacy appointment    Other referrals                Supportive Plan Information  OP PROSTATE LEUPROLIDE (LUPRON) 6 MONTH Beka Alvarez MD   Associated Diagnosis: Prostate cancer Stage IVB cTX, cNX, cM1 noted on 6/27/2022   Line of treatment: Supportive Care   Treatment goal: Palliative     Upcoming Treatment Dates - OP PROSTATE LEUPROLIDE (LUPRON) 6 MONTH    No upcoming days in selected categories.    Therapy Plan Information  PORT FLUSH for Malignant neoplasm of prostate, noted on 6/11/2014  PORT FLUSH for Iron deficiency anemia, noted on 3/10/2022  PORT FLUSH for Encounter for insertion of venous access port, noted on 6/15/2022  Flushes  heparin, porcine (PF) 100 unit/mL injection flush 500 Units  500 Units, Intravenous, Every visit  sodium chloride 0.9% flush 10 mL  10 mL, Intravenous, Every visit      No therapy plan of the specified type found.    No therapy plan of the specified type found.        Beka Alvarez MD  [facility]  Hematology and Oncology  [facility]   [address] [place] [address]   O: [phone]  F: [phone]

## 2024-10-22 ENCOUNTER — LAB VISIT (OUTPATIENT)
Dept: LAB | Facility: HOSPITAL | Age: 89
End: 2024-10-22
Attending: INTERNAL MEDICINE
Payer: MEDICARE

## 2024-10-22 DIAGNOSIS — C61 PROSTATE CANCER: ICD-10-CM

## 2024-10-22 LAB
ALBUMIN SERPL BCP-MCNC: 2.9 G/DL (ref 3.5–5.2)
ALP SERPL-CCNC: 31 U/L (ref 40–150)
ALT SERPL W/O P-5'-P-CCNC: 10 U/L (ref 10–44)
ANION GAP SERPL CALC-SCNC: 10 MMOL/L (ref 8–16)
AST SERPL-CCNC: 24 U/L (ref 10–40)
BASOPHILS # BLD AUTO: 0.03 K/UL (ref 0–0.2)
BASOPHILS NFR BLD: 0.3 % (ref 0–1.9)
BILIRUB SERPL-MCNC: 0.4 MG/DL (ref 0.1–1)
BUN SERPL-MCNC: 20 MG/DL (ref 8–23)
CALCIUM SERPL-MCNC: 8.7 MG/DL (ref 8.7–10.5)
CHLORIDE SERPL-SCNC: 105 MMOL/L (ref 95–110)
CO2 SERPL-SCNC: 25 MMOL/L (ref 23–29)
COMPLEXED PSA SERPL-MCNC: 3.5 NG/ML (ref 0–4)
CREAT SERPL-MCNC: 1 MG/DL (ref 0.5–1.4)
DIFFERENTIAL METHOD BLD: ABNORMAL
EOSINOPHIL # BLD AUTO: 0.2 K/UL (ref 0–0.5)
EOSINOPHIL NFR BLD: 1.9 % (ref 0–8)
ERYTHROCYTE [DISTWIDTH] IN BLOOD BY AUTOMATED COUNT: 14.4 % (ref 11.5–14.5)
EST. GFR  (NO RACE VARIABLE): >60 ML/MIN/1.73 M^2
GLUCOSE SERPL-MCNC: 109 MG/DL (ref 70–110)
HCT VFR BLD AUTO: 31.6 % (ref 40–54)
HGB BLD-MCNC: 10 G/DL (ref 14–18)
IMM GRANULOCYTES # BLD AUTO: 0.08 K/UL (ref 0–0.04)
IMM GRANULOCYTES NFR BLD AUTO: 0.9 % (ref 0–0.5)
LYMPHOCYTES # BLD AUTO: 2.3 K/UL (ref 1–4.8)
LYMPHOCYTES NFR BLD: 25.5 % (ref 18–48)
MCH RBC QN AUTO: 31.9 PG (ref 27–31)
MCHC RBC AUTO-ENTMCNC: 31.6 G/DL (ref 32–36)
MCV RBC AUTO: 101 FL (ref 82–98)
MONOCYTES # BLD AUTO: 0.5 K/UL (ref 0.3–1)
MONOCYTES NFR BLD: 5.7 % (ref 4–15)
NEUTROPHILS # BLD AUTO: 5.9 K/UL (ref 1.8–7.7)
NEUTROPHILS NFR BLD: 65.7 % (ref 38–73)
NRBC BLD-RTO: 0 /100 WBC
PLATELET # BLD AUTO: 171 K/UL (ref 150–450)
PMV BLD AUTO: 10.9 FL (ref 9.2–12.9)
POTASSIUM SERPL-SCNC: 4 MMOL/L (ref 3.5–5.1)
PROT SERPL-MCNC: 6.8 G/DL (ref 6–8.4)
RBC # BLD AUTO: 3.13 M/UL (ref 4.6–6.2)
SODIUM SERPL-SCNC: 140 MMOL/L (ref 136–145)
WBC # BLD AUTO: 9.02 K/UL (ref 3.9–12.7)

## 2024-10-22 PROCEDURE — 36415 COLL VENOUS BLD VENIPUNCTURE: CPT | Mod: HCNC,PN | Performed by: INTERNAL MEDICINE

## 2024-10-22 PROCEDURE — 80053 COMPREHEN METABOLIC PANEL: CPT | Mod: HCNC,PN | Performed by: INTERNAL MEDICINE

## 2024-10-22 PROCEDURE — 84153 ASSAY OF PSA TOTAL: CPT | Mod: HCNC | Performed by: INTERNAL MEDICINE

## 2024-10-22 PROCEDURE — 85025 COMPLETE CBC W/AUTO DIFF WBC: CPT | Mod: HCNC,PN | Performed by: INTERNAL MEDICINE

## 2024-10-23 ENCOUNTER — OFFICE VISIT (OUTPATIENT)
Dept: HEMATOLOGY/ONCOLOGY | Facility: CLINIC | Age: 89
End: 2024-10-23
Payer: MEDICARE

## 2024-10-23 ENCOUNTER — INFUSION (OUTPATIENT)
Dept: INFUSION THERAPY | Facility: HOSPITAL | Age: 89
End: 2024-10-23
Attending: INTERNAL MEDICINE
Payer: MEDICARE

## 2024-10-23 VITALS
HEIGHT: 68 IN | BODY MASS INDEX: 19.01 KG/M2 | HEART RATE: 67 BPM | OXYGEN SATURATION: 96 % | TEMPERATURE: 98 F | WEIGHT: 125.44 LBS | HEART RATE: 67 BPM | HEIGHT: 68 IN | TEMPERATURE: 98 F | WEIGHT: 125.44 LBS | RESPIRATION RATE: 18 BRPM | BODY MASS INDEX: 19.01 KG/M2 | SYSTOLIC BLOOD PRESSURE: 108 MMHG | SYSTOLIC BLOOD PRESSURE: 108 MMHG | DIASTOLIC BLOOD PRESSURE: 78 MMHG | OXYGEN SATURATION: 96 % | RESPIRATION RATE: 18 BRPM | DIASTOLIC BLOOD PRESSURE: 78 MMHG

## 2024-10-23 DIAGNOSIS — Z45.2 ENCOUNTER FOR INSERTION OF VENOUS ACCESS PORT: ICD-10-CM

## 2024-10-23 DIAGNOSIS — D63.8 ANEMIA OF CHRONIC DISEASE: ICD-10-CM

## 2024-10-23 DIAGNOSIS — D50.8 OTHER IRON DEFICIENCY ANEMIA: ICD-10-CM

## 2024-10-23 DIAGNOSIS — C61 MALIGNANT NEOPLASM OF PROSTATE: Primary | ICD-10-CM

## 2024-10-23 DIAGNOSIS — Q99.9 GENETIC DEFECT: ICD-10-CM

## 2024-10-23 DIAGNOSIS — C61 PROSTATE CANCER: ICD-10-CM

## 2024-10-23 DIAGNOSIS — C77.1 METASTASIS TO MEDIASTINAL LYMPH NODE: ICD-10-CM

## 2024-10-23 DIAGNOSIS — C61 PROSTATE CANCER: Primary | ICD-10-CM

## 2024-10-23 PROCEDURE — 1126F AMNT PAIN NOTED NONE PRSNT: CPT | Mod: HCNC,CPTII,S$GLB, | Performed by: INTERNAL MEDICINE

## 2024-10-23 PROCEDURE — G2211 COMPLEX E/M VISIT ADD ON: HCPCS | Mod: HCNC,S$GLB,, | Performed by: INTERNAL MEDICINE

## 2024-10-23 PROCEDURE — 96402 CHEMO HORMON ANTINEOPL SQ/IM: CPT | Mod: HCNC,PN

## 2024-10-23 PROCEDURE — 99999 PR PBB SHADOW E&M-EST. PATIENT-LVL III: CPT | Mod: PBBFAC,HCNC,, | Performed by: INTERNAL MEDICINE

## 2024-10-23 PROCEDURE — 99215 OFFICE O/P EST HI 40 MIN: CPT | Mod: HCNC,S$GLB,, | Performed by: INTERNAL MEDICINE

## 2024-10-23 PROCEDURE — 3288F FALL RISK ASSESSMENT DOCD: CPT | Mod: HCNC,CPTII,S$GLB, | Performed by: INTERNAL MEDICINE

## 2024-10-23 PROCEDURE — 63600175 PHARM REV CODE 636 W HCPCS: Mod: HCNC,PN | Performed by: INTERNAL MEDICINE

## 2024-10-23 PROCEDURE — 1157F ADVNC CARE PLAN IN RCRD: CPT | Mod: HCNC,CPTII,S$GLB, | Performed by: INTERNAL MEDICINE

## 2024-10-23 PROCEDURE — 1101F PT FALLS ASSESS-DOCD LE1/YR: CPT | Mod: HCNC,CPTII,S$GLB, | Performed by: INTERNAL MEDICINE

## 2024-10-23 RX ORDER — PREDNISONE 5 MG/1
5 TABLET ORAL DAILY
Qty: 60 TABLET | Refills: 6 | Status: SHIPPED | OUTPATIENT
Start: 2024-10-23

## 2024-10-23 RX ORDER — ABIRATERONE 500 MG/1
1000 TABLET ORAL DAILY
Qty: 60 TABLET | Refills: 6 | Status: SHIPPED | OUTPATIENT
Start: 2024-10-23 | End: 2025-10-23

## 2024-10-23 RX ORDER — HEPARIN 100 UNIT/ML
500 SYRINGE INTRAVENOUS
OUTPATIENT
Start: 2024-10-23

## 2024-10-23 RX ORDER — HEPARIN 100 UNIT/ML
500 SYRINGE INTRAVENOUS
Status: DISCONTINUED | OUTPATIENT
Start: 2024-10-23 | End: 2024-10-23 | Stop reason: HOSPADM

## 2024-10-23 RX ORDER — SODIUM CHLORIDE 0.9 % (FLUSH) 0.9 %
10 SYRINGE (ML) INJECTION
OUTPATIENT
Start: 2024-10-23

## 2024-10-23 RX ADMIN — Medication 500 UNITS: at 09:10

## 2024-10-23 RX ADMIN — LEUPROLIDE ACETATE 45 MG: KIT at 09:10

## 2024-10-23 NOTE — PLAN OF CARE
Pt arrived to clinic today for Lupron injection and port flush and tolerated well. No changes throughout therapy. Pt aware of follow up appointments and side effects of drugs. Discharged to home. NAD.

## 2024-11-15 ENCOUNTER — TELEPHONE (OUTPATIENT)
Dept: HEMATOLOGY/ONCOLOGY | Facility: CLINIC | Age: 89
End: 2024-11-15
Payer: MEDICARE

## 2024-11-15 NOTE — TELEPHONE ENCOUNTER
----- Message from [place] sent at 11/15/2024  8:06 AM CST -----  Type: Needs Medical Advice  Who Called:  Polina (spouse)  Symptoms (please be specific):    How long has patient had these symptoms:    Pharmacy name and phone #:    Best Call Back Number:   Additional Information: Polina is requesting a call back from Chante regarding her  prescript.

## 2024-11-15 NOTE — TELEPHONE ENCOUNTER
Spoke to wife. She states she is having trouble with her credit card info on the portal for [employer]. Gave patient contact information to call and see what the issue is.

## 2024-11-22 ENCOUNTER — TELEPHONE (OUTPATIENT)
Dept: CARDIOLOGY | Facility: CLINIC | Age: 89
End: 2024-11-22
Payer: MEDICARE

## 2024-12-05 ENCOUNTER — PATIENT MESSAGE (OUTPATIENT)
Dept: FAMILY MEDICINE | Facility: CLINIC | Age: 89
End: 2024-12-05
Payer: MEDICARE

## 2024-12-06 ENCOUNTER — INFUSION (OUTPATIENT)
Dept: INFUSION THERAPY | Facility: HOSPITAL | Age: 89
End: 2024-12-06
Attending: INTERNAL MEDICINE
Payer: MEDICARE

## 2024-12-06 ENCOUNTER — OFFICE VISIT (OUTPATIENT)
Dept: CARDIOLOGY | Facility: CLINIC | Age: 89
End: 2024-12-06
Payer: MEDICARE

## 2024-12-06 ENCOUNTER — TELEPHONE (OUTPATIENT)
Dept: FAMILY MEDICINE | Facility: CLINIC | Age: 89
End: 2024-12-06
Payer: MEDICARE

## 2024-12-06 VITALS
HEART RATE: 110 BPM | TEMPERATURE: 97 F | WEIGHT: 122.38 LBS | BODY MASS INDEX: 18.55 KG/M2 | OXYGEN SATURATION: 100 % | SYSTOLIC BLOOD PRESSURE: 100 MMHG | RESPIRATION RATE: 20 BRPM | HEIGHT: 68 IN | DIASTOLIC BLOOD PRESSURE: 48 MMHG

## 2024-12-06 DIAGNOSIS — C61 PROSTATE CANCER: Primary | ICD-10-CM

## 2024-12-06 DIAGNOSIS — E78.5 DYSLIPIDEMIA: ICD-10-CM

## 2024-12-06 DIAGNOSIS — C61 MALIGNANT NEOPLASM OF PROSTATE: Primary | ICD-10-CM

## 2024-12-06 DIAGNOSIS — D50.8 OTHER IRON DEFICIENCY ANEMIA: ICD-10-CM

## 2024-12-06 DIAGNOSIS — I35.0 NON-RHEUMATIC AORTIC STENOSIS: ICD-10-CM

## 2024-12-06 DIAGNOSIS — I47.19 PAT (PAROXYSMAL ATRIAL TACHYCARDIA): ICD-10-CM

## 2024-12-06 DIAGNOSIS — Z45.2 ENCOUNTER FOR INSERTION OF VENOUS ACCESS PORT: ICD-10-CM

## 2024-12-06 PROCEDURE — 25000003 PHARM REV CODE 250: Mod: HCNC,PN | Performed by: INTERNAL MEDICINE

## 2024-12-06 PROCEDURE — 99999 PR PBB SHADOW E&M-EST. PATIENT-LVL III: CPT | Mod: PBBFAC,HCNC,, | Performed by: INTERNAL MEDICINE

## 2024-12-06 PROCEDURE — 96523 IRRIG DRUG DELIVERY DEVICE: CPT | Mod: HCNC,PN

## 2024-12-06 PROCEDURE — 63600175 PHARM REV CODE 636 W HCPCS: Mod: HCNC,PN | Performed by: INTERNAL MEDICINE

## 2024-12-06 PROCEDURE — A4216 STERILE WATER/SALINE, 10 ML: HCPCS | Mod: HCNC,PN | Performed by: INTERNAL MEDICINE

## 2024-12-06 RX ORDER — SODIUM CHLORIDE 0.9 % (FLUSH) 0.9 %
10 SYRINGE (ML) INJECTION
OUTPATIENT
Start: 2024-12-06

## 2024-12-06 RX ORDER — HEPARIN 100 UNIT/ML
500 SYRINGE INTRAVENOUS
OUTPATIENT
Start: 2024-12-06

## 2024-12-06 RX ORDER — HEPARIN 100 UNIT/ML
500 SYRINGE INTRAVENOUS
Status: DISCONTINUED | OUTPATIENT
Start: 2024-12-06 | End: 2024-12-06 | Stop reason: HOSPADM

## 2024-12-06 RX ORDER — SODIUM CHLORIDE 0.9 % (FLUSH) 0.9 %
10 SYRINGE (ML) INJECTION
Status: DISCONTINUED | OUTPATIENT
Start: 2024-12-06 | End: 2024-12-06 | Stop reason: HOSPADM

## 2024-12-06 RX ADMIN — Medication 500 UNITS: at 10:12

## 2024-12-06 RX ADMIN — Medication 10 ML: at 10:12

## 2024-12-06 NOTE — PROGRESS NOTES
Subjective:    Patient ID:  Amor Alcazar is a 89 y.o. male who presents for follow-up.    HPI  He has a history of arthritis, hypothyroidism, aortic stenosis, iron deficiency anemia and metastatic prostate cancer, has been on Lupron and mitoxantrone, completed XRT in 6/24, on abiraterone and prednisone, Lupron .  He is here for follow up. Since last visit, he reports no chest pain, SOB or palpitations. His appetite has not been good, feels fatigued, poor energy.   Treatment Plan Information   OP MITOXANTRONE PREDNISONE Q3W   Beka Alvarez MD   Upcoming Treatment Dates - OP MITOXANTRONE PREDNISONE Q3W     No upcoming days in selected categories.     Supportive Plan Information  OP PROSTATE LEUPROLIDE (LUPRON) 6 MONTH   Beka Alvarez MD   Upcoming Treatment Dates - OP PROSTATE LEUPROLIDE (LUPRON) 6 MONTH     3/15/2024       Chemotherapy       leuprolide acetate (6 month) injection 45 mg  8/30/2024       Chemotherapy       leuprolide acetate (6 month) injection 45 mg     Therapy Plan Information  Flushes  heparin, porcine (PF) 100 unit/mL injection flush 500 Units  500 Units, Intravenous, Every visit  sodium chloride 0.9% flush 10 mL  10 mL, Intravenous, Every visit     Treatment Plan Information   OP MITOXANTRONE PREDNISONE Q3W   Beka Alvarez MD   Upcoming Treatment Dates - OP MITOXANTRONE PREDNISONE Q3W     No upcoming days in selected categories.     Supportive Plan Information  OP PROSTATE LEUPROLIDE (LUPRON) 6 MONTH   Beka Alvarez MD   Upcoming Treatment Dates - OP PROSTATE LEUPROLIDE (LUPRON) 6 MONTH     8/30/2024       Chemotherapy       leuprolide acetate (6 month) injection 45 mg     Therapy Plan Information  Flushes  heparin, porcine (PF) 100 unit/mL injection flush 500 Units  500 Units, Intravenous, Every visit  sodium chloride 0.9% flush 10 mL  10 mL, Intravenous, Every visit  Review of Systems   Constitutional: Positive for decreased appetite and malaise/fatigue. Negative for chills and  fever.   HENT:  Positive for hearing loss.    Eyes:  Negative for redness.   Cardiovascular:  Negative for chest pain, irregular heartbeat, leg swelling, palpitations and syncope.   Respiratory:  Negative for cough and shortness of breath.    Musculoskeletal:  Positive for joint pain.   Gastrointestinal:  Negative for vomiting.   Genitourinary:  Negative for hematuria.   Neurological:  Positive for weakness. Negative for focal weakness and seizures.   Psychiatric/Behavioral:  The patient is not nervous/anxious.    Allergic/Immunologic: Negative for hives.        Current Outpatient Medications   Medication Sig    abiraterone (ZYTIGA) 500 mg Tab Take 2 tablets (1,000 mg total) by mouth once daily.    atorvastatin (LIPITOR) 10 MG tablet Take 1 tablet (10 mg total) by mouth once daily.    folic acid (FOLVITE) 400 MCG tablet Take 400 mcg by mouth once daily.    levothyroxine (SYNTHROID) 50 MCG tablet Take 1 tablet (50 mcg total) by mouth before breakfast.    LUPRON DEPOT, 6 MONTH, 45 mg SyKt injection Inject into the muscle every 6 (six) months.    metoprolol succinate (TOPROL-XL) 25 MG 24 hr tablet Take 1 tablet (25 mg total) by mouth daily with breakfast AND 0.5 tablets (12.5 mg total) before evening meal.    multivit-min-FA-lycopen-lutein (CENTRUM SILVER MEN) 300-600-300 mcg Tab Take by mouth once daily.    predniSONE (DELTASONE) 5 MG tablet Take 1 tablet (5 mg total) by mouth once daily.    LIDOcaine-prilocaine (EMLA) cream Apply topically as needed (please apply to port site one hour prior to treatment administration).     No current facility-administered medications for this visit.       Objective:    Physical Exam  Vitals reviewed.   Constitutional:       General: He is not in acute distress.     Appearance: He is ill-appearing.   HENT:      Head: Normocephalic and atraumatic.   Neck:      Vascular: No JVD.   Cardiovascular:      Rate and Rhythm: Regular rhythm. Tachycardia present.      Heart sounds: Murmur  heard.      Systolic murmur is present with a grade of 1/6 at the upper right sternal border and apex.   Pulmonary:      Effort: Pulmonary effort is normal.      Breath sounds: Normal breath sounds.   Abdominal:      Palpations: Abdomen is soft.   Musculoskeletal:      Cervical back: Neck supple.   Skin:     General: Skin is warm and dry.      Coloration: Skin is pale.   Neurological:      Mental Status: He is alert and oriented to person, place, and time.       Vitals:    12/06/24 0956   BP: (!) 100/48   Pulse: 110   Resp: 20   Temp: 97.1 °F (36.2 °C)                   Assessment:       1. Prostate cancer    2. Non-rheumatic aortic stenosis    3. Dyslipidemia    4. PAT (paroxysmal atrial tachycardia)                   Plan:       I have reviewed the labs and ancillary data.    5/25/22 echo interpretation:  The left ventricle is normal in size with normal systolic function.  The estimated ejection fraction is 55-60%.  Normal left ventricular diastolic function.  Normal right ventricular size with normal right ventricular systolic function.  There is mild aortic valve stenosis.  Aortic valve area is 1.82 cm2; peak velocity is 1.48 m/s; mean gradient is 5 mmHg.  Normal central venous pressure (3 mmHg).  The estimated PA systolic pressure is 29 mmHg.  The left ventricular global longitudinal strain is -18.32%.    8/12/22 echo interpretation:  The left ventricle is normal in size with concentric remodeling and normal systolic function.  The estimated ejection fraction is 60%.  Normal left ventricular diastolic function.  Normal right ventricular size with normal right ventricular systolic function.  There is mild aortic valve stenosis.  Aortic valve area is 1.72 cm2; peak velocity is 2.13 m/s; mean gradient is 10 mmHg.  Mild mitral regurgitation.  Mild tricuspid regurgitation.  Normal central venous pressure (3 mmHg).  The estimated PA systolic pressure is 33 mmHg.  The left ventricular global longitudinal strain is  -18.1%.    12/13/22 echo interpretation:  The left ventricle is normal in size with concentric remodeling and normal systolic function.  The estimated ejection fraction is 65%.  Normal left ventricular diastolic function.  Normal right ventricular size with normal right ventricular systolic function.  There is mild aortic valve stenosis.  Aortic valve area is 1.79 cm2; peak velocity is 2.18 m/s; mean gradient is 10 mmHg.  IVC not well visualized.  The estimated PA systolic pressure is at [id number] mmHg.  Echocardiographic images too poor to obtain accurate strain measurement.    3/14/23 Holter:  The patient monitored for 2 days and 23 hours.  The rhythm is sinus with mean heart rate of 72 beats per minute, lowest heart rate 55 beats per minute maximum heart rate 111 beats per minute.  Very frequent isolated PACs burden of 8.69%  Frequent short runs of PSVT the longest is 2 minutes and 12 seconds at 10:39 a.m.  Occasional isolated PVCs burden of 0.35% and 1 ventricular triplet  No atrial fibrillation.  No heart block.    3/14/23 echo:  The left ventricle is normal in size with normal systolic function.  The estimated ejection fraction is 60%.  Normal left ventricular diastolic function.  Normal right ventricular size with normal right ventricular systolic function.  There is mild aortic valve stenosis.  Aortic valve area is 1.84 cm2; peak velocity is 2.09 m/s; mean gradient is 10 mmHg.  Mild-to-moderate mitral regurgitation.  Mild tricuspid regurgitation.  Normal central venous pressure (3 mmHg).  The estimated PA systolic pressure is 25 mmHg.  The left ventricular global longitudinal strain is -18.6%.      6/15/23 echo:  The left ventricle is normal in size with normal systolic function.  The estimated ejection fraction is 60%.  Normal left ventricular diastolic function.  Normal right ventricular size with normal right ventricular systolic function.  There is mild aortic valve stenosis.  Aortic valve peak velocity  is 2.23 m/s; mean gradient is 12 mmHg.  Mild mitral regurgitation.  Mild tricuspid regurgitation.  Normal central venous pressure (3 mmHg).  The estimated PA systolic pressure is 33 mmHg.  Image quality not high enough to accurately calculate cardiac strain, so none will be reported.    9/21/23 echo:      Left Ventricle: The left ventricle is normal in size. Normal wall thickness. There is concentric remodeling. Normal wall motion. There is normal systolic function. Ejection fraction by visual approximation is 60-65%. Global longitudinal strain is -16.0%. There is normal diastolic function.    Right Ventricle: Normal right ventricular cavity size. Wall thickness is normal. Right ventricle wall motion  is normal. Systolic function is normal.    Aortic Valve: The aortic valve is a trileaflet valve. There is aortic valve sclerosis.    Tricuspid Valve: There is mild regurgitation.    Pulmonary Artery: The estimated pulmonary artery systolic pressure is 30 mmHg.    IVC/SVC: Normal venous pressure at 3 mmHg.    12/22/23 echo:    Left Ventricle: The left ventricle is normal in size. Normal wall thickness. Normal wall motion. There is normal systolic function. Ejection fraction by visual approximation is 65%. Global longitudinal strain is -19.1%. There is normal diastolic function.    Right Ventricle: Normal right ventricular cavity size. Wall thickness is normal. Right ventricle wall motion  is normal. Systolic function is normal.    Aortic Valve: The aortic valve is a trileaflet valve. There is mild aortic valve sclerosis. Mildly restricted motion. There is mild stenosis. Aortic valve area by VTI is 1.71 cm². Aortic valve peak velocity is 2.29 m/s. Mean gradient is 11 mmHg. The dimensionless index is 0.43.    Mitral Valve: There is mild regurgitation.    Tricuspid Valve: There is mild regurgitation.    Pulmonary Artery: The estimated pulmonary artery systolic pressure is 27 mmHg.    IVC/SVC: Normal venous pressure at 3  mmHg.    3/22/24 echo:    Left Ventricle: There is normal systolic function. Ejection fraction by visual approximation is 60%. Global longitudinal strain is -20.5%. There is normal diastolic function.    Right Ventricle: Normal right ventricular cavity size. Wall thickness is normal. Right ventricle wall motion  is normal. Systolic function is normal.    Aortic Valve: The aortic valve is a trileaflet valve. There is mild aortic valve sclerosis. Mildly restricted motion. There is mild stenosis. Aortic valve area by VTI is 1.86 cm². Aortic valve peak velocity is 2.1 m/s. Mean gradient is 8 mmHg. The dimensionless index is 0.48.    Mitral Valve: There is mild regurgitation.    Tricuspid Valve: There is mild regurgitation.    Pulmonary Artery: The estimated pulmonary artery systolic pressure is 28 mmHg.    IVC/SVC: Normal venous pressure at 3 mmHg.    6/28/24 echo:    Left Ventricle: The left ventricle is normal in size. Normal wall thickness. There is concentric remodeling. There is normal systolic function. Ejection fraction by visual approximation is 60-65%. Global longitudinal strain is -19.4%. There is normal diastolic function.    Right Ventricle: Normal right ventricular cavity size. Wall thickness is normal. Systolic function is normal.    Aortic Valve: The aortic valve is a trileaflet valve. There is mild aortic valve sclerosis without significant stenosis on this study.    Mitral Valve: There is moderate regurgitation.    Pulmonary Artery: The estimated pulmonary artery systolic pressure is 28 mmHg.    IVC/SVC: Normal venous pressure at 3 mmHg.    9/30/24 echo:     Left Ventricle: The left ventricle is normal in size. Normal wall thickness. There is concentric remodeling. There is normal systolic function. Ejection fraction is approximately 60%. Global longitudinal strain is -19.6%. There is normal diastolic function.    Right Ventricle: Normal right ventricular cavity size. Wall thickness is normal. Systolic  function is normal.    Aortic Valve: The aortic valve is a trileaflet valve. There is mild aortic valve sclerosis. Mildly restricted motion. There is mild stenosis. Aortic valve area by VTI is 1.7 cm². Aortic valve peak velocity is 2.1 m/s. Mean gradient is 10.8 mmHg. The dimensionless index is 0.44.    Tricuspid Valve: There is mild regurgitation.    Pulmonary Artery: The estimated pulmonary artery systolic pressure is 30 mmHg.    IVC/SVC: Normal venous pressure at 3 mmHg.    Impression and Plan:  1) prostate ca: followed by oncology; ; echo has been stable, repeat later this month.  2) aortic stenosis: mild; no intervention needed at this time.  3) dyslipidemia: continue low dose statin. LDL at goal.  4) PAT: continue b-blocker. No symptoms. Advised adequate hydration.

## 2024-12-30 ENCOUNTER — HOSPITAL ENCOUNTER (OUTPATIENT)
Dept: CARDIOLOGY | Facility: HOSPITAL | Age: 89
Discharge: HOME | End: 2024-12-30
Attending: INTERNAL MEDICINE
Payer: MEDICARE

## 2024-12-30 VITALS — BODY MASS INDEX: 18.49 KG/M2 | HEIGHT: 68 IN | WEIGHT: 122 LBS

## 2024-12-30 DIAGNOSIS — C61 PROSTATE CANCER: ICD-10-CM

## 2024-12-30 DIAGNOSIS — I35.0 NON-RHEUMATIC AORTIC STENOSIS: ICD-10-CM

## 2024-12-30 LAB
ASCENDING AORTA: 2.7 CM
AV INDEX (PROSTH): 0.31
AV MEAN GRADIENT: 15.6 MMHG
AV PEAK GRADIENT: 33.6 MMHG
AV VALVE AREA BY VELOCITY RATIO: 1.2 CM²
AV VALVE AREA: 1.4 CM²
AV VELOCITY RATIO: 0.28
BSA FOR ECHO PROCEDURE: 1.63 M2
CV ECHO LV RWT: 0.61 CM
DOP CALC AO PEAK VEL: 2.9 M/S
DOP CALC AO VTI: 58.1 CM
DOP CALC LVOT AREA: 4.5 CM2
DOP CALC LVOT DIAMETER: 2.4 CM
DOP CALC LVOT PEAK VEL: 0.8 M/S
DOP CALC LVOT STROKE VOLUME: 81.8 CM3
DOP CALCLVOT PEAK VEL VTI: 18.1 CM
E WAVE DECELERATION TIME: 186.06 MSEC
E/A RATIO: 0.8
E/E' RATIO: 14 M/S
ECHO LV POSTERIOR WALL: 1.1 CM (ref 0.6–1.1)
FRACTIONAL SHORTENING: 30.6 % (ref 28–44)
GLOBAL LONGITUIDAL STRAIN: -21 %
INTERVENTRICULAR SEPTUM: 1.1 CM (ref 0.6–1.1)
LEFT ATRIUM AREA SYSTOLIC (APICAL 2 CHAMBER): 10.48 CM2
LEFT ATRIUM AREA SYSTOLIC (APICAL 4 CHAMBER): 9.64 CM2
LEFT ATRIUM SIZE: 3.26 CM
LEFT ATRIUM VOLUME INDEX MOD: 12.9 ML/M2
LEFT ATRIUM VOLUME MOD: 21.48 ML
LEFT INTERNAL DIMENSION IN SYSTOLE: 2.5 CM (ref 2.1–4)
LEFT VENTRICLE DIASTOLIC VOLUME INDEX: 33.45 ML/M2
LEFT VENTRICLE DIASTOLIC VOLUME: 55.52 ML
LEFT VENTRICLE END SYSTOLIC VOLUME APICAL 2 CHAMBER: 22.67 ML
LEFT VENTRICLE END SYSTOLIC VOLUME APICAL 4 CHAMBER: 19.33 ML
LEFT VENTRICLE MASS INDEX: 74.8 G/M2
LEFT VENTRICLE SYSTOLIC VOLUME INDEX: 12.8 ML/M2
LEFT VENTRICLE SYSTOLIC VOLUME: 21.31 ML
LEFT VENTRICULAR INTERNAL DIMENSION IN DIASTOLE: 3.6 CM (ref 3.5–6)
LEFT VENTRICULAR MASS: 124.1 G
LV LATERAL E/E' RATIO: 12 M/S
LV SEPTAL E/E' RATIO: 16.8 M/S
LVED V (TEICH): 55.52 ML
LVES V (TEICH): 21.31 ML
LVOT MG: 0.98 MMHG
LVOT MV: 0.46 CM/S
MV PEAK A VEL: 1.05 M/S
MV PEAK E VEL: 0.84 M/S
MV STENOSIS PRESSURE HALF TIME: 53.96 MS
MV VALVE AREA P 1/2 METHOD: 4.08 CM2
OHS CV RV/LV RATIO: 0.78 CM
PISA MRMAX VEL: 5.75 M/S
PISA TR MAX VEL: 2.75 M/S
RA PRESSURE ESTIMATED: 3 MMHG
RIGHT VENTRICLE DIASTOLIC BASEL DIMENSION: 2.8 CM
RIGHT VENTRICLE DIASTOLIC LENGTH: 5.9 CM
RIGHT VENTRICLE DIASTOLIC MID DIMENSION: 2 CM
RIGHT VENTRICULAR END-DIASTOLIC DIMENSION: 2.78 CM
RIGHT VENTRICULAR LENGTH IN DIASTOLE (APICAL 4-CHAMBER VIEW): 5.87 CM
RV MID DIAMA: 1.98 CM
RV TB RVSP: 6 MMHG
SINUS: 3.3 CM
STJ: 3.18 CM
TDI LATERAL: 0.07 M/S
TDI SEPTAL: 0.05 M/S
TDI: 0.06 M/S
TR MAX PG: 30 MMHG
TRICUSPID ANNULAR PLANE SYSTOLIC EXCURSION: 1.3 CM
TV REST PULMONARY ARTERY PRESSURE: 33 MMHG
Z-SCORE OF LEFT VENTRICULAR DIMENSION IN END DIASTOLE: -2.56
Z-SCORE OF LEFT VENTRICULAR DIMENSION IN END SYSTOLE: -1.12

## 2024-12-30 PROCEDURE — 93306 TTE W/DOPPLER COMPLETE: CPT | Mod: 26,HCNC,, | Performed by: INTERNAL MEDICINE

## 2024-12-30 PROCEDURE — 93356 MYOCRD STRAIN IMG SPCKL TRCK: CPT | Mod: HCNC,,, | Performed by: INTERNAL MEDICINE

## 2024-12-30 PROCEDURE — 93306 TTE W/DOPPLER COMPLETE: CPT | Mod: HCNC,PO

## 2025-01-01 ENCOUNTER — TELEPHONE (OUTPATIENT)
Dept: HEMATOLOGY/ONCOLOGY | Facility: CLINIC | Age: 89
End: 2025-01-01
Payer: MEDICARE

## 2025-01-01 ENCOUNTER — OFFICE VISIT (OUTPATIENT)
Dept: PALLIATIVE MEDICINE | Facility: CLINIC | Age: 89
End: 2025-01-01
Payer: MEDICARE

## 2025-01-01 ENCOUNTER — PATIENT MESSAGE (OUTPATIENT)
Dept: FAMILY MEDICINE | Facility: CLINIC | Age: 89
End: 2025-01-01
Payer: MEDICARE

## 2025-01-01 ENCOUNTER — TELEPHONE (OUTPATIENT)
Dept: FAMILY MEDICINE | Facility: CLINIC | Age: 89
End: 2025-01-01
Payer: MEDICARE

## 2025-01-01 ENCOUNTER — TELEPHONE (OUTPATIENT)
Dept: INFUSION THERAPY | Facility: HOSPITAL | Age: 89
End: 2025-01-01
Payer: MEDICARE

## 2025-01-01 DIAGNOSIS — R64 CANCER CACHEXIA: ICD-10-CM

## 2025-01-01 DIAGNOSIS — Z51.5 PALLIATIVE CARE BY SPECIALIST: Primary | ICD-10-CM

## 2025-01-01 DIAGNOSIS — C61 PROSTATE CANCER: ICD-10-CM

## 2025-01-01 DIAGNOSIS — C61 PROSTATE CANCER: Primary | ICD-10-CM

## 2025-01-01 DIAGNOSIS — R19.7 DIARRHEA, UNSPECIFIED TYPE: Primary | ICD-10-CM

## 2025-01-01 RX ORDER — ABIRATERONE 500 MG/1
1000 TABLET ORAL DAILY
Qty: 60 TABLET | Refills: 6 | Status: SHIPPED | OUTPATIENT
Start: 2025-01-01 | End: 2026-02-19

## 2025-01-03 ENCOUNTER — TELEPHONE (OUTPATIENT)
Dept: CARDIOLOGY | Facility: CLINIC | Age: 89
End: 2025-01-03
Payer: MEDICARE

## 2025-01-03 NOTE — TELEPHONE ENCOUNTER
----- Message from Jacqueline Kasper MD sent at 1/3/2025 10:02 AM CST -----  Please let the patient know the echo did not show significant change compared to previous

## 2025-01-13 ENCOUNTER — PATIENT MESSAGE (OUTPATIENT)
Dept: FAMILY MEDICINE | Facility: CLINIC | Age: 89
End: 2025-01-13
Payer: MEDICARE

## 2025-01-13 DIAGNOSIS — E03.4 HYPOTHYROIDISM DUE TO ACQUIRED ATROPHY OF THYROID: ICD-10-CM

## 2025-01-13 NOTE — TELEPHONE ENCOUNTER
No care due was identified.  [facility] Embedded Care Due Messages. Reference number: [id number].   1/13/2025 2:20:15 PM CST

## 2025-01-15 RX ORDER — LEVOTHYROXINE SODIUM 50 UG/1
50 TABLET ORAL
Qty: 90 TABLET | Refills: 0 | Status: SHIPPED | OUTPATIENT
Start: 2025-01-15 | End: 2026-01-15

## 2025-01-23 NOTE — PROGRESS NOTES
PATIENT: Amor Alcazar  MRN: [id number]  DATE: 1/24/2025      Diagnosis:   1. Prostate cancer    2. 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 Genetic defect    5. Anemia of chronic disease    6. Anorexia      Chief Complaint: Prostate Cancer (3 month follow up )      Subjective:    Interval History: Mr. Alcazar is a 89 y.o. male with Arthritis, hypothyroidism, valvular heart disease, iron deficiency anemia who presents for follow up of prostate cancer.  Since the last clinic visit the patient states he has lost his sense of taste and has been eating and drinking less.  The patient denies CP, lightheadedness, dizziness.  The patient denies cough, SOB, abdominal pain, nausea, vomiting, constipation, diarrhea.  The patient denies fever, chills, night sweats, weight loss, new lumps or bumps, easy bruising or bleeding.    Prior History:  The patient was diagnosed with prostate cancer Tio 9 (4+5) 6/03/14.  He had a rising PSA and underwent PSMA PET/CT 5/09/22 showing disease in the prostate and mediastinal LN's.  The patient has been receiving Lupron 45mg 6 months dosing with Dr Bellamy with last treatment on 4/20/22.  He has previously been on Casodex and Apalutamide with progression.  He was started on Mitoxantrone and prednisone 7/01/22.  Last Echo 8/26/22 showed normal EF.   The patient saw Dr Kasper in cardiology on 9/30/22 and recommended repeat Echo in 3 months.   The patient underwent an Echo on 12/13/22 showing normal diastolic and systolic function.   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   The patient underwent PSA on 7/14/23 showing increase to 4.1   The patient underwent PSMA PET-CT on 08/04/2023 showing shrinkage of mediastinal lymph nodes with decreased FDG uptake with paratracheal lymph node measuring 1.4 x 1 cm; subcarinal lymph  node measuring 1.2 x 1.3 cm; increased radiotracer accumulation within the prostate gland.    Abiraterone was held from 10/13/23 until 10/31/23 due to liver enzyme elevation.  PSA increased to 6.7 on 23.   The patient underwent PSMA PET-CT on 2023 showing no evidence of metastatic disease but increased uptake in the prostate.   The patient underwent a PSA on 24 which was elevated at 11.2ng/mL.   The patient underwent PSMA PET-CT on 2024 showing diffuse markedly increased radiotracer accumulation throughout the entire prostate with increased SUV from 40.9 to 55.   The patient completed radiation to the prostate under the care of Dr Santos 2024.  PSA 8,7ng/mL on 24.   The patient underwent labs on 10/22/24 with PSA at 3.5ng/mL.    Past Medical History:   Past Medical History:   Diagnosis Date    Arthritis     History of skin cancer     details unknown    Hypothyroidism, unspecified     PAT (paroxysmal atrial tachycardia)     Prostate cancer     injections q 6 months    Valvular heart disease     mild AS, MR and TR  ECHO       Past Surgical HIstory:   Past Surgical History:   Procedure Laterality Date    BONE MARROW BIOPSY Bilateral 6/15/2022    Procedure: Biopsy-bone marrow;  Surgeon: Hermann Jackson MD;  Location: [facility] OR;  Service: Oncology;  Laterality: Bilateral;    CATARACT EXTRACTION W/  INTRAOCULAR LENS IMPLANT Bilateral     ESOPHAGOGASTRODUODENOSCOPY      INSERTION OF TUNNELED CENTRAL VENOUS CATHETER (CVC) WITH SUBCUTANEOUS PORT N/A 6/15/2022    Procedure: INSERTION, PORT-A-CATH;  Surgeon: Marques Rivero MD;  Location: [facility] OR;  Service: General;  Laterality: N/A;       Family History:   Family History   Problem Relation Name Age of Onset    Lung cancer Sister           of       Social History:  reports that he has never smoked. He has never used smokeless tobacco. He reports that he does not currently use alcohol. He reports that he does not use  drugs.    Allergies:  Review of patient's allergies indicates:  No Known Allergies    Medications:  No current facility-administered medications for this visit.     Current Outpatient Medications   Medication Sig Dispense Refill    abiraterone (ZYTIGA) 500 mg Tab Take 2 tablets (1,000 mg total) by mouth once daily. 60 tablet 6    atorvastatin (LIPITOR) 10 MG tablet TAKE 1 TABLET ONE TIME DAILY 90 tablet 0    folic acid (FOLVITE) 400 MCG tablet Take 400 mcg by mouth once daily.      levothyroxine (SYNTHROID) 50 MCG tablet Take 1 tablet (50 mcg total) by mouth before breakfast. 90 tablet 0    LUPRON DEPOT, 6 MONTH, 45 mg SyKt injection Inject into the muscle every 6 (six) months.      methylcellulose, laxative, 500 mg Tab Take by mouth.      metoprolol succinate (TOPROL-XL) 25 MG 24 hr tablet Take 1 tablet (25 mg total) by mouth daily with breakfast AND 0.5 tablets (12.5 mg total) before evening meal. 135 tablet 3    multivit-min-FA-lycopen-lutein (CENTRUM SILVER MEN) 300-600-300 mcg Tab Take by mouth once daily.      predniSONE (DELTASONE) 5 MG tablet Take 1 tablet (5 mg total) by mouth once daily. 60 tablet 6    droNABinol (MARINOL) 2.5 MG capsule Take 1 capsule (2.5 mg total) by mouth 2 (two) times daily before meals. 60 capsule 1       Review of Systems   Constitutional:  Positive for appetite change. Negative for chills, diaphoresis, fatigue, fever and unexpected weight change.   Respiratory:  Negative for cough and shortness of breath.    Cardiovascular:  Negative for chest pain and palpitations.   Gastrointestinal:  Negative for abdominal pain, constipation, diarrhea, nausea and vomiting.   Skin:  Negative for color change and rash.   Neurological:  Negative for dizziness, light-headedness and headaches.   Hematological:  Negative for adenopathy. Does not bruise/bleed easily.       ECOG Performance Status: 1   Objective:      Vitals:   Vitals:    01/24/25 1058   BP: (!) 70/39   BP Location: Left arm   Patient  "Position: Sitting   Pulse: (!) 117   Resp: 18   Temp: 98.3 °F (36.8 °C)   TempSrc: Temporal   Weight: 53.1 kg (117 lb 1 oz)   Height: 5' 8" (1.727 m)               Physical Exam  Constitutional:       General: He is not in acute distress.     Appearance: He is well-developed. He is not diaphoretic.   HENT:      Head: Normocephalic and atraumatic.   Cardiovascular:      Rate and Rhythm: Normal rate and regular rhythm.      Heart sounds: Normal heart sounds. No murmur heard.     No friction rub. No gallop.   Pulmonary:      Effort: Pulmonary effort is normal. No respiratory distress.      Breath sounds: Normal breath sounds. No wheezing or rales.   Chest:      Chest wall: No tenderness.   Abdominal:      General: Bowel sounds are normal. There is no distension.      Palpations: Abdomen is soft. There is no mass.      Tenderness: There is no abdominal tenderness. There is no rebound.   Musculoskeletal:      Cervical back: Normal range of motion.      Comments: PORT in left chest   Lymphadenopathy:      Cervical: No cervical adenopathy.      Upper Body:      Right upper body: No supraclavicular or axillary adenopathy.      Left upper body: No supraclavicular or axillary adenopathy.   Skin:     General: Skin is warm and dry.      Findings: No erythema, lesion or rash.   Neurological:      Mental Status: He is alert and oriented to person, place, and time.   Psychiatric:         Behavior: Behavior normal.         Laboratory Data:  Admission on 01/24/2025   Component Date Value Ref Range Status    QRS Duration 01/24/2025 86  ms In process    [facility] QTC Calculation 01/24/2025 481  ms In process   Infusion on 01/24/2025   Component Date Value Ref Range Status    WBC 01/24/2025 10.99  3.90 - 12.70 K/uL Final    RBC 01/24/2025 2.92 (L)  4.60 - 6.20 M/uL Final    Hemoglobin 01/24/2025 9.3 (L)  14.0 - 18.0 g/dL Final    Hematocrit 01/24/2025 28.9 (L)  40.0 - 54.0 % Final    MCV 01/24/2025 99 (H)  82 - 98 fL Final    MCH 01/24/2025 " 31.8 (H)  27.0 - 31.0 pg Final    MCHC 01/24/2025 32.2  32.0 - 36.0 g/dL Final    RDW 01/24/2025 14.3  11.5 - 14.5 % Final    Platelets 01/24/2025 182  150 - 450 K/uL Final    MPV 01/24/2025 11.8  9.2 - 12.9 fL Final    Immature Granulocytes 01/24/2025 0.5  0.0 - 0.5 % Final    Gran # (ANC) 01/24/2025 7.5  1.8 - 7.7 K/uL Final    Immature Grans (Abs) 01/24/2025 0.06 (H)  0.00 - 0.04 K/uL Final    Comment: Mild elevation in immature granulocytes is non specific and   can be seen in a variety of conditions including stress response,   acute inflammation, trauma and pregnancy. Correlation with other   laboratory and clinical findings is essential.      Lymph # 01/24/2025 2.9  1.0 - 4.8 K/uL Final    Mono # 01/24/2025 0.5  0.3 - 1.0 K/uL Final    Eos # 01/24/2025 0.1  0.0 - 0.5 K/uL Final    Baso # 01/24/2025 0.03  0.00 - 0.20 K/uL Final    nRBC 01/24/2025 0  0 /100 WBC Final    Gran % 01/24/2025 67.9  38.0 - 73.0 % Final    Lymph % 01/24/2025 25.9  18.0 - 48.0 % Final    Mono % 01/24/2025 4.1  4.0 - 15.0 % Final    Eosinophil % 01/24/2025 1.3  0.0 - 8.0 % Final    Basophil % 01/24/2025 0.3  0.0 - 1.9 % Final    Differential Method 01/24/2025 Automated   Final    Sodium 01/24/2025 141  136 - 145 mmol/L Final    Potassium 01/24/2025 4.1  3.5 - 5.1 mmol/L Final    Chloride 01/24/2025 108  95 - 110 mmol/L Final    CO2 01/24/2025 24  23 - 29 mmol/L Final    Glucose 01/24/2025 107  70 - 110 mg/dL Final    BUN 01/24/2025 22  8 - 23 mg/dL Final    Creatinine 01/24/2025 1.0  0.5 - 1.4 mg/dL Final    Calcium 01/24/2025 8.2 (L)  8.7 - 10.5 mg/dL Final    Total Protein 01/24/2025 6.5  6.0 - 8.4 g/dL Final    Albumin 01/24/2025 2.5 (L)  3.5 - 5.2 g/dL Final    Total Bilirubin 01/24/2025 0.5  0.1 - 1.0 mg/dL Final    Comment: For infants and newborns, interpretation of results should be based  on gestational age, weight and in agreement with clinical  observations.    Premature Infant recommended reference ranges:  Up to 24  hours.............<8.0 mg/dL  Up to 48 hours............<12.0 mg/dL  3-5 days..................<15.0 mg/dL  6-29 days.................<15.0 mg/dL      Alkaline Phosphatase 01/24/2025 38 (L)  40 - 150 U/L Final    AST 01/24/2025 25  10 - 40 U/L Final    ALT 01/24/2025 8 (L)  10 - 44 U/L Final    eGFR 01/24/2025 >60.0  >60 mL/min/1.73 m^2 Final    Anion Gap 01/24/2025 9  8 - 16 mmol/L Final         Imaging:     PSMA PET/CT 4/30/24    Head/neck:     No significant abnormal foci of increased radiotracer accumulation are present in the head neck. No lymphadenopathy is present.     Chest:     No significant abnormal foci of increased radiotracer accumulation are present in the chest. Chronic poorly PSMA avid fibrotic changes are present in both lung apices which are unchanged. No pulmonary nodules, lymphadenopathy, or pleural effusion are present.     Abdomen/pelvis:     Diffuse markedly increased radiotracer accumulation is present throughout the entire prostate gland in a similar pattern as on the previous study. The max SUV is 40.9 compared to 55.0 previously. The seminal vesicles appear normal. No lymphadenopathy is present. There does not appear to be extracapsular extension.     Skeleton:     No significant abnormal foci of increased radiotracer accumulation are present within the skeleton. There are no findings to suggest osseous metastatic disease.       Assessment:       1. Prostate cancer    2. 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 Genetic defect    5. Anemia of chronic disease    6. Anorexia             Plan:     Hypotension - pt with significant hypotension in clinic   -Possibly due to decreased PO intake  -Pt is also at risk for adrenal insufficiency from abiraterone  -Pt sent to ER for cardiac work up and work up for adrenal insufficiency    Prostate Cancer - PT has metastatic prostate cancer with mediastinal LN involvement  -Pt with prior progression on  Casodex and Apalutamide  -Last Echo 12/13/22 showed normal systolic and diastolic function  -Lupron 45mg given 10/14/22 with next dose due 4/14/23  -Pt completed 10th cycle of Mitoxantrone 1/06/23  -Pt not to receive additional doses  -PSA on 4/10/23 was 2.8  -[employer] genetic testing showed a mutation in 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  May predispose pt any other family member to increased risk of cancer  -PT to see genetic counselor  -PSMA PET/CT 8/04/23 showed decrease in size and avidity of paratracheal LN and subcarinal LN but increased uptake in the prostate  -Pt potential candidate for radiation. Will have pt return to see rad onc  -Patient on Abiraterone 1000mg daily and prednisone 5mg BID  -Repeat PSMA PET/CT done 12/07/23 for rising PSA showed stable disease  -PSA 4/29/24 elevated at 11.2  -PSMA PET/CT on 04/30/2024 showed continued uptake isolated to the prostate  -Patient completed radiation to the prostate under the care of Dr Santos 6/2024  -PSA 3.5ng/mL on 10/22/24  -PSA from today pending  -Will continue abiraterone and prednisone  -Lupron due next 4/23/25  Visit today included increased complexity associated with the care of the episodic problem (ADT and Abiraterone ) addressed and managing the longitudinal care of the patient due to the serious and/or complex managed problem(s) prostate cancer.    CHECK 2 mutation - Pt saw René Stovall on 8/03/23    Anemia of Chronic Disease - Hemoglobin 9.3g/dL on 1/24/25  -hemoglobin stable  -Will monitor    Hypothyroidism - pt on synthroid  -management per PCP    Anorexia - pt prescribed marinol 2.5mg BID  -Pt to see nutritionist once out of the hospital  -Will monitor     Route Chart for Scheduling    Med Onc Chart Routing      Follow up with physician Other. Pt to go to the ER.   Follow up with [facility]    Infusion scheduling note    Injection scheduling note    Labs    Imaging    Pharmacy appointment    Other referrals                Supportive Plan  Information  OP PROSTATE LEUPROLIDE (LUPRON) 6 MONTH Beka Alvarez MD   Associated Diagnosis: Prostate cancer Stage IVB cTX, cNX, cM1 noted on 6/27/2022   Line of treatment: Supportive Care   Treatment goal: Palliative     Upcoming Treatment Dates - OP PROSTATE LEUPROLIDE (LUPRON) 6 MONTH    2/14/2025       Chemotherapy       leuprolide acetate (6 month) injection 45 mg    Therapy Plan Information  PORT FLUSH for Malignant neoplasm of prostate, noted on 6/11/2014  PORT FLUSH for Iron deficiency anemia, noted on 3/10/2022  PORT FLUSH for Encounter for insertion of venous access port, noted on 6/15/2022  Flushes  heparin, porcine (PF) 100 unit/mL injection flush 500 Units  500 Units, Intravenous, Every visit  sodium chloride 0.9% flush 10 mL  10 mL, Intravenous, Every visit      No therapy plan of the specified type found.    No therapy plan of the specified type found.        Beka Alvarez MD  [facility]  Hematology and Oncology  [facility] [address] [place] [address]   O: [phone]  F: [phone]

## 2025-01-24 ENCOUNTER — OFFICE VISIT (OUTPATIENT)
Dept: HEMATOLOGY/ONCOLOGY | Facility: CLINIC | Age: 89
End: 2025-01-24
Payer: MEDICARE

## 2025-01-24 ENCOUNTER — INFUSION (OUTPATIENT)
Dept: INFUSION THERAPY | Facility: HOSPITAL | Age: 89
End: 2025-01-24
Attending: INTERNAL MEDICINE
Payer: MEDICARE

## 2025-01-24 VITALS
BODY MASS INDEX: 17.74 KG/M2 | HEIGHT: 68 IN | RESPIRATION RATE: 18 BRPM | HEART RATE: 117 BPM | TEMPERATURE: 98 F | DIASTOLIC BLOOD PRESSURE: 39 MMHG | SYSTOLIC BLOOD PRESSURE: 70 MMHG | WEIGHT: 117.06 LBS

## 2025-01-24 DIAGNOSIS — D50.8 OTHER IRON DEFICIENCY ANEMIA: ICD-10-CM

## 2025-01-24 DIAGNOSIS — C61 MALIGNANT NEOPLASM OF PROSTATE: Primary | ICD-10-CM

## 2025-01-24 DIAGNOSIS — C77.1 METASTASIS TO MEDIASTINAL LYMPH NODE: ICD-10-CM

## 2025-01-24 DIAGNOSIS — D63.8 ANEMIA OF CHRONIC DISEASE: ICD-10-CM

## 2025-01-24 DIAGNOSIS — C61 PROSTATE CANCER: Primary | ICD-10-CM

## 2025-01-24 DIAGNOSIS — Z45.2 ENCOUNTER FOR INSERTION OF VENOUS ACCESS PORT: ICD-10-CM

## 2025-01-24 DIAGNOSIS — I95.9 HYPOTENSION, UNSPECIFIED HYPOTENSION TYPE: ICD-10-CM

## 2025-01-24 DIAGNOSIS — Q99.9 GENETIC DEFECT: ICD-10-CM

## 2025-01-24 DIAGNOSIS — R63.0 ANOREXIA: ICD-10-CM

## 2025-01-24 DIAGNOSIS — C61 PROSTATE CANCER: ICD-10-CM

## 2025-01-24 PROBLEM — E46 PROTEIN CALORIE MALNUTRITION: Status: ACTIVE | Noted: 2024-05-02

## 2025-01-24 PROBLEM — I47.19 PAROXYSMAL ATRIAL TACHYCARDIA: Status: ACTIVE | Noted: 2025-01-24

## 2025-01-24 PROBLEM — I35.0 AORTIC VALVE STENOSIS, MILD: Status: ACTIVE | Noted: 2025-01-24

## 2025-01-24 PROBLEM — Z71.89 ACP (ADVANCE CARE PLANNING): Status: ACTIVE | Noted: 2022-06-15

## 2025-01-24 LAB
ALBUMIN SERPL BCP-MCNC: 2.5 G/DL (ref 3.5–5.2)
ALP SERPL-CCNC: 38 U/L (ref 40–150)
ALT SERPL W/O P-5'-P-CCNC: 8 U/L (ref 10–44)
ANION GAP SERPL CALC-SCNC: 9 MMOL/L (ref 8–16)
AST SERPL-CCNC: 25 U/L (ref 10–40)
BASOPHILS # BLD AUTO: 0.03 K/UL (ref 0–0.2)
BASOPHILS NFR BLD: 0.3 % (ref 0–1.9)
BILIRUB SERPL-MCNC: 0.5 MG/DL (ref 0.1–1)
BUN SERPL-MCNC: 22 MG/DL (ref 8–23)
CALCIUM SERPL-MCNC: 8.2 MG/DL (ref 8.7–10.5)
CHLORIDE SERPL-SCNC: 108 MMOL/L (ref 95–110)
CO2 SERPL-SCNC: 24 MMOL/L (ref 23–29)
COMPLEXED PSA SERPL-MCNC: 3.6 NG/ML (ref 0–4)
CREAT SERPL-MCNC: 1 MG/DL (ref 0.5–1.4)
DIFFERENTIAL METHOD BLD: ABNORMAL
EOSINOPHIL # BLD AUTO: 0.1 K/UL (ref 0–0.5)
EOSINOPHIL NFR BLD: 1.3 % (ref 0–8)
ERYTHROCYTE [DISTWIDTH] IN BLOOD BY AUTOMATED COUNT: 14.3 % (ref 11.5–14.5)
EST. GFR  (NO RACE VARIABLE): >60 ML/MIN/1.73 M^2
GLUCOSE SERPL-MCNC: 107 MG/DL (ref 70–110)
HCT VFR BLD AUTO: 28.9 % (ref 40–54)
HGB BLD-MCNC: 9.3 G/DL (ref 14–18)
IMM GRANULOCYTES # BLD AUTO: 0.06 K/UL (ref 0–0.04)
IMM GRANULOCYTES NFR BLD AUTO: 0.5 % (ref 0–0.5)
LYMPHOCYTES # BLD AUTO: 2.9 K/UL (ref 1–4.8)
LYMPHOCYTES NFR BLD: 25.9 % (ref 18–48)
MCH RBC QN AUTO: 31.8 PG (ref 27–31)
MCHC RBC AUTO-ENTMCNC: 32.2 G/DL (ref 32–36)
MCV RBC AUTO: 99 FL (ref 82–98)
MONOCYTES # BLD AUTO: 0.5 K/UL (ref 0.3–1)
MONOCYTES NFR BLD: 4.1 % (ref 4–15)
NEUTROPHILS # BLD AUTO: 7.5 K/UL (ref 1.8–7.7)
NEUTROPHILS NFR BLD: 67.9 % (ref 38–73)
NRBC BLD-RTO: 0 /100 WBC
PLATELET # BLD AUTO: 182 K/UL (ref 150–450)
PMV BLD AUTO: 11.8 FL (ref 9.2–12.9)
POTASSIUM SERPL-SCNC: 4.1 MMOL/L (ref 3.5–5.1)
PROT SERPL-MCNC: 6.5 G/DL (ref 6–8.4)
RBC # BLD AUTO: 2.92 M/UL (ref 4.6–6.2)
SODIUM SERPL-SCNC: 141 MMOL/L (ref 136–145)
WBC # BLD AUTO: 10.99 K/UL (ref 3.9–12.7)

## 2025-01-24 PROCEDURE — 99999 PR PBB SHADOW E&M-EST. PATIENT-LVL III: CPT | Mod: PBBFAC,HCNC,, | Performed by: INTERNAL MEDICINE

## 2025-01-24 PROCEDURE — 1157F ADVNC CARE PLAN IN RCRD: CPT | Mod: HCNC,CPTII,S$GLB, | Performed by: INTERNAL MEDICINE

## 2025-01-24 PROCEDURE — 80053 COMPREHEN METABOLIC PANEL: CPT | Mod: 91,HCNC,PN | Performed by: INTERNAL MEDICINE

## 2025-01-24 PROCEDURE — 3288F FALL RISK ASSESSMENT DOCD: CPT | Mod: HCNC,CPTII,S$GLB, | Performed by: INTERNAL MEDICINE

## 2025-01-24 PROCEDURE — 85025 COMPLETE CBC W/AUTO DIFF WBC: CPT | Mod: 91,HCNC,PN | Performed by: INTERNAL MEDICINE

## 2025-01-24 PROCEDURE — 1159F MED LIST DOCD IN RCRD: CPT | Mod: HCNC,CPTII,S$GLB, | Performed by: INTERNAL MEDICINE

## 2025-01-24 PROCEDURE — 63600175 PHARM REV CODE 636 W HCPCS: Mod: HCNC,PN | Performed by: INTERNAL MEDICINE

## 2025-01-24 PROCEDURE — A4216 STERILE WATER/SALINE, 10 ML: HCPCS | Mod: HCNC,PN | Performed by: INTERNAL MEDICINE

## 2025-01-24 PROCEDURE — G2211 COMPLEX E/M VISIT ADD ON: HCPCS | Mod: HCNC,S$GLB,, | Performed by: INTERNAL MEDICINE

## 2025-01-24 PROCEDURE — 1126F AMNT PAIN NOTED NONE PRSNT: CPT | Mod: HCNC,CPTII,S$GLB, | Performed by: INTERNAL MEDICINE

## 2025-01-24 PROCEDURE — 36591 DRAW BLOOD OFF VENOUS DEVICE: CPT | Mod: HCNC,PN

## 2025-01-24 PROCEDURE — 25000003 PHARM REV CODE 250: Mod: HCNC,PN | Performed by: INTERNAL MEDICINE

## 2025-01-24 PROCEDURE — 1101F PT FALLS ASSESS-DOCD LE1/YR: CPT | Mod: HCNC,CPTII,S$GLB, | Performed by: INTERNAL MEDICINE

## 2025-01-24 PROCEDURE — 84153 ASSAY OF PSA TOTAL: CPT | Mod: HCNC | Performed by: INTERNAL MEDICINE

## 2025-01-24 PROCEDURE — 99215 OFFICE O/P EST HI 40 MIN: CPT | Mod: HCNC,S$GLB,, | Performed by: INTERNAL MEDICINE

## 2025-01-24 RX ORDER — HEPARIN 100 UNIT/ML
500 SYRINGE INTRAVENOUS
Status: CANCELLED | OUTPATIENT
Start: 2025-01-24

## 2025-01-24 RX ORDER — SODIUM CHLORIDE 0.9 % (FLUSH) 0.9 %
10 SYRINGE (ML) INJECTION
Status: CANCELLED | OUTPATIENT
Start: 2025-01-24

## 2025-01-24 RX ORDER — DRONABINOL 2.5 MG/1
2.5 CAPSULE ORAL
Qty: 60 CAPSULE | Refills: 1 | Status: SHIPPED | OUTPATIENT
Start: 2025-01-24

## 2025-01-24 RX ORDER — HEPARIN 100 UNIT/ML
500 SYRINGE INTRAVENOUS
Status: DISCONTINUED | OUTPATIENT
Start: 2025-01-24 | End: 2025-01-24 | Stop reason: HOSPADM

## 2025-01-24 RX ORDER — SODIUM CHLORIDE 0.9 % (FLUSH) 0.9 %
10 SYRINGE (ML) INJECTION
Status: DISCONTINUED | OUTPATIENT
Start: 2025-01-24 | End: 2025-01-24 | Stop reason: HOSPADM

## 2025-01-24 RX ADMIN — Medication 500 UNITS: at 10:01

## 2025-01-24 RX ADMIN — SODIUM CHLORIDE, PRESERVATIVE FREE 10 ML: 5 INJECTION INTRAVENOUS at 10:01

## 2025-01-24 NOTE — PLAN OF CARE
Problem: Adult Inpatient Plan of Care  Goal: Plan of Care Review  Outcome: Progressing  Flowsheets (Taken 1/24/2025 1033)  Plan of Care Reviewed With:   patient   spouse  Goal: Patient-Specific Goal (Individualized)  Outcome: Progressing     Problem: Fatigue  Goal: Improved Activity Tolerance  Outcome: Progressing   Pt tolerated labs and port flush well.   No adverse reaction noted.  PAC flushed with heparin and de-accessed per protocol.   Pt left clinic in no acute distress.

## 2025-01-25 PROBLEM — Z73.6 LIMITATION OF ACTIVITY DUE TO DISABILITY: Status: ACTIVE | Noted: 2025-01-25

## 2025-01-27 ENCOUNTER — TELEPHONE (OUTPATIENT)
Dept: HEMATOLOGY/ONCOLOGY | Facility: CLINIC | Age: 89
End: 2025-01-27
Payer: MEDICARE

## 2025-01-27 NOTE — TELEPHONE ENCOUNTER
----- Message from Lia sent at 1/27/2025 10:28 AM CST -----  Type: Needs Medical Advice  Who Called:  [facility] nurse    Best Call Back Number: [phone]   Additional Information: requesting a 1 week f/u appt

## 2025-01-30 DIAGNOSIS — Z00.00 ENCOUNTER FOR MEDICARE ANNUAL WELLNESS EXAM: ICD-10-CM

## 2025-01-30 NOTE — PROGRESS NOTES
PATIENT: Amor Alcazar  MRN: [id number]  DATE: 1/31/2025      Diagnosis:   1. 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 Hypotension, unspecified hypotension type    4. Genetic defect    5. Dehydration    6. Anemia of chronic disease    7. Anorexia    8. Debility    9. Skin tear of right elbow without complication, initial encounter        Chief Complaint: Prostate Cancer      Subjective:    Interval History: Mr. Alcazar is a 89 y.o. male with Arthritis, hypothyroidism, valvular heart disease, iron deficiency anemia who presents for follow up of prostate cancer.  Since the last clinic visit the patient was admitted to the hospital from 1/24/25 to 1/26/25 for hypotension.  Pt given IV fluids with improvement.  Pt with complaint of dizziness on occasion when standing.  The patient denies CP, cough, SOB, abdominal pain, nausea, vomiting, constipation, diarrhea.  The patient denies fever, chills, night sweats, weight loss, new lumps or bumps, easy bruising or bleeding.    Prior History:  The patient was diagnosed with prostate cancer Tio 9 (4+5) 6/03/14.  He had a rising PSA and underwent PSMA PET/CT 5/09/22 showing disease in the prostate and mediastinal LN's.  The patient has been receiving Lupron 45mg 6 months dosing with Dr Bellamy with last treatment on 4/20/22.  He has previously been on Casodex and Apalutamide with progression.  He was started on Mitoxantrone and prednisone 7/01/22.  Last Echo 8/26/22 showed normal EF.   The patient saw Dr Kasper in cardiology on 9/30/22 and recommended repeat Echo in 3 months.   The patient underwent an Echo on 12/13/22 showing normal diastolic and systolic function.   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   The patient underwent PSA on 7/14/23 showing increase to 4.1   The patient underwent PSMA PET-CT on 08/04/2023 showing  shrinkage of mediastinal lymph nodes with decreased FDG uptake with paratracheal lymph node measuring 1.4 x 1 cm; subcarinal lymph node measuring 1.2 x 1.3 cm; increased radiotracer accumulation within the prostate gland.    Abiraterone was held from 10/13/23 until 10/31/23 due to liver enzyme elevation.  PSA increased to 6.7 on 23.   The patient underwent PSMA PET-CT on 2023 showing no evidence of metastatic disease but increased uptake in the prostate.   The patient underwent a PSA on 24 which was elevated at 11.2ng/mL.   The patient underwent PSMA PET-CT on 2024 showing diffuse markedly increased radiotracer accumulation throughout the entire prostate with increased SUV from 40.9 to 55.   The patient completed radiation to the prostate under the care of Dr Santos 2024.  PSA 8,7ng/mL on 24.   The patient underwent labs on 10/22/24 with PSA at 3.5ng/mL.    Past Medical History:   Past Medical History:   Diagnosis Date    Arthritis     History of skin cancer     details unknown    Hypothyroidism, unspecified     PAT (paroxysmal atrial tachycardia)     Prostate cancer     injections q 6 months    Valvular heart disease     mild AS, MR and TR  ECHO       Past Surgical HIstory:   Past Surgical History:   Procedure Laterality Date    BONE MARROW BIOPSY Bilateral 6/15/2022    Procedure: Biopsy-bone marrow;  Surgeon: Hermann Jackson MD;  Location: [facility] OR;  Service: Oncology;  Laterality: Bilateral;    CATARACT EXTRACTION W/  INTRAOCULAR LENS IMPLANT Bilateral     ESOPHAGOGASTRODUODENOSCOPY      INSERTION OF TUNNELED CENTRAL VENOUS CATHETER (CVC) WITH SUBCUTANEOUS PORT N/A 6/15/2022    Procedure: INSERTION, PORT-A-CATH;  Surgeon: Marques Rivero MD;  Location: [facility] OR;  Service: General;  Laterality: N/A;       Family History:   Family History   Problem Relation Name Age of Onset    Lung cancer Sister           of       Social History:  reports that he has never smoked. He has  never used smokeless tobacco. He reports that he does not currently use alcohol. He reports current drug use. Drug: Other-see comments.    Allergies:  Review of patient's allergies indicates:  No Known Allergies    Medications:  Current Outpatient Medications   Medication Sig Dispense Refill    abiraterone (ZYTIGA) 500 mg Tab Take 2 tablets (1,000 mg total) by mouth once daily. 60 tablet 6    atorvastatin (LIPITOR) 10 MG tablet TAKE 1 TABLET ONE TIME DAILY 90 tablet 0    droNABinol (MARINOL) 2.5 MG capsule Take 1 capsule (2.5 mg total) by mouth 2 (two) times daily before meals. 60 capsule 1    folic acid (FOLVITE) 400 MCG tablet Take 400 mcg by mouth once daily.      levothyroxine (SYNTHROID) 50 MCG tablet Take 1 tablet (50 mcg total) by mouth before breakfast. 90 tablet 0    LUPRON DEPOT, 6 MONTH, 45 mg SyKt injection Inject into the muscle every 6 (six) months.      metoprolol succinate (TOPROL-XL) 25 MG 24 hr tablet Take 1 tablet (25 mg total) by mouth daily with breakfast AND 0.5 tablets (12.5 mg total) before evening meal. 135 tablet 3    multivit-min-FA-lycopen-lutein (CENTRUM SILVER MEN) 300-600-300 mcg Tab Take by mouth once daily.      mupirocin (BACTROBAN) 2 % ointment Apply topically once daily. for 10 days 30 g 3    predniSONE (DELTASONE) 5 MG tablet Take 1 tablet (5 mg total) by mouth once daily. 60 tablet 6    methylcellulose, laxative, 500 mg Tab Take by mouth. (Patient not taking: Reported on 1/31/2025)       No current facility-administered medications for this visit.     Facility-Administered Medications Ordered in Other Visits   Medication Dose Route Frequency Provider Last Rate Last Admin    heparin, porcine (PF) 100 unit/mL injection flush 500 Units  500 Units Intravenous PRN Beka Alvarez MD        heparin, porcine (PF) 100 unit/mL injection flush 500 Units  500 Units Intravenous PRN Beka Alvarez MD        sodium chloride 0.9% bolus 1,000 mL 1,000 mL  1,000 mL Intravenous 1 time in  "Clinic/HOD Beka Alvarez  mL/hr at 01/31/25 1137 1,000 mL at 01/31/25 1137    sodium chloride 0.9% flush 10 mL  10 mL Intravenous PRN Beka Alvarez MD        sodium chloride 0.9% flush 10 mL  10 mL Intravenous PRN Beka Alvarez MD           Review of Systems   Constitutional:  Negative for appetite change, chills, diaphoresis, fatigue, fever and unexpected weight change.   Respiratory:  Negative for cough and shortness of breath.    Cardiovascular:  Negative for chest pain and palpitations.   Gastrointestinal:  Negative for abdominal pain, constipation, diarrhea, nausea and vomiting.   Skin:  Negative for color change and rash.   Neurological:  Positive for dizziness (with standing on occasion). Negative for light-headedness and headaches.   Hematological:  Negative for adenopathy. Does not bruise/bleed easily.       ECOG Performance Status: 1   Objective:      Vitals:   Vitals:    01/31/25 1044   BP: (!) 99/51   BP Location: Right arm   Patient Position: Sitting   Pulse: 77   Resp: 16   Temp: 98.9 °F (37.2 °C)   TempSrc: Temporal   SpO2: 95%   Weight: 53.6 kg (118 lb 2.7 oz)   Height: 5' 10" (1.778 m)                 Physical Exam  Constitutional:       General: He is not in acute distress.     Appearance: He is well-developed. He is not diaphoretic.   HENT:      Head: Normocephalic and atraumatic.   Cardiovascular:      Rate and Rhythm: Normal rate and regular rhythm.      Heart sounds: Normal heart sounds. No murmur heard.     No friction rub. No gallop.   Pulmonary:      Effort: Pulmonary effort is normal. No respiratory distress.      Breath sounds: Normal breath sounds. No wheezing or rales.   Chest:      Chest wall: No tenderness.   Abdominal:      General: Bowel sounds are normal. There is no distension.      Palpations: Abdomen is soft. There is no mass.      Tenderness: There is no abdominal tenderness. There is no rebound.   Musculoskeletal:      Cervical back: Normal range of motion.    "   Comments: PORT in left chest   Lymphadenopathy:      Cervical: No cervical adenopathy.      Upper Body:      Right upper body: No supraclavicular or axillary adenopathy.      Left upper body: No supraclavicular or axillary adenopathy.   Skin:     General: Skin is warm and dry.      Findings: No erythema, lesion or rash.      Comments: Multiple skin tears on arms   Neurological:      Mental Status: He is alert and oriented to person, place, and time.   Psychiatric:         Behavior: Behavior normal.         Laboratory Data:  Admission on 01/24/2025, Discharged on 01/26/2025   Component Date Value Ref Range Status    WBC 01/24/2025 10.47  3.90 - 12.70 K/uL Final    RBC 01/24/2025 3.26 (L)  4.60 - 6.20 M/uL Final    Hemoglobin 01/24/2025 10.1 (L)  14.0 - 18.0 g/dL Final    Hematocrit 01/24/2025 32.5 (L)  40.0 - 54.0 % Final    MCV 01/24/2025 100 (H)  82 - 98 fL Final    MCH 01/24/2025 31.0  27.0 - 31.0 pg Final    MCHC 01/24/2025 31.1 (L)  32.0 - 36.0 g/dL Final    RDW 01/24/2025 14.2  11.5 - 14.5 % Final    Platelets 01/24/2025 215  150 - 450 K/uL Final    MPV 01/24/2025 12.2  9.2 - 12.9 fL Final    Immature Granulocytes 01/24/2025 1.0 (H)  0.0 - 0.5 % Final    Gran # (ANC) 01/24/2025 7.4  1.8 - 7.7 K/uL Final    Immature Grans (Abs) 01/24/2025 0.10 (H)  0.00 - 0.04 K/uL Final    Comment: Mild elevation in immature granulocytes is non specific and   can be seen in a variety of conditions including stress response,   acute inflammation, trauma and pregnancy. Correlation with other   laboratory and clinical findings is essential.      Lymph # 01/24/2025 2.7  1.0 - 4.8 K/uL Final    Mono # 01/24/2025 0.3  0.3 - 1.0 K/uL Final    Eos # 01/24/2025 0.0  0.0 - 0.5 K/uL Final    Baso # 01/24/2025 0.02  0.00 - 0.20 K/uL Final    nRBC 01/24/2025 0  0 /100 WBC Final    Gran % 01/24/2025 70.6  38.0 - 73.0 % Final    Lymph % 01/24/2025 25.3  18.0 - 48.0 % Final    Mono % 01/24/2025 2.6 (L)  4.0 - 15.0 % Final    Eosinophil %  01/24/2025 0.3  0.0 - 8.0 % Final    Basophil % 01/24/2025 0.2  0.0 - 1.9 % Final    Differential Method 01/24/2025 Automated   Final    Sodium 01/24/2025 139  136 - 145 mmol/L Final    Potassium 01/24/2025 4.2  3.5 - 5.1 mmol/L Final    Anion Gap reference range revised on 4/28/2023    Chloride 01/24/2025 105  95 - 110 mmol/L Final    CO2 01/24/2025 25  23 - 29 mmol/L Final    Glucose 01/24/2025 124 (H)  70 - 110 mg/dL Final    Comment: The ADA recommends the following guidelines for fasting glucose:    Normal:       less than 100 mg/dL    Prediabetes:  100 mg/dL to 125 mg/dL    Diabetes:     126 mg/dL or higher      BUN 01/24/2025 23  8 - 23 mg/dL Final    Creatinine 01/24/2025 1.16  0.50 - 1.40 mg/dL Final    Calcium 01/24/2025 8.9  8.7 - 10.5 mg/dL Final    Total Protein 01/24/2025 7.5  6.0 - 8.4 g/dL Final    Albumin 01/24/2025 2.8 (L)  3.5 - 5.2 g/dL Final    Total Bilirubin 01/24/2025 0.6  0.2 - 1.0 mg/dL Final    Alkaline Phosphatase 01/24/2025 43  40 - 150 U/L Final    AST 01/24/2025 37  10 - 40 U/L Final    ALT 01/24/2025 <7 (L)  10 - 44 U/L Final    Anion Gap 01/24/2025 9  8 - 16 mmol/L Final    Anion Gap reference range revised on 4/28/2023    eGFR 01/24/2025 >60  >60 mL/min/1.73 m^2 Final    Lactate (Lactic Acid) 01/24/2025 3.0 (H)  0.5 - 2.2 mmol/L Final    Specimen UA 01/24/2025 Urine, Clean Catch   Final    Color, UA 01/24/2025 Yellow  Yellow, Straw, Jaye Final    Appearance, UA 01/24/2025 Hazy (A)  Clear Final    pH, UA 01/24/2025 6.0  5.0 - 8.0 Final    Specific Gravity, UA 01/24/2025 1.020  1.005 - 1.030 Final    Protein, UA 01/24/2025 1+ (A)  Negative Final    Comment: Recommend a 24 hour urine protein or a urine   protein/creatinine ratio if globulin induced proteinuria is  clinically suspected.      Glucose, UA 01/24/2025 Negative  Negative Final    Ketones, UA 01/24/2025 Trace (A)  Negative Final    Bilirubin (UA) 01/24/2025 Negative  Negative Final    Occult Blood UA 01/24/2025 Negative   Negative Final    Nitrite, UA 01/24/2025 Negative  Negative Final    Urobilinogen, UA 01/24/2025 1.0  <2.0 EU/dL Final    Leukocytes, UA 01/24/2025 Negative  Negative Final    Blood Culture, Routine 01/24/2025 No growth after 5 days.   Final    Blood Culture, Routine 01/24/2025 No growth after 5 days.   Final    QRS Duration 01/24/2025 86  ms Final    [facility] QTC Calculation 01/24/2025 481  ms Final    SARS-CoV2 (COVID-19) Qualitative P* 01/24/2025 Negative  Negative Final    Comment: This test utilizes a real-time RT-PCR test intended for  the simultaneous qualitative detection and differentiation  of SARS-CoV-2, influenza A, influenza B, and respiratory  syncytial virus (RSV) viral RNA. The analytical sensitivity  (limit of detection) of the SARS-CoV-2 assay is 131 copies/mL.  Negative results do not rule out the possibility of SARS-CoV-2,  influenza A virus, influenza B virus and/or RSV infection   and should not be used as a sole basis for treatment or other  patient management decisions.    This test is only for use under the Food and Drug Administration's  Emergency Use Authorization (EUA). The [employer] Xpert Xpress  SARS-CoV-2/FLU/RSV Letter of Authorization, along with the   authorized Fact Sheet for Healthcare Providers, the authorized  Fact Sheet for Patients and authorized labeling are available  on the FDA website:    http://www.fda.gov/medical-devices/coronavirus-disease-2019-  covid-19-emergency-useauthorizations-medical-devices/vitro-  diagnostics-euas                           .      Influenza A, Molecular 01/24/2025 Negative  Negative Final    Influenza B, Molecular 01/24/2025 Negative  Negative Final    RSV Ag by Molecular Method 01/24/2025 Negative  Negative Final    TSH 01/24/2025 2.235  0.400 - 4.000 uIU/mL Final    Comment: Warning:  Heterophilic antibodies in serum or plasma of   certain individuals are known to cause interference with   immunoassays. These antibodies may be present in blood  samples   from individuals regularly exposed to animal or who have been   treated with animal products.       Folate 01/24/2025 36.0 (H)  4.0 - 24.0 ng/mL Final    Comment: Patients taking very high Biotin doses of >300 mcg/day may   cause a positive bias in this assay.      Vitamin B-12 01/24/2025 1644 (H)  210 - 950 pg/mL Final    Procalcitonin 01/24/2025 0.06  <0.25 ng/mL Final    Comment: A concentration < 0.25 ng/mL represents a low risk of bacterial   infection.  Procalcitonin may not be accurate among patients with localized   infection, recent trauma or major surgery, immunosuppressed state,   invasive fungal infection, renal dysfunction. Decisions regarding   initiation or continuation of antibiotic therapy should not be based   solely on procalcitonin levels.      Cortisol 01/24/2025 4.88  ug/dL Final    Comment: Cortisol Reference Range:  Before 10:00 am: 4.46-22.7 ug/dL  After 5:00 pm:    1.7-14.1 ug/dL    Warning:  Results from patients receiving steroids, especially   prednisolone, should be treated with caution due to possible   cross-reactivity with the sheep anti-cortisol antibody.      Hemoglobin A1C 01/24/2025 5.2  4.0 - 5.6 % Final    Comment: Reference Interval:  5.0 - 5.6 Normal   5.7 - 6.4 High Risk   > 6.5 Diabetic       Hgb A1c results are standardized based on the (NGSP) National   Glycohemoglobin Standardization Program.      Hemoglobin A1C levels are related to mean serum/plasma glucose   during the preceding 2-3 months.          Estimated Avg Glucose 01/24/2025 103  68 - 131 mg/dL Final    Lactate (Lactic Acid) 01/24/2025 1.7  0.5 - 2.2 mmol/L Final    RBC, UA 01/24/2025 3  0 - 4 /hpf Final    WBC, UA 01/24/2025 1  0 - 5 /hpf Final    Bacteria 01/24/2025 Negative  Negative /hpf Final    Yeast, UA 01/24/2025 Few (A)  None Final    Squam Epithel, UA 01/24/2025 3  /hpf Final    Hyaline Casts, UA 01/24/2025 4 (A)  0 - 1 /lpf Final    Microscopic Comment 01/24/2025 SEE COMMENT   Final     Comment: Other formed elements not mentioned in the report are not   present in the microscopic examination.       WBC 01/25/2025 7.95  3.90 - 12.70 K/uL Final    RBC 01/25/2025 2.44 (L)  4.60 - 6.20 M/uL Final    Hemoglobin 01/25/2025 7.7 (L)  14.0 - 18.0 g/dL Final    Hematocrit 01/25/2025 24.6 (L)  40.0 - 54.0 % Final    MCV 01/25/2025 101 (H)  82 - 98 fL Final    MCH 01/25/2025 31.6 (H)  27.0 - 31.0 pg Final    MCHC 01/25/2025 31.3 (L)  32.0 - 36.0 g/dL Final    RDW 01/25/2025 14.2  11.5 - 14.5 % Final    Platelets 01/25/2025 142 (L)  150 - 450 K/uL Final    MPV 01/25/2025 12.1  9.2 - 12.9 fL Final    Immature Granulocytes 01/25/2025 1.0 (H)  0.0 - 0.5 % Final    Gran # (ANC) 01/25/2025 4.4  1.8 - 7.7 K/uL Final    Immature Grans (Abs) 01/25/2025 0.08 (H)  0.00 - 0.04 K/uL Final    Comment: Mild elevation in immature granulocytes is non specific and   can be seen in a variety of conditions including stress response,   acute inflammation, trauma and pregnancy. Correlation with other   laboratory and clinical findings is essential.      Lymph # 01/25/2025 2.9  1.0 - 4.8 K/uL Final    Mono # 01/25/2025 0.5  0.3 - 1.0 K/uL Final    Eos # 01/25/2025 0.1  0.0 - 0.5 K/uL Final    Baso # 01/25/2025 0.02  0.00 - 0.20 K/uL Final    nRBC 01/25/2025 0  0 /100 WBC Final    Gran % 01/25/2025 54.9  38.0 - 73.0 % Final    Lymph % 01/25/2025 35.8  18.0 - 48.0 % Final    Mono % 01/25/2025 6.7  4.0 - 15.0 % Final    Eosinophil % 01/25/2025 1.3  0.0 - 8.0 % Final    Basophil % 01/25/2025 0.3  0.0 - 1.9 % Final    Differential Method 01/25/2025 Automated   Final    Magnesium 01/25/2025 1.9  1.6 - 2.6 mg/dL Final    Glucose 01/25/2025 63 (L)  70 - 110 mg/dL Final    Comment: The ADA recommends the following guidelines for fasting glucose:    Normal:       less than 100 mg/dL    Prediabetes:  100 mg/dL to 125 mg/dL    Diabetes:     126 mg/dL or higher      Sodium 01/25/2025 139  136 - 145 mmol/L Final    Potassium 01/25/2025 3.3 (L)   3.5 - 5.1 mmol/L Final    Anion Gap reference range revised on 4/28/2023    Chloride 01/25/2025 114 (H)  95 - 110 mmol/L Final    CO2 01/25/2025 21 (L)  23 - 29 mmol/L Final    BUN 01/25/2025 20  8 - 23 mg/dL Final    Calcium 01/25/2025 7.3 (L)  8.7 - 10.5 mg/dL Final    Creatinine 01/25/2025 0.91  0.50 - 1.40 mg/dL Final    Albumin 01/25/2025 2.1 (L)  3.5 - 5.2 g/dL Final    Phosphorus 01/25/2025 2.9  2.7 - 4.5 mg/dL Final    eGFR 01/25/2025 >60  >60 mL/min/1.73 m^2 Final    Anion Gap 01/25/2025 4 (L)  8 - 16 mmol/L Final    Anion Gap reference range revised on 4/28/2023    WBC 01/26/2025 8.38  3.90 - 12.70 K/uL Final    RBC 01/26/2025 2.75 (L)  4.60 - 6.20 M/uL Final    Hemoglobin 01/26/2025 8.6 (L)  14.0 - 18.0 g/dL Final    Hematocrit 01/26/2025 26.9 (L)  40.0 - 54.0 % Final    MCV 01/26/2025 98  82 - 98 fL Final    MCH 01/26/2025 31.3 (H)  27.0 - 31.0 pg Final    MCHC 01/26/2025 32.0  32.0 - 36.0 g/dL Final    RDW 01/26/2025 14.3  11.5 - 14.5 % Final    Platelets 01/26/2025 152  150 - 450 K/uL Final    MPV 01/26/2025 12.0  9.2 - 12.9 fL Final    Immature Granulocytes 01/26/2025 0.8 (H)  0.0 - 0.5 % Final    Gran # (ANC) 01/26/2025 5.2  1.8 - 7.7 K/uL Final    Immature Grans (Abs) 01/26/2025 0.07 (H)  0.00 - 0.04 K/uL Final    Comment: Mild elevation in immature granulocytes is non specific and   can be seen in a variety of conditions including stress response,   acute inflammation, trauma and pregnancy. Correlation with other   laboratory and clinical findings is essential.      Lymph # 01/26/2025 2.5  1.0 - 4.8 K/uL Final    Mono # 01/26/2025 0.5  0.3 - 1.0 K/uL Final    Eos # 01/26/2025 0.1  0.0 - 0.5 K/uL Final    Baso # 01/26/2025 0.03  0.00 - 0.20 K/uL Final    nRBC 01/26/2025 0  0 /100 WBC Final    Gran % 01/26/2025 61.9  38.0 - 73.0 % Final    Lymph % 01/26/2025 29.2  18.0 - 48.0 % Final    Mono % 01/26/2025 6.4  4.0 - 15.0 % Final    Eosinophil % 01/26/2025 1.3  0.0 - 8.0 % Final    Basophil %  01/26/2025 0.4  0.0 - 1.9 % Final    Differential Method 01/26/2025 Automated   Final    Magnesium 01/26/2025 1.9  1.6 - 2.6 mg/dL Final    Glucose 01/26/2025 78  70 - 110 mg/dL Final    Comment: The ADA recommends the following guidelines for fasting glucose:    Normal:       less than 100 mg/dL    Prediabetes:  100 mg/dL to 125 mg/dL    Diabetes:     126 mg/dL or higher      Sodium 01/26/2025 136  136 - 145 mmol/L Final    Potassium 01/26/2025 4.0  3.5 - 5.1 mmol/L Final    Anion Gap reference range revised on 4/28/2023    Chloride 01/26/2025 108  95 - 110 mmol/L Final    CO2 01/26/2025 23  23 - 29 mmol/L Final    BUN 01/26/2025 17  8 - 23 mg/dL Final    Calcium 01/26/2025 7.9 (L)  8.7 - 10.5 mg/dL Final    Creatinine 01/26/2025 0.95  0.50 - 1.40 mg/dL Final    Albumin 01/26/2025 2.3 (L)  3.5 - 5.2 g/dL Final    Phosphorus 01/26/2025 2.5 (L)  2.7 - 4.5 mg/dL Final    eGFR 01/26/2025 >60  >60 mL/min/1.73 m^2 Final    Anion Gap 01/26/2025 5 (L)  8 - 16 mmol/L Final    Anion Gap reference range revised on 4/28/2023         Imaging:     PSMA PET/CT 4/30/24    Head/neck:     No significant abnormal foci of increased radiotracer accumulation are present in the head neck. No lymphadenopathy is present.     Chest:     No significant abnormal foci of increased radiotracer accumulation are present in the chest. Chronic poorly PSMA avid fibrotic changes are present in both lung apices which are unchanged. No pulmonary nodules, lymphadenopathy, or pleural effusion are present.     Abdomen/pelvis:     Diffuse markedly increased radiotracer accumulation is present throughout the entire prostate gland in a similar pattern as on the previous study. The max SUV is 40.9 compared to 55.0 previously. The seminal vesicles appear normal. No lymphadenopathy is present. There does not appear to be extracapsular extension.     Skeleton:     No significant abnormal foci of increased radiotracer accumulation are present within the skeleton.  There are no findings to suggest osseous metastatic disease.       Assessment:       1. 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 Hypotension, unspecified hypotension type    4. Genetic defect    5. Dehydration    6. Anemia of chronic disease    7. Anorexia    8. Debility    9. 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  May predispose pt any other family member to increased risk of cancer  -PT to see genetic counselor  -PSMA PET/CT 8/04/23 showed decrease in size and avidity of paratracheal LN and subcarinal LN but increased uptake in the prostate  -Pt potential candidate for radiation. Will have pt return to see rad onc  -Patient on Abiraterone 1000mg daily and prednisone 5mg BID  -Repeat PSMA PET/CT done 12/07/23 for rising PSA showed stable disease  -PSA 4/29/24 elevated at 11.2  -PSMA PET/CT on 04/30/2024 showed continued uptake isolated to the prostate  -Patient completed radiation to the prostate under the care of Dr Santos 6/2024  -PSA 3.5ng/mL on 10/22/24  -PSA stable at 3.6 on 1/24/25  -Will continue abiraterone and prednisone  -Lupron due next 4/23/25  Visit today included increased complexity associated with the care of the episodic problem (ADT and Abiraterone ) addressed and managing the longitudinal care of the patient due to the serious and/or complex managed problem(s) prostate cancer.    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 Pt needs PORT flush today.  Pt needs 1L IV fluids over 2 hours.  Pt eneds an urgent return appt with his cardiologist Dr Kasper.  PT needs a CBC adn CMP with appt with me in 6 weeks.   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 noted on 6/27/2022   Line of treatment: Supportive Care   Treatment goal: Palliative     Upcoming Treatment Dates - OP PROSTATE LEUPROLIDE (LUPRON) 6 MONTH    2/14/2025       Chemotherapy       leuprolide acetate (6 month) injection 45 mg    Therapy Plan Information  PORT FLUSH for Malignant neoplasm of prostate, noted on 6/11/2014  PORT FLUSH for Iron deficiency anemia, noted on 3/10/2022  PORT FLUSH for ACP (advance care planning), noted on 6/15/2022  Flushes  heparin, porcine (PF) 100 unit/mL injection flush 500 Units  500 Units, Intravenous, Every visit  sodium chloride 0.9% flush 10 mL  10 mL, Intravenous, Every visit    INF FLUIDS for Dehydration, noted on 1/31/2025  IV Fluids  sodium chloride 0.9% bolus 1,000 mL 1,000 mL  1,000 mL, Intravenous, Every visit  Flushes  sodium chloride 0.9% flush 10 mL  10 mL, Intravenous, PRN  heparin, porcine (PF)  100 unit/mL injection flush 500 Units  500 Units, Intravenous, PRN      No therapy plan of the specified type found.        Beka Alvarez MD  [facility]  Hematology and Oncology  [facility]   [address]   [place] [address]   O: [phone]  F: [phone]

## 2025-01-31 ENCOUNTER — OFFICE VISIT (OUTPATIENT)
Dept: HEMATOLOGY/ONCOLOGY | Facility: CLINIC | Age: 89
End: 2025-01-31
Payer: MEDICARE

## 2025-01-31 ENCOUNTER — INFUSION (OUTPATIENT)
Dept: INFUSION THERAPY | Facility: HOSPITAL | Age: 89
End: 2025-01-31
Attending: INTERNAL MEDICINE
Payer: MEDICARE

## 2025-01-31 VITALS
TEMPERATURE: 99 F | HEIGHT: 70 IN | RESPIRATION RATE: 16 BRPM | SYSTOLIC BLOOD PRESSURE: 105 MMHG | DIASTOLIC BLOOD PRESSURE: 41 MMHG | OXYGEN SATURATION: 95 % | WEIGHT: 118.19 LBS | HEART RATE: 57 BPM | BODY MASS INDEX: 16.92 KG/M2

## 2025-01-31 VITALS
WEIGHT: 118.19 LBS | BODY MASS INDEX: 16.92 KG/M2 | OXYGEN SATURATION: 95 % | RESPIRATION RATE: 16 BRPM | TEMPERATURE: 99 F | SYSTOLIC BLOOD PRESSURE: 99 MMHG | HEART RATE: 77 BPM | HEIGHT: 70 IN | DIASTOLIC BLOOD PRESSURE: 51 MMHG

## 2025-01-31 DIAGNOSIS — Q99.9 GENETIC DEFECT: ICD-10-CM

## 2025-01-31 DIAGNOSIS — R63.0 ANOREXIA: ICD-10-CM

## 2025-01-31 DIAGNOSIS — C61 PROSTATE CANCER: Primary | ICD-10-CM

## 2025-01-31 DIAGNOSIS — C77.1 METASTASIS TO MEDIASTINAL LYMPH NODE: ICD-10-CM

## 2025-01-31 DIAGNOSIS — S51.011A SKIN TEAR OF RIGHT ELBOW WITHOUT COMPLICATION, INITIAL ENCOUNTER: ICD-10-CM

## 2025-01-31 DIAGNOSIS — Z71.89 ACP (ADVANCE CARE PLANNING): ICD-10-CM

## 2025-01-31 DIAGNOSIS — E86.0 DEHYDRATION: ICD-10-CM

## 2025-01-31 DIAGNOSIS — I95.9 HYPOTENSION, UNSPECIFIED HYPOTENSION TYPE: ICD-10-CM

## 2025-01-31 DIAGNOSIS — R53.81 DEBILITY: ICD-10-CM

## 2025-01-31 DIAGNOSIS — D63.8 ANEMIA OF CHRONIC DISEASE: ICD-10-CM

## 2025-01-31 DIAGNOSIS — C61 MALIGNANT NEOPLASM OF PROSTATE: Primary | ICD-10-CM

## 2025-01-31 DIAGNOSIS — D50.8 OTHER IRON DEFICIENCY ANEMIA: ICD-10-CM

## 2025-01-31 PROCEDURE — 63600175 PHARM REV CODE 636 W HCPCS: Mod: HCNC,PN | Performed by: INTERNAL MEDICINE

## 2025-01-31 PROCEDURE — G2211 COMPLEX E/M VISIT ADD ON: HCPCS | Mod: HCNC,S$GLB,, | Performed by: INTERNAL MEDICINE

## 2025-01-31 PROCEDURE — 99215 OFFICE O/P EST HI 40 MIN: CPT | Mod: HCNC,S$GLB,, | Performed by: INTERNAL MEDICINE

## 2025-01-31 PROCEDURE — 1126F AMNT PAIN NOTED NONE PRSNT: CPT | Mod: HCNC,CPTII,S$GLB, | Performed by: INTERNAL MEDICINE

## 2025-01-31 PROCEDURE — 99999 PR PBB SHADOW E&M-EST. PATIENT-LVL IV: CPT | Mod: PBBFAC,HCNC,, | Performed by: INTERNAL MEDICINE

## 2025-01-31 PROCEDURE — 25000003 PHARM REV CODE 250: Mod: HCNC,PN | Performed by: INTERNAL MEDICINE

## 2025-01-31 PROCEDURE — 96360 HYDRATION IV INFUSION INIT: CPT | Mod: HCNC,PN

## 2025-01-31 PROCEDURE — 96361 HYDRATE IV INFUSION ADD-ON: CPT | Mod: HCNC,PN

## 2025-01-31 PROCEDURE — 1101F PT FALLS ASSESS-DOCD LE1/YR: CPT | Mod: HCNC,CPTII,S$GLB, | Performed by: INTERNAL MEDICINE

## 2025-01-31 PROCEDURE — 3288F FALL RISK ASSESSMENT DOCD: CPT | Mod: HCNC,CPTII,S$GLB, | Performed by: INTERNAL MEDICINE

## 2025-01-31 PROCEDURE — A4216 STERILE WATER/SALINE, 10 ML: HCPCS | Mod: HCNC,PN | Performed by: INTERNAL MEDICINE

## 2025-01-31 PROCEDURE — 1157F ADVNC CARE PLAN IN RCRD: CPT | Mod: HCNC,CPTII,S$GLB, | Performed by: INTERNAL MEDICINE

## 2025-01-31 RX ORDER — SODIUM CHLORIDE 0.9 % (FLUSH) 0.9 %
10 SYRINGE (ML) INJECTION
Status: DISCONTINUED | OUTPATIENT
Start: 2025-01-31 | End: 2025-01-31 | Stop reason: HOSPADM

## 2025-01-31 RX ORDER — HEPARIN 100 UNIT/ML
500 SYRINGE INTRAVENOUS
OUTPATIENT
Start: 2025-01-31

## 2025-01-31 RX ORDER — HEPARIN 100 UNIT/ML
500 SYRINGE INTRAVENOUS
Status: DISCONTINUED | OUTPATIENT
Start: 2025-01-31 | End: 2025-01-31 | Stop reason: HOSPADM

## 2025-01-31 RX ORDER — SODIUM CHLORIDE 0.9 % (FLUSH) 0.9 %
10 SYRINGE (ML) INJECTION
OUTPATIENT
Start: 2025-01-31

## 2025-01-31 RX ORDER — SODIUM CHLORIDE 9 MG/ML
INJECTION, SOLUTION INTRAVENOUS
Status: CANCELLED | OUTPATIENT
Start: 2025-01-31 | End: 2025-01-31

## 2025-01-31 RX ADMIN — SODIUM CHLORIDE 1000 ML: 9 INJECTION, SOLUTION INTRAVENOUS at 11:01

## 2025-01-31 RX ADMIN — Medication 500 UNITS: at 01:01

## 2025-01-31 RX ADMIN — Medication 10 ML: at 01:01

## 2025-01-31 NOTE — PLAN OF CARE
Problem: Adult Inpatient Plan of Care  Goal: Plan of Care Review  Outcome: Progressing  Flowsheets (Taken 1/31/2025 1345)  Plan of Care Reviewed With: patient  Goal: Patient-Specific Goal (Individualized)  Outcome: Progressing  Flowsheets (Taken 1/31/2025 1345)  Individualized Care Needs: recliner, blanket, wife at chairside  Anxieties, Fears or Concerns: none     Problem: Fatigue  Goal: Improved Activity Tolerance  Outcome: Progressing  Intervention: Promote Improved Energy  Flowsheets (Taken 1/31/2025 1345)  Fatigue Management: paced activity encouraged  Sleep/Rest Enhancement:   family presence promoted   awakenings minimized   noise level reduced   natural light exposure provided  Activity Management:   Up in chair - L3   Walk with assistive devise and /or staff member - L3  Environmental Support:   environmental consistency promoted   distractions minimized  Pt. tolerated IVF infusion well. VS stable. + blood return noted to port, deaccessed prior to discharge. Discussed future appointments, NAD noted. Pt. discharged to home via W/C, accompanied by wife.

## 2025-02-06 NOTE — TELEPHONE ENCOUNTER
RD called pt's spouse, Polina, with a nutrition referral from Dr. Alvarez. Pt is having decreased appetite and unintentional weight loss. He is taking Marinol as prescribed. D/t pt's change of taste, he is not eating some foods as he had previously. RD discussed lemon to aid with change of taste. Pt drinks 250kcal Boost daily. Encouraged Polina to make a high calorie smoothie with Boost and ice cream. Answered all questions to pt's satisfaction. Provided pt with RD contact information.     Wt Readings from Last 10 Encounters:   02/02/25 53.1 kg (117 lb)   01/31/25 53.6 kg (118 lb 2.7 oz)   01/31/25 53.6 kg (118 lb 2.7 oz)   01/30/25 53.1 kg (117 lb)   01/25/25 53.1 kg (117 lb 1 oz)   01/24/25 53.1 kg (117 lb 1 oz)   12/30/24 55.3 kg (122 lb)   12/06/24 55.5 kg (122 lb 5.7 oz)   10/23/24 56.9 kg (125 lb 7.1 oz)   10/23/24 56.9 kg (125 lb 7.1 oz)

## 2025-02-06 NOTE — TELEPHONE ENCOUNTER
----- Message from SD Goetz sent at 2/6/2025  9:18 AM CST -----  Regarding: Palliative care  Can we please get Dallas Ottoniel a palliative care referral if it has not already been done? Virtual visit would be preferred. We had a conversation about hospice yesterday and they are not ready yet to stop active treatments.   Thank you

## 2025-02-19 NOTE — PROGRESS NOTES
Office Visit  [facility] Palliative Care      Consult Requested By: Dr. Beka Alvarez  Reason for Consult: cancer related fatigue      ASSESSMENT/PLAN:     Plan/Recommendations:  Diagnoses and all orders for this visit:    Palliative care by specialist    Prostate cancer  -     Ambulatory referral/consult to CLINIC Palliative Care  -     Ambulatory referral/consult to Hospice; Future  -     Ambulatory referral/consult to Hematology/Oncology/Nutrition; Future    Cancer cachexia        Assessment & Plan    Assessed patient's nutritional status and weight loss as primary concerns, recognizing link between nutritional decline, frailty, and increased mortality risk  Considered Marinol trial to stimulate appetite before exploring other interventions for fatigue  Evaluated patient's functional status and symptom burden to determine appropriateness of hospice referral  [place] form to document patient's preferences for end-of-life care    MALIGNANT NEOPLASM OF PROSTATE:  - Explained palliative care as a subspecialty for managing symptoms related to chronic illnesses like cancer.  - Discussed hospice care as an option for increased support and services, clarifying it is not limited to end-of-life scenarios.  - Referred to [facility] for evaluation and information on services.    Cancer cachexia:  - Educated on the importance of nutrition and weight maintenance in overall health and prognosis.  - Mr. Alcazar to consume any food or drink to increase caloric intake.  - Mr. Alcazar to try beef broth with increased salt content to help retain fluid and increase blood pressure.  - Mr. Alcazar to continue consuming foods previously enjoyed, such as oatmeal cookies.  - Continued Marinol, instructing to take 30 minutes before projected mealtimes 2 times daily for 2 weeks to stimulate appetite.  - Continued Boost nutritional supplement 3 times daily.  - Referred to Cielo Castro, [facility]ist at [facility] for nutritional assessment and recommendations.    FOLLOW-UP:  - Refer to [facility] hospice for info visit  - Follow up in 2 weeks to assess effectiveness of Marinol on appetite stimulation and caloric intake.  - Contact office if any issues arise before scheduled follow-up.          Patient's encounter and above plan of care discussed with patient's wife, Sandy Alcazar and daughter.    SUBJECTIVE:     History of Present Illness:  Patient is a 89 y.o. year old male.    History of Present Illness    CHIEF COMPLAINT:  - Mr. Alcazar presents for palliative care consultation to discuss symptom management related to prostate cancer and evaluate potential hospice services.    HISTORY OBTAINED FROM:  - Mr. Alcazar, Mrs. Alcazar (Wife), Shireen (Daughter)    HPI:  Mr. Alcazar has prostate cancer and underwent radiation therapy. He has experienced significant decline in mobility and independence over the past few months. He was hospitalized for two days in January and started home health services in early February. Since then, his condition has deteriorated, requiring assistance with activities of daily living. He now primarily moves between his bed and chair, occasionally using a walker or cane for support. Mr. Alcazar began wearing incontinence products after his hospital stay and requires assistance with cleaning in the mornings. Bathing has been reduced to sponge baths. His appetite has significantly decreased over the past year, with his diet primarily consisting of Boost nutritional drinks 3 times daily. He has been prescribed Marinol to stimulate appetite, but usage has been inconsistent. Mr. Alcazar experiences fatigue and shortness of breath with exertion, particularly when walking. His endurance has notably decreased, necessitating wheelchair use for longer distances such as doctor's appointments. He also experiences intermittent periods of drowsiness during the day. He previously had issues with skin wounds and  bruising on his arms due to thin skin, but these have since healed. Mr. Alcazar's weight is reported as 109 lbs.    Mr. Alcazar denies experiencing pain, nausea, vomiting, sleep disturbances, or mood issues. He has not been diagnosed with any cognitive impairments.    ACTIVITIES OF DAILY LIVING:  - Walks from bed to chair without assistance, but does not move around much beyond that  - Uses a walker or cane on some days when having difficulty  - Needs wheelchair for longer distances, such as at doctor's appointments  - Wears adult diapers (pens) and requires cleaning in the morning  - Uses a urinal for urination  - Receives sponge baths for bathing  - Stopped eating solid food about a year ago, now primarily consumes Boost 3 times daily  - Offered food but has very little appetite  - Takes vitamins and pills independently    GOALS OF CARE/ADVANCED DIRECTIVES:  - Has an advanced directive and living will in place  - [place] POST (Physician Orders for Scope of Treatment) form has been completed  - Does not want chest compressions if his heart stops beating  - Does not want to be on a breathing machine  - Agrees to selective treatment, which includes avoiding intensive care and intubation, but allows for antibiotics and other reversible treatments  - Agreeable to a trial period of artificial nutrition via feeding tube if needed, with potential for long-term use if helpful in reversing his condition  - Daughter, Shireen, has been designated as the decision-maker  - Currently receives home health care, with discussions about potentially transi  tioning to hospice care in the future  - Lives at home with his wife, who is his primary caregiver    MEDICATIONS:  - Marinol, taken when he eats (which is seldom), oral, for appetite stimulation, ineffective so far  - Boost, 3 times daily, oral, for nutrition  - Gatorade, oral, to increase fluid intake and blood pressure  - Vitamins, taken daily, oral    MEDICAL HISTORY:  - Prostate  cancer, diagnosed prior to     SURGICAL HISTORY:  - Radiation therapy: Performed for prostate cancer    SOCIAL HISTORY:  - Marital Status:  (patient referred to as Mr. Alcazar and his wife as Mrs. Alcazar)    Past Medical History:   Diagnosis Date    Arthritis     History of skin cancer     details unknown    Hypothyroidism, unspecified     PAT (paroxysmal atrial tachycardia)     Prostate cancer     injections q 6 months    Valvular heart disease     mild AS, MR and TR  ECHO     Past Surgical History:   Procedure Laterality Date    BONE MARROW BIOPSY Bilateral 6/15/2022    Procedure: Biopsy-bone marrow;  Surgeon: Hermann Jackson MD;  Location: [facility] OR;  Service: Oncology;  Laterality: Bilateral;    CATARACT EXTRACTION W/  INTRAOCULAR LENS IMPLANT Bilateral     ESOPHAGOGASTRODUODENOSCOPY      INSERTION OF TUNNELED CENTRAL VENOUS CATHETER (CVC) WITH SUBCUTANEOUS PORT N/A 6/15/2022    Procedure: INSERTION, PORT-A-CATH;  Surgeon: Marques Rivero MD;  Location: [facility] OR;  Service: General;  Laterality: N/A;     Family History   Problem Relation Name Age of Onset    Lung cancer Sister           of     Review of patient's allergies indicates:  No Known Allergies  Social Drivers of Health     Tobacco Use: Low Risk  (2025)    Patient History     Smoking Tobacco Use: Never     Smokeless Tobacco Use: Never     Passive Exposure: Not on file   Alcohol Use: Not At Risk (2024)    AUDIT-C     Frequency of Alcohol Consumption: Never     Average Number of Drinks: Patient does not drink     Frequency of Binge Drinking: Never   Financial Resource Strain: Low Risk  (2025)    Overall Financial Resource Strain (CARDIA)     Difficulty of Paying Living Expenses: Not hard at all   Food Insecurity: No Food Insecurity (2025)    Hunger Vital Sign     Worried About Running Out of Food in the Last Year: Never true     Ran Out of Food in the Last Year: Never true   Transportation Needs: No Transportation  Needs (2/2/2025)    OASIS : Transportation     Lack of Transportation (Medical): No     Lack of Transportation (Non-Medical): No     Patient Unable or Declines to Respond: No   Physical Activity: Inactive (1/26/2025)    Exercise Vital Sign     Days of Exercise per Week: 0 days     Minutes of Exercise per Session: 0 min   Stress: No Stress Concern Present (1/26/2025)    [employer] - Occupational Stress Questionnaire     Feeling of Stress : Only a little   Housing Stability: Unknown (1/26/2025)    Housing Stability Vital Sign     Unable to Pay for Housing in the Last Year: No     Number of Times Moved in the Last Year: Not on file     Homeless in the Last Year: No   Depression: Low Risk  (12/6/2024)    Depression     Last PHQ-4: Flowsheet Data: 0   Utilities: Not At Risk (1/26/2025)    [facility] Utilities     Threatened with loss of utilities: No   Health Literacy: Adequate Health Literacy (2/2/2025)    OASIS : Health Literacy     Frequency of needing help to read materials from doctor or pharmacy: Never   Recent Concern: Health Literacy - Inadequate Health Literacy (1/24/2025)     Health Literacy     Frequency of need for help with medical instructions: Often   Social Isolation: Socially Integrated (1/26/2025)    Social Isolation     Social Isolation: 1          Medications:  Current Medications[1]    OBJECTIVE:       ROS:  Review of Systems   Constitutional:  Positive for unexpected weight change. Negative for appetite change.   HENT:  Negative for mouth sores.    Eyes:  Negative for visual disturbance.   Respiratory:  Negative for cough and shortness of breath.    Cardiovascular:  Negative for chest pain.   Gastrointestinal:  Negative for abdominal pain and diarrhea.   Genitourinary:  Negative for frequency.   Musculoskeletal:  Negative for back pain.   Skin:  Negative for rash.   Neurological:  Negative for headaches.   Hematological:  Negative for adenopathy.    Psychiatric/Behavioral:  The patient is not nervous/anxious.        Review of Symptoms      Symptom Assessment (ESAS 0-10 Scale)  Pain:  0  Dyspnea:  0  Anxiety:  0  Nausea:  0  Depression:  0  Anorexia:  0  Fatigue:  0  Insomnia:  0  Restlessness:  0  Agitation:  0       Anxiety:  Is not nervous/anxious    ECOG Performance Status thGthrthathdtheth:th th4th Living Arrangements:  Lives with family and Lives with spouse    Psychosocial/Cultural:   See Palliative Psychosocial Note: Yes  **Primary  to Follow**  Palliative Care  Consult: Yes     Time-Based Charting:  Yes  Chart Review: 5 minutes  Face to Face: 45 minutes  Advance Care Planning: 10 minutes    Total Time Spent: 60 minutes      Advance Care Planning   Advance Directives:   Living Will: Yes        Copy on chart: Yes    [place]: Yes    Do Not Resuscitate Status: Yes      Decision Making:  Patient answered questions and Family answered questions  Goals of Care: The patient and family endorses that what is most important right now is to focus on spending time at home, avoiding the hospital, remaining as independent as possible, symptom/pain control, and quality of life, even if it means sacrificing a little time    Accordingly, we have decided that the best plan to meet the patient's goals includes enrolling in hospice care              Physical Exam:  Vitals:    Physical Exam  Nursing note reviewed.   Constitutional:       General: He is not in acute distress.     Appearance: He is ill-appearing. He is not toxic-appearing.   Pulmonary:      Effort: Pulmonary effort is normal.   Musculoskeletal:      Cervical back: Normal range of motion.   Skin:     Coloration: Skin is pale. Skin is not jaundiced.   Neurological:      Mental Status: He is alert and oriented to person, place, and time.   Psychiatric:         Mood and Affect: Mood normal.         Behavior: Behavior normal.         Thought Content: Thought content normal.         Judgment: Judgment  normal.         Labs:  CBC:   WBC   Date Value Ref Range Status   01/26/2025 8.38 3.90 - 12.70 K/uL Final     Hemoglobin   Date Value Ref Range Status   01/26/2025 8.6 (L) 14.0 - 18.0 g/dL Final     Hematocrit   Date Value Ref Range Status   01/26/2025 26.9 (L) 40.0 - 54.0 % Final     MCV   Date Value Ref Range Status   01/26/2025 98 82 - 98 fL Final     Platelets   Date Value Ref Range Status   01/26/2025 152 150 - 450 K/uL Final       LFT:   Lab Results   Component Value Date    AST 37 01/24/2025    ALKPHOS 43 01/24/2025    BILITOT 0.6 01/24/2025       Albumin:   Albumin   Date Value Ref Range Status   01/26/2025 2.3 (L) 3.5 - 5.2 g/dL Final     Protein:   Total Protein   Date Value Ref Range Status   01/24/2025 7.5 6.0 - 8.4 g/dL Final       Radiology:None      60 minutes of total time spent on the encounter, which includes face to face time and non-face to face time preparing to see the patient (eg, review of tests), Obtaining and/or reviewing separately obtained history, Documenting clinical information in the electronic or other health record, Independently interpreting results (not separately reported) and communicating results to the patient/family/caregiver, or Care coordination (not separately reported).    10 minutes spent in discussing ACP    This note was generated with the assistance of ambient listening technology. Verbal consent was obtained by the patient and accompanying visitor(s) for the recording of patient appointment to facilitate this note. I attest to having reviewed and edited the generated note for accuracy, though some syntax or spelling errors may persist. Please contact the author of this note for any clarification.     The patient location is: [place], [place]    Visit type: audiovisual    Face to Face time with patient: 45  60 minutes of total time spent on the encounter, which includes face to face time and non-face to face time preparing to see the patient (eg, review of tests),  Obtaining and/or reviewing separately obtained history, Documenting clinical information in the electronic or other health record, Independently interpreting results (not separately reported) and communicating results to the patient/family/caregiver, or Care coordination (not separately reported).         Each patient to whom he or she provides medical services by telemedicine is:  (1) informed of the relationship between the physician and patient and the respective role of any other health care provider with respect to management of the patient; and (2) notified that he or she may decline to receive medical services by telemedicine and may withdraw from such care at any time.    Notes:       Signature: Taty Duran DO         [1]   Current Outpatient Medications:     abiraterone (ZYTIGA) 500 mg Tab, Take 2 tablets (1,000 mg total) by mouth once daily., Disp: 60 tablet, Rfl: 6    acetaminophen (TYLENOL) 650 MG Supp, Place 650 mg rectally every 4 (four) hours as needed (fever). Indications: fever, pain, Disp: , Rfl:     albuterol-ipratropium (DUO-NEB) 2.5 mg-0.5 mg/3 mL nebulizer solution, Take 3 mLs by nebulization every 4 (four) hours as needed for Shortness of Breath. Indications: wheezing, Disp: , Rfl:     atorvastatin (LIPITOR) 10 MG tablet, TAKE 1 TABLET ONE TIME DAILY, Disp: 90 tablet, Rfl: 0    atropine sulfate (ATROPINE ORAL), Take 2-4 drops by mouth every 4 (four) hours as needed (excessive secretions). Indications: excessive secretions, Disp: , Rfl:     bisacodyL (DULCOLAX, BISACODYL,) 10 mg Supp, Place 10 mg rectally as needed (constipation). If no BM x 3 days  Indications: constipation, Disp: , Rfl:     droNABinol (MARINOL) 2.5 MG capsule, Take 1 capsule (2.5 mg total) by mouth 2 (two) times daily before meals., Disp: 60 capsule, Rfl: 1    folic acid (FOLVITE) 400 MCG tablet, Take 400 mcg by mouth once daily., Disp: , Rfl:     haloperidol (HALDOL) 2 mg/mL solution, Take 1 mg by mouth every 4 (four) hours  as needed (agitation). Indications: delirium, Disp: , Rfl:     lactulose 10 gram/15 ml (CHRONULAC) 10 gram/15 mL (15 mL) solution, Take 10 g by mouth daily as needed (constipation). Indications: constipation, Disp: , Rfl:     levothyroxine (SYNTHROID) 50 MCG tablet, Take 1 tablet (50 mcg total) by mouth before breakfast., Disp: 90 tablet, Rfl: 0    LORazepam (ATIVAN) 2 mg/mL Conc, Take 1 mg by mouth every 2 (two) hours as needed (anxiety). Indications: anxious, Disp: , Rfl:     LUPRON DEPOT, 6 MONTH, 45 mg SyKt injection, Inject into the muscle every 6 (six) months., Disp: , Rfl:     methylcellulose, laxative, 500 mg Tab, Take 1 tablet by mouth daily as needed (constipation)., Disp: , Rfl:     metoprolol succinate (TOPROL-XL) 25 MG 24 hr tablet, Take 1 tablet (25 mg total) by mouth daily with breakfast AND 0.5 tablets (12.5 mg total) before evening meal. (Patient taking differently: Take 1 tablet (25 mg total) by mouth daily with breakfast AND 0.5 tablets (12.5 mg total) before evening meal. Hold medication if SBP <100. ), Disp: 135 tablet, Rfl: 3    morphine 20 mg/mL concentrated syringe, Take 10 mg by mouth every 2 (two) hours as needed for Pain. Indications: severe pain with opioid tolerance, Disp: , Rfl:     multivit-min-FA-lycopen-lutein (CENTRUM SILVER MEN) 300-600-300 mcg Tab, Take 1 tablet by mouth once daily., Disp: , Rfl:     ondansetron (ZOFRAN) 4 MG tablet, Take 4 mg by mouth every 4 (four) hours as needed for Nausea. Indications: nausea and vomiting caused by cancer drugs, Disp: , Rfl:     OXYGEN-AIR DELIVERY SYSTEMS MISC, 2-4 L/min by Nasal route continuous prn (Shortness of breath). Indications: SOB, Disp: , Rfl:     predniSONE (DELTASONE) 5 MG tablet, Take 1 tablet (5 mg total) by mouth once daily., Disp: 60 tablet, Rfl: 6    senna-docusate 8.6-50 mg (SENNA-S) 8.6-50 mg per tablet, Take 1 tablet by mouth daily as needed for Constipation. Indications: constipation, Disp: , Rfl:     traZODone (DESYREL)  50 MG tablet, Take 50 mg by mouth nightly as needed for Insomnia. Indications: insomnia associated with depression, Disp: , Rfl:

## 2025-02-21 NOTE — TELEPHONE ENCOUNTER
----- Message from Nurse Dennis sent at 2/21/2025  4:32 PM CST -----  Regarding: FW: [place]  transition to hospice     ----- Message -----  From: Bishnu Orellana RN  Sent: 2/20/2025   3:59 PM CST  To: [facility]YS[facility] Staff  Subject: Fyi  transition to hospice                       Patient decided to transition to hospice care today, will be admitted to hospice in the morning. They had a palliative care visit this week and confirmed DNR status. Today Mr. Alcazar's vital signs were concerning, though he was alert and conversational his pressure was pretty low 71/54 and heart rate was 128. Prior to vital signs being taking he had gotten up and been dizzy. They did not want to go to the hospital or pursue further care.  Thank you

## 2025-03-03 ENCOUNTER — PATIENT MESSAGE (OUTPATIENT)
Dept: HEMATOLOGY/ONCOLOGY | Facility: CLINIC | Age: 89
End: 2025-03-03
Payer: MEDICARE

## 2025-03-03 ENCOUNTER — PATIENT MESSAGE (OUTPATIENT)
Dept: FAMILY MEDICINE | Facility: CLINIC | Age: 89
End: 2025-03-03
Payer: MEDICARE

## (undated) DEVICE — GLOVE SURG BIOGEL LATEX SZ 7.5

## (undated) DEVICE — TOWEL OR DISP STRL BLUE 4/PK

## (undated) DEVICE — CLOSURE SKIN STERI STRIP 1/2X4

## (undated) DEVICE — SUT MONOCRYL 4-0 PS-2

## (undated) DEVICE — SPONGE DERMACEA 4X4IN 12PLY

## (undated) DEVICE — SUT 2-0 VICRYL / SH (J417)

## (undated) DEVICE — SUT 3-0 VICRYL / SH (J416)

## (undated) DEVICE — SYR 10CC LUER LOCK

## (undated) DEVICE — DEVICE CARESITE LUER ACCESS

## (undated) DEVICE — PACK BASIC

## (undated) DEVICE — PACK SET UP 190 OMC-NS

## (undated) DEVICE — SEE MEDLINE ITEM 157128

## (undated) DEVICE — SEE L#120831

## (undated) DEVICE — APPLICATOR CHLORAPREP ORN 26ML

## (undated) DEVICE — DRAPE C ARM 42 X 120 10/BX

## (undated) DEVICE — DRESSING TRANS 4X4 TEGADERM

## (undated) DEVICE — DRAPE LAP TIBURON 77X122IN

## (undated) DEVICE — PENCIL ROCKER SWITCH 10FT CORD

## (undated) DEVICE — ELECTRODE REM PLYHSV RETURN 9